# Patient Record
Sex: FEMALE | Race: BLACK OR AFRICAN AMERICAN | Employment: OTHER | URBAN - NONMETROPOLITAN AREA
[De-identification: names, ages, dates, MRNs, and addresses within clinical notes are randomized per-mention and may not be internally consistent; named-entity substitution may affect disease eponyms.]

---

## 2017-02-20 ENCOUNTER — HOSPITAL ENCOUNTER (OUTPATIENT)
Age: 72
Setting detail: SPECIMEN
Discharge: HOME | End: 2017-02-20
Payer: MEDICARE

## 2017-05-08 ENCOUNTER — HOSPITAL ENCOUNTER (OUTPATIENT)
Age: 72
Setting detail: SPECIMEN
Discharge: HOME | End: 2017-05-08
Payer: COMMERCIAL

## 2017-05-31 ENCOUNTER — APPOINTMENT (OUTPATIENT)
Dept: GENERAL RADIOLOGY | Age: 72
End: 2017-05-31
Payer: MEDICARE

## 2017-05-31 ENCOUNTER — HOSPITAL ENCOUNTER (EMERGENCY)
Age: 72
Discharge: HOME | End: 2017-05-31
Attending: EMERGENCY MEDICINE
Payer: MEDICARE

## 2017-05-31 VITALS
HEIGHT: 62 IN | TEMPERATURE: 98.4 F | RESPIRATION RATE: 18 BRPM | DIASTOLIC BLOOD PRESSURE: 76 MMHG | BODY MASS INDEX: 20.98 KG/M2 | HEART RATE: 82 BPM | SYSTOLIC BLOOD PRESSURE: 118 MMHG | WEIGHT: 114 LBS | OXYGEN SATURATION: 99 %

## 2017-05-31 DIAGNOSIS — E87.6 HYPOKALEMIA: ICD-10-CM

## 2017-05-31 DIAGNOSIS — D64.9 ANEMIA, UNSPECIFIED TYPE: ICD-10-CM

## 2017-05-31 DIAGNOSIS — N17.9 ACUTE RENAL FAILURE ON DIALYSIS (HCC): Primary | ICD-10-CM

## 2017-05-31 DIAGNOSIS — Z99.2 ACUTE RENAL FAILURE ON DIALYSIS (HCC): Primary | ICD-10-CM

## 2017-05-31 LAB
ABO/RH: NORMAL
ALBUMIN SERPL-MCNC: 2.4 G/DL (ref 3.5–5.2)
ALP BLD-CCNC: 88 U/L (ref 35–104)
ALT SERPL-CCNC: <5 U/L (ref 5–33)
ANION GAP SERPL CALCULATED.3IONS-SCNC: 18 MMOL/L (ref 7–19)
ANTIBODY SCREEN: NORMAL
AST SERPL-CCNC: 9 U/L (ref 5–32)
BANDED NEUTROPHILS RELATIVE PERCENT: 1 % (ref 0–5)
BASOPHILS ABSOLUTE: 0 K/UL (ref 0–0.2)
BASOPHILS MANUAL: 1 %
BASOPHILS RELATIVE PERCENT: 1 % (ref 0–1)
BILIRUB SERPL-MCNC: 1.2 MG/DL (ref 0.2–1.2)
BLOOD BANK DISPENSE STATUS: NORMAL
BLOOD BANK PRODUCT CODE: NORMAL
BPU ID: NORMAL
BUN BLDV-MCNC: 28 MG/DL (ref 8–23)
BURR CELLS: ABNORMAL
C-REACTIVE PROTEIN: 4.8 MG/L (ref 0–5)
CALCIUM SERPL-MCNC: 7.3 MG/DL (ref 8.8–10.2)
CHLORIDE BLD-SCNC: 96 MMOL/L (ref 98–111)
CO2: 24 MMOL/L (ref 22–29)
CREAT SERPL-MCNC: 9.8 MG/DL (ref 0.5–0.9)
DESCRIPTION BLOOD BANK: NORMAL
EOSINOPHILS ABSOLUTE: 0.06 K/UL (ref 0–0.6)
EOSINOPHILS MANUAL: 2 %
EOSINOPHILS RELATIVE PERCENT: 2 % (ref 0–5)
GFR NON-AFRICAN AMERICAN: 4
GLUCOSE BLD-MCNC: 67 MG/DL (ref 74–109)
HCT VFR BLD CALC: 22 % (ref 37–47)
HEMOGLOBIN: 7.1 G/DL (ref 12–16)
HYPOCHROMIA: ABNORMAL
LYMPHOCYTES ABSOLUTE: 0.9 K/UL (ref 1.1–4.5)
LYMPHOCYTES MANUAL: 32 % (ref 20–40)
LYMPHOCYTES RELATIVE PERCENT: 32 % (ref 20–40)
MAGNESIUM: 2.1 MG/DL (ref 1.6–2.4)
MCH RBC QN AUTO: 29.5 PG (ref 27–31)
MCHC RBC AUTO-ENTMCNC: 32.3 G/DL (ref 33–37)
MCV RBC AUTO: 91.3 FL (ref 81–99)
MONOCYTES ABSOLUTE: 0.1 K/UL (ref 0–0.9)
MONOCYTES MANUAL: 4 % (ref 0–10)
MONOCYTES RELATIVE PERCENT: 4 % (ref 0–10)
NEUTROPHILS ABSOLUTE: 1.8 K/UL (ref 1.5–7.5)
NEUTROPHILS MANUAL: 60 %
NEUTROPHILS RELATIVE PERCENT: 60 % (ref 50–65)
PDW BLD-RTO: 23 % (ref 11.5–14.5)
PLATELET # BLD: 111 K/UL (ref 130–400)
PMV BLD AUTO: 9.5 FL (ref 7.4–10.4)
POIKILOCYTES: ABNORMAL
POTASSIUM SERPL-SCNC: 2 MMOL/L (ref 3.5–5)
RBC # BLD: 2.41 M/UL (ref 4.2–5.4)
SEDIMENTATION RATE, ERYTHROCYTE: 13 MM/HR (ref 0–25)
SLIDE REVIEW: ABNORMAL
SODIUM BLD-SCNC: 138 MMOL/L (ref 136–145)
TOTAL PROTEIN: 5.5 G/DL (ref 6.6–8.7)
WBC # BLD: 2.9 K/UL (ref 4.8–10.8)

## 2017-05-31 PROCEDURE — 85652 RBC SED RATE AUTOMATED: CPT

## 2017-05-31 PROCEDURE — 36415 COLL VENOUS BLD VENIPUNCTURE: CPT

## 2017-05-31 PROCEDURE — 86901 BLOOD TYPING SEROLOGIC RH(D): CPT

## 2017-05-31 PROCEDURE — 99284 EMERGENCY DEPT VISIT MOD MDM: CPT

## 2017-05-31 PROCEDURE — 85025 COMPLETE CBC W/AUTO DIFF WBC: CPT

## 2017-05-31 PROCEDURE — 2580000003 HC RX 258: Performed by: NURSE PRACTITIONER

## 2017-05-31 PROCEDURE — 99284 EMERGENCY DEPT VISIT MOD MDM: CPT | Performed by: EMERGENCY MEDICINE

## 2017-05-31 PROCEDURE — 83735 ASSAY OF MAGNESIUM: CPT

## 2017-05-31 PROCEDURE — 73610 X-RAY EXAM OF ANKLE: CPT

## 2017-05-31 PROCEDURE — 86900 BLOOD TYPING SEROLOGIC ABO: CPT

## 2017-05-31 PROCEDURE — 86140 C-REACTIVE PROTEIN: CPT

## 2017-05-31 PROCEDURE — 36430 TRANSFUSION BLD/BLD COMPNT: CPT

## 2017-05-31 PROCEDURE — 93005 ELECTROCARDIOGRAM TRACING: CPT

## 2017-05-31 PROCEDURE — 6370000000 HC RX 637 (ALT 250 FOR IP): Performed by: NURSE PRACTITIONER

## 2017-05-31 PROCEDURE — 80053 COMPREHEN METABOLIC PANEL: CPT

## 2017-05-31 PROCEDURE — P9016 RBC LEUKOCYTES REDUCED: HCPCS

## 2017-05-31 PROCEDURE — 86850 RBC ANTIBODY SCREEN: CPT

## 2017-05-31 RX ORDER — POTASSIUM CHLORIDE 20 MEQ/1
20 TABLET, EXTENDED RELEASE ORAL ONCE
Status: COMPLETED | OUTPATIENT
Start: 2017-05-31 | End: 2017-05-31

## 2017-05-31 RX ORDER — POTASSIUM CHLORIDE 7.45 MG/ML
10 INJECTION INTRAVENOUS ONCE
Status: DISCONTINUED | OUTPATIENT
Start: 2017-05-31 | End: 2017-05-31

## 2017-05-31 RX ORDER — 0.9 % SODIUM CHLORIDE 0.9 %
250 INTRAVENOUS SOLUTION INTRAVENOUS ONCE
Status: COMPLETED | OUTPATIENT
Start: 2017-05-31 | End: 2017-05-31

## 2017-05-31 RX ADMIN — SODIUM CHLORIDE 250 ML: 9 INJECTION, SOLUTION INTRAVENOUS at 11:39

## 2017-05-31 RX ADMIN — POTASSIUM CHLORIDE 20 MEQ: 20 TABLET, EXTENDED RELEASE ORAL at 11:24

## 2017-05-31 ASSESSMENT — PAIN DESCRIPTION - LOCATION: LOCATION: ANKLE

## 2017-05-31 ASSESSMENT — PAIN SCALES - GENERAL: PAINLEVEL_OUTOF10: 10

## 2017-05-31 ASSESSMENT — PAIN DESCRIPTION - ORIENTATION: ORIENTATION: LEFT

## 2017-05-31 ASSESSMENT — PAIN DESCRIPTION - FREQUENCY: FREQUENCY: CONTINUOUS

## 2017-06-01 LAB
EKG P AXIS: 76 DEGREES
EKG P-R INTERVAL: 151 MS
EKG Q-T INTERVAL: 540 MS
EKG QRS DURATION: 125 MS
EKG QTC CALCULATION (BAZETT): 612 MS
EKG T AXIS: -59 DEGREES

## 2017-06-11 ENCOUNTER — HOSPITAL ENCOUNTER (INPATIENT)
Facility: HOSPITAL | Age: 72
LOS: 15 days | Discharge: HOME HEALTH | End: 2017-06-26
Attending: EMERGENCY MEDICINE | Admitting: FAMILY MEDICINE

## 2017-06-11 ENCOUNTER — APPOINTMENT (OUTPATIENT)
Dept: GENERAL RADIOLOGY | Facility: HOSPITAL | Age: 72
End: 2017-06-11

## 2017-06-11 DIAGNOSIS — I95.9 HYPOTENSION, UNSPECIFIED HYPOTENSION TYPE: ICD-10-CM

## 2017-06-11 DIAGNOSIS — N18.6 END STAGE RENAL DISEASE (HCC): ICD-10-CM

## 2017-06-11 DIAGNOSIS — Z74.09 IMPAIRED MOBILITY: ICD-10-CM

## 2017-06-11 DIAGNOSIS — I50.22 CHRONIC SYSTOLIC (CONGESTIVE) HEART FAILURE (HCC): ICD-10-CM

## 2017-06-11 DIAGNOSIS — Z78.9 DECREASED ACTIVITIES OF DAILY LIVING (ADL): ICD-10-CM

## 2017-06-11 DIAGNOSIS — R07.9 CHEST PAIN IN ADULT: Primary | ICD-10-CM

## 2017-06-11 LAB
ALBUMIN SERPL-MCNC: 2.1 G/DL (ref 3.5–5)
ALBUMIN/GLOB SERPL: 0.6 G/DL (ref 1.1–2.5)
ALP SERPL-CCNC: 81 U/L (ref 24–120)
ALT SERPL W P-5'-P-CCNC: 17 U/L (ref 0–54)
ANION GAP SERPL CALCULATED.3IONS-SCNC: 20 MMOL/L (ref 4–13)
AST SERPL-CCNC: 18 U/L (ref 7–45)
BILIRUB SERPL-MCNC: 2.4 MG/DL (ref 0.1–1)
BUN BLD-MCNC: 66 MG/DL (ref 5–21)
BUN/CREAT SERPL: 4 (ref 7–25)
CALCIUM SPEC-SCNC: 8.1 MG/DL (ref 8.4–10.4)
CHLORIDE SERPL-SCNC: 100 MMOL/L (ref 98–110)
CO2 SERPL-SCNC: 19 MMOL/L (ref 24–31)
CREAT BLD-MCNC: 16.31 MG/DL (ref 0.5–1.4)
GFR SERPL CREATININE-BSD FRML MDRD: 3 ML/MIN/1.73
GLOBULIN UR ELPH-MCNC: 3.3 GM/DL
GLUCOSE BLD-MCNC: 72 MG/DL (ref 70–100)
MAGNESIUM SERPL-MCNC: 2.2 MG/DL (ref 1.4–2.2)
PHOSPHATE SERPL-MCNC: 9.4 MG/DL (ref 2.5–4.5)
POTASSIUM BLD-SCNC: 2 MMOL/L (ref 3.5–5.3)
PROT SERPL-MCNC: 5.4 G/DL (ref 6.3–8.7)
SODIUM BLD-SCNC: 139 MMOL/L (ref 135–145)
TROPONIN I SERPL-MCNC: 2.57 NG/ML (ref 0–0.03)
TROPONIN I SERPL-MCNC: 2.87 NG/ML (ref 0–0.03)

## 2017-06-11 PROCEDURE — 83735 ASSAY OF MAGNESIUM: CPT | Performed by: INTERNAL MEDICINE

## 2017-06-11 PROCEDURE — 93010 ELECTROCARDIOGRAM REPORT: CPT | Performed by: INTERNAL MEDICINE

## 2017-06-11 PROCEDURE — 93005 ELECTROCARDIOGRAM TRACING: CPT

## 2017-06-11 PROCEDURE — 84484 ASSAY OF TROPONIN QUANT: CPT | Performed by: INTERNAL MEDICINE

## 2017-06-11 PROCEDURE — 84100 ASSAY OF PHOSPHORUS: CPT | Performed by: NURSE PRACTITIONER

## 2017-06-11 PROCEDURE — 85007 BL SMEAR W/DIFF WBC COUNT: CPT | Performed by: NURSE PRACTITIONER

## 2017-06-11 PROCEDURE — 84484 ASSAY OF TROPONIN QUANT: CPT | Performed by: EMERGENCY MEDICINE

## 2017-06-11 PROCEDURE — 71010 HC CHEST PA OR AP: CPT

## 2017-06-11 PROCEDURE — 99285 EMERGENCY DEPT VISIT HI MDM: CPT

## 2017-06-11 PROCEDURE — 80053 COMPREHEN METABOLIC PANEL: CPT | Performed by: NURSE PRACTITIONER

## 2017-06-11 PROCEDURE — 99222 1ST HOSP IP/OBS MODERATE 55: CPT | Performed by: INTERNAL MEDICINE

## 2017-06-11 PROCEDURE — 85025 COMPLETE CBC W/AUTO DIFF WBC: CPT | Performed by: NURSE PRACTITIONER

## 2017-06-11 PROCEDURE — 83735 ASSAY OF MAGNESIUM: CPT | Performed by: NURSE PRACTITIONER

## 2017-06-11 PROCEDURE — 93005 ELECTROCARDIOGRAM TRACING: CPT | Performed by: EMERGENCY MEDICINE

## 2017-06-11 RX ORDER — LOSARTAN POTASSIUM 25 MG/1
25 TABLET ORAL DAILY
COMMUNITY
End: 2017-06-26 | Stop reason: HOSPADM

## 2017-06-11 RX ORDER — PREDNISONE 1 MG/1
1 TABLET ORAL DAILY
COMMUNITY
End: 2017-06-26 | Stop reason: HOSPADM

## 2017-06-11 RX ORDER — TRAZODONE HYDROCHLORIDE 50 MG/1
50 TABLET ORAL NIGHTLY
COMMUNITY

## 2017-06-11 RX ORDER — IPRATROPIUM BROMIDE AND ALBUTEROL SULFATE 2.5; .5 MG/3ML; MG/3ML
3 SOLUTION RESPIRATORY (INHALATION) EVERY 4 HOURS PRN
Status: DISCONTINUED | OUTPATIENT
Start: 2017-06-11 | End: 2017-06-15

## 2017-06-11 RX ORDER — PANTOPRAZOLE SODIUM 40 MG/1
40 TABLET, DELAYED RELEASE ORAL DAILY
COMMUNITY

## 2017-06-11 RX ORDER — ONDANSETRON 2 MG/ML
4 INJECTION INTRAMUSCULAR; INTRAVENOUS EVERY 6 HOURS PRN
Status: DISCONTINUED | OUTPATIENT
Start: 2017-06-11 | End: 2017-06-26 | Stop reason: HOSPADM

## 2017-06-11 RX ORDER — POTASSIUM CHLORIDE 750 MG/1
20 CAPSULE, EXTENDED RELEASE ORAL 2 TIMES DAILY WITH MEALS
Status: DISCONTINUED | OUTPATIENT
Start: 2017-06-11 | End: 2017-06-12

## 2017-06-11 RX ORDER — SODIUM CHLORIDE 0.9 % (FLUSH) 0.9 %
1-10 SYRINGE (ML) INJECTION AS NEEDED
Status: DISCONTINUED | OUTPATIENT
Start: 2017-06-11 | End: 2017-06-22

## 2017-06-11 RX ORDER — POTASSIUM CHLORIDE 1.5 G/1.77G
20 POWDER, FOR SOLUTION ORAL DAILY
COMMUNITY
End: 2017-06-26 | Stop reason: HOSPADM

## 2017-06-11 RX ORDER — CARVEDILOL 12.5 MG/1
12.5 TABLET ORAL DAILY
COMMUNITY
End: 2017-06-26 | Stop reason: HOSPADM

## 2017-06-11 RX ORDER — PANTOPRAZOLE SODIUM 40 MG/1
40 TABLET, DELAYED RELEASE ORAL
Status: DISCONTINUED | OUTPATIENT
Start: 2017-06-12 | End: 2017-06-26 | Stop reason: HOSPADM

## 2017-06-11 RX ORDER — ASPIRIN 81 MG/1
81 TABLET ORAL DAILY
Status: DISCONTINUED | OUTPATIENT
Start: 2017-06-11 | End: 2017-06-18

## 2017-06-11 RX ADMIN — SODIUM CHLORIDE 250 ML: 9 INJECTION, SOLUTION INTRAVENOUS at 17:55

## 2017-06-11 RX ADMIN — SODIUM CHLORIDE 250 ML: 9 INJECTION, SOLUTION INTRAVENOUS at 18:30

## 2017-06-11 NOTE — ED NOTES
PATIENT C/O CHEST PAIN THAT BEGAN TWO DAYS AGO. PATIENT STATES PAIN RADIATED TO HER RIGHT ARM. PATIENT STATES PAIN IS RESOLVED AT THIS TIME. PATIENT DENIES SHORTNESS OF BREATH. PATIENT PRESENTED TO [facility] ER TODAY WITH C/O CHEST PAIN, NAUSEA, SOA, AND ALL OVER PAIN. PATIENT'S TROPONIN WAS 3.09 AT [facility]. PATIENT ALSO REPORTS MISSING HER DIALYSIS APPOINTMENT ON Friday DUE TO FATIGUE AND NOT FELLING WELL. PATIENT WAS GIVEN 500 NS BOLUS, 324 MG ASPIRIN, 2 MG MORPHINE, AND 4MG ZOFRAN PER TRANSFERRING FACILITY.      Dahlia Avila RN  06/11/17 2859

## 2017-06-11 NOTE — ED PROVIDER NOTES
"Subjective   HPI Comments: Patient is a 72-year-old female with history of chronic systolic congestive heart failure, history of severe ischemic cardiomyopathy, end-stage renal disease, atrial fibrillation and hypertension.  Patient reports that she was not feeling well on Friday (06/09/17) and therefore she did not go for her dialysis treatment.  Patient reports that this morning at 3 AM she developed \"sharp\" center chest pain radiating to her left arm with associated shortness of breath, nausea, vomiting, diaphoresis and generalized pain and therefore she was seen at [facility] emergency department and was noted to have a potassium at 2.7, BUNs 67, creatinine 17.9 and troponin 3.09 and therefore the patient was sent to this emergency department.  Patient reports her chest pain was worsened by nothing and relieved partially at [facility] emergency department when given morphine 2 mg intravenously.  She reports her chest pain currently is a 10 and at its worse is a 10.  Patient reports that her cardiologist is in [place], [place] and she last saw him about 3 months ago.  She reports her last cardiac stress test was about 4 years ago.      History provided by:  Patient ([facility] records dated 06/11/17.)      Review of Systems   Constitutional: Positive for diaphoresis.   HENT: Negative.    Eyes: Negative.    Respiratory: Positive for shortness of breath.    Cardiovascular: Positive for chest pain.   Gastrointestinal: Positive for nausea and vomiting.   Endocrine: Negative.    Genitourinary: Negative.         Chronic kidney disease   Musculoskeletal: Negative.    Skin: Negative.    Allergic/Immunologic: Negative.    Neurological: Negative.    Hematological: Negative.    Psychiatric/Behavioral: Negative.    All other systems reviewed and are negative.      Past Medical History:   Diagnosis Date   • A-fib    • Arthritis    • CHF (congestive heart failure)    • Hypertension    • " Renal failure        Allergies   Allergen Reactions   • Heparin    • Iron    • Latex    • Levaquin [Levofloxacin]    • Shrimp (Diagnostic)    • Vancomycin        Past Surgical History:   Procedure Laterality Date   • ARTERIOVENOUS FISTULA LEG Left        History reviewed. No pertinent family history.    Social History     Social History   • Marital status: Single     Spouse name: N/A   • Number of children: N/A   • Years of education: N/A     Social History Main Topics   • Smoking status: Former Smoker   • Smokeless tobacco: None   • Alcohol use No   • Drug use: No   • Sexual activity: Defer     Other Topics Concern   • None     Social History Narrative   • None           Objective   Physical Exam   Constitutional: She is oriented to person, place, and time.   Ill-appearing female.   HENT:   Head: Normocephalic and atraumatic.   Right Ear: External ear normal.   Left Ear: External ear normal.   Nose: Nose normal.   Mouth/Throat: Oropharynx is clear and moist.   Eyes: Conjunctivae and EOM are normal. Pupils are equal, round, and reactive to light. Right eye exhibits no discharge. Left eye exhibits no discharge.   Neck: Normal range of motion. Neck supple. No tracheal deviation present. No thyromegaly present.   Cardiovascular: Normal rate, regular rhythm, normal heart sounds and intact distal pulses.    No murmur heard.  Pulmonary/Chest: Effort normal and breath sounds normal. No respiratory distress. She exhibits no tenderness.   Abdominal: Soft. She exhibits no distension. There is tenderness (Mild generalized abdominal pain to palpation.).   Musculoskeletal: She exhibits no edema, tenderness or deformity.   Moderate generalized weakness.   Neurological: She is alert and oriented to person, place, and time. No cranial nerve deficit.   Skin: Skin is warm and dry. No erythema. No pallor.   Psychiatric: She has a normal mood and affect. Judgment normal.   Nursing note and vitals reviewed.      Procedures         ED  Course  ED Course   Comment By Time   Patient's case has been discussed with Dr. Nelson and he recommends that the patient be admitted by the hospitalist and the patient receive dialysis and that he will consult on the patient. Dereck Calvo MD 06/11 1841   Patient's case has just been discussed with Dr. Tang (hospitalist) and he agrees to admit the patient to ICU. Dereck Calvo MD 06/11 1858                  MDM  Number of Diagnoses or Management Options  Chest pain in adult: new and requires workup  Chronic systolic (congestive) heart failure:   End stage renal disease: established and worsening  Hypotension, unspecified hypotension type: new and requires workup     Amount and/or Complexity of Data Reviewed  Clinical lab tests: reviewed and ordered  Tests in the radiology section of CPT®: ordered and reviewed  Discuss the patient with other providers: yes    Risk of Complications, Morbidity, and/or Mortality  Presenting problems: moderate  Diagnostic procedures: moderate  Management options: low    Patient Progress  Patient progress: stable      Final diagnoses:   Chest pain in adult   End stage renal disease   Hypotension, unspecified hypotension type   Chronic systolic (congestive) heart failure            Dereck Calvo MD  06/11/17 9370

## 2017-06-11 NOTE — ED NOTES
Transfer from [facility]. Pt went to transferring ER with c/o CP since this AM, nausea, SOA, and generalized pain. Dialysis patient (M-W-F) and missed her Friday dialysis due to not feeling well. Transferred to this ER for abnormal labs: troponin 3.09, bilirubin 2.3, K 2.7, BUN 67, creat 17.9. While at [facility] pt received NS bolus of 500ml, ASA 324mg PO, Morphine 2mg IV, and Zofran 4mg IV. Chest pain decreased from 10/10 to 6/10 with Morphine. Pt also has reported new onset afib, controlled rate in 80s.     Yashira Macedo, RN, BSN  06/11/17 5595

## 2017-06-12 ENCOUNTER — APPOINTMENT (OUTPATIENT)
Dept: CARDIOLOGY | Facility: HOSPITAL | Age: 72
End: 2017-06-12
Attending: INTERNAL MEDICINE

## 2017-06-12 LAB
ANION GAP SERPL CALCULATED.3IONS-SCNC: 17 MMOL/L (ref 4–13)
ANION GAP SERPL CALCULATED.3IONS-SCNC: 20 MMOL/L (ref 4–13)
ANISOCYTOSIS BLD QL: NORMAL
BASOPHILS # BLD AUTO: 0.01 10*3/MM3 (ref 0–0.2)
BASOPHILS NFR BLD AUTO: 0.1 % (ref 0–2)
BH CV ECHO MEAS - AO MAX PG (FULL): 13.6 MMHG
BH CV ECHO MEAS - AO MAX PG: 17.5 MMHG
BH CV ECHO MEAS - AO MEAN PG (FULL): 5 MMHG
BH CV ECHO MEAS - AO MEAN PG: 7 MMHG
BH CV ECHO MEAS - AO ROOT AREA (BSA CORRECTED): 1.9
BH CV ECHO MEAS - AO ROOT AREA: 6.2 CM^2
BH CV ECHO MEAS - AO ROOT DIAM: 2.8 CM
BH CV ECHO MEAS - AO V2 MAX: 209 CM/SEC
BH CV ECHO MEAS - AO V2 MEAN: 119 CM/SEC
BH CV ECHO MEAS - AO V2 VTI: 35.8 CM
BH CV ECHO MEAS - AVA(I,A): 1.3 CM^2
BH CV ECHO MEAS - AVA(I,D): 1.3 CM^2
BH CV ECHO MEAS - AVA(V,A): 1.3 CM^2
BH CV ECHO MEAS - AVA(V,D): 1.3 CM^2
BH CV ECHO MEAS - BSA(HAYCOCK): 1.5 M^2
BH CV ECHO MEAS - BSA: 1.5 M^2
BH CV ECHO MEAS - BZI_BMI: 20.7 KILOGRAMS/M^2
BH CV ECHO MEAS - BZI_METRIC_HEIGHT: 157.5 CM
BH CV ECHO MEAS - BZI_METRIC_WEIGHT: 51.3 KG
BH CV ECHO MEAS - CONTRAST EF 4CH: 65.1 ML/M^2
BH CV ECHO MEAS - EDV(CUBED): 54.4 ML
BH CV ECHO MEAS - EDV(MOD-SP4): 70.8 ML
BH CV ECHO MEAS - EDV(TEICH): 61.6 ML
BH CV ECHO MEAS - EF(CUBED): 69.9 %
BH CV ECHO MEAS - EF(MOD-SP4): 65.1 %
BH CV ECHO MEAS - EF(TEICH): 62.3 %
BH CV ECHO MEAS - ESV(CUBED): 16.4 ML
BH CV ECHO MEAS - ESV(MOD-SP4): 24.7 ML
BH CV ECHO MEAS - ESV(TEICH): 23.2 ML
BH CV ECHO MEAS - FS: 33 %
BH CV ECHO MEAS - IVS/LVPW: 0.92
BH CV ECHO MEAS - IVSD: 0.91 CM
BH CV ECHO MEAS - LA DIMENSION: 3.5 CM
BH CV ECHO MEAS - LA/AO: 1.3
BH CV ECHO MEAS - LAT PEAK E' VEL: 4.7 CM/SEC
BH CV ECHO MEAS - LV DIASTOLIC VOL/BSA (35-75): 47.2 ML/M^2
BH CV ECHO MEAS - LV MASS(C)D: 109.3 GRAMS
BH CV ECHO MEAS - LV MASS(C)DI: 72.9 GRAMS/M^2
BH CV ECHO MEAS - LV MAX PG: 3.8 MMHG
BH CV ECHO MEAS - LV MEAN PG: 2 MMHG
BH CV ECHO MEAS - LV SYSTOLIC VOL/BSA (12-30): 16.5 ML/M^2
BH CV ECHO MEAS - LV V1 MAX: 97.9 CM/SEC
BH CV ECHO MEAS - LV V1 MEAN: 59.3 CM/SEC
BH CV ECHO MEAS - LV V1 VTI: 16.7 CM
BH CV ECHO MEAS - LVIDD: 3.8 CM
BH CV ECHO MEAS - LVIDS: 2.5 CM
BH CV ECHO MEAS - LVLD AP4: 7.4 CM
BH CV ECHO MEAS - LVLS AP4: 5.8 CM
BH CV ECHO MEAS - LVOT AREA (M): 2.8 CM^2
BH CV ECHO MEAS - LVOT AREA: 2.8 CM^2
BH CV ECHO MEAS - LVOT DIAM: 1.9 CM
BH CV ECHO MEAS - LVPWD: 1 CM
BH CV ECHO MEAS - MED PEAK E' VEL: 3.37 CM/SEC
BH CV ECHO MEAS - MV A MAX VEL: 81.2 CM/SEC
BH CV ECHO MEAS - MV DEC TIME: 0.24 SEC
BH CV ECHO MEAS - MV E MAX VEL: 81.9 CM/SEC
BH CV ECHO MEAS - MV E/A: 1
BH CV ECHO MEAS - PI END-D VEL: 162 CM/SEC
BH CV ECHO MEAS - RAP SYSTOLE: 10 MMHG
BH CV ECHO MEAS - RVSP: 88.5 MMHG
BH CV ECHO MEAS - SI(AO): 147 ML/M^2
BH CV ECHO MEAS - SI(CUBED): 25.4 ML/M^2
BH CV ECHO MEAS - SI(LVOT): 31.6 ML/M^2
BH CV ECHO MEAS - SI(MOD-SP4): 30.7 ML/M^2
BH CV ECHO MEAS - SI(TEICH): 25.6 ML/M^2
BH CV ECHO MEAS - SV(AO): 220.4 ML
BH CV ECHO MEAS - SV(CUBED): 38.1 ML
BH CV ECHO MEAS - SV(LVOT): 47.3 ML
BH CV ECHO MEAS - SV(MOD-SP4): 46.1 ML
BH CV ECHO MEAS - SV(TEICH): 38.3 ML
BH CV ECHO MEAS - TR MAX VEL: 443 CM/SEC
BUN BLD-MCNC: 45 MG/DL (ref 5–21)
BUN BLD-MCNC: 66 MG/DL (ref 5–21)
BUN/CREAT SERPL: 3.9 (ref 7–25)
BUN/CREAT SERPL: 4 (ref 7–25)
BURR CELLS BLD QL SMEAR: NORMAL
CALCIUM SPEC-SCNC: 8.1 MG/DL (ref 8.4–10.4)
CALCIUM SPEC-SCNC: 8.2 MG/DL (ref 8.4–10.4)
CHLORIDE SERPL-SCNC: 100 MMOL/L (ref 98–110)
CHLORIDE SERPL-SCNC: 97 MMOL/L (ref 98–110)
CO2 SERPL-SCNC: 19 MMOL/L (ref 24–31)
CO2 SERPL-SCNC: 24 MMOL/L (ref 24–31)
CREAT BLD-MCNC: 11.53 MG/DL (ref 0.5–1.4)
CREAT BLD-MCNC: 16.52 MG/DL (ref 0.5–1.4)
DEPRECATED RDW RBC AUTO: 68.6 FL (ref 40–54)
DEPRECATED RDW RBC AUTO: 69.4 FL (ref 40–54)
EOSINOPHIL # BLD AUTO: 0.05 10*3/MM3 (ref 0–0.7)
EOSINOPHIL NFR BLD AUTO: 0.6 % (ref 0–4)
ERYTHROCYTE [DISTWIDTH] IN BLOOD BY AUTOMATED COUNT: 22.6 % (ref 12–15)
ERYTHROCYTE [DISTWIDTH] IN BLOOD BY AUTOMATED COUNT: 22.8 % (ref 12–15)
GFR SERPL CREATININE-BSD FRML MDRD: 3 ML/MIN/1.73
GFR SERPL CREATININE-BSD FRML MDRD: 4 ML/MIN/1.73
GLUCOSE BLD-MCNC: 48 MG/DL (ref 70–100)
GLUCOSE BLD-MCNC: 71 MG/DL (ref 70–100)
GLUCOSE BLDC GLUCOMTR-MCNC: 29 MG/DL (ref 70–130)
GLUCOSE BLDC GLUCOMTR-MCNC: 34 MG/DL (ref 70–130)
HCT VFR BLD AUTO: 23.8 % (ref 37–47)
HCT VFR BLD AUTO: 24.1 % (ref 37–47)
HGB BLD-MCNC: 8 G/DL (ref 12–16)
HGB BLD-MCNC: 8.1 G/DL (ref 12–16)
IMM GRANULOCYTES # BLD: 0.03 10*3/MM3 (ref 0–0.03)
IMM GRANULOCYTES NFR BLD: 0.3 % (ref 0–5)
LEFT ATRIUM VOLUME INDEX: 38.9 ML/M2
LEFT ATRIUM VOLUME: 58.3 CM3
LYMPHOCYTES # BLD AUTO: 0.61 10*3/MM3 (ref 0.72–4.86)
LYMPHOCYTES NFR BLD AUTO: 6.9 % (ref 15–45)
MAGNESIUM SERPL-MCNC: 2.2 MG/DL (ref 1.4–2.2)
MCH RBC QN AUTO: 28 PG (ref 28–32)
MCH RBC QN AUTO: 28.5 PG (ref 28–32)
MCHC RBC AUTO-ENTMCNC: 33.2 G/DL (ref 33–36)
MCHC RBC AUTO-ENTMCNC: 34 G/DL (ref 33–36)
MCV RBC AUTO: 83.8 FL (ref 82–98)
MCV RBC AUTO: 84.3 FL (ref 82–98)
MONOCYTES # BLD AUTO: 0.17 10*3/MM3 (ref 0.19–1.3)
MONOCYTES NFR BLD AUTO: 1.9 % (ref 4–12)
NEUTROPHILS # BLD AUTO: 8 10*3/MM3 (ref 1.87–8.4)
NEUTROPHILS NFR BLD AUTO: 90.2 % (ref 39–78)
OVALOCYTES BLD QL SMEAR: NORMAL
PLATELET # BLD AUTO: 123 10*3/MM3 (ref 130–400)
PLATELET # BLD AUTO: 127 10*3/MM3 (ref 130–400)
PMV BLD AUTO: 10.7 FL (ref 6–12)
POIKILOCYTOSIS BLD QL SMEAR: NORMAL
POTASSIUM BLD-SCNC: 1.9 MMOL/L (ref 3.5–5.3)
POTASSIUM BLD-SCNC: 2.2 MMOL/L (ref 3.5–5.3)
POTASSIUM BLD-SCNC: 2.4 MMOL/L (ref 3.5–5.3)
RBC # BLD AUTO: 2.84 10*6/MM3 (ref 4.2–5.4)
RBC # BLD AUTO: 2.86 10*6/MM3 (ref 4.2–5.4)
SMALL PLATELETS BLD QL SMEAR: NORMAL
SODIUM BLD-SCNC: 138 MMOL/L (ref 135–145)
SODIUM BLD-SCNC: 139 MMOL/L (ref 135–145)
T3FREE SERPL-MCNC: 2.53 PG/ML (ref 2.77–5.27)
T4 FREE SERPL-MCNC: 1.87 NG/DL (ref 0.78–2.19)
TROPONIN I SERPL-MCNC: 1.66 NG/ML (ref 0–0.03)
TROPONIN I SERPL-MCNC: 2.03 NG/ML (ref 0–0.03)
TROPONIN I SERPL-MCNC: 2.44 NG/ML (ref 0–0.03)
TSH SERPL DL<=0.05 MIU/L-ACNC: 12.2 MIU/ML (ref 0.47–4.68)
WBC MORPH BLD: NORMAL
WBC NRBC COR # BLD: 8.87 10*3/MM3 (ref 4.8–10.8)
WBC NRBC COR # BLD: 8.9 10*3/MM3 (ref 4.8–10.8)

## 2017-06-12 PROCEDURE — 85027 COMPLETE CBC AUTOMATED: CPT | Performed by: INTERNAL MEDICINE

## 2017-06-12 PROCEDURE — 84484 ASSAY OF TROPONIN QUANT: CPT | Performed by: INTERNAL MEDICINE

## 2017-06-12 PROCEDURE — 99232 SBSQ HOSP IP/OBS MODERATE 35: CPT | Performed by: INTERNAL MEDICINE

## 2017-06-12 PROCEDURE — 84439 ASSAY OF FREE THYROXINE: CPT | Performed by: FAMILY MEDICINE

## 2017-06-12 PROCEDURE — 80048 BASIC METABOLIC PNL TOTAL CA: CPT | Performed by: INTERNAL MEDICINE

## 2017-06-12 PROCEDURE — 93306 TTE W/DOPPLER COMPLETE: CPT

## 2017-06-12 PROCEDURE — 25010000003 POTASSIUM CHLORIDE 10 MEQ/100ML SOLUTION: Performed by: NURSE PRACTITIONER

## 2017-06-12 PROCEDURE — 02HV33Z INSERTION OF INFUSION DEVICE INTO SUPERIOR VENA CAVA, PERCUTANEOUS APPROACH: ICD-10-PCS | Performed by: FAMILY MEDICINE

## 2017-06-12 PROCEDURE — 84481 FREE ASSAY (FT-3): CPT | Performed by: FAMILY MEDICINE

## 2017-06-12 PROCEDURE — 94799 UNLISTED PULMONARY SVC/PX: CPT

## 2017-06-12 PROCEDURE — 84443 ASSAY THYROID STIM HORMONE: CPT | Performed by: INTERNAL MEDICINE

## 2017-06-12 PROCEDURE — 25010000002 ONDANSETRON PER 1 MG: Performed by: INTERNAL MEDICINE

## 2017-06-12 PROCEDURE — 84132 ASSAY OF SERUM POTASSIUM: CPT | Performed by: FAMILY MEDICINE

## 2017-06-12 PROCEDURE — 82962 GLUCOSE BLOOD TEST: CPT

## 2017-06-12 PROCEDURE — 93306 TTE W/DOPPLER COMPLETE: CPT | Performed by: INTERNAL MEDICINE

## 2017-06-12 RX ORDER — LIDOCAINE AND PRILOCAINE 25; 25 MG/G; MG/G
CREAM TOPICAL ONCE
Status: COMPLETED | OUTPATIENT
Start: 2017-06-12 | End: 2017-06-12

## 2017-06-12 RX ORDER — DEXTROSE MONOHYDRATE 25 G/50ML
50 INJECTION, SOLUTION INTRAVENOUS
Status: DISCONTINUED | OUTPATIENT
Start: 2017-06-12 | End: 2017-06-22

## 2017-06-12 RX ORDER — DEXTROSE MONOHYDRATE 25 G/50ML
INJECTION, SOLUTION INTRAVENOUS
Status: COMPLETED
Start: 2017-06-12 | End: 2017-06-12

## 2017-06-12 RX ORDER — POTASSIUM CHLORIDE 20MEQ/15ML
20 LIQUID (ML) ORAL DAILY
Status: DISCONTINUED | OUTPATIENT
Start: 2017-06-12 | End: 2017-06-12

## 2017-06-12 RX ORDER — POTASSIUM CHLORIDE 20MEQ/15ML
20 LIQUID (ML) ORAL ONCE
Status: COMPLETED | OUTPATIENT
Start: 2017-06-12 | End: 2017-06-12

## 2017-06-12 RX ORDER — POTASSIUM CHLORIDE 7.45 MG/ML
10 INJECTION INTRAVENOUS
Status: COMPLETED | OUTPATIENT
Start: 2017-06-12 | End: 2017-06-12

## 2017-06-12 RX ORDER — POTASSIUM CHLORIDE 750 MG/1
40 CAPSULE, EXTENDED RELEASE ORAL 2 TIMES DAILY WITH MEALS
Status: DISCONTINUED | OUTPATIENT
Start: 2017-06-12 | End: 2017-06-13

## 2017-06-12 RX ORDER — DEXTROSE MONOHYDRATE 25 G/50ML
50 INJECTION, SOLUTION INTRAVENOUS
Status: DISCONTINUED | OUTPATIENT
Start: 2017-06-12 | End: 2017-06-12

## 2017-06-12 RX ORDER — POTASSIUM CHLORIDE, DEXTROSE MONOHYDRATE 150; 5 MG/100ML; G/100ML
50 INJECTION, SOLUTION INTRAVENOUS CONTINUOUS
Status: DISCONTINUED | OUTPATIENT
Start: 2017-06-12 | End: 2017-06-12

## 2017-06-12 RX ADMIN — POTASSIUM CHLORIDE 40 MEQ: 10 CAPSULE, EXTENDED RELEASE ORAL at 17:28

## 2017-06-12 RX ADMIN — SODIUM CHLORIDE 250 ML: 9 INJECTION, SOLUTION INTRAVENOUS at 20:08

## 2017-06-12 RX ADMIN — LIDOCAINE AND PRILOCAINE: 25; 25 CREAM TOPICAL at 15:14

## 2017-06-12 RX ADMIN — POTASSIUM CHLORIDE 10 MEQ: 7.46 INJECTION, SOLUTION INTRAVENOUS at 06:09

## 2017-06-12 RX ADMIN — POTASSIUM CHLORIDE 10 MEQ: 7.46 INJECTION, SOLUTION INTRAVENOUS at 03:47

## 2017-06-12 RX ADMIN — DEXTROSE MONOHYDRATE 50 ML: 25 INJECTION, SOLUTION INTRAVENOUS at 23:30

## 2017-06-12 RX ADMIN — POTASSIUM CHLORIDE 10 MEQ: 7.46 INJECTION, SOLUTION INTRAVENOUS at 04:49

## 2017-06-12 RX ADMIN — POTASSIUM CHLORIDE 10 MEQ: 7.46 INJECTION, SOLUTION INTRAVENOUS at 02:42

## 2017-06-12 RX ADMIN — POTASSIUM CHLORIDE 20 MEQ: 20 SOLUTION ORAL at 20:08

## 2017-06-12 RX ADMIN — PANTOPRAZOLE SODIUM 40 MG: 40 TABLET, DELAYED RELEASE ORAL at 06:10

## 2017-06-12 RX ADMIN — ONDANSETRON 4 MG: 2 SOLUTION INTRAMUSCULAR; INTRAVENOUS at 09:12

## 2017-06-12 RX ADMIN — ONDANSETRON 4 MG: 2 SOLUTION INTRAMUSCULAR; INTRAVENOUS at 17:15

## 2017-06-12 RX ADMIN — ASPIRIN 81 MG: 81 TABLET ORAL at 09:54

## 2017-06-12 NOTE — PROGRESS NOTES
Discharge Planning Assessment  [facility]     Patient Name: Jennifer Marks  MRN: [id number]  Today's Date: 6/12/2017    Admit Date: 6/11/2017          Discharge Needs Assessment       06/12/17 1351    Living Environment    Lives With alone    Living Arrangements house    Transportation Available car;family or friend will provide;public transportation    Living Environment    Provides Primary Care For no one    Discharge Needs Assessment    Concerns To Be Addressed care coordination/care conferences;discharge planning concerns    Readmission Within The Last 30 Days no previous admission in last 30 days    Equipment Currently Used at Home walker, standard;walker, rolling;wheelchair    Discharge Facility/Level Of Care Needs nursing facility, skilled    Current Discharge Risk chronically ill    Discharge Planning Comments Spoke with patient regarding discharge plan/needs.  Patient states she resides alone.  Patient does dialysis at the [facility].  Patient states she transports to and from dialysis via [place] [facility].  Discussed discharge options and patient stated she was possibly interested in short term SNF placement.  She was referring to a specific facility in [place] but states it is not [facility].  SW went back to meet with patient with resource manual to discuss facilities and lab was drawing blood.  Second attempt patient was receiving dialysis and sleeping.  Will follow up with patient tomorrow regarding facility options.            Discharge Plan     None        Discharge Placement     No information found                Demographic Summary     None            Functional Status     None            Psychosocial     None            Abuse/Neglect     None            Legal     None            Substance Abuse     None            Patient Forms     None          ANUPAMA Zaragoza

## 2017-06-12 NOTE — CONSULTS
Consult for midline discussed with FANNIE Campoverde for nephrology. Due to patients extremely low GFR, he stated no midline at this time.

## 2017-06-12 NOTE — PLAN OF CARE
Problem: Fall Risk (Adult)  Goal: Identify Related Risk Factors and Signs and Symptoms  Outcome: Ongoing (interventions implemented as appropriate)  Pt. Received dialysis today, no fluid removed.    06/12/17 1749   Fall Risk   Fall Risk: Related Risk Factors age-related changes       Goal: Absence of Falls  Outcome: Ongoing (interventions implemented as appropriate)    Problem: Fluid Volume Excess (Adult,Obstetrics,Pediatric)  Goal: Identify Related Risk Factors and Signs and Symptoms  Outcome: Ongoing (interventions implemented as appropriate)  Goal: Stable Weight  Outcome: Outcome(s) achieved Date Met:  06/12/17  Goal: Balanced Intake/Output  Outcome: Ongoing (interventions implemented as appropriate)

## 2017-06-12 NOTE — PROGRESS NOTES
[facility] -  Progress Note     LOS: 1 day   Patient Care Team:  No Known Provider as PCP - General  No Known Provider as PCP - Family Medicine    Chief Complaint: chest pain    Subjective     Interval History: Patient not complaining of chest pain this morning.  Sleeping when I entered the room but was easily arousable.  No complaints of palpitations, chest pain, dyspnea.    Review of Systems:  Review of Systems   Respiratory: Negative for shortness of breath and wheezing.    Cardiovascular: Negative for chest pain, palpitations and leg swelling.       Vital Sign Min/Max for last 24 hours  Temp  Min: 96.2 °F (35.7 °C)  Max: 97.8 °F (36.6 °C)   BP  Min: 64/41  Max: 94/53   Pulse  Min: 76  Max: 88   Resp  Min: 12  Max: 20   SpO2  Min: 85 %  Max: 100 %   Flow (L/min)  Min: 2  Max: 2.5   Weight  Min: 113 lb 0.1 oz (51.3 kg)  Max: 113 lb 0.1 oz (51.3 kg)     Last Weight    06/11/17 1727   Weight: 113 lb 0.1 oz (51.3 kg)       Physical Exam:   Physical Exam   Constitutional: She appears acutely ill.   Neck: No JVD present.   Cardiovascular: Normal rate, regular rhythm, S1 normal, S2 normal, normal heart sounds, intact distal pulses and normal pulses.    Pulmonary/Chest: Effort normal and breath sounds normal.   Abdominal: There is tenderness. A hernia is present.   Neurological: She is alert and oriented to person, place, and time.   Skin: Skin is warm and dry.       Results Review:   Lab Results (last 24 hours)     Procedure Component Value Units Date/Time    Troponin [[id number]]  (Abnormal) Collected:  06/11/17 1730    Specimen:  Blood from Arm, Right Updated:  06/11/17 1807     Troponin I 2.870 (C) ng/mL     Troponin [[id number]]  (Abnormal) Collected:  06/11/17 2047    Specimen:  Blood Updated:  06/11/17 2116     Troponin I 2.570 (C) ng/mL     Phosphorus [[id number]]  (Abnormal) Collected:  06/11/17 2047    Specimen:  Blood Updated:  06/11/17 2238     Phosphorus 9.4 (H) mg/dL     Magnesium  [[id number]]  (Normal) Collected:  06/11/17 2047    Specimen:  Blood Updated:  06/11/17 2238     Magnesium 2.2 mg/dL     Comprehensive Metabolic Panel [[id number]]  (Abnormal) Collected:  06/11/17 2047    Specimen:  Blood Updated:  06/11/17 2249     Glucose 72 mg/dL      BUN 66 (H) mg/dL      Creatinine 16.31 (H) mg/dL      Sodium 139 mmol/L      Potassium 2.0 (C) mmol/L      Chloride 100 mmol/L      CO2 19.0 (L) mmol/L      Calcium 8.1 (L) mg/dL      Total Protein 5.4 (L) g/dL      Albumin 2.10 (L) g/dL      ALT (SGPT) 17 U/L      AST (SGOT) 18 U/L      Alkaline Phosphatase 81 U/L      Total Bilirubin 2.4 (H) mg/dL      eGFR  African Amer 3 (L) mL/min/1.73      Globulin 3.3 gm/dL      A/G Ratio 0.6 (L) g/dL      BUN/Creatinine Ratio 4.0 (L)     Anion Gap 20.0 (H) mmol/L     Narrative:       The MDRD GFR formula is only valid for adults with stable renal function between ages 18 and 70.    Magnesium [[id number]]  (Normal) Collected:  06/11/17 2328    Specimen:  Blood Updated:  06/12/17 0002     Magnesium 2.2 mg/dL     Troponin [[id number]]  (Abnormal) Collected:  06/11/17 2328    Specimen:  Blood Updated:  06/12/17 0018     Troponin I 2.440 (C) ng/mL     CBC & Differential [[id number]] Collected:  06/11/17 2328    Specimen:  Blood Updated:  06/12/17 0020    Narrative:       The following orders were created for panel order CBC & Differential.  Procedure                               Abnormality         Status                     ---------                               -----------         ------                     Scan Slide[[id number]]                                       Final result               CBC Auto Differential[[id number]]        Abnormal            Final result                 Please view results for these tests on the individual orders.    CBC Auto Differential [[id number]]  (Abnormal) Collected:  06/11/17 2328    Specimen:  Blood Updated:  06/12/17 0020     WBC 8.87 10*3/mm3      RBC 2.84 (L) 10*6/mm3       Hemoglobin 8.1 (L) g/dL      Hematocrit 23.8 (L) %      MCV 83.8 fL      MCH 28.5 pg      MCHC 34.0 g/dL      RDW 22.6 (H) %      RDW-SD 68.6 (H) fl      MPV 10.7 fL      Platelets 127 (L) 10*3/mm3      Neutrophil % 90.2 (H) %      Lymphocyte % 6.9 (L) %      Monocyte % 1.9 (L) %      Eosinophil % 0.6 %      Basophil % 0.1 %      Immature Grans % 0.3 %      Neutrophils, Absolute 8.00 10*3/mm3      Lymphocytes, Absolute 0.61 (L) 10*3/mm3      Monocytes, Absolute 0.17 (L) 10*3/mm3      Eosinophils, Absolute 0.05 10*3/mm3      Basophils, Absolute 0.01 10*3/mm3      Immature Grans, Absolute 0.03 10*3/mm3     Scan Slide [[id number]] Collected:  06/11/17 2328    Specimen:  Blood Updated:  06/12/17 0020     Anisocytosis Slight/1+     [place] Cells Slight/1+     Ovalocytes Slight/1+     Poikilocytes Mod/2+     WBC Morphology Normal     Platelet Estimate Decreased    Basic Metabolic Panel [[id number]]  (Abnormal) Collected:  06/12/17 0253    Specimen:  Blood Updated:  06/12/17 0322     Glucose 71 mg/dL      BUN 66 (H) mg/dL      Creatinine 16.52 (H) mg/dL      Sodium 139 mmol/L      Potassium 1.9 (C) mmol/L      Chloride 100 mmol/L      CO2 19.0 (L) mmol/L      Calcium 8.2 (L) mg/dL      eGFR  African Amer 3 (L) mL/min/1.73      BUN/Creatinine Ratio 4.0 (L)     Anion Gap 20.0 (H) mmol/L     Narrative:       The MDRD GFR formula is only valid for adults with stable renal function between ages 18 and 70.    TSH [[id number]]  (Abnormal) Collected:  06/12/17 0253    Specimen:  Blood Updated:  06/12/17 0341     TSH 12.200 (H) mIU/mL     CBC (No Diff) [[id number]]  (Abnormal) Collected:  06/12/17 0253    Specimen:  Blood Updated:  06/12/17 0613     WBC 8.90 10*3/mm3      RBC 2.86 (L) 10*6/mm3      Hemoglobin 8.0 (L) g/dL      Hematocrit 24.1 (L) %      MCV 84.3 fL      MCH 28.0 pg      MCHC 33.2 g/dL      RDW 22.8 (H) %      RDW-SD 69.4 (H) fl      Platelets 123 (L) 10*3/mm3     Troponin [[id number]]  (Abnormal) Collected:  06/12/17 0545     Specimen:  Blood Updated:  06/12/17 0631     Troponin I 2.030 (C) ng/mL     Potassium [[id number]] Updated:  06/12/17 0835    Specimen:  Blood     T4, Free [[id number]]  (Normal) Collected:  06/12/17 0253    Specimen:  Blood Updated:  06/12/17 0849     Free T4 1.87 ng/dL     T3, Free [[id number]]  (Abnormal) Collected:  06/12/17 0253    Specimen:  Blood Updated:  06/12/17 0849     T3, Free 2.53 (L) pg/mL           Medication Review: yes  Current Facility-Administered Medications   Medication Dose Route Frequency Provider Last Rate Last Dose   • aspirin EC tablet 81 mg  81 mg Oral Daily Pino Tang MD   81 mg at 06/12/17 0954   • ipratropium-albuterol (DUO-NEB) nebulizer solution 3 mL  3 mL Nebulization Q4H PRN Pino Tang MD       • ondansetron (ZOFRAN) injection 4 mg  4 mg Intravenous Q6H PRN Pino Tang MD   4 mg at 06/12/17 0912   • pantoprazole (PROTONIX) EC tablet 40 mg  40 mg Oral Q AM Pino Tang MD   40 mg at 06/12/17 0610   • sodium chloride 0.9 % flush 1-10 mL  1-10 mL Intravenous PRN Pino Tang MD         Telemetry reviewed: No arrhythmias.    Assessment/Plan       1. Atypical chest discomfort: Resolved.  Troponin downtrending.  EKG showed no ischemic changes. The troponin elevation (in setting of renal failure and having missed dialysis last week) in conjunction with ongoing discomfort of a week's duration is not consistent with angina or ACS. Patient still has epigastric tenderness on exam - recommend laboratory and/or imaging investigation for GI pathology.   2.  Troponin elevation without evidence of ACS: see above.  3. Non-ischemic cardiomyopathy (by cath 12/20/14) (established, more w/u warranted) : no signs of decompensated HF on exam or by history. Check echocardiogram to reassess LVEF since I do not see one here since 2015.  4. ESRD (established, no w/u but treatment needed)- on MWF HD; missed last Friday's session.  Nephrology has seen the patient and she will be dialyzed this  morning.  5. Hypokalemia: being replaced. Defer to nephrology.     I discussed the patients findings and my recommendations with patient.  If echocardiogram does not show any significant changes from previous studies, cardiology will sign off.  Please call back with any questions.    Jayson Nelson MD  06/12/17  10:45 AM

## 2017-06-12 NOTE — PROGRESS NOTES
"Patient:  Jennifer Marks  YOB: 1945  Date of Service: 6/12/2017  MRN: [id number]   Acct: [id number]   Primary Care Physician: No Known Provider  Advance Directive: Full Code  Admit Date: 6/11/2017       Hospital Day: 1  Referring Provider: Pino Tang MD      Pt was seen on RRT  Modality: Hemodialysis  Access: AV graft  Location: left lower  QB: 300  QD: 500  UF: 0      Review of Systems:  History obtained from chart review and the patient  General ROS: No fever or chills  Respiratory ROS: No cough, shortness of breath, wheezing  Cardiovascular ROS: no chest pain or dyspnea on exertion  Gastrointestinal ROS: No abdominal pain or melena  Genito-Urinary ROS: No dysuria or hematuria  Musculoskeletal: negative  Skin: negative    Objective:  BP (!) 80/57  Pulse 83  Temp 96.9 °F (36.1 °C) (Temporal Artery )   Resp 16  Ht 62\" (157.5 cm)  Wt 113 lb 0.1 oz (51.3 kg)  SpO2 (!) 77%  BMI 20.67 kg/m2    Intake/Output Summary (Last 24 hours) at 06/12/17 1700  Last data filed at 06/12/17 0609   Gross per 24 hour   Intake              400 ml   Output                0 ml   Net              400 ml       Physical examination:  General: awake/alert   Chest:  clear to auscultation bilaterally without respiratory distress  CVS: regular rate and rhythm  Abdominal: soft, nontender, normal bowel sounds  Extremities: no cyanosis or edema  Skin: warm and dry without rash  Neuro: No focal motor deficits    Labs:  Lab Results (last 24 hours)     Procedure Component Value Units Date/Time    Troponin [[id number]]  (Abnormal) Collected:  06/11/17 1730    Specimen:  Blood from Arm, Right Updated:  06/11/17 1807     Troponin I 2.870 (C) ng/mL     Troponin [[id number]]  (Abnormal) Collected:  06/11/17 2047    Specimen:  Blood Updated:  06/11/17 2116     Troponin I 2.570 (C) ng/mL     Phosphorus [[id number]]  (Abnormal) Collected:  06/11/17 2047    Specimen:  Blood Updated:  06/11/17 2238     Phosphorus 9.4 (H) mg/dL     " Magnesium [[id number]]  (Normal) Collected:  06/11/17 2047    Specimen:  Blood Updated:  06/11/17 2238     Magnesium 2.2 mg/dL     Comprehensive Metabolic Panel [[id number]]  (Abnormal) Collected:  06/11/17 2047    Specimen:  Blood Updated:  06/11/17 2249     Glucose 72 mg/dL      BUN 66 (H) mg/dL      Creatinine 16.31 (H) mg/dL      Sodium 139 mmol/L      Potassium 2.0 (C) mmol/L      Chloride 100 mmol/L      CO2 19.0 (L) mmol/L      Calcium 8.1 (L) mg/dL      Total Protein 5.4 (L) g/dL      Albumin 2.10 (L) g/dL      ALT (SGPT) 17 U/L      AST (SGOT) 18 U/L      Alkaline Phosphatase 81 U/L      Total Bilirubin 2.4 (H) mg/dL      eGFR  African Amer 3 (L) mL/min/1.73      Globulin 3.3 gm/dL      A/G Ratio 0.6 (L) g/dL      BUN/Creatinine Ratio 4.0 (L)     Anion Gap 20.0 (H) mmol/L     Narrative:       The MDRD GFR formula is only valid for adults with stable renal function between ages 18 and 70.    Magnesium [[id number]]  (Normal) Collected:  06/11/17 2328    Specimen:  Blood Updated:  06/12/17 0002     Magnesium 2.2 mg/dL     Troponin [[id number]]  (Abnormal) Collected:  06/11/17 2328    Specimen:  Blood Updated:  06/12/17 0018     Troponin I 2.440 (C) ng/mL     CBC & Differential [[id number]] Collected:  06/11/17 2328    Specimen:  Blood Updated:  06/12/17 0020    Narrative:       The following orders were created for panel order CBC & Differential.  Procedure                               Abnormality         Status                     ---------                               -----------         ------                     Scan Slide[[id number]]                                       Final result               CBC Auto Differential[[id number]]        Abnormal            Final result                 Please view results for these tests on the individual orders.    CBC Auto Differential [[id number]]  (Abnormal) Collected:  06/11/17 2328    Specimen:  Blood Updated:  06/12/17 0020     WBC 8.87 10*3/mm3      RBC 2.84 (L)  10*6/mm3      Hemoglobin 8.1 (L) g/dL      Hematocrit 23.8 (L) %      MCV 83.8 fL      MCH 28.5 pg      MCHC 34.0 g/dL      RDW 22.6 (H) %      RDW-SD 68.6 (H) fl      MPV 10.7 fL      Platelets 127 (L) 10*3/mm3      Neutrophil % 90.2 (H) %      Lymphocyte % 6.9 (L) %      Monocyte % 1.9 (L) %      Eosinophil % 0.6 %      Basophil % 0.1 %      Immature Grans % 0.3 %      Neutrophils, Absolute 8.00 10*3/mm3      Lymphocytes, Absolute 0.61 (L) 10*3/mm3      Monocytes, Absolute 0.17 (L) 10*3/mm3      Eosinophils, Absolute 0.05 10*3/mm3      Basophils, Absolute 0.01 10*3/mm3      Immature Grans, Absolute 0.03 10*3/mm3     Scan Slide [[id number]] Collected:  06/11/17 2328    Specimen:  Blood Updated:  06/12/17 0020     Anisocytosis Slight/1+     [place] Cells Slight/1+     Ovalocytes Slight/1+     Poikilocytes Mod/2+     WBC Morphology Normal     Platelet Estimate Decreased    Basic Metabolic Panel [[id number]]  (Abnormal) Collected:  06/12/17 0253    Specimen:  Blood Updated:  06/12/17 0322     Glucose 71 mg/dL      BUN 66 (H) mg/dL      Creatinine 16.52 (H) mg/dL      Sodium 139 mmol/L      Potassium 1.9 (C) mmol/L      Chloride 100 mmol/L      CO2 19.0 (L) mmol/L      Calcium 8.2 (L) mg/dL      eGFR  African Amer 3 (L) mL/min/1.73      BUN/Creatinine Ratio 4.0 (L)     Anion Gap 20.0 (H) mmol/L     Narrative:       The MDRD GFR formula is only valid for adults with stable renal function between ages 18 and 70.    TSH [[id number]]  (Abnormal) Collected:  06/12/17 0253    Specimen:  Blood Updated:  06/12/17 0341     TSH 12.200 (H) mIU/mL     CBC (No Diff) [[id number]]  (Abnormal) Collected:  06/12/17 0253    Specimen:  Blood Updated:  06/12/17 0613     WBC 8.90 10*3/mm3      RBC 2.86 (L) 10*6/mm3      Hemoglobin 8.0 (L) g/dL      Hematocrit 24.1 (L) %      MCV 84.3 fL      MCH 28.0 pg      MCHC 33.2 g/dL      RDW 22.8 (H) %      RDW-SD 69.4 (H) fl      Platelets 123 (L) 10*3/mm3     Troponin [[id number]]  (Abnormal) Collected:   06/12/17 0545    Specimen:  Blood Updated:  06/12/17 0631     Troponin I 2.030 (C) ng/mL     T4, Free [[id number]]  (Normal) Collected:  06/12/17 0253    Specimen:  Blood Updated:  06/12/17 0849     Free T4 1.87 ng/dL     T3, Free [[id number]]  (Abnormal) Collected:  06/12/17 0253    Specimen:  Blood Updated:  06/12/17 0849     T3, Free 2.53 (L) pg/mL     Potassium [[id number]]  (Abnormal) Collected:  06/12/17 1304    Specimen:  Blood Updated:  06/12/17 1330     Potassium 2.2 (C) mmol/L     Troponin [[id number]]  (Abnormal) Collected:  06/12/17 1304    Specimen:  Blood Updated:  06/12/17 1336     Troponin I 1.660 (C) ng/mL           Radiology:   Imaging Results (last 24 hours)     Procedure Component Value Units Date/Time    XR Chest 1 View [[id number]] Collected:  06/11/17 1803     Updated:  06/11/17 2054    Narrative:       EXAMINATION:   XR CHEST 1 VW-  6/11/2017 6:01 PM CDT     HISTORY: Chest pain     Prior exams 05/28/2016.     Single view of the chest is obtained. The pulmonary vascular margins are  slightly indistinct. This has the appearance of mild pulmonary vascular  congestion. Left diaphragm is indistinct. This may be infiltrate or  atelectasis left lower lobe.     Cardiac silhouette is enlarged and unchanged from May 28.       Impression:       1. Silhouetting of the left diaphragm consistent with infiltrate or  atelectasis left lower lobe.  2. Indistinct pulmonary vascular margins consistent with mild pulmonary  vascular congestion.  This report was finalized on 06/11/2017 20:51 by Dr. Herson Magaña MD.              Assessment     End stage renal disease  Type II diabetes mellitus with nephropathy  Benign hypertension  Diarrhea and hypotension    Plan:  Will continue dialysis cautiously. If she has further drop in her BPs. Will then stp dialysis and patient is then to go on pressors.    Dg Ball MD  6/12/2017  5:00 PM

## 2017-06-12 NOTE — CONSULTS
"  [facility] HEART GROUP CONSULT NOTE    Patient Care Team:  No Known Provider as PCP - General  No Known Provider as PCP - Family Medicine  Pino Tang MD  REASON FOR REFERRAL: Chest pain, troponin elevation  Chief complaint: Chest pain    Subjective     Patient is a 72 y.o. female with end-stage renal disease who presents with chest pain.  Onset 1-1/2 weeks ago.  Has been constant 10 out of 10 right-sided chest discomfort ever since.  He describes today's pain is \"like an getting electrocuted.\"  No identifiable aggravating or alleviating factors.  Radiates into right arm.  Associated with constant nausea.  Upon my evaluation, the patient was sleeping comfortably.  When awakened, she complained of 10 out of 10 chest pain.    Patient does have history of congestive heart failure, which by cardiac catheterization here in December 2014, has been deemed nonischemic in origin.  She wore a LifeVest for arrhythmia prevention shortly after diagnosis, but never had an ICD implanted for unclear reasons.  The aforementioned catheterization in 2014 was performed for circumstances quite similar to this presentation, according to comparison of records.      Review of Systems   Review of Systems   Constitutional: Negative for fatigue and unexpected weight change.   HENT: Negative for nosebleeds and trouble swallowing.    Respiratory: Negative for cough, shortness of breath and wheezing.    Cardiovascular: Positive for chest pain. Negative for palpitations and leg swelling.   Gastrointestinal: Positive for nausea. Negative for abdominal distention, abdominal pain, blood in stool and vomiting.   Endocrine: Negative for cold intolerance, heat intolerance, polydipsia, polyphagia and polyuria.   Genitourinary: Negative for hematuria and vaginal bleeding.   Musculoskeletal: Negative for arthralgias and joint swelling.   Skin: Negative for color change and pallor.   Neurological: Negative for seizures, syncope and " weakness.   Hematological: Does not bruise/bleed easily.   Psychiatric/Behavioral: Negative for confusion. The patient is not nervous/anxious.        History  Past Medical History:   Diagnosis Date   • A-fib    • Arthritis    • CHF (congestive heart failure)    • Hypertension    • Renal failure      Past Surgical History:   Procedure Laterality Date   • ARTERIOVENOUS FISTULA LEG Left      History reviewed. No pertinent family history.  Social History   Substance Use Topics   • Smoking status: Former Smoker   • Smokeless tobacco: None   • Alcohol use No       (Not in a hospital admission)  Allergies:  Heparin; Iron; Latex; Levaquin [levofloxacin]; Shrimp (diagnostic); and Vancomycin    Objective     Vital Signs  Temp:  [97.6 °F (36.4 °C)] 97.6 °F (36.4 °C)  Heart Rate:  [83-87] 83  Resp:  [17-20] 20  BP: (71-83)/(34-50) 82/43    Physical Exam:  Physical Exam   Constitutional: No distress.   HENT:   Mouth/Throat: Oropharynx is clear. Pharynx is normal.   Neck: Normal range of motion and thyroid normal. Neck supple. No JVD present. No thyromegaly present.   Cardiovascular: Normal rate, regular rhythm, S1 normal, S2 normal, normal heart sounds, intact distal pulses and normal pulses.   No extrasystoles are present. PMI is not displaced.    Pulmonary/Chest: Effort normal and breath sounds normal.   Abdominal: Bowel sounds are normal. She exhibits no distension. There is no splenomegaly or hepatomegaly. There is tenderness (epigastric tenderness/guarding).   Musculoskeletal: She exhibits no edema or tenderness.   Neurological: She is alert and oriented to person, place, and time.   Skin: Skin is warm and dry.     Results Review:   Lab Results (last 72 hours)     Procedure Component Value Units Date/Time    Troponin [[id number]]  (Abnormal) Collected:  06/11/17 1730    Specimen:  Blood from Arm, Right Updated:  06/11/17 1807     Troponin I 2.870 (C) ng/mL         Chest x-ray report reviewed: Possible mild vascular  congestion.  Possible infiltrate versus atelectasis of the left lower lobe.    ECG reviewed by me (my interpretation), performed 6/11/17 at 1720: Normal sinus rhythm w/PACs, IVCD, nonspecific ST changes in lateral leads.    Echo report reviewed by me from 7/14/15: LVEF 35-40% with global hypokinesis.  Mild to moderately dilated left atrium.  Mild aortic stenosis, mild mitral regurgitation, moderate tricuspid regurgitation with estimated RVSP greater than 60 consistent with severe pulmonary hypertension    Cath report from 12/20/14 reviewed: Normal coronaries.    Assessment/Plan     1. Atypical chest discomfort (new, w/u needed).  Despite troponin elevation (in setting of renal failure and having missed dialysis last week), ongoing discomfort of a week's duration is not consistent with angina or ACS. ECG has no ischemic changes.  Patient has epigastric tenderness on exam - recommend admission to hospitalist with further labs, imaging to look for GI pathology.  Continue to trend ECGs, troponins 6hr, and check ECG if change in chest pain occurs.   2.  Non-ischemic cardiomyopathy (by cath 12/20/14) (established, more w/u warranted) : no signs of decompensated HF on exam or by history.  Check echocardiogram to reassess LVEF since I do not see one here since 2015.  3.  ESRD (established, no w/u but treatment needed)- on MWF HD; missed last Friday's session. Recommend consultation with nephrology.    I discussed the patients findings and my recommendations with patient.     Jayson Nelson MD  06/11/17  7:25 PM

## 2017-06-12 NOTE — PROGRESS NOTES
[facility] Medicine Services  INPATIENT PROGRESS NOTE    Length of Stay: 1  Date of Admission: 6/11/2017  Primary Care Physician: No Known Provider    Subjective   Chief Complaint: Weakness  HPI   Patient seemed very sleepy.  Denies any chest pain or shortness of breath.  Discussed patient with the lab values.  Nephrology consult pending.  Unable to give prophylaxis heparin due to allergy.     Review of Systems   Constitutional: Positive for activity change, appetite change and fatigue. Negative for chills and fever.   HENT: Negative for hearing loss, nosebleeds, tinnitus and trouble swallowing.    Eyes: Negative for visual disturbance.   Respiratory: Negative for cough, chest tightness, shortness of breath and wheezing.    Cardiovascular: Negative for chest pain, palpitations and leg swelling.   Gastrointestinal: Negative for abdominal distention, abdominal pain, blood in stool, constipation, diarrhea, nausea and vomiting.   Endocrine: Negative for cold intolerance, heat intolerance, polydipsia, polyphagia and polyuria.   Genitourinary: Negative for decreased urine volume, difficulty urinating, dysuria, flank pain, frequency and hematuria.   Musculoskeletal: Positive for arthralgias, gait problem and myalgias. Negative for joint swelling.   Skin: Negative for rash.   Allergic/Immunologic: Negative for immunocompromised state.   Neurological: Positive for weakness. Negative for dizziness, syncope, light-headedness and headaches.   Hematological: Negative for adenopathy. Does not bruise/bleed easily.   Psychiatric/Behavioral: Negative for confusion and sleep disturbance. The patient is not nervous/anxious.           All pertinent negatives and positives are as above. All other systems have been reviewed and are negative unless otherwise stated.     Objective    Temp:  [96.2 °F (35.7 °C)-97.8 °F (36.6 °C)] 96.2 °F (35.7 °C)  Heart Rate:  [76-88] 76  Resp:  [14-20] 14  BP:  (64-94)/(34-55) 69/55    Intake/Output Summary (Last 24 hours) at 06/12/17 0753  Last data filed at 06/12/17 0609   Gross per 24 hour   Intake              400 ml   Output                0 ml   Net              400 ml     Physical Exam   Constitutional: She appears well-developed.   HENT:   Head: Normocephalic and atraumatic.   Eyes: Conjunctivae and EOM are normal. Pupils are equal, round, and reactive to light.   Neck: Neck supple. No JVD present. No thyromegaly present.   Cardiovascular: Normal rate, regular rhythm, normal heart sounds and intact distal pulses.  Exam reveals no gallop and no friction rub.    No murmur heard.  Pulmonary/Chest: Effort normal and breath sounds normal. No respiratory distress. She has no wheezes. She has no rales. She exhibits no tenderness.   Abdominal: Soft. Bowel sounds are normal. She exhibits no distension. There is no tenderness. There is no rebound and no guarding.   Musculoskeletal: Normal range of motion. She exhibits no edema, tenderness or deformity.   Lymphadenopathy:     She has no cervical adenopathy.   Neurological: She is alert. She displays normal reflexes. No cranial nerve deficit. She exhibits abnormal muscle tone. Coordination abnormal.   Skin: Skin is warm and dry. No rash noted.   Psychiatric: She has a normal mood and affect. Her behavior is normal.   Nursing note and vitals reviewed.      Results Review:  Lab Results (last 24 hours)     Procedure Component Value Units Date/Time    Troponin [[id number]]  (Abnormal) Collected:  06/11/17 1730    Specimen:  Blood from Arm, Right Updated:  06/11/17 1807     Troponin I 2.870 (C) ng/mL     Troponin [[id number]]  (Abnormal) Collected:  06/11/17 2047    Specimen:  Blood Updated:  06/11/17 2116     Troponin I 2.570 (C) ng/mL     Phosphorus [[id number]]  (Abnormal) Collected:  06/11/17 2047    Specimen:  Blood Updated:  06/11/17 2238     Phosphorus 9.4 (H) mg/dL     Magnesium [[id number]]  (Normal) Collected:  06/11/17 2047     Specimen:  Blood Updated:  06/11/17 2238     Magnesium 2.2 mg/dL     Comprehensive Metabolic Panel [[id number]]  (Abnormal) Collected:  06/11/17 2047    Specimen:  Blood Updated:  06/11/17 2249     Glucose 72 mg/dL      BUN 66 (H) mg/dL      Creatinine 16.31 (H) mg/dL      Sodium 139 mmol/L      Potassium 2.0 (C) mmol/L      Chloride 100 mmol/L      CO2 19.0 (L) mmol/L      Calcium 8.1 (L) mg/dL      Total Protein 5.4 (L) g/dL      Albumin 2.10 (L) g/dL      ALT (SGPT) 17 U/L      AST (SGOT) 18 U/L      Alkaline Phosphatase 81 U/L      Total Bilirubin 2.4 (H) mg/dL      eGFR  [place] Amer 3 (L) mL/min/1.73      Globulin 3.3 gm/dL      A/G Ratio 0.6 (L) g/dL      BUN/Creatinine Ratio 4.0 (L)     Anion Gap 20.0 (H) mmol/L     Narrative:       The MDRD GFR formula is only valid for adults with stable renal function between ages 18 and 70.    Magnesium [[id number]]  (Normal) Collected:  06/11/17 2328    Specimen:  Blood Updated:  06/12/17 0002     Magnesium 2.2 mg/dL     Troponin [[id number]]  (Abnormal) Collected:  06/11/17 2328    Specimen:  Blood Updated:  06/12/17 0018     Troponin I 2.440 (C) ng/mL     CBC & Differential [[id number]] Collected:  06/11/17 2328    Specimen:  Blood Updated:  06/12/17 0020    Narrative:       The following orders were created for panel order CBC & Differential.  Procedure                               Abnormality         Status                     ---------                               -----------         ------                     Scan Slide[[id number]]                                       Final result               CBC Auto Differential[[id number]]        Abnormal            Final result                 Please view results for these tests on the individual orders.    CBC Auto Differential [[id number]]  (Abnormal) Collected:  06/11/17 2328    Specimen:  Blood Updated:  06/12/17 0020     WBC 8.87 10*3/mm3      RBC 2.84 (L) 10*6/mm3      Hemoglobin 8.1 (L) g/dL      Hematocrit 23.8 (L) %       MCV 83.8 fL      MCH 28.5 pg      MCHC 34.0 g/dL      RDW 22.6 (H) %      RDW-SD 68.6 (H) fl      MPV 10.7 fL      Platelets 127 (L) 10*3/mm3      Neutrophil % 90.2 (H) %      Lymphocyte % 6.9 (L) %      Monocyte % 1.9 (L) %      Eosinophil % 0.6 %      Basophil % 0.1 %      Immature Grans % 0.3 %      Neutrophils, Absolute 8.00 10*3/mm3      Lymphocytes, Absolute 0.61 (L) 10*3/mm3      Monocytes, Absolute 0.17 (L) 10*3/mm3      Eosinophils, Absolute 0.05 10*3/mm3      Basophils, Absolute 0.01 10*3/mm3      Immature Grans, Absolute 0.03 10*3/mm3     Scan Slide [[id number]] Collected:  06/11/17 2328    Specimen:  Blood Updated:  06/12/17 0020     Anisocytosis Slight/1+     [place] Cells Slight/1+     Ovalocytes Slight/1+     Poikilocytes Mod/2+     WBC Morphology Normal     Platelet Estimate Decreased    Basic Metabolic Panel [[id number]]  (Abnormal) Collected:  06/12/17 0253    Specimen:  Blood Updated:  06/12/17 0322     Glucose 71 mg/dL      BUN 66 (H) mg/dL      Creatinine 16.52 (H) mg/dL      Sodium 139 mmol/L      Potassium 1.9 (C) mmol/L      Chloride 100 mmol/L      CO2 19.0 (L) mmol/L      Calcium 8.2 (L) mg/dL      eGFR  [place] Amer 3 (L) mL/min/1.73      BUN/Creatinine Ratio 4.0 (L)     Anion Gap 20.0 (H) mmol/L     Narrative:       The MDRD GFR formula is only valid for adults with stable renal function between ages 18 and 70.    TSH [[id number]]  (Abnormal) Collected:  06/12/17 0253    Specimen:  Blood Updated:  06/12/17 0341     TSH 12.200 (H) mIU/mL     CBC (No Diff) [[id number]]  (Abnormal) Collected:  06/12/17 0253    Specimen:  Blood Updated:  06/12/17 0613     WBC 8.90 10*3/mm3      RBC 2.86 (L) 10*6/mm3      Hemoglobin 8.0 (L) g/dL      Hematocrit 24.1 (L) %      MCV 84.3 fL      MCH 28.0 pg      MCHC 33.2 g/dL      RDW 22.8 (H) %      RDW-SD 69.4 (H) fl      Platelets 123 (L) 10*3/mm3     Troponin [[id number]]  (Abnormal) Collected:  06/12/17 0545    Specimen:  Blood Updated:  06/12/17 0631      Troponin I 2.030 (C) ng/mL            Cultures:       Radiology Data:    Imaging Results (last 24 hours)     Procedure Component Value Units Date/Time    XR Chest 1 View [[id number]] Collected:  06/11/17 1803     Updated:  06/11/17 2054    Narrative:       EXAMINATION:   XR CHEST 1 VW-  6/11/2017 6:01 PM CDT     HISTORY: Chest pain     Prior exams 05/28/2016.     Single view of the chest is obtained. The pulmonary vascular margins are  slightly indistinct. This has the appearance of mild pulmonary vascular  congestion. Left diaphragm is indistinct. This may be infiltrate or  atelectasis left lower lobe.     Cardiac silhouette is enlarged and unchanged from May 28.       Impression:       1. Silhouetting of the left diaphragm consistent with infiltrate or  atelectasis left lower lobe.  2. Indistinct pulmonary vascular margins consistent with mild pulmonary  vascular congestion.  This report was finalized on 06/11/2017 20:51 by Dr. Herson Magaña MD.          Allergies   Allergen Reactions   • Heparin    • Iron    • Latex    • Levaquin [Levofloxacin]    • Shrimp (Diagnostic)    • Vancomycin        Scheduled meds:     aspirin 81 mg Oral Daily   pantoprazole 40 mg Oral Q AM       PRN meds:  ipratropium-albuterol  •  ondansetron  •  sodium chloride    Assessment/Plan     Active Problems:    Chest pain in adult      Plan:  Non-STEMI- echocardiogram pending. Per cardiology.     Anemia- stable 8.    Chronic renal failure- consult nephrology pending    Hyperlipidemia- lipid panel in am.     Elevate TSH- free t4 and free t3.     Hypotension- stable    Hypokalemia- 4 potassium run. K after K's runs. Magnesium level.     Midline and std.     Carter Llamas MD   06/12/17   7:53 AM

## 2017-06-12 NOTE — CONSULTS
Nephrology ([facility]) Consult Note      Patient:  Jennifer Marks  YOB: 1945  Date of Service: 6/12/2017  MRN: [id number]   Acct: [id number]   Primary Care Physician: No Known Provider  Advance Directive: Full Code  Admit Date: 6/11/2017       Hospital Day: 1  Referring Provider: Pino Tang MD      Patient personally seen and examined.  Complete chart including Consults, Notes, Operative Reports, Labs, Cardiology, and Radiology studies reviewed as able.        Subjective:  Jennifer Marks is a 72 y.o. female  whom we were consulted for end stage renal disease management, hypokalemia.  She is on hemodialysis Monday Wednesday Friday; apparently missed her Friday treatment due to not feeling well.  Admitted with chest pain.  This morning is sleepy, mild confusion.  Denies chest pain or dyspnea.      Allergies:  Heparin; Iron; Latex; Levaquin [levofloxacin]; Shrimp (diagnostic); and Vancomycin    Home Meds:  Prescriptions Prior to Admission   Medication Sig Dispense Refill Last Dose   • carvedilol (COREG) 12.5 MG tablet Take 12.5 mg by mouth Daily.      • losartan (COZAAR) 25 MG tablet Take 25 mg by mouth Daily.      • pantoprazole (PROTONIX) 40 MG EC tablet Take 40 mg by mouth Daily.      • potassium chloride (KLOR-CON) 20 MEQ packet Take 20 mEq by mouth Daily.      • predniSONE (DELTASONE) 1 MG tablet Take 1 mg by mouth Daily.      • PREDNISONE PO Take  by mouth.      • traZODone (DESYREL) 50 MG tablet Take 50 mg by mouth Every Night.          Medicines:  Current Facility-Administered Medications   Medication Dose Route Frequency Provider Last Rate Last Dose   • aspirin EC tablet 81 mg  81 mg Oral Daily Pino Tang MD       • ipratropium-albuterol (DUO-NEB) nebulizer solution 3 mL  3 mL Nebulization Q4H PRN Pino Tang MD       • ondansetron (ZOFRAN) injection 4 mg  4 mg Intravenous Q6H PRN Pino Tang MD       • pantoprazole (PROTONIX) EC tablet 40 mg  40 mg Oral  "Q AM Pino Tang MD   40 mg at 06/12/17 0610   • sodium chloride 0.9 % flush 1-10 mL  1-10 mL Intravenous PRN Pino Tang MD           Past Medical History:  Past Medical History:   Diagnosis Date   • A-fib    • Arthritis    • CHF (congestive heart failure)    • Hypertension    • Renal failure        Past Surgical History:  Past Surgical History:   Procedure Laterality Date   • ARTERIOVENOUS FISTULA LEG Left        Family History  History reviewed. No pertinent family history.    Social History  Social History     Social History   • Marital status: Single     Spouse name: N/A   • Number of children: N/A   • Years of education: N/A     Occupational History   • Not on file.     Social History Main Topics   • Smoking status: Former Smoker   • Smokeless tobacco: Not on file   • Alcohol use No   • Drug use: No   • Sexual activity: Defer     Other Topics Concern   • Not on file     Social History Narrative   • No narrative on file         Review of Systems:  History obtained from chart review and the patient  General ROS: Patient complains of malaise and No fever or chills  Respiratory ROS: No cough, shortness of breath, wheezing  Cardiovascular ROS: no chest pain or dyspnea on exertion  Gastrointestinal ROS: No abdominal pain or melena  Genito-Urinary ROS: No dysuria or hematuria  Neurological ROS: negative  14 point ROS reviewed with the patient and negative except as noted above and in the HPI unless unable to obtain.    Objective:  BP (!) 85/54  Pulse 80  Temp 96.2 °F (35.7 °C) (Temporal Artery )   Resp 16  Ht 62\" (157.5 cm)  Wt 113 lb 0.1 oz (51.3 kg)  SpO2 100%  BMI 20.67 kg/m2    Intake/Output Summary (Last 24 hours) at 06/12/17 0821  Last data filed at 06/12/17 0609   Gross per 24 hour   Intake              400 ml   Output                0 ml   Net              400 ml     General: awake, oriented X 2   Neck: supple, no JVD  Chest:  clear to auscultation bilaterally without respiratory distress  CVS: " regular rate and rhythm  Abdominal: soft, nontender, normal bowel sounds  Extremities: no cyanosis or edema  Skin: warm and dry without rash   Neuro: no focal motor deficits      Labs:    Results from last 7 days  Lab Units 06/12/17  0253 06/11/17  2328   WBC 10*3/mm3 8.90 8.87   HEMOGLOBIN g/dL 8.0* 8.1*   HEMATOCRIT % 24.1* 23.8*   PLATELETS 10*3/mm3 123* 127*           Results from last 7 days  Lab Units 06/12/17  0253 06/11/17  2047   SODIUM mmol/L 139 139   POTASSIUM mmol/L 1.9* 2.0*   CHLORIDE mmol/L 100 100   TOTAL CO2 mmol/L 19.0* 19.0*   BUN mg/dL 66* 66*   CREATININE mg/dL 16.52* 16.31*   CALCIUM mg/dL 8.2* 8.1*   BILIRUBIN mg/dL  --  2.4*   ALK PHOS U/L  --  81   ALT (SGPT) U/L  --  17   AST (SGOT) U/L  --  18   GLUCOSE mg/dL 71 72       Radiology:   Imaging Results (last 72 hours)     Procedure Component Value Units Date/Time    XR Chest 1 View [[id number]] Collected:  06/11/17 1803     Updated:  06/11/17 2054    Narrative:       EXAMINATION:   XR CHEST 1 VW-  6/11/2017 6:01 PM CDT     HISTORY: Chest pain     Prior exams 05/28/2016.     Single view of the chest is obtained. The pulmonary vascular margins are  slightly indistinct. This has the appearance of mild pulmonary vascular  congestion. Left diaphragm is indistinct. This may be infiltrate or  atelectasis left lower lobe.     Cardiac silhouette is enlarged and unchanged from May 28.       Impression:       1. Silhouetting of the left diaphragm consistent with infiltrate or  atelectasis left lower lobe.  2. Indistinct pulmonary vascular margins consistent with mild pulmonary  vascular congestion.  This report was finalized on 06/11/2017 20:51 by Dr. Herson Magaña MD.          Culture:         Assessment   1.  End stage renal disease on hemodialysis Monday Wednesday Friday   2.  Hypokalemia  3.  Chest pain  4.  Hypotension  5.  Anemia of chronic kidney disease  5.  Diabetes type 2    Plan:  1.  Dialysis this AM  2.  Replace potassium  3.  Further  assessment and plan pending Dr Ball's evaluation of patient      Thank you for the consult, we appreciate the opportunity to provide care to your patients.  Feel free to contact me if I can be of any further assistance.      Jaden Tovar, APRN  6/12/2017  8:21 AM

## 2017-06-12 NOTE — H&P
cc:      TIME: 10:49 p.m.     PRIMARY CARE PHYSICIAN: Unknown.    HISTORY OF PRESENT ILLNESS: Ms. Marks is a 72-year-old  female who presents to [facility] due to uncertain reasons. Ms. Marks is not able to provide much meaningful history. There are no family members present. I spoke with her niece who relates that Ms. Marks has been having significant difficulties with fatigue, malaise, lethargy, generalized weakness, diffuse arthralgias, myalgias, poor appetite, as well as recurring chest and abdominal pain. Her symptoms worsened acutely approximately 24 hours ago. Ms. Marks has a history of end-stage renal disease. She undergoes dialysis on Monday, Wednesday and Friday. There is speculation that she did not complete dialysis on Friday due to feeling ill. Presently Ms. Marks is resting comfortably and is in no distress. It is very difficult to obtain any information due to her status.     In the emergency department, laboratory studies demonstrated a creatinine of 16.3. I have consulted the Nephrology service for dialysis.     REVIEW OF SYSTEMS: Cannot be performed due to the patient's status.     PAST MEDICAL HISTORY:  1. Hypertension.   2. Dyslipidemia.   3. Diabetes mellitus type 2.   3. Hypothyroidism.   4. Chronic congestive heart failure due to systolic and diastolic dysfunction.   5. Aortic stenosis.   6. Pulmonary hypertension.   7. Nonischemic cardiomyopathy with estimated ejection fraction of 30% to 35%.   8. Peripheral arterial disease.   9. Wegener's granulomatosis.   10. Rheumatoid arthritis.   11. End-stage renal disease necessitating hemodialysis.   12. Chronic anemia.   13. Deep vein thrombosis.   14. Syncope.   15. Hearing impairment.     PAST SURGICAL HISTORY:  1. Status post appendectomy.   2. Status post cholecystectomy.   3. Status post bilateral tubal ligation.   4. Status post hernia repair.   5. Status post cataract resection.   6. Status post bilateral  upper extremity AV fistula placement.   7. Status post left lower extremity AV fistula placement.   8. Status post cardiac catheterization in 2014, which demonstrated no coronary artery disease.     ALLERGIES (PER MEDICAL RECORDS):  1. HEPARIN.  2. IRON.  3. LATEX.  4. LEVAQUIN.  5. SHRIMP.  6. VANCOMYCIN.     HOME MEDICATIONS:  1. Coreg 12.5 mg p.o. b.i.d.   2. Losartan 25 mg p.o. daily.   3. Protonix 40 mg p.o. daily.   4. Potassium chloride 20 mEq p.o. daily.   5. Prednisone 1 mg p.o. daily.   6. Trazodone 50 mg p.o. at bedtime.     Note, the above medications doses and schedules cannot be confirmed.     SOCIAL HISTORY: Significant for being a resident of [place], [place]. She lives with a male friend. She is . She hads3 sons and 2 daughters although 1 son is now  due to uncertain reasons. The patient is retired. She has a high school education. She has no history of tobacco, alcohol or drug use. She is Latter day. She has no recent history of travel outside this region.     She is a FULL CODE.     She designates her daughter, Rosanna Marks, to serve as a surrogate for healthcare matters should such become necessary. Her daughter’s telephone number is [phone].    FAMILY HISTORY: Cannot be obtained.     PHYSICAL EXAMINATION:  VITAL SIGNS: Temperature is 97.8, pulse is 88, respirations 17, and unlabored, blood pressure is 81/45, O2 saturation is 100%, breathing supplemental oxygen, weight 113 pounds.     GENERAL: This is a 72-year-old  female appearing her documented age. She is resting comfortably in bed. She is in no apparent distress. She is not able to provide any sort of meaningful history. She seems to be hearing impaired.     HEAD AND NECK: Essentially unremarkable except as noted. I see no signs of acute trauma. Eyes, nose, and throat appear grossly unremarkable. Neck is supple. She has no cervical or clavicular adenopathy. She has no definite carotid bruits. There  are no masses of the head or neck. Neck veins do not appear pathologically distended.     CARDIAC: Reveals S1 and S2 with a regular rate and rhythm. She has had no definite murmurs, rubs, or gallops.     LUNGS: Reveals bilateral breath sounds are clear to auscultation throughout. She has no rales, wheezes, or rhonchi.     ABDOMEN: Reveals bowel sounds to be present. Her abdomen is nondistended and soft. She exhibits diffuse tenderness. She relates this is a chronic problem.     EXTREMITIES: No significant lower extremity edema, erythema or calf tenderness.     NEUROLOGIC: Reveals the patient to be awake and alert. Cranial nerves II-XII are intact. She exhibits no definite focal, motor or sensory deficits. She seems able to move all extremities without difficulty and at will. Her gait was not tested. She appears weak.     PSYCHIATRIC: Deferred due to patient's status.     DIAGNOSTIC DATA: Laboratory studies from [facility] demonstrated a sodium of 136, potassium 2.7, chloride 98, CO2 of 17, BUN 67, creatinine 17.9, glucose 71, total calcium 8.1.     CBC demonstrates a white blood cell count of 8.6, hemoglobin 10.0, hematocrit 29.6, platelet count 157,000.     Magnesium is 2.4.     Troponin level is 3.09.     EKG at [facility] demonstrates sinus rhythm of 68 beats per minute. ST segment elevations are noted in the inferior leads. T wave inversions are noted throughout the anterior precordial leads.     IMPRESSION:  1. Acute MI?.  2. Hypertension.   3. Dyslipidemia.   4. Diabetes mellitus type 2.   5. Hypothyroidism.   6. End-stage renal disease necessitating hemodialysis.   7. Chronic congestive heart failure due to systolic and diastolic dysfunction.   8. Nonischemic cardiomyopathy.   9. Ejection fraction of 30% to 35%.   10. Pulmonary hypertension.   11. Anemia.   12. Wegener's granulomatosis.     PLAN: At this time, Ms. Marks will be admitted to [facility] for further  evaluation and treatment. Her admitting diagnoses are as noted. Her condition at this time is judged to be guarded. She will be housed in the CCU.     I have asked the nursing staff to obtain vital signs per protocol. She will be confined to bedrest. Her allergies are as noted above. I have asked the nursing staff to monitor input and output. Daily weights will be obtained. She will be maintained on a cardiac diet. IV fluids will be saline locked. Oxygen will be used as needed to maintain her O2 saturation greater than 92%. She is a FULL CODE. Fall precautions are to be instituted. I will consult the Cardiology service. The patient has already been seen by Dr. Nelson.    INITIAL ADMITTING MEDICATIONS:  1. Aspirin 81 mg p.o. daily.   2. Protonix 40 mg p.o. daily.   3. Potassium chloride 20 mEq p.o. b.i.d.     Note, the patient will not be given beta blockers at this time due to her low blood pressure. Also note that she cannot be given heparin or heparin products due to reported allergy.     Additional p.r.n. medications will include DuoNeb and Zofran.     I will obtain follow up laboratory studies.     I will schedule a cardiac echocardiogram for in the morning.     I will continue to follow Ms. Marks closely through the night pending return of the hospitalist team in the morning. The nursing staff may call should they have any questions or concerns. Please refer to the medical record for additional information, orders and/or comments.          Pino Tang M.D.  SANTINO/[id number]  D:  06/12/2017 00:04:01(Eastern Time)  T:  06/12/2017 00:46:25(Eastern Time)  Voice ID:  [id number]/Document ID:  [id number]

## 2017-06-13 ENCOUNTER — ANESTHESIA (OUTPATIENT)
Dept: CARDIOVASCULAR ICU | Facility: HOSPITAL | Age: 72
End: 2017-06-13

## 2017-06-13 ENCOUNTER — ANESTHESIA EVENT (OUTPATIENT)
Dept: CARDIOVASCULAR ICU | Facility: HOSPITAL | Age: 72
End: 2017-06-13

## 2017-06-13 PROBLEM — I95.9 HYPOTENSION: Status: ACTIVE | Noted: 2017-06-13

## 2017-06-13 PROBLEM — D64.9 ANEMIA: Status: ACTIVE | Noted: 2017-06-13

## 2017-06-13 PROBLEM — N19 RENAL FAILURE: Status: ACTIVE | Noted: 2017-06-13

## 2017-06-13 PROBLEM — E87.6 HYPOKALEMIA: Status: ACTIVE | Noted: 2017-06-13

## 2017-06-13 LAB
ABO GROUP BLD: NORMAL
ALBUMIN SERPL-MCNC: 2.1 G/DL (ref 3.5–5)
ALBUMIN/GLOB SERPL: 0.7 G/DL (ref 1.1–2.5)
ALP SERPL-CCNC: 94 U/L (ref 24–120)
ALT SERPL W P-5'-P-CCNC: 20 U/L (ref 0–54)
ANION GAP SERPL CALCULATED.3IONS-SCNC: 16 MMOL/L (ref 4–13)
ANISOCYTOSIS BLD QL: NORMAL
ARTICHOKE IGE QN: 39 MG/DL (ref 0–99)
AST SERPL-CCNC: 30 U/L (ref 7–45)
BASOPHILS # BLD AUTO: 0.01 10*3/MM3 (ref 0–0.2)
BASOPHILS NFR BLD AUTO: 0.2 % (ref 0–2)
BILIRUB SERPL-MCNC: 1.8 MG/DL (ref 0.1–1)
BLD GP AB SCN SERPL QL: NEGATIVE
BUN BLD-MCNC: 49 MG/DL (ref 5–21)
BUN/CREAT SERPL: 4.1 (ref 7–25)
C3 FRG RBC-MCNC: NORMAL
CALCIUM SPEC-SCNC: 7.7 MG/DL (ref 8.4–10.4)
CHLORIDE SERPL-SCNC: 100 MMOL/L (ref 98–110)
CHOLEST SERPL-MCNC: 65 MG/DL (ref 130–200)
CO2 SERPL-SCNC: 22 MMOL/L (ref 24–31)
CREAT BLD-MCNC: 11.87 MG/DL (ref 0.5–1.4)
DEPRECATED RDW RBC AUTO: 68.6 FL (ref 40–54)
EOSINOPHIL # BLD AUTO: 0.12 10*3/MM3 (ref 0–0.7)
EOSINOPHIL NFR BLD AUTO: 2.4 % (ref 0–4)
ERYTHROCYTE [DISTWIDTH] IN BLOOD BY AUTOMATED COUNT: 22.7 % (ref 12–15)
GFR SERPL CREATININE-BSD FRML MDRD: 4 ML/MIN/1.73
GLOBULIN UR ELPH-MCNC: 3.1 GM/DL
GLUCOSE BLD-MCNC: 75 MG/DL (ref 70–100)
GLUCOSE BLD-MCNC: 89 MG/DL (ref 70–100)
GLUCOSE BLDC GLUCOMTR-MCNC: 178 MG/DL (ref 70–130)
GLUCOSE BLDC GLUCOMTR-MCNC: 37 MG/DL (ref 70–130)
GLUCOSE BLDC GLUCOMTR-MCNC: 81 MG/DL (ref 70–130)
GLUCOSE BLDC GLUCOMTR-MCNC: 96 MG/DL (ref 70–130)
GLUCOSE BLDC GLUCOMTR-MCNC: 98 MG/DL (ref 70–130)
GLUCOSE BLDC GLUCOMTR-MCNC: 99 MG/DL (ref 70–130)
HCT VFR BLD AUTO: 21.5 % (ref 37–47)
HDLC SERPL-MCNC: 10 MG/DL
HGB BLD-MCNC: 7.2 G/DL (ref 12–16)
HYPOCHROMIA BLD QL: NORMAL
IMM GRANULOCYTES # BLD: 0.03 10*3/MM3 (ref 0–0.03)
IMM GRANULOCYTES NFR BLD: 0.6 % (ref 0–5)
LDLC/HDLC SERPL: 3.18 {RATIO}
LYMPHOCYTES # BLD AUTO: 0.49 10*3/MM3 (ref 0.72–4.86)
LYMPHOCYTES NFR BLD AUTO: 9.6 % (ref 15–45)
MAGNESIUM SERPL-MCNC: 2 MG/DL (ref 1.4–2.2)
MCH RBC QN AUTO: 27.9 PG (ref 28–32)
MCHC RBC AUTO-ENTMCNC: 33.5 G/DL (ref 33–36)
MCV RBC AUTO: 83.3 FL (ref 82–98)
MONOCYTES # BLD AUTO: 0.16 10*3/MM3 (ref 0.19–1.3)
MONOCYTES NFR BLD AUTO: 3.1 % (ref 4–12)
NEUTROPHILS # BLD AUTO: 4.29 10*3/MM3 (ref 1.87–8.4)
NEUTROPHILS NFR BLD AUTO: 84.1 % (ref 39–78)
NRBC BLD MANUAL-RTO: 1.1 /100 WBC (ref 0–0)
PLATELET # BLD AUTO: 100 10*3/MM3 (ref 130–400)
POIKILOCYTOSIS BLD QL SMEAR: NORMAL
POTASSIUM BLD-SCNC: 2.4 MMOL/L (ref 3.5–5.3)
PROT SERPL-MCNC: 5.2 G/DL (ref 6.3–8.7)
RBC # BLD AUTO: 2.58 10*6/MM3 (ref 4.2–5.4)
RH BLD: NEGATIVE
SMALL PLATELETS BLD QL SMEAR: NORMAL
SODIUM BLD-SCNC: 138 MMOL/L (ref 135–145)
TARGETS BLD QL SMEAR: NORMAL
TRIGL SERPL-MCNC: 116 MG/DL (ref 0–149)
WBC MORPH BLD: NORMAL
WBC NRBC COR # BLD: 5.1 10*3/MM3 (ref 4.8–10.8)

## 2017-06-13 PROCEDURE — 5A1D60Z PERFORMANCE OF URINARY FILTRATION, MULTIPLE: ICD-10-PCS | Performed by: INTERNAL MEDICINE

## 2017-06-13 PROCEDURE — 80061 LIPID PANEL: CPT | Performed by: FAMILY MEDICINE

## 2017-06-13 PROCEDURE — 86900 BLOOD TYPING SEROLOGIC ABO: CPT

## 2017-06-13 PROCEDURE — 85025 COMPLETE CBC W/AUTO DIFF WBC: CPT | Performed by: FAMILY MEDICINE

## 2017-06-13 PROCEDURE — 25010000003 POTASSIUM CHLORIDE PER 2 MEQ: Performed by: NURSE PRACTITIONER

## 2017-06-13 PROCEDURE — P9016 RBC LEUKOCYTES REDUCED: HCPCS

## 2017-06-13 PROCEDURE — 83735 ASSAY OF MAGNESIUM: CPT | Performed by: FAMILY MEDICINE

## 2017-06-13 PROCEDURE — 94760 N-INVAS EAR/PLS OXIMETRY 1: CPT

## 2017-06-13 PROCEDURE — 86920 COMPATIBILITY TEST SPIN: CPT

## 2017-06-13 PROCEDURE — 80053 COMPREHEN METABOLIC PANEL: CPT | Performed by: FAMILY MEDICINE

## 2017-06-13 PROCEDURE — 30233N1 TRANSFUSION OF NONAUTOLOGOUS RED BLOOD CELLS INTO PERIPHERAL VEIN, PERCUTANEOUS APPROACH: ICD-10-PCS | Performed by: FAMILY MEDICINE

## 2017-06-13 PROCEDURE — P9040 RBC LEUKOREDUCED IRRADIATED: HCPCS

## 2017-06-13 PROCEDURE — 82947 ASSAY GLUCOSE BLOOD QUANT: CPT | Performed by: FAMILY MEDICINE

## 2017-06-13 PROCEDURE — 25010000002 HYDROMORPHONE PER 4 MG: Performed by: FAMILY MEDICINE

## 2017-06-13 PROCEDURE — 02HV33Z INSERTION OF INFUSION DEVICE INTO SUPERIOR VENA CAVA, PERCUTANEOUS APPROACH: ICD-10-PCS | Performed by: SPECIALIST

## 2017-06-13 PROCEDURE — 86900 BLOOD TYPING SEROLOGIC ABO: CPT | Performed by: FAMILY MEDICINE

## 2017-06-13 PROCEDURE — 82962 GLUCOSE BLOOD TEST: CPT

## 2017-06-13 PROCEDURE — 25010000002 ONDANSETRON PER 1 MG: Performed by: INTERNAL MEDICINE

## 2017-06-13 PROCEDURE — 86850 RBC ANTIBODY SCREEN: CPT | Performed by: FAMILY MEDICINE

## 2017-06-13 PROCEDURE — 94799 UNLISTED PULMONARY SVC/PX: CPT

## 2017-06-13 PROCEDURE — 85007 BL SMEAR W/DIFF WBC COUNT: CPT | Performed by: FAMILY MEDICINE

## 2017-06-13 PROCEDURE — 36430 TRANSFUSION BLD/BLD COMPNT: CPT

## 2017-06-13 PROCEDURE — 86901 BLOOD TYPING SEROLOGIC RH(D): CPT | Performed by: FAMILY MEDICINE

## 2017-06-13 RX ORDER — DEXTROSE MONOHYDRATE 25 G/50ML
INJECTION, SOLUTION INTRAVENOUS
Status: COMPLETED
Start: 2017-06-13 | End: 2017-06-13

## 2017-06-13 RX ORDER — POTASSIUM CHLORIDE 29.8 MG/ML
20 INJECTION INTRAVENOUS
Status: COMPLETED | OUTPATIENT
Start: 2017-06-13 | End: 2017-06-13

## 2017-06-13 RX ADMIN — DEXTROSE MONOHYDRATE 50 ML: 25 INJECTION, SOLUTION INTRAVENOUS at 14:25

## 2017-06-13 RX ADMIN — POTASSIUM CHLORIDE 20 MEQ: 400 INJECTION, SOLUTION INTRAVENOUS at 14:26

## 2017-06-13 RX ADMIN — HYDROMORPHONE HYDROCHLORIDE 0.5 MG: 1 INJECTION, SOLUTION INTRAMUSCULAR; INTRAVENOUS; SUBCUTANEOUS at 10:19

## 2017-06-13 RX ADMIN — SODIUM CHLORIDE 250 ML: 9 INJECTION, SOLUTION INTRAVENOUS at 08:57

## 2017-06-13 RX ADMIN — POTASSIUM CHLORIDE 20 MEQ: 400 INJECTION, SOLUTION INTRAVENOUS at 10:18

## 2017-06-13 RX ADMIN — DEXTROSE MONOHYDRATE 50 ML: 25 INJECTION, SOLUTION INTRAVENOUS at 14:24

## 2017-06-13 RX ADMIN — ASPIRIN 81 MG: 81 TABLET ORAL at 08:14

## 2017-06-13 RX ADMIN — NOREPINEPHRINE BITARTRATE 0.02 MCG/KG/MIN: 1 INJECTION INTRAVENOUS at 14:26

## 2017-06-13 RX ADMIN — ONDANSETRON 4 MG: 2 SOLUTION INTRAMUSCULAR; INTRAVENOUS at 10:19

## 2017-06-13 NOTE — OP NOTE
After informed consent and universal precautions.  The right groin was prepped and draped sterilely.  One percent lidocaine was used for local anesthesia.  The vein was accessed and the wire passed.  The track was dilated and the catheter was inserted.  Suture placed 2-0 silk.  Flushed.  Dressing placed.

## 2017-06-13 NOTE — NURSING NOTE
Dr. Chaney here to attempt  central line placement in intrajugular bilaterally, attempts not successful, unable to thread. Dr. Garcia notified per Dr. Chaney.

## 2017-06-13 NOTE — PLAN OF CARE
Problem: Patient Care Overview (Adult)  Goal: Plan of Care Review  Outcome: Outcome(s) achieved Date Met:  06/13/17 06/13/17 0452   Coping/Psychosocial Response Interventions   Plan Of Care Reviewed With patient;family   Patient Care Overview   Progress no change       Goal: Adult Individualization and Mutuality  Outcome: Ongoing (interventions implemented as appropriate)  Goal: Discharge Needs Assessment  Outcome: Ongoing (interventions implemented as appropriate)    Problem: Fall Risk (Adult)  Goal: Absence of Falls  Outcome: Ongoing (interventions implemented as appropriate)  Goal: Identify Related Risk Factors and Signs and Symptoms  Outcome: Outcome(s) achieved Date Met:  06/13/17  Goal: Absence of Falls  Outcome: Ongoing (interventions implemented as appropriate)    Problem: Fluid Volume Excess (Adult,Obstetrics,Pediatric)  Goal: Identify Related Risk Factors and Signs and Symptoms  Outcome: Outcome(s) achieved Date Met:  06/13/17  Goal: Balanced Intake/Output  Outcome: Ongoing (interventions implemented as appropriate)    Problem: Pressure Ulcer Risk (New Scale) (Adult,Obstetrics,Pediatric)  Goal: Identify Related Risk Factors and Signs and Symptoms  Outcome: Outcome(s) achieved Date Met:  06/13/17  Goal: Skin Integrity  Outcome: Ongoing (interventions implemented as appropriate)

## 2017-06-13 NOTE — PROGRESS NOTES
Continued Stay Note   Renea     Patient Name: Jennifer Marks  MRN: [id number]  Today's Date: 6/13/2017    Admit Date: 6/11/2017          Discharge Plan       06/13/17 1053    Case Management/Social Work Plan    Plan Referral to [facility]    Patient/Family In Agreement With Plan yes    Additional Comments Spoke with patient this am to follow up about facilities in [place].  Patient has consented to referral to [facility].  Will proceed with referral.              Discharge Codes     None            ANUPAMA Zaragoza

## 2017-06-13 NOTE — PROGRESS NOTES
Nephrology ([facility]) Progress Note      Patient:  Jennifer Marks  YOB: 1945  Date of Service: 6/13/2017  MRN: [id number]   Acct: [id number]   Primary Care Physician: No Known Provider  Advance Directive: Full Code  Admit Date: 6/11/2017       Hospital Day: 2  Referring Provider: Pino Tang MD      Patient personally seen and examined.  Complete chart including Consults, Notes, Operative Reports, Labs, Cardiology, and Radiology studies reviewed as able.        Subjective:  Jennifer Marks is a 72 y.o. female  whom we were consulted for end stage renal disease management, hypokalemia. She is on hemodialysis Monday Wednesday Friday; apparently missed her Friday treatment due to not feeling well. Admitted with chest pain. Has had decreased oral intake and diarrhea for several days.  Today she is awake, alert.  Complaint of general malaise.    Allergies:  Heparin; Iron; Latex; Levaquin [levofloxacin]; Shrimp (diagnostic); and Vancomycin    Home Meds:  Prescriptions Prior to Admission   Medication Sig Dispense Refill Last Dose   • carvedilol (COREG) 12.5 MG tablet Take 12.5 mg by mouth Daily.      • losartan (COZAAR) 25 MG tablet Take 25 mg by mouth Daily.      • pantoprazole (PROTONIX) 40 MG EC tablet Take 40 mg by mouth Daily.      • potassium chloride (KLOR-CON) 20 MEQ packet Take 20 mEq by mouth Daily.      • predniSONE (DELTASONE) 1 MG tablet Take 1 mg by mouth Daily.      • PREDNISONE PO Take  by mouth.      • traZODone (DESYREL) 50 MG tablet Take 50 mg by mouth Every Night.          Medicines:  Current Facility-Administered Medications   Medication Dose Route Frequency Provider Last Rate Last Dose   • aspirin EC tablet 81 mg  81 mg Oral Daily Pino Tang MD   81 mg at 06/12/17 0954   • dextrose (D50W) solution 50 mL  50 mL Intravenous Q1H PRN Bree Soares MD   50 mL at 06/12/17 3430   • ipratropium-albuterol (DUO-NEB) nebulizer solution 3 mL  3 mL Nebulization  Q4H PRN Pino Tang MD       • ondansetron (ZOFRAN) injection 4 mg  4 mg Intravenous Q6H PRN Pino Tang MD   4 mg at 06/12/17 1715   • pantoprazole (PROTONIX) EC tablet 40 mg  40 mg Oral Q AM Pino Tang MD   40 mg at 06/12/17 0610   • potassium chloride (MICRO-K) CR capsule 40 mEq  40 mEq Oral BID With Meals Carter Llamsa MD   40 mEq at 06/12/17 1728   • potassium chloride 20 mEq in 50 mL IVPB  20 mEq Intravenous Q1H FANNIE Blanco       • sodium chloride 0.9 % flush 1-10 mL  1-10 mL Intravenous PRN Pino Tang MD           Past Medical History:  Past Medical History:   Diagnosis Date   • A-fib    • Arthritis    • CHF (congestive heart failure)    • Hypertension    • Renal failure        Past Surgical History:  Past Surgical History:   Procedure Laterality Date   • ARTERIOVENOUS FISTULA LEG Left        Family History  History reviewed. No pertinent family history.    Social History  Social History     Social History   • Marital status: Single     Spouse name: N/A   • Number of children: N/A   • Years of education: N/A     Occupational History   • Not on file.     Social History Main Topics   • Smoking status: Former Smoker   • Smokeless tobacco: Not on file   • Alcohol use No   • Drug use: No   • Sexual activity: Defer     Other Topics Concern   • Not on file     Social History Narrative   • No narrative on file         Review of Systems:  History obtained from chart review and the patient  General ROS: Patient complains of malaise and No fever or chills  Respiratory ROS: No cough, shortness of breath, wheezing  Cardiovascular ROS: no chest pain or dyspnea on exertion  Gastrointestinal ROS: No abdominal pain or melena  Genito-Urinary ROS: No dysuria or hematuria  Neurological ROS: negative  14 point ROS reviewed with the patient and negative except as noted above and in the HPI unless unable to obtain.    Objective:  BP (!) 72/42  Pulse 84  Temp 97.4 °F (36.3 °C) (Temporal Artery )   Resp 21  " Ht 62\" (157.5 cm)  Wt 113 lb 0.1 oz (51.3 kg)  SpO2 92%  BMI 20.67 kg/m2    Intake/Output Summary (Last 24 hours) at 06/13/17 0813  Last data filed at 06/12/17 2330   Gross per 24 hour   Intake              300 ml   Output                0 ml   Net              300 ml     General: awake/alert    Neck: supple, no JVD  Chest:  clear to auscultation bilaterally without respiratory distress  CVS: regular rate and rhythm  Abdominal: soft, nontender, normal bowel sounds  Extremities: no cyanosis or edema  Skin: warm and dry without rash   Neuro: no focal motor deficits      Labs:    Results from last 7 days  Lab Units 06/13/17  0556 06/12/17  0253 06/11/17  2328   WBC 10*3/mm3 5.10 8.90 8.87   HEMOGLOBIN g/dL 7.2* 8.0* 8.1*   HEMATOCRIT % 21.5* 24.1* 23.8*   PLATELETS 10*3/mm3 100* 123* 127*           Results from last 7 days  Lab Units 06/13/17  0556 06/12/17  1830 06/12/17  1304 06/12/17  0253 06/11/17  2047   SODIUM mmol/L 138 138  --  139 139   POTASSIUM mmol/L 2.4* 2.4* 2.2* 1.9* 2.0*   CHLORIDE mmol/L 100 97*  --  100 100   TOTAL CO2 mmol/L 22.0* 24.0  --  19.0* 19.0*   BUN mg/dL 49* 45*  --  66* 66*   CREATININE mg/dL 11.87* 11.53*  --  16.52* 16.31*   CALCIUM mg/dL 7.7* 8.1*  --  8.2* 8.1*   BILIRUBIN mg/dL 1.8*  --   --   --  2.4*   ALK PHOS U/L 94  --   --   --  81   ALT (SGPT) U/L 20  --   --   --  17   AST (SGOT) U/L 30  --   --   --  18   GLUCOSE mg/dL 89 48*  --  71 72       Radiology:   Imaging Results (last 72 hours)     Procedure Component Value Units Date/Time    XR Chest 1 View [[id number]] Collected:  06/11/17 1803     Updated:  06/11/17 2054    Narrative:       EXAMINATION:   XR CHEST 1 VW-  6/11/2017 6:01 PM CDT     HISTORY: Chest pain     Prior exams 05/28/2016.     Single view of the chest is obtained. The pulmonary vascular margins are  slightly indistinct. This has the appearance of mild pulmonary vascular  congestion. Left diaphragm is indistinct. This may be infiltrate or  atelectasis left " lower lobe.     Cardiac silhouette is enlarged and unchanged from May 28.       Impression:       1. Silhouetting of the left diaphragm consistent with infiltrate or  atelectasis left lower lobe.  2. Indistinct pulmonary vascular margins consistent with mild pulmonary  vascular congestion.  This report was finalized on 06/11/2017 20:51 by Dr. Herson Magaña MD.          Culture:         Assessment   1. End stage renal disease on maintenance hemodialysis Monday Wednesday Friday   2. Hypokalemia  3. Hypotension  4. Anemia of chronic kidney disease  5. Diabetes mellitus type 2    Plan:  1.  Replace potassium  2.  Pt has no peripheral access; needs central line placed  3.  Normal saline 250ml bolus  4.  Levophed drip as needed to maintain blood pressure.      Jaden Tovar, APRN  6/13/2017  8:13 AM

## 2017-06-13 NOTE — PAYOR COMM NOTE
"ADMIT INPT 6-11-17 TO CCU  AETNA IS PRIMARY, PATIENT HAS ONLY MEDICARE PART B  UR PHONE   FAX  [phone]      Jennifer Zhao (72 y.o. Female)     Date of Birth Social Security Number Address Home Phone MRN    1945  [address] [place] [address] [phone] [id number]    Mosque Marital Status          Cheondoism Single       Admission Date Admission Type Admitting Provider Attending Provider Department, Room/Bed    6/11/17 Emergency Carter Llamas MD Truong, Khai C, MD [facility] CARDIAC CARE, C002/1    Discharge Date Discharge Disposition Discharge Destination                      Attending Provider: Carter Llamas MD     Allergies:  Heparin, Iron, Latex, Levaquin [Levofloxacin], Shrimp (Diagnostic), Vancomycin    Isolation:  None   Infection:  None   Code Status:  FULL    Ht:  62\" (157.5 cm)   Wt:  113 lb 0.1 oz (51.3 kg)    Admission Cmt:  None   Principal Problem:  None                Active Insurance as of 6/11/2017     Primary Coverage     Payor Plan Insurance Group Employer/Plan Group    MEDICARE MEDICARE B ONLY      Payor Plan Address Payor Plan Phone Number Effective From Effective To    [address] [address] [phone] 5/1/2008     [place] [address]       Subscriber Name Subscriber Birth Date Member ID       JENNIFER ZHAO 1945 [id number]           Secondary Coverage     Payor Plan Insurance Group Employer/Plan Group    [place]      Payor Plan Address Payor Plan Phone Number Effective From Effective To    [address] [address]  1/1/2014     [place] [address]       Subscriber Name Subscriber Birth Date Member ID       JENNIFER ZHAO 1945 [id number]                 Emergency Contacts      (Rel.) Home Phone Work Phone Mobile Phone    Norma Amado (Relative) [phone] -- --    Mirza Peguero (Relative) [phone] -- --               History & Physical      H&P signed by Pino Tang MD at " 6/12/2017 10:22 PM              cc:      TIME: 10:49 p.m.     PRIMARY CARE PHYSICIAN: Unknown.    HISTORY OF PRESENT ILLNESS: Ms. Marks is a 72-year-old  female who presents to [facility] due to uncertain reasons. Ms. Marks is not able to provide much meaningful history. There are no family members present. I spoke with her niece who relates that Ms. Marks has been having significant difficulties with fatigue, malaise, lethargy, generalized weakness, diffuse arthralgias, myalgias, poor appetite, as well as recurring chest and abdominal pain. Her symptoms worsened acutely approximately 24 hours ago. Ms. Marks has a history of end-stage renal disease. She undergoes dialysis on Monday, Wednesday and Friday. There is speculation that she did not complete dialysis on Friday due to feeling ill. Presently Ms. Marks is resting comfortably and is in no distress. It is very difficult to obtain any information due to her status.     In the emergency department, laboratory studies demonstrated a creatinine of 16.3. I have consulted the Nephrology service for dialysis.     REVIEW OF SYSTEMS: Cannot be performed due to the patient's status.     PAST MEDICAL HISTORY:  1. Hypertension.   2. Dyslipidemia.   3. Diabetes mellitus type 2.   3. Hypothyroidism.   4. Chronic congestive heart failure due to systolic and diastolic dysfunction.   5. Aortic stenosis.   6. Pulmonary hypertension.   7. Nonischemic cardiomyopathy with estimated ejection fraction of 30% to 35%.   8. Peripheral arterial disease.   9. Wegener's granulomatosis.   10. Rheumatoid arthritis.   11. End-stage renal disease necessitating hemodialysis.   12. Chronic anemia.   13. Deep vein thrombosis.   14. Syncope.   15. Hearing impairment.     PAST SURGICAL HISTORY:  1. Status post appendectomy.   2. Status post cholecystectomy.   3. Status post bilateral tubal ligation.   4. Status post hernia repair.   5. Status post cataract resection.    6. Status post bilateral upper extremity AV fistula placement.   7. Status post left lower extremity AV fistula placement.   8. Status post cardiac catheterization in 2014, which demonstrated no coronary artery disease.     ALLERGIES (PER MEDICAL RECORDS):  1. HEPARIN.  2. IRON.  3. LATEX.  4. LEVAQUIN.  5. SHRIMP.  6. VANCOMYCIN.     HOME MEDICATIONS:  1. Coreg 12.5 mg p.o. b.i.d.   2. Losartan 25 mg p.o. daily.   3. Protonix 40 mg p.o. daily.   4. Potassium chloride 20 mEq p.o. daily.   5. Prednisone 1 mg p.o. daily.   6. Trazodone 50 mg p.o. at bedtime.     Note, the above medications doses and schedules cannot be confirmed.     SOCIAL HISTORY: Significant for being a resident of [place], [place]. She lives with a male friend. She is . She hads3 sons and 2 daughters although 1 son is now  due to uncertain reasons. The patient is retired. She has a high school education. She has no history of tobacco, alcohol or drug use. She is Amish. She has no recent history of travel outside this region.     She is a FULL CODE.     She designates her daughter, Rosanna Marks, to serve as a surrogate for healthcare matters should such become necessary. Her daughter’s telephone number is [phone].    FAMILY HISTORY: Cannot be obtained.     PHYSICAL EXAMINATION:  VITAL SIGNS: Temperature is 97.8, pulse is 88, respirations 17, and unlabored, blood pressure is 81/45, O2 saturation is 100%, breathing supplemental oxygen, weight 113 pounds.     GENERAL: This is a 72-year-old  female appearing her documented age. She is resting comfortably in bed. She is in no apparent distress. She is not able to provide any sort of meaningful history. She seems to be hearing impaired.     HEAD AND NECK: Essentially unremarkable except as noted. I see no signs of acute trauma. Eyes, nose, and throat appear grossly unremarkable. Neck is supple. She has no cervical or clavicular adenopathy. She has no  definite carotid bruits. There are no masses of the head or neck. Neck veins do not appear pathologically distended.     CARDIAC: Reveals S1 and S2 with a regular rate and rhythm. She has had no definite murmurs, rubs, or gallops.     LUNGS: Reveals bilateral breath sounds are clear to auscultation throughout. She has no rales, wheezes, or rhonchi.     ABDOMEN: Reveals bowel sounds to be present. Her abdomen is nondistended and soft. She exhibits diffuse tenderness. She relates this is a chronic problem.     EXTREMITIES: No significant lower extremity edema, erythema or calf tenderness.     NEUROLOGIC: Reveals the patient to be awake and alert. Cranial nerves II-XII are intact. She exhibits no definite focal, motor or sensory deficits. She seems able to move all extremities without difficulty and at will. Her gait was not tested. She appears weak.     PSYCHIATRIC: Deferred due to patient's status.     DIAGNOSTIC DATA: Laboratory studies from [facility] demonstrated a sodium of 136, potassium 2.7, chloride 98, CO2 of 17, BUN 67, creatinine 17.9, glucose 71, total calcium 8.1.     CBC demonstrates a white blood cell count of 8.6, hemoglobin 10.0, hematocrit 29.6, platelet count 157,000.     Magnesium is 2.4.     Troponin level is 3.09.     EKG at [facility] demonstrates sinus rhythm of 68 beats per minute. ST segment elevations are noted in the inferior leads. T wave inversions are noted throughout the anterior precordial leads.     IMPRESSION:  1. Acute MI?.  2. Hypertension.   3. Dyslipidemia.   4. Diabetes mellitus type 2.   5. Hypothyroidism.   6. End-stage renal disease necessitating hemodialysis.   7. Chronic congestive heart failure due to systolic and diastolic dysfunction.   8. Nonischemic cardiomyopathy.   9. Ejection fraction of 30% to 35%.   10. Pulmonary hypertension.   11. Anemia.   12. Wegener's granulomatosis.     PLAN: At this time, Ms. Marks will be admitted to  [facility] for further evaluation and treatment. Her admitting diagnoses are as noted. Her condition at this time is judged to be guarded. She will be housed in the CCU.     I have asked the nursing staff to obtain vital signs per protocol. She will be confined to bedrest. Her allergies are as noted above. I have asked the nursing staff to monitor input and output. Daily weights will be obtained. She will be maintained on a cardiac diet. IV fluids will be saline locked. Oxygen will be used as needed to maintain her O2 saturation greater than 92%. She is a FULL CODE. Fall precautions are to be instituted. I will consult the Cardiology service. The patient has already been seen by Dr. Nelson.    INITIAL ADMITTING MEDICATIONS:  1. Aspirin 81 mg p.o. daily.   2. Protonix 40 mg p.o. daily.   3. Potassium chloride 20 mEq p.o. b.i.d.     Note, the patient will not be given beta blockers at this time due to her low blood pressure. Also note that she cannot be given heparin or heparin products due to reported allergy.     Additional p.r.n. medications will include DuoNeb and Zofran.     I will obtain follow up laboratory studies.     I will schedule a cardiac echocardiogram for in the morning.     I will continue to follow Ms. Marks closely through the night pending return of the hospitalist team in the morning. The nursing staff may call should they have any questions or concerns. Please refer to the medical record for additional information, orders and/or comments.          Pino Tang M.D.  SANTINO/[id number]  D:  06/12/2017 00:04:01(Eastern Time)  T:  06/12/2017 00:46:25(Eastern Time)  Voice ID:  [id number]/Document ID:  [id number]       Electronically signed by Pino Tang MD at 6/12/2017 10:22 PM           Emergency Department Notes      Yashira Macedo, RN, BSN at 6/11/2017  5:07 PM          Transfer from [facility]. Pt went to transferring ER with c/o CP since this AM, nausea, SOA, and generalized pain. Dialysis  "patient (M-W-F) and missed her Friday dialysis due to not feeling well. Transferred to this ER for abnormal labs: troponin 3.09, bilirubin 2.3, K 2.7, BUN 67, creat 17.9. [facility] received NS bolus of 500ml, ASA 324mg PO, Morphine 2mg IV, and Zofran 4mg IV. Chest pain decreased from 10/10 to 6/10 with Morphine. Pt also has reported new onset afib, controlled rate in 80s.     Yashira Macedo RN, BSN  06/11/17 6635       Electronically signed by Yashira Macedo RN, BSN at 6/11/2017  5:09 PM      Dahlia Avila RN at 6/11/2017  5:43 PM          PATIENT C/O CHEST PAIN THAT BEGAN TWO DAYS AGO. PATIENT STATES PAIN RADIATED TO HER RIGHT ARM. PATIENT STATES PAIN IS RESOLVED AT THIS TIME. PATIENT DENIES SHORTNESS OF BREATH. PATIENT PRESENTED TO [facility] ER TODAY WITH C/O CHEST PAIN, NAUSEA, SOA, AND ALL OVER PAIN. PATIENT'S TROPONIN WAS 3.09 AT [facility]. PATIENT ALSO REPORTS MISSING HER DIALYSIS APPOINTMENT ON Friday DUE TO FATIGUE AND NOT FELLING WELL. PATIENT WAS GIVEN 500 NS BOLUS, 324 MG ASPIRIN, 2 MG MORPHINE, AND 4MG ZOFRAN PER TRANSFERRING FACILITY.      Dahlia Avila RN  06/11/17 3917       Electronically signed by Dahlia Avila RN at 6/11/2017  5:45 PM      Dereck Calvo MD at 6/11/2017  6:37 PM          Subjective   HPI Comments: Patient is a 72-year-old female with history of chronic systolic congestive heart failure, history of severe ischemic cardiomyopathy, end-stage renal disease, atrial fibrillation and hypertension.  Patient reports that she was not feeling well on Friday (06/09/17) and therefore she did not go for her dialysis treatment.  Patient reports that this morning at 3 AM she developed \"sharp\" center chest pain radiating to her left arm with associated shortness of breath, nausea, vomiting, diaphoresis and generalized pain and therefore she was seen at [facility] emergency department and was noted to have a potassium at 2.7, BUNs " 67, creatinine 17.9 and troponin 3.09 and therefore the patient was sent to this emergency department.  Patient reports her chest pain was worsened by nothing and relieved partially at [facility] emergency department when given morphine 2 mg intravenously.  She reports her chest pain currently is a 10 and at its worse is a 10.  Patient reports that her cardiologist is in [place], [place] and she last saw him about 3 months ago.  She reports her last cardiac stress test was about 4 years ago.      History provided by:  Patient ([facility] records dated 06/11/17.)      Review of Systems   Constitutional: Positive for diaphoresis.   HENT: Negative.    Eyes: Negative.    Respiratory: Positive for shortness of breath.    Cardiovascular: Positive for chest pain.   Gastrointestinal: Positive for nausea and vomiting.   Endocrine: Negative.    Genitourinary: Negative.         Chronic kidney disease   Musculoskeletal: Negative.    Skin: Negative.    Allergic/Immunologic: Negative.    Neurological: Negative.    Hematological: Negative.    Psychiatric/Behavioral: Negative.    All other systems reviewed and are negative.    Objective   Physical Exam   Constitutional: She is oriented to person, place, and time.   Ill-appearing female.   HENT:   Head: Normocephalic and atraumatic.   Right Ear: External ear normal.   Left Ear: External ear normal.   Nose: Nose normal.   Mouth/Throat: Oropharynx is clear and moist.   Eyes: Conjunctivae and EOM are normal. Pupils are equal, round, and reactive to light. Right eye exhibits no discharge. Left eye exhibits no discharge.   Neck: Normal range of motion. Neck supple. No tracheal deviation present. No thyromegaly present.   Cardiovascular: Normal rate, regular rhythm, normal heart sounds and intact distal pulses.    No murmur heard.  Pulmonary/Chest: Effort normal and breath sounds normal. No respiratory distress. She exhibits no tenderness.   Abdominal: Soft. She  exhibits no distension. There is tenderness (Mild generalized abdominal pain to palpation.).   Musculoskeletal: She exhibits no edema, tenderness or deformity.   Moderate generalized weakness.   Neurological: She is alert and oriented to person, place, and time. No cranial nerve deficit.   Skin: Skin is warm and dry. No erythema. No pallor.   Psychiatric: She has a normal mood and affect. Judgment normal.   Nursing note and vitals reviewed.      Procedures        ED Course  ED Course   Comment By Time   Patient's case has been discussed with Dr. Nelson and he recommends that the patient be admitted by the hospitalist and the patient receive dialysis and that he will consult on the patient. Dereck Calvo MD 06/11 1841   Patient's case has just been discussed with Dr. Tang (hospitalist) and he agrees to admit the patient to ICU. Dereck Calvo MD 06/11 0997                  MDM  Number of Diagnoses or Management Options  Chest pain in adult: new and requires workup  Chronic systolic (congestive) heart failure:   End stage renal disease: established and worsening  Hypotension, unspecified hypotension type: new and requires workup     Amount and/or Complexity of Data Reviewed  Clinical lab tests: reviewed and ordered  Tests in the radiology section of CPT®:  ordered and reviewed  Discuss the patient with other providers: yes    Risk of Complications, Morbidity, and/or Mortality  Presenting problems: moderate  Diagnostic procedures: moderate  Management options: low    Patient Progress  Patient progress: stable      Final diagnoses:   Chest pain in adult   End stage renal disease   Hypotension, unspecified hypotension type   Chronic systolic (congestive) heart failure            Dereck Calvo MD  06/11/17 1900       Electronically signed by Dereck Calvo MD at 6/11/2017  7:00 PM        Vital Signs (last 7 days)       06/06 0700  -  06/07 0659 06/07 0700  -  06/08 0659 06/08 0700  -  06/09  0659 06/09 0700  -  06/10 0659 06/10 0700  -  06/11 0659 06/11 0700  -  06/12 0659 06/12 0700 - 06/13 0659 06/13 0700  -  06/13 1320   Most Recent    Temp (°F)           96.2 -  97.8    96.3 -  97.4       97.4 (36.3)    Heart Rate           76 -  88    75 -  90    92 -  93     93    Resp           14 -  20    12 -  17      21     21    BP           (!)64/41 -  94/53    (!)69/46 -  92/49    (!)67/57 -  (!)70/39     (!) 67/57    SpO2 (%)           (!)85 -  100    (!)77 -  100    (!)89 -  95     95          Intake & Output (last 7 days)       06/06 0701 - 06/07 0700 06/07 0701 - 06/08 0700 06/08 0701 - 06/09 0700 06/09 0701 - 06/10 0700 06/10 0701 - 06/11 0700 06/11 0701 - 06/12 0700 06/12 0701 - 06/13 0700 06/13 0701 - 06/14 0700    IV Piggyback      400 300     Total Intake(mL/kg)      400 (7.8) 300 (5.9)     Net           +400 +300                      Lines, Drains & Airways    Active LDAs     Name:   Placement date:   Placement time:   Site:   Days:    Percutaneous Central Line - Triple Lumen 06/13/17 1051  06/13/17    1051 created via procedure documentation    less than 1    Peripheral IV Line - Single Lumen 06/11/17 1732 metacarpal vein (top of hand), left 22 gauge  06/11/17    1732      1    Peripheral IV Line - Single Lumen 06/11/17 1733 median vein (underside of arm), right 18 gauge  06/11/17    1733      1    Hemodialysis AV Access Device                 Hemodialysis AV Access Device                 Hemodialysis AV Access Device                      Inactive LDAs     None                Hospital Medications (all)       Dose Frequency Start End    aspirin EC tablet 81 mg 81 mg Daily 6/11/2017     Sig - Route: Take 1 tablet by mouth Daily. - Oral    dextrose (D50W) solution 50 mL 50 mL Every 1 Hour PRN 6/12/2017     Sig - Route: Infuse 50 mL into a venous catheter Every 1 (One) Hour As Needed for Low Blood Sugar (Blood sugar < 70). - Intravenous    HYDROmorphone (DILAUDID) injection 0.5 mg 0.5 mg Every 4  Hours PRN 6/13/2017 6/23/2017    Sig - Route: Infuse 0.5 mg into a venous catheter Every 4 (Four) Hours As Needed for Severe Pain (7-10). - Intravenous    ipratropium-albuterol (DUO-NEB) nebulizer solution 3 mL 3 mL Every 4 Hours PRN 6/11/2017     Sig - Route: Take 3 mL by nebulization Every 4 (Four) Hours As Needed for Wheezing or Shortness of Air. - Nebulization    lidocaine-prilocaine (EMLA) 2.5-2.5 % cream  Once 6/12/2017 6/12/2017    Sig - Route: Apply  topically 1 (One) Time. - Topical    norepinephrine (LEVOPHED) 8,000 mcg in sodium chloride 0.9 % 250 mL (32 mcg/mL) infusion 0.02-0.3 mcg/kg/min × 51.3 kg Titrated 6/13/2017     Sig - Route: Infuse 1.026-15.39 mcg/min into a venous catheter Dose Adjusted By Provider As Needed. - Intravenous    ondansetron (ZOFRAN) injection 4 mg 4 mg Every 6 Hours PRN 6/11/2017     Sig - Route: Infuse 2 mL into a venous catheter Every 6 (Six) Hours As Needed for Nausea or Vomiting. - Intravenous    pantoprazole (PROTONIX) EC tablet 40 mg 40 mg Every Early Morning 6/12/2017     Sig - Route: Take 1 tablet by mouth Every Morning. - Oral    potassium chloride (KAYCIEL) 20 MEQ/15ML (10%) solution 20 mEq 20 mEq Once 6/12/2017 6/12/2017    Sig - Route: Take 15 mL by mouth 1 (One) Time. - Oral    potassium chloride 10 mEq in 100 mL IVPB 10 mEq Every 1 Hour 6/12/2017 6/12/2017    Sig - Route: Infuse 100 mL into a venous catheter Every 1 (One) Hour. - Intravenous    potassium chloride 20 mEq in 50 mL IVPB 20 mEq Every 2 Hours 6/13/2017 6/13/2017    Sig - Route: Infuse 50 mL into a venous catheter Every 2 (Two) Hours. - Intravenous    sodium chloride 0.9 % bolus 250 mL 250 mL Once 6/11/2017 6/11/2017    Sig - Route: Infuse 250 mL into a venous catheter 1 (One) Time. - Intravenous    sodium chloride 0.9 % bolus 250 mL 250 mL Once 6/11/2017 6/11/2017    Sig - Route: Infuse 250 mL into a venous catheter 1 (One) Time. - Intravenous    sodium chloride 0.9 % bolus 250 mL 250 mL Once 6/12/2017  6/12/2017    Sig - Route: Infuse 250 mL into a venous catheter 1 (One) Time. - Intravenous    sodium chloride 0.9 % bolus 250 mL 250 mL Once 6/13/2017 6/13/2017    Sig - Route: Infuse 250 mL into a venous catheter 1 (One) Time. - Intravenous    sodium chloride 0.9 % flush 1-10 mL 1-10 mL As Needed 6/11/2017     Sig - Route: Infuse 1-10 mL into a venous catheter As Needed for Line Care. - Intravenous    dextrose (D50W) solution 50 mL (Discontinued) 50 mL Every 1 Hour PRN 6/12/2017 6/12/2017    Sig - Route: Infuse 50 mL into a venous catheter Every 1 (One) Hour As Needed for Low Blood Sugar. - Intravenous    dextrose 5 % with KCl 20 mEq/L infusion (Discontinued) 50 mL/hr Continuous 6/12/2017 6/12/2017    Sig - Route: Infuse 50 mL/hr into a venous catheter Continuous. - Intravenous    potassium chloride (KAYCIEL) 20 MEQ/15ML (10%) solution 20 mEq (Discontinued) 20 mEq Daily 6/12/2017 6/12/2017    Sig - Route: Take 15 mL by mouth Daily. - Oral    potassium chloride (MICRO-K) CR capsule 20 mEq (Discontinued) 20 mEq 2 Times Daily With Meals 6/11/2017 6/12/2017    Sig - Route: Take 2 capsules by mouth 2 (Two) Times a Day With Meals. - Oral    potassium chloride (MICRO-K) CR capsule 40 mEq (Discontinued) 40 mEq 2 Times Daily With Meals 6/12/2017 6/13/2017    Sig - Route: Take 4 capsules by mouth 2 (Two) Times a Day With Meals. - Oral          Blood Administration Record     None        Lab Results (all)     Procedure Component Value Units Date/Time    Troponin [[id number]]  (Abnormal) Collected:  06/11/17 1730    Specimen:  Blood from Arm, Right Updated:  06/11/17 1807     Troponin I 2.870 (C) ng/mL     Troponin [[id number]]  (Abnormal) Collected:  06/11/17 2047    Specimen:  Blood Updated:  06/11/17 2116     Troponin I 2.570 (C) ng/mL     Phosphorus [[id number]]  (Abnormal) Collected:  06/11/17 2047    Specimen:  Blood Updated:  06/11/17 2238     Phosphorus 9.4 (H) mg/dL     Magnesium [[id number]]  (Normal) Collected:  06/11/17  2047    Specimen:  Blood Updated:  06/11/17 2238     Magnesium 2.2 mg/dL     Comprehensive Metabolic Panel [[id number]]  (Abnormal) Collected:  06/11/17 2047    Specimen:  Blood Updated:  06/11/17 2249     Glucose 72 mg/dL      BUN 66 (H) mg/dL      Creatinine 16.31 (H) mg/dL      Sodium 139 mmol/L      Potassium 2.0 (C) mmol/L      Chloride 100 mmol/L      CO2 19.0 (L) mmol/L      Calcium 8.1 (L) mg/dL      Total Protein 5.4 (L) g/dL      Albumin 2.10 (L) g/dL      ALT (SGPT) 17 U/L      AST (SGOT) 18 U/L      Alkaline Phosphatase 81 U/L      Total Bilirubin 2.4 (H) mg/dL      eGFR  African Amer 3 (L) mL/min/1.73      Globulin 3.3 gm/dL      A/G Ratio 0.6 (L) g/dL      BUN/Creatinine Ratio 4.0 (L)     Anion Gap 20.0 (H) mmol/L     Narrative:       The MDRD GFR formula is only valid for adults with stable renal function between ages 18 and 70.    Magnesium [[id number]]  (Normal) Collected:  06/11/17 2328    Specimen:  Blood Updated:  06/12/17 0002     Magnesium 2.2 mg/dL     Troponin [[id number]]  (Abnormal) Collected:  06/11/17 2328    Specimen:  Blood Updated:  06/12/17 0018     Troponin I 2.440 (C) ng/mL     CBC & Differential [[id number]] Collected:  06/11/17 2328    Specimen:  Blood Updated:  06/12/17 0020    Narrative:       The following orders were created for panel order CBC & Differential.  Procedure                               Abnormality         Status                     ---------                               -----------         ------                     Scan Slide[[id number]]                                       Final result               CBC Auto Differential[[id number]]        Abnormal            Final result                 Please view results for these tests on the individual orders.    CBC Auto Differential [[id number]]  (Abnormal) Collected:  06/11/17 2328    Specimen:  Blood Updated:  06/12/17 0020     WBC 8.87 10*3/mm3      RBC 2.84 (L) 10*6/mm3      Hemoglobin 8.1 (L) g/dL      Hematocrit 23.8  (L) %      MCV 83.8 fL      MCH 28.5 pg      MCHC 34.0 g/dL      RDW 22.6 (H) %      RDW-SD 68.6 (H) fl      MPV 10.7 fL      Platelets 127 (L) 10*3/mm3      Neutrophil % 90.2 (H) %      Lymphocyte % 6.9 (L) %      Monocyte % 1.9 (L) %      Eosinophil % 0.6 %      Basophil % 0.1 %      Immature Grans % 0.3 %      Neutrophils, Absolute 8.00 10*3/mm3      Lymphocytes, Absolute 0.61 (L) 10*3/mm3      Monocytes, Absolute 0.17 (L) 10*3/mm3      Eosinophils, Absolute 0.05 10*3/mm3      Basophils, Absolute 0.01 10*3/mm3      Immature Grans, Absolute 0.03 10*3/mm3     Scan Slide [[id number]] Collected:  06/11/17 2328    Specimen:  Blood Updated:  06/12/17 0020     Anisocytosis Slight/1+     Forgan Cells Slight/1+     Ovalocytes Slight/1+     Poikilocytes Mod/2+     WBC Morphology Normal     Platelet Estimate Decreased    Basic Metabolic Panel [[id number]]  (Abnormal) Collected:  06/12/17 0253    Specimen:  Blood Updated:  06/12/17 0322     Glucose 71 mg/dL      BUN 66 (H) mg/dL      Creatinine 16.52 (H) mg/dL      Sodium 139 mmol/L      Potassium 1.9 (C) mmol/L      Chloride 100 mmol/L      CO2 19.0 (L) mmol/L      Calcium 8.2 (L) mg/dL      eGFR  African Amer 3 (L) mL/min/1.73      BUN/Creatinine Ratio 4.0 (L)     Anion Gap 20.0 (H) mmol/L     Narrative:       The MDRD GFR formula is only valid for adults with stable renal function between ages 18 and 70.    TSH [[id number]]  (Abnormal) Collected:  06/12/17 0253    Specimen:  Blood Updated:  06/12/17 0341     TSH 12.200 (H) mIU/mL     CBC (No Diff) [[id number]]  (Abnormal) Collected:  06/12/17 0253    Specimen:  Blood Updated:  06/12/17 0613     WBC 8.90 10*3/mm3      RBC 2.86 (L) 10*6/mm3      Hemoglobin 8.0 (L) g/dL      Hematocrit 24.1 (L) %      MCV 84.3 fL      MCH 28.0 pg      MCHC 33.2 g/dL      RDW 22.8 (H) %      RDW-SD 69.4 (H) fl      Platelets 123 (L) 10*3/mm3     Troponin [[id number]]  (Abnormal) Collected:  06/12/17 0545    Specimen:  Blood Updated:  06/12/17 0631      Troponin I 2.030 (C) ng/mL     T4, Free [[id number]]  (Normal) Collected:  06/12/17 0253    Specimen:  Blood Updated:  06/12/17 0849     Free T4 1.87 ng/dL     T3, Free [[id number]]  (Abnormal) Collected:  06/12/17 0253    Specimen:  Blood Updated:  06/12/17 0849     T3, Free 2.53 (L) pg/mL     Potassium [[id number]]  (Abnormal) Collected:  06/12/17 1304    Specimen:  Blood Updated:  06/12/17 1330     Potassium 2.2 (C) mmol/L     Troponin [[id number]]  (Abnormal) Collected:  06/12/17 1304    Specimen:  Blood Updated:  06/12/17 1336     Troponin I 1.660 (C) ng/mL     Basic Metabolic Panel [[id number]]  (Abnormal) Collected:  06/12/17 1830    Specimen:  Blood Updated:  06/12/17 1903     Glucose 48 (C) mg/dL      BUN 45 (H) mg/dL      Creatinine 11.53 (H) mg/dL      Sodium 138 mmol/L      Potassium 2.4 (C) mmol/L      Chloride 97 (L) mmol/L      CO2 24.0 mmol/L      Calcium 8.1 (L) mg/dL      eGFR  African Amer 4 (L) mL/min/1.73      BUN/Creatinine Ratio 3.9 (L)     Anion Gap 17.0 (H) mmol/L     Narrative:       The MDRD GFR formula is only valid for adults with stable renal function between ages 18 and 70.    POC Glucose Fingerstick [[id number]]  (Abnormal) Collected:  06/12/17 2317    Specimen:  Blood Updated:  06/12/17 2331     Glucose 34 (C) mg/dL       : [id number] [facility] ID: [id number]       POC Glucose Fingerstick [[id number]]  (Abnormal) Collected:  06/12/17 2322    Specimen:  Blood Updated:  06/12/17 2333     Glucose 29 (C) mg/dL       : [id number] [facility] ID: [id number]       POC Glucose Fingerstick [[id number]]  (Normal) Collected:  06/13/17 0020    Specimen:  Blood Updated:  06/13/17 0030     Glucose 98 mg/dL       : [id number] Vitaliy Contreras ID: [id number]       POC Glucose Fingerstick [[id number]]  (Normal) Collected:  06/13/17 0531    Specimen:  Blood Updated:  06/13/17 0544     Glucose 81 mg/dL       : [id number] [facility] ID: [id number]        Magnesium [[id number]]  (Normal) Collected:  06/13/17 0556    Specimen:  Blood Updated:  06/13/17 0640     Magnesium 2.0 mg/dL     Comprehensive Metabolic Panel [[id number]]  (Abnormal) Collected:  06/13/17 0556    Specimen:  Blood Updated:  06/13/17 0643     Glucose 89 mg/dL      BUN 49 (H) mg/dL      Creatinine 11.87 (H) mg/dL      Sodium 138 mmol/L      Potassium 2.4 (C) mmol/L      Chloride 100 mmol/L      CO2 22.0 (L) mmol/L      Calcium 7.7 (L) mg/dL      Total Protein 5.2 (L) g/dL      Albumin 2.10 (L) g/dL      ALT (SGPT) 20 U/L      AST (SGOT) 30 U/L      Alkaline Phosphatase 94 U/L      Total Bilirubin 1.8 (H) mg/dL      eGFR  African Amer 4 (L) mL/min/1.73      Globulin 3.1 gm/dL      A/G Ratio 0.7 (L) g/dL      BUN/Creatinine Ratio 4.1 (L)     Anion Gap 16.0 (H) mmol/L     Narrative:       The MDRD GFR formula is only valid for adults with stable renal function between ages 18 and 70.    Lipid Panel [[id number]]  (Abnormal) Collected:  06/13/17 0556    Specimen:  Blood Updated:  06/13/17 0651     Total Cholesterol 65 (L) mg/dL      Triglycerides 116 mg/dL      HDL Cholesterol 10 (L) mg/dL      LDL Cholesterol  39 mg/dL      LDL/HDL Ratio 3.18    CBC & Differential [[id number]] Collected:  06/13/17 0556    Specimen:  Blood Updated:  06/13/17 0655    Narrative:       The following orders were created for panel order CBC & Differential.  Procedure                               Abnormality         Status                     ---------                               -----------         ------                     Scan Slide[[id number]]                                       Final result               CBC Auto Differential[[id number]]        Abnormal            Final result                 Please view results for these tests on the individual orders.    CBC Auto Differential [[id number]]  (Abnormal) Collected:  06/13/17 0556    Specimen:  Blood Updated:  06/13/17 0655     WBC 5.10 10*3/mm3      RBC 2.58 (L) 10*6/mm3       Hemoglobin 7.2 (L) g/dL      Hematocrit 21.5 (L) %      MCV 83.3 fL      MCH 27.9 (L) pg      MCHC 33.5 g/dL      RDW 22.7 (H) %      RDW-SD 68.6 (H) fl      Platelets 100 (L) 10*3/mm3      Neutrophil % 84.1 (H) %      Lymphocyte % 9.6 (L) %      Monocyte % 3.1 (L) %      Eosinophil % 2.4 %      Basophil % 0.2 %      Immature Grans % 0.6 %      Neutrophils, Absolute 4.29 10*3/mm3      Lymphocytes, Absolute 0.49 (L) 10*3/mm3      Monocytes, Absolute 0.16 (L) 10*3/mm3      Eosinophils, Absolute 0.12 10*3/mm3      Basophils, Absolute 0.01 10*3/mm3      Immature Grans, Absolute 0.03 10*3/mm3      nRBC 1.1 (H) /100 WBC     Scan Slide [[id number]] Collected:  06/13/17 0556    Specimen:  Blood Updated:  06/13/17 0655     Anisocytosis Slight/1+     Hypochromia Slight/1+     Poikilocytes Slight/1+     RBC Fragments Slight/1+     Target Cells Slight/1+     WBC Morphology Normal     Platelet Estimate Decreased          Imaging Results (all)     Procedure Component Value Units Date/Time    XR Chest 1 View [[id number]] Collected:  06/11/17 1803     Updated:  06/11/17 2054    Narrative:       EXAMINATION:   XR CHEST 1 VW-  6/11/2017 6:01 PM CDT     HISTORY: Chest pain     Prior exams 05/28/2016.     Single view of the chest is obtained. The pulmonary vascular margins are  slightly indistinct. This has the appearance of mild pulmonary vascular  congestion. Left diaphragm is indistinct. This may be infiltrate or  atelectasis left lower lobe.     Cardiac silhouette is enlarged and unchanged from May 28.       Impression:       1. Silhouetting of the left diaphragm consistent with infiltrate or  atelectasis left lower lobe.  2. Indistinct pulmonary vascular margins consistent with mild pulmonary  vascular congestion.  This report was finalized on 06/11/2017 20:51 by Dr. Herson Magaña MD.          ECG/EMG Results (all)     Procedure Component Value Units Date/Time    SCANNED EKG [[id number]] Resulted:  06/11/17      Updated:  06/12/17  0429    ECG 12 Lead [[id number]] Collected:  06/11/17 1720     Updated:  06/12/17 0835    Narrative:       Test Reason : chest pain  Blood Pressure : **/** mmHG  Vent. Rate : 086 BPM     Atrial Rate : 085 BPM     P-R Int : 276 ms          QRS Dur : 124 ms      QT Int : 352 ms       P-R-T Axes : -38 112 254 degrees     QTc Int : 421 ms    ** Poor data quality, interpretation may be adversely affected  Normal sinus rhythm with Premature supraventricular complexes  Right bundle branch block  Low voltage QRS, consider pulmonary disease, pericardial effusion, or  normal variant  Nonspecific ST and T wave abnormality  Abnormal ECG  When compared with ECG of 28-MAY-2016 12:57,  Premature atrial complexes is now present  Right bundle branch block is now present  Nonspecific ST and T wave abnormality is now present    Referred By:  ALEA           Confirmed By:Luis Eduardo Ray MD    Adult Transthoracic Echo Complete [[id number]] Collected:  06/12/17 0859     Updated:  06/12/17 1705    Narrative:       · Left ventricular systolic function is normal. Estimated ejection   fraction is 61-65%.  · Left ventricular diastolic dysfunction (grade II) consistent with   pseudonormalization.  · Right ventricular cavity is moderate-to-severely dilated.  · Moderately reduced right ventricular systolic function noted.  · There is biatrial enlargement.  · Mild aortic valve stenosis is present.  · Mild pulmonic valve regurgitation is present.  · Moderate to severe tricuspid valve regurgitation is present.  · Estimated right ventricular systolic pressure from tricuspid   regurgitation is markedly elevated (>55 mmHg).             Orders (all)     Start     Ordered    06/14/17 0600  Magnesium  Morning Draw      06/13/17 0811    06/14/17 0600  CBC & Differential  Morning Draw      06/13/17 0955    06/13/17 1009  Type & Screen  Once      06/13/17 1008    06/13/17 1007  Verify Informed Consent for Blood Product Administration  Once      06/13/17 1008     06/13/17 1007  Prepare RBC, 2 Units  Blood - Once     Comments:  Transfused during dialysis please.    06/13/17 1008    06/13/17 0948  HYDROmorphone (DILAUDID) injection 0.5 mg  Every 4 Hours PRN      06/13/17 0948    06/13/17 0900  sodium chloride 0.9 % bolus 250 mL  Once      06/13/17 0821    06/13/17 0900  norepinephrine (LEVOPHED) 8,000 mcg in sodium chloride 0.9 % 250 mL (32 mcg/mL) infusion  Titrated      06/13/17 0824    06/13/17 0830  potassium chloride 20 mEq in 50 mL IVPB  Every 2 Hours      06/13/17 0810    06/13/17 0804  Nursing Communication Consult surgery for central line placement.  Continuous     Comments:  Consult surgery for central line placement.    06/13/17 0804    06/13/17 0631  Scan Slide  Once      06/13/17 0630    06/13/17 0600  Lipid Panel  Morning Draw      06/12/17 0817    06/13/17 0600  CBC & Differential  Morning Draw      06/12/17 0817    06/13/17 0600  Comprehensive Metabolic Panel  Morning Draw      06/12/17 0817    06/13/17 0600  Magnesium  Morning Draw      06/12/17 0817    06/13/17 0600  Basic Metabolic Panel  Daily      06/12/17 0834    06/13/17 0600  Basic Metabolic Panel  Morning Draw,   Status:  Canceled      06/12/17 1457    06/13/17 0600  CBC Auto Differential  PROCEDURE ONCE      06/13/17 0001    06/13/17 0545  POC Glucose Fingerstick  Once      06/13/17 0544    06/13/17 0031  POC Glucose Fingerstick  Once      06/13/17 0030    06/13/17 0000  Hemodialysis inpatient  Once      06/12/17 1457    06/13/17 0000  POC Glucose Fingerstick  Every 6 Hours      06/12/17 1948    06/12/17 2346  dextrose (D50W) solution 50 mL  Every 1 Hour PRN      06/12/17 2346    06/12/17 2334  POC Glucose Fingerstick  Once      06/12/17 2333    06/12/17 2332  POC Glucose Fingerstick  Once      06/12/17 2331    06/12/17 2318  dextrose (D50W) solution 50 mL  Every 1 Hour PRN,   Status:  Discontinued      06/12/17 2319    06/12/17 2030  potassium chloride (KAYCIEL) 20 MEQ/15ML (10%) solution 20 mEq   Once      06/12/17 1945 06/12/17 2015  sodium chloride 0.9 % bolus 250 mL  Once      06/12/17 1940 06/12/17 2015  potassium chloride (KAYCIEL) 20 MEQ/15ML (10%) solution 20 mEq  Daily,   Status:  Discontinued      06/12/17 1944 06/12/17 1800  potassium chloride (MICRO-K) CR capsule 40 mEq  2 Times Daily With Meals,   Status:  Discontinued      06/12/17 1430    06/12/17 1800  Basic Metabolic Panel  Morning Draw      06/12/17 1457    06/12/17 1530  lidocaine-prilocaine (EMLA) 2.5-2.5 % cream  Once      06/12/17 1500    06/12/17 1500  Hemodialysis inpatient  Once      06/12/17 1459    06/12/17 1256  Potassium  Once      06/12/17 0730    06/12/17 0817  T4, Free  Once      06/12/17 0817    06/12/17 0817  T3, Free  Once      06/12/17 0817    06/12/17 0810  Inpatient Consult For PICC  Once     Comments:  After Line Placement, Flushes and Line Care Should Be Ordered Using the Line Care (Flushes & Dressing Changes) - All Line Types Order Set   Provider:  (Not yet assigned)    06/12/17 0809    06/12/17 0803  Place Sequential Compression Device  Once      06/12/17 0802    06/12/17 0600  pantoprazole (PROTONIX) EC tablet 40 mg  Every Early Morning      06/11/17 2302    06/12/17 0600  TSH  Morning Draw      06/11/17 2305    06/12/17 0600  Basic Metabolic Panel  Morning Draw      06/11/17 2305    06/12/17 0600  CBC (No Diff)  Morning Draw      06/11/17 2305    06/12/17 0511  Troponin  Once      06/12/17 0511    06/12/17 0115  potassium chloride 10 mEq in 100 mL IVPB  Every 1 Hour      06/12/17 0039    06/12/17 0104  Inpatient Consult to Case Management   Once     Provider:  (Not yet assigned)    06/12/17 0103    06/12/17 0100  dextrose 5 % with KCl 20 mEq/L infusion  Continuous,   Status:  Discontinued      06/12/17 0018    06/11/17 2353  Scan Slide  Once      06/11/17 2352    06/11/17 2345  potassium chloride (MICRO-K) CR capsule 20 mEq  2 Times Daily With Meals,   Status:  Discontinued      06/11/17 8544     06/11/17 2306  Magnesium  Once      06/11/17 2305    06/11/17 2306  Troponin  Now Then Every 6 Hours      06/11/17 2305    06/11/17 2306  Adult Transthoracic Echo Complete  Once      06/11/17 2305    06/11/17 2221  Phosphorus  STAT      06/11/17 2226    06/11/17 2221  Magnesium  STAT      06/11/17 2226    06/11/17 2220  CBC & Differential  STAT      06/11/17 2226    06/11/17 2220  Comprehensive Metabolic Panel  STAT      06/11/17 2226    06/11/17 2220  CBC Auto Differential  PROCEDURE ONCE      06/11/17 2226    06/11/17 2200  Vital Signs Every Hour and Per Hospital Policy Based on Patient Condition  Every Hour      06/11/17 2155 06/11/17 2200  Intake and Output  Every Hour      06/11/17 2155 06/11/17 2156  Cardiac Monitoring  Continuous      06/11/17 2155 06/11/17 2156  Continuous Pulse Oximetry  Continuous      06/11/17 2155 06/11/17 2156  Strict Bed Rest  Until Discontinued      06/11/17 2155 06/11/17 2156  Use Mobility Guidelines for Advancement of Activity  Continuous      06/11/17 2155 06/11/17 2156  Height & Weight  Once      06/11/17 2155 06/11/17 2156  Daily Weights  Daily      06/11/17 2155 06/11/17 2156  Insert Peripheral IV  Once      06/11/17 2155 06/11/17 2156  Saline Lock & Maintain IV Access  Continuous      06/11/17 2155 06/11/17 2156  Full Code  Continuous      06/11/17 2155 06/11/17 2156  VTE Risk Assessment - Moderate Risk  Once      06/11/17 2155 06/11/17 2156  Mechanical VTE Prophylaxis Not Indicated: Moderate VTE Risk With Pharmacologic Prophylaxis  Once      06/11/17 2155 06/11/17 2156  NPO Diet  Diet Effective Now      06/11/17 2155 06/11/17 2155  sodium chloride 0.9 % flush 1-10 mL  As Needed      06/11/17 2155    06/11/17 2046  Troponin  STAT      06/11/17 2045    06/11/17 1920  IP Consult to Cardiology  Once     Specialty:  Cardiology  Provider:  Jayson Nelson MD    06/11/17 1919    06/11/17 1900  aspirin EC tablet 81 mg  Daily      06/11/17  1859 06/11/17 1900  Inpatient Consult to Nephrology  Once     Specialty:  Nephrology  Provider:  Maximo Nieves MD    06/11/17 1859    06/11/17 1859  Inpatient Admission  Once      06/11/17 1858    06/11/17 1858  ondansetron (ZOFRAN) injection 4 mg  Every 6 Hours PRN      06/11/17 1859    06/11/17 1858  ipratropium-albuterol (DUO-NEB) nebulizer solution 3 mL  Every 4 Hours PRN      06/11/17 1859    06/11/17 1858  Inpatient Admission  Once      06/11/17 1858    06/11/17 1830  sodium chloride 0.9 % bolus 250 mL  Once      06/11/17 1828    06/11/17 1757  sodium chloride 0.9 % bolus 250 mL  Once      06/11/17 1755    06/11/17 1732  Troponin  Once      06/11/17 1731    06/11/17 1732  ECG 12 Lead  Once      06/11/17 1731    06/11/17 1732  XR Chest 1 View  1 Time Imaging      06/11/17 1731    06/11/17 1717  ECG 12 Lead  Once      06/11/17 1716    Unscheduled  Transfuse RBC, 2 Units Infuse Each Unit Over: 4H  Transfusion      06/13/17 1008    --  carvedilol (COREG) 12.5 MG tablet  Daily      06/11/17 1750    --  potassium chloride (KLOR-CON) 20 MEQ packet  Daily      06/11/17 1750    --  traZODone (DESYREL) 50 MG tablet  Nightly      06/11/17 1750    --  predniSONE (DELTASONE) 1 MG tablet  Daily      06/11/17 1750    --  PREDNISONE PO      06/11/17 1750    --  losartan (COZAAR) 25 MG tablet  Daily      06/11/17 1750    --  pantoprazole (PROTONIX) 40 MG EC tablet  Daily      06/11/17 1750    --  SCANNED EKG      06/11/17 0000    Pending  Nursing Communication Resp may draw labs arterially as pt is a difficult stick  Continuous     Comments:  Resp may draw labs arterially as pt is a difficult stick    Pending    --  SCANNED - TELEMETRY        06/11/17 0000    Pending  Diet Regular; Cardiac  Diet Effective Now      Pending          Ventilator/Non-Invasive Ventilation Settings     None           Operative/Procedure Notes (last 72 hours) (Notes from 6/10/2017  1:20 PM through 6/13/2017  1:20 PM)      Ana Luisa WHITING  MD Sharla at 6/13/2017  1:16 PM  Version 1 of 1         After informed consent and universal precautions.  The right groin was prepped and draped sterilely.  One percent lidocaine was used for local anesthesia.  The vein was accessed and the wire passed.  The track was dilated and the catheter was inserted.  Suture placed 2-0 silk.  Flushed.  Dressing placed.     Electronically signed by Ana Luisa Magdaleno MD at 6/13/2017  1:17 PM           Physician Progress Notes (all)      Carter Llamas MD at 6/12/2017  7:53 AM  Version 1 of 1             [facility] Medicine Services  INPATIENT PROGRESS NOTE    Length of Stay: 1  Date of Admission: 6/11/2017  Primary Care Physician: No Known Provider    Subjective   Chief Complaint: Weakness  HPI   Patient seemed very sleepy.  Denies any chest pain or shortness of breath.  Discussed patient with the lab values.  Nephrology consult pending.  Unable to give prophylaxis heparin due to allergy.     Review of Systems   Constitutional: Positive for activity change, appetite change and fatigue. Negative for chills and fever.   HENT: Negative for hearing loss, nosebleeds, tinnitus and trouble swallowing.    Eyes: Negative for visual disturbance.   Respiratory: Negative for cough, chest tightness, shortness of breath and wheezing.    Cardiovascular: Negative for chest pain, palpitations and leg swelling.   Gastrointestinal: Negative for abdominal distention, abdominal pain, blood in stool, constipation, diarrhea, nausea and vomiting.   Endocrine: Negative for cold intolerance, heat intolerance, polydipsia, polyphagia and polyuria.   Genitourinary: Negative for decreased urine volume, difficulty urinating, dysuria, flank pain, frequency and hematuria.   Musculoskeletal: Positive for arthralgias, gait problem and myalgias. Negative for joint swelling.   Skin: Negative for rash.   Allergic/Immunologic: Negative for immunocompromised state.   Neurological:  Positive for weakness. Negative for dizziness, syncope, light-headedness and headaches.   Hematological: Negative for adenopathy. Does not bruise/bleed easily.   Psychiatric/Behavioral: Negative for confusion and sleep disturbance. The patient is not nervous/anxious.           All pertinent negatives and positives are as above. All other systems have been reviewed and are negative unless otherwise stated.     Objective    Temp:  [96.2 °F (35.7 °C)-97.8 °F (36.6 °C)] 96.2 °F (35.7 °C)  Heart Rate:  [76-88] 76  Resp:  [14-20] 14  BP: (64-94)/(34-55) 69/55    Intake/Output Summary (Last 24 hours) at 06/12/17 0753  Last data filed at 06/12/17 0609   Gross per 24 hour   Intake              400 ml   Output                0 ml   Net              400 ml     Physical Exam   Constitutional: She appears well-developed.   HENT:   Head: Normocephalic and atraumatic.   Eyes: Conjunctivae and EOM are normal. Pupils are equal, round, and reactive to light.   Neck: Neck supple. No JVD present. No thyromegaly present.   Cardiovascular: Normal rate, regular rhythm, normal heart sounds and intact distal pulses.  Exam reveals no gallop and no friction rub.    No murmur heard.  Pulmonary/Chest: Effort normal and breath sounds normal. No respiratory distress. She has no wheezes. She has no rales. She exhibits no tenderness.   Abdominal: Soft. Bowel sounds are normal. She exhibits no distension. There is no tenderness. There is no rebound and no guarding.   Musculoskeletal: Normal range of motion. She exhibits no edema, tenderness or deformity.   Lymphadenopathy:     She has no cervical adenopathy.   Neurological: She is alert. She displays normal reflexes. No cranial nerve deficit. She exhibits abnormal muscle tone. Coordination abnormal.   Skin: Skin is warm and dry. No rash noted.   Psychiatric: She has a normal mood and affect. Her behavior is normal.   Nursing note and vitals reviewed.      Results Review:  Lab Results (last 24 hours)      Procedure Component Value Units Date/Time    Troponin [[id number]]  (Abnormal) Collected:  06/11/17 1730    Specimen:  Blood from Arm, Right Updated:  06/11/17 1807     Troponin I 2.870 (C) ng/mL     Troponin [[id number]]  (Abnormal) Collected:  06/11/17 2047    Specimen:  Blood Updated:  06/11/17 2116     Troponin I 2.570 (C) ng/mL    Radiology Data:    Imaging Results (last 24 hours)     Procedure Component Value Units Date/Time    XR Chest 1 View [[id number]] Collected:  06/11/17 1803     Updated:  06/11/17 2054    Narrative:       EXAMINATION:   XR CHEST 1 VW-  6/11/2017 6:01 PM CDT     HISTORY: Chest pain     Prior exams 05/28/2016.     Single view of the chest is obtained. The pulmonary vascular margins are  slightly indistinct. This has the appearance of mild pulmonary vascular  congestion. Left diaphragm is indistinct. This may be infiltrate or  atelectasis left lower lobe.     Cardiac silhouette is enlarged and unchanged from May 28.       Impression:       1. Silhouetting of the left diaphragm consistent with infiltrate or  atelectasis left lower lobe.  2. Indistinct pulmonary vascular margins consistent with mild pulmonary  vascular congestion.  This report was finalized on 06/11/2017 20:51 by Dr. Herson Magaña MD.          Allergies   Allergen Reactions   • Heparin    • Iron    • Latex    • Levaquin [Levofloxacin]    • Shrimp (Diagnostic)    • Vancomycin        Scheduled meds:     aspirin 81 mg Oral Daily   pantoprazole 40 mg Oral Q AM       PRN meds:  ipratropium-albuterol  •  ondansetron  •  sodium chloride    Assessment/Plan     Active Problems:    Chest pain in adult      Plan:  Non-STEMI- echocardiogram pending. Per cardiology.     Anemia- stable 8.    Chronic renal failure- consult nephrology pending    Hyperlipidemia- lipid panel in am.     Elevate TSH- free t4 and free t3.     Hypotension- stable    Hypokalemia- 4 potassium run. K after K's runs. Magnesium level.     Midline and std.     Carter C.  MD Farhad   06/12/17   7:53 AM            [facility] -  Progress Note     LOS: 1 day   Patient Care Team:  No Known Provider as PCP - General  No Known Provider as PCP - Family Medicine    Chief Complaint: chest pain    Subjective     Interval History: Patient not complaining of chest pain this morning.  Sleeping when I entered the room but was easily arousable.  No complaints of palpitations, chest pain, dyspnea.    Review of Systems:  Review of Systems   Respiratory: Negative for shortness of breath and wheezing.    Cardiovascular: Negative for chest pain, palpitations and leg swelling.       Vital Sign Min/Max for last 24 hours  Temp  Min: 96.2 °F (35.7 °C)  Max: 97.8 °F (36.6 °C)   BP  Min: 64/41  Max: 94/53   Pulse  Min: 76  Max: 88   Resp  Min: 12  Max: 20   SpO2  Min: 85 %  Max: 100 %   Flow (L/min)  Min: 2  Max: 2.5   Weight  Min: 113 lb 0.1 oz (51.3 kg)  Max: 113 lb 0.1 oz (51.3 kg)     Last Weight    06/11/17  1727   Weight: 113 lb 0.1 oz (51.3 kg)       Physical Exam:   Physical Exam   Constitutional: She appears acutely ill.   Neck: No JVD present.   Cardiovascular: Normal rate, regular rhythm, S1 normal, S2 normal, normal heart sounds, intact distal pulses and normal pulses.    Pulmonary/Chest: Effort normal and breath sounds normal.   Abdominal: There is tenderness. A hernia is present.   Neurological: She is alert and oriented to person, place, and time.   Skin: Skin is warm and dry.     Medication Review: yes  Current Facility-Administered Medications   Medication Dose Route Frequency Provider Last Rate Last Dose   • aspirin EC tablet 81 mg  81 mg Oral Daily Pino Tang MD   81 mg at 06/12/17 0954   • ipratropium-albuterol (DUO-NEB) nebulizer solution 3 mL  3 mL Nebulization Q4H PRN Pino Tang MD       • ondansetron (ZOFRAN) injection 4 mg  4 mg Intravenous Q6H PRN Pino Tang MD   4 mg at 06/12/17 0912   • pantoprazole (PROTONIX) EC tablet 40 mg  40 mg Oral Q AM Pino  JOHANNE Tang MD   40 mg at 06/12/17 0610   • sodium chloride 0.9 % flush 1-10 mL  1-10 mL Intravenous PRN Pino Tang MD         Telemetry reviewed: No arrhythmias.    Assessment/Plan       1. Atypical chest discomfort: Resolved.  Troponin downtrending.  EKG showed no ischemic changes. The troponin elevation (in setting of renal failure and having missed dialysis last week) in conjunction with ongoing discomfort of a week's duration is not consistent with angina or ACS. Patient still has epigastric tenderness on exam - recommend laboratory and/or imaging investigation for GI pathology.   2.  Troponin elevation without evidence of ACS: see above.  3. Non-ischemic cardiomyopathy (by cath 12/20/14) (established, more w/u warranted) : no signs of decompensated HF on exam or by history. Check echocardiogram to reassess LVEF since I do not see one here since 2015.  4. ESRD (established, no w/u but treatment needed)- on MWF HD; missed last Friday's session.  Nephrology has seen the patient and she will be dialyzed this morning.  5. Hypokalemia: being replaced. Defer to nephrology.     I discussed the patients findings and my recommendations with patient.  If echocardiogram does not show any significant changes from previous studies, cardiology will sign off.  Please call back with any questions.    Jayson Nelson MD  06/12/17  10:45 AM          [facility] -  Progress Note     LOS: 1 day   Patient Care Team:  No Known Provider as PCP - General  No Known Provider as PCP - Family Medicine    Chief Complaint: chest pain    Subjective     Interval History: Patient not complaining of chest pain this morning.  Sleeping when I entered the room but was easily arousable.  No complaints of palpitations, chest pain, dyspnea.    Review of Systems:  Review of Systems   Respiratory: Negative for shortness of breath and wheezing.    Cardiovascular: Negative for chest pain, palpitations and leg swelling.       Vital  Sign Min/Max for last 24 hours  Temp  Min: 96.2 °F (35.7 °C)  Max: 97.8 °F (36.6 °C)   BP  Min: 64/41  Max: 94/53   Pulse  Min: 76  Max: 88   Resp  Min: 12  Max: 20   SpO2  Min: 85 %  Max: 100 %   Flow (L/min)  Min: 2  Max: 2.5   Weight  Min: 113 lb 0.1 oz (51.3 kg)  Max: 113 lb 0.1 oz (51.3 kg)     Last Weight    06/11/17  1727   Weight: 113 lb 0.1 oz (51.3 kg)       Physical Exam:   Physical Exam   Constitutional: She appears acutely ill.   Neck: No JVD present.   Cardiovascular: Normal rate, regular rhythm, S1 normal, S2 normal, normal heart sounds, intact distal pulses and normal pulses.    Pulmonary/Chest: Effort normal and breath sounds normal.   Abdominal: There is tenderness. A hernia is present.   Neurological: She is alert and oriented to person, place, and time.   Skin: Skin is warm and dry.       Assessment/Plan       1. Atypical chest discomfort: Resolved.  Troponin downtrending.  EKG showed no ischemic changes. The troponin elevation (in setting of renal failure and having missed dialysis last week) in conjunction with ongoing discomfort of a week's duration is not consistent with angina or ACS. Patient still has epigastric tenderness on exam - recommend laboratory and/or imaging investigation for GI pathology.   2.  Troponin elevation without evidence of ACS: see above.  3. Non-ischemic cardiomyopathy (by cath 12/20/14) (established, more w/u warranted) : no signs of decompensated HF on exam or by history. Check echocardiogram to reassess LVEF since I do not see one here since 2015.  4. ESRD (established, no w/u but treatment needed)- on MWF HD; missed last Friday's session.  Nephrology has seen the patient and she will be dialyzed this morning.     I discussed the patients findings and my recommendations with patient.  If echocardiogram does not show any significant changes from previous studies, cardiology will sign off.  Please call back with any questions.    Jayson Nelson MD  06/12/17  10:45  "AM             Electronically signed by Jayson Nelson MD at 6/12/2017 10:53 AM      Dg Ball MD at 6/12/2017  5:00 PM  Version 1 of 1         Patient:  Jennifer Marks  YOB: 1945  Date of Service: 6/12/2017  MRN: [id number]   Acct: [id number]   Primary Care Physician: No Known Provider  Advance Directive: Full Code  Admit Date: 6/11/2017       Hospital Day: 1  Referring Provider: Pino Tang MD      Pt was seen on RRT  Modality: Hemodialysis  Access: AV graft  Location: left lower  QB: 300  QD: 500  UF: 0      Review of Systems:  History obtained from chart review and the patient  General ROS: No fever or chills  Respiratory ROS: No cough, shortness of breath, wheezing  Cardiovascular ROS: no chest pain or dyspnea on exertion  Gastrointestinal ROS: No abdominal pain or melena  Genito-Urinary ROS: No dysuria or hematuria  Musculoskeletal: negative  Skin: negative    Objective:  BP (!) 80/57  Pulse 83  Temp 96.9 °F (36.1 °C) (Temporal Artery )   Resp 16  Ht 62\" (157.5 cm)  Wt 113 lb 0.1 oz (51.3 kg)  SpO2 (!) 77%  BMI 20.67 kg/m2    Intake/Output Summary (Last 24 hours) at 06/12/17 1700  Last data filed at 06/12/17 0609   Gross per 24 hour   Intake              400 ml   Output                0 ml   Net              400 ml       Physical examination:  General: awake/alert   Chest:  clear to auscultation bilaterally without respiratory distress  CVS: regular rate and rhythm  Abdominal: soft, nontender, normal bowel sounds  Extremities: no cyanosis or edema  Skin: warm and dry without rash  Neuro: No focal motor deficits    Assessment     End stage renal disease  Type II diabetes mellitus with nephropathy  Benign hypertension  Diarrhea and hypotension    Plan:  Will continue dialysis cautiously. If she has further drop in her BPs. Will then stp dialysis and patient is then to go on pressors.    Dg Ball MD  6/12/2017  5:00 PM     Electronically signed by Dg " Cholo Ball MD at 6/12/2017  5:02 PM      FANNIE Blanco at 6/13/2017  8:13 AM  Version 1 of 1         Nephrology ([facility]) Progress Note      Patient:  Jennifer Marks  YOB: 1945  Date of Service: 6/13/2017  MRN: [id number]   Acct: [id number]   Primary Care Physician: No Known Provider  Advance Directive: Full Code  Admit Date: 6/11/2017       Hospital Day: 2  Referring Provider: Pino Tang MD      Patient personally seen and examined.  Complete chart including Consults, Notes, Operative Reports, Labs, Cardiology, and Radiology studies reviewed as able.        Subjective:  Jennifer Marks is a 72 y.o. female  whom we were consulted for end stage renal disease management, hypokalemia. She is on hemodialysis Monday Wednesday Friday; apparently missed her Friday treatment due to not feeling well. Admitted with chest pain. Has had decreased oral intake and diarrhea for several days.  Today she is awake, alert.  Complaint of general malaise.    Allergies:  Heparin; Iron; Latex; Levaquin [levofloxacin]; Shrimp (diagnostic); and Vancomycin    Home Meds:  Prescriptions Prior to Admission   Medication Sig Dispense Refill Last Dose   • carvedilol (COREG) 12.5 MG tablet Take 12.5 mg by mouth Daily.      • losartan (COZAAR) 25 MG tablet Take 25 mg by mouth Daily.      • pantoprazole (PROTONIX) 40 MG EC tablet Take 40 mg by mouth Daily.      • potassium chloride (KLOR-CON) 20 MEQ packet Take 20 mEq by mouth Daily.      • predniSONE (DELTASONE) 1 MG tablet Take 1 mg by mouth Daily.      • PREDNISONE PO Take  by mouth.      • traZODone (DESYREL) 50 MG tablet Take 50 mg by mouth Every Night.        Review of Systems:  History obtained from chart review and the patient  General ROS: Patient complains of malaise and No fever or chills  Respiratory ROS: No cough, shortness of breath, wheezing  Cardiovascular ROS: no chest pain or dyspnea on exertion  Gastrointestinal ROS:  "No abdominal pain or melena  Genito-Urinary ROS: No dysuria or hematuria  Neurological ROS: negative  14 point ROS reviewed with the patient and negative except as noted above and in the HPI unless unable to obtain.    Objective:  BP (!) 72/42  Pulse 84  Temp 97.4 °F (36.3 °C) (Temporal Artery )   Resp 21  Ht 62\" (157.5 cm)  Wt 113 lb 0.1 oz (51.3 kg)  SpO2 92%  BMI 20.67 kg/m2    Intake/Output Summary (Last 24 hours) at 06/13/17 0813  Last data filed at 06/12/17 2330   Gross per 24 hour   Intake              300 ml   Output                0 ml   Net              300 ml     General: awake/alert    Neck: supple, no JVD  Chest:  clear to auscultation bilaterally without respiratory distress  CVS: regular rate and rhythm  Abdominal: soft, nontender, normal bowel sounds  Extremities: no cyanosis or edema  Skin: warm and dry without rash   Neuro: no focal motor deficits      Labs:    Results from last 7 days  Lab Units 06/13/17  0556 06/12/17  0253 06/11/17  2328   WBC 10*3/mm3 5.10 8.90 8.87   HEMOGLOBIN g/dL 7.2* 8.0* 8.1*   HEMATOCRIT % 21.5* 24.1* 23.8*   PLATELETS 10*3/mm3 100* 123* 127*           Results from last 7 days  Lab Units 06/13/17  0556 06/12/17  1830 06/12/17  1304 06/12/17  0253 06/11/17  2047   SODIUM mmol/L 138 138  --  139 139   POTASSIUM mmol/L 2.4* 2.4* 2.2* 1.9* 2.0*   CHLORIDE mmol/L 100 97*  --  100 100   TOTAL CO2 mmol/L 22.0* 24.0  --  19.0* 19.0*   BUN mg/dL 49* 45*  --  66* 66*   CREATININE mg/dL 11.87* 11.53*  --  16.52* 16.31*   CALCIUM mg/dL 7.7* 8.1*  --  8.2* 8.1*   BILIRUBIN mg/dL 1.8*  --   --   --  2.4*   ALK PHOS U/L 94  --   --   --  81   ALT (SGPT) U/L 20  --   --   --  17   AST (SGOT) U/L 30  --   --   --  18   GLUCOSE mg/dL 89 48*  --  71 72         Assessment   1. End stage renal disease on maintenance hemodialysis Monday Wednesday Friday   2. Hypokalemia  3. Hypotension  4. Anemia of chronic kidney disease  5. Diabetes mellitus type 2    Plan:  1.  Replace potassium  2. "  Pt has no peripheral access; needs central line placed  3.  Normal saline 250ml bolus  4.  Levophed drip as needed to maintain blood pressure.      Jaden Tovar, APRN  6/13/2017  8:13 AM      [facility] ER Medicine Services  INPATIENT PROGRESS NOTE    Length of Stay: 2  Date of Admission: 6/11/2017  Primary Care Physician: No Known Provider    Subjective   Chief Complaint: Weakness.     HPI    Patient stated she feeling a little better.  Denies any shortness of breath.  Complaint chest pain.  EKG showed no acute changes.  Discussed patient is possibly due to low potassium.  Patient for Dialysis today.  Plan for transfusion during dialysis.      Review of Systems   Constitutional: Positive for activity change, appetite change and fatigue. Negative for chills and fever.   HENT: Negative for hearing loss, nosebleeds, tinnitus and trouble swallowing.   Eyes: Negative for visual disturbance.   Respiratory: Negative for cough, chest tightness, shortness of breath and wheezing.   Cardiovascular: Negative for chest pain, palpitations and leg swelling.   Gastrointestinal: Negative for abdominal distention, abdominal pain, blood in stool, constipation, diarrhea, nausea and vomiting.   Endocrine: Negative for cold intolerance, heat intolerance, polydipsia, polyphagia and polyuria.   Genitourinary: Negative for decreased urine volume, difficulty urinating, dysuria, flank pain, frequency and hematuria.   Musculoskeletal: Positive for arthralgias, gait problem and myalgias. Negative for joint swelling.   Skin: Negative for rash.   Allergic/Immunologic: Negative for immunocompromised state.   Neurological: Positive for weakness. Negative for dizziness, syncope, light-headedness and headaches.   Hematological: Negative for adenopathy. Does not bruise/bleed easily.   Psychiatric/Behavioral: Negative for confusion and sleep disturbance. The patient is not nervous/anxious.     All pertinent  negatives and positives are as above. All other systems have been reviewed and are negative unless otherwise stated.     Objective    Temp:  [96.3 °F (35.7 °C)-97.4 °F (36.3 °C)] 97.4 °F (36.3 °C)  Heart Rate:  [75-92] 92  Resp:  [12-21] 21  BP: (69-92)/(39-59) 70/39    Intake/Output Summary (Last 24 hours) at 06/13/17 0954  Last data filed at 06/12/17 2330   Gross per 24 hour   Intake              300 ml   Output                0 ml   Net              300 ml     Physical Exam  Constitutional: She appears well-developed.   HENT:   Head: Normocephalic and atraumatic.   Eyes: Conjunctivae and EOM are normal. Pupils are equal, round, and reactive to light.   Neck: Neck supple. No JVD present. No thyromegaly present.   Cardiovascular: Normal rate, regular rhythm, normal heart sounds and intact distal pulses. Exam reveals no gallop and no friction rub.   No murmur heard.  Pulmonary/Chest: Effort normal and breath sounds normal. No respiratory distress. She has no wheezes. She has no rales. She exhibits no tenderness.   Abdominal: Soft. Bowel sounds are normal. She exhibits no distension. There is no tenderness. There is no rebound and no guarding.   Musculoskeletal: Normal range of motion. She exhibits no edema, tenderness or deformity.   Lymphadenopathy:   She has no cervical adenopathy.   Neurological: She is alert. She displays normal reflexes. No cranial nerve deficit. She exhibits abnormal muscle tone. Coordination abnormal.   Skin: Skin is warm and dry. No rash noted.   Psychiatric: She has a normal mood and affect. Her behavior is normal.   Nursing note and vitals reviewed.    Assessment/Plan     Active Problems:    Chest pain in adult    Renal failure    Hypotension    Anemia    Hypokalemia      Plan:  Non-STEMI- echocardiogram -Ejection fraction 61%, diastolic dysfunction, right ventricular dilatation, tricuspid valve regurgitation, right ventricle systolic pressure greater than 55. Per cardiology.      Anemia-  "Hemoglobin is dropping.  Type and crossmatch 2 units of blood transfused during dialysis.     Chronic renal failure- nephrology is on board.  Patient dialysis today.     Hyperlipidemia- lipid panel in am.      Elevate TSH- free T4 is normal.     Hypotension- stable     Hypokalemia- potassium run pending.    Calcium 7.7, corrected calcium 9.5.     Consults Gen. surgery Central line placement.    Discharge Planning: unknown    Carter Llamas MD   06/13/17   9:54 AM                     Electronically signed by Carter Llamas MD at 6/13/2017 10:09 AM           Consult Notes (all)      Jayson Nelson MD at 6/11/2017  7:24 PM  Version 1 of 1     Consult Orders:    1. IP Consult to Cardiology [[id number]] ordered by Dereck Calvo MD at 06/11/17 2839                  [facility] CONSULT NOTE    Patient Care Team:  No Known Provider as PCP - General  No Known Provider as PCP - Family Medicine  Pino Tang MD  REASON FOR REFERRAL: Chest pain, troponin elevation  Chief complaint: Chest pain    Subjective     Patient is a 72 y.o. female with end-stage renal disease who presents with chest pain.  Onset 1-1/2 weeks ago.  Has been constant 10 out of 10 right-sided chest discomfort ever since.  He describes today's pain is \"like an getting electrocuted.\"  No identifiable aggravating or alleviating factors.  Radiates into right arm.  Associated with constant nausea.  Upon my evaluation, the patient was sleeping comfortably.  When awakened, she complained of 10 out of 10 chest pain.    Patient does have history of congestive heart failure, which by cardiac catheterization here in December 2014, has been deemed nonischemic in origin.  She wore a LifeVest for arrhythmia prevention shortly after diagnosis, but never had an ICD implanted for unclear reasons.  The aforementioned catheterization in 2014 was performed for circumstances quite similar to this presentation, according to comparison of records.  "     Review of Systems   Review of Systems   Constitutional: Negative for fatigue and unexpected weight change.   HENT: Negative for nosebleeds and trouble swallowing.    Respiratory: Negative for cough, shortness of breath and wheezing.    Cardiovascular: Positive for chest pain. Negative for palpitations and leg swelling.   Gastrointestinal: Positive for nausea. Negative for abdominal distention, abdominal pain, blood in stool and vomiting.   Endocrine: Negative for cold intolerance, heat intolerance, polydipsia, polyphagia and polyuria.   Genitourinary: Negative for hematuria and vaginal bleeding.   Musculoskeletal: Negative for arthralgias and joint swelling.   Skin: Negative for color change and pallor.   Neurological: Negative for seizures, syncope and weakness.   Hematological: Does not bruise/bleed easily.   Psychiatric/Behavioral: Negative for confusion. The patient is not nervous/anxious.        History  Past Medical History:   Diagnosis Date   • A-fib    • Arthritis    • CHF (congestive heart failure)    • Hypertension    • Renal failure      Past Surgical History:   Procedure Laterality Date   • ARTERIOVENOUS FISTULA LEG Left      History reviewed. No pertinent family history.  Social History   Substance Use Topics   • Smoking status: Former Smoker   • Smokeless tobacco: None   • Alcohol use No       (Not in a hospital admission)  Allergies:  Heparin; Iron; Latex; Levaquin [levofloxacin]; Shrimp (diagnostic); and Vancomycin    Objective     Vital Signs  Temp:  [97.6 °F (36.4 °C)] 97.6 °F (36.4 °C)  Heart Rate:  [83-87] 83  Resp:  [17-20] 20  BP: (71-83)/(34-50) 82/43    Physical Exam:  Physical Exam   Constitutional: No distress.   HENT:   Mouth/Throat: Oropharynx is clear. Pharynx is normal.   Neck: Normal range of motion and thyroid normal. Neck supple. No JVD present. No thyromegaly present.   Cardiovascular: Normal rate, regular rhythm, S1 normal, S2 normal, normal heart sounds, intact distal pulses  and normal pulses.   No extrasystoles are present. PMI is not displaced.    Pulmonary/Chest: Effort normal and breath sounds normal.   Abdominal: Bowel sounds are normal. She exhibits no distension. There is no splenomegaly or hepatomegaly. There is tenderness (epigastric tenderness/guarding).   Musculoskeletal: She exhibits no edema or tenderness.   Neurological: She is alert and oriented to person, place, and time.   Skin: Skin is warm and dry.     Results Review:   Lab Results (last 72 hours)     Procedure Component Value Units Date/Time    Troponin [[id number]]  (Abnormal) Collected:  06/11/17 1730    Specimen:  Blood from Arm, Right Updated:  06/11/17 1807     Troponin I 2.870 (C) ng/mL         Chest x-ray report reviewed: Possible mild vascular congestion.  Possible infiltrate versus atelectasis of the left lower lobe.    ECG reviewed by me (my interpretation), performed 6/11/17 at 1720: Normal sinus rhythm w/PACs, IVCD, nonspecific ST changes in lateral leads.    Echo report reviewed by me from 7/14/15: LVEF 35-40% with global hypokinesis.  Mild to moderately dilated left atrium.  Mild aortic stenosis, mild mitral regurgitation, moderate tricuspid regurgitation with estimated RVSP greater than 60 consistent with severe pulmonary hypertension    Cath report from 12/20/14 reviewed: Normal coronaries.    Assessment/Plan     1. Atypical chest discomfort (new, w/u needed).  Despite troponin elevation (in setting of renal failure and having missed dialysis last week), ongoing discomfort of a week's duration is not consistent with angina or ACS. ECG has no ischemic changes.  Patient has epigastric tenderness on exam - recommend admission to hospitalist with further labs, imaging to look for GI pathology.  Continue to trend ECGs, troponins 6hr, and check ECG if change in chest pain occurs.   2.  Non-ischemic cardiomyopathy (by cath 12/20/14) (established, more w/u warranted) : no signs of decompensated HF on exam or by  history.  Check echocardiogram to reassess LVEF since I do not see one here since 2015.  3.  ESRD (established, no w/u but treatment needed)- on MWF HD; missed last Friday's session. Recommend consultation with nephrology.    I discussed the patients findings and my recommendations with patient.     Jayson Nelson MD  06/11/17  7:25 PM           Electronically signed by Jayson Nelson MD at 6/11/2017  7:37 PM      FANNIE Blanco at 6/12/2017  8:20 AM  Version 1 of 1     Consult Orders:    1. Inpatient Consult to Nephrology [[id number]] ordered by Pino Tang MD at 06/11/17 4579           Attestation signed by Dg Ball MD at 6/12/2017  5:02 PM (Updated)        I saw and  examined patient independently. I agree with nurse practitioner assessment and examination.  Patient is dehydrated from intractable diarrhea and decreased oral intake.  She has been missing dialysis and she appeared to be on dialyzed.  This showed a critically low serum potassium.  Patient is in CCU and has no peripheral intravenous line.  She was in need for dialysis.  She was awake and offered no major complaints or concerns.  The signs were reviewed.  She was hypotensive with systolic blood pressure in the 70s but she was hemodynamically stable without any major sinus distress.  On examination, she was awake, responsive.  Heart sounds were regular and her lungs were clear to auscultation.  Her abdomen was soft nontender and her legs without edema. Labs were reviewed.  Assessment and plan:  End stage renal disease  Type II diabetes mellitus   Benign hypertension  Diarrhea and hypotension  At this time we will proceed his dialysis cautiously.  Will not remove fluids.  We will follow-up her electrolytes after dialysis.                                  Nephrology ([facility]) Consult Note      Patient:  Jennifer Marks  YOB: 1945  Date of Service: 6/12/2017  MRN: [id number]   Acct:  [id number]   Primary Care Physician: No Known Provider  Advance Directive: Full Code  Admit Date: 6/11/2017       Hospital Day: 1  Referring Provider: Pino Tang MD      Patient personally seen and examined.  Complete chart including Consults, Notes, Operative Reports, Labs, Cardiology, and Radiology studies reviewed as able.        Subjective:  Jennifer Marks is a 72 y.o. female  whom we were consulted for end stage renal disease management, hypokalemia.  She is on hemodialysis Monday Wednesday Friday; apparently missed her Friday treatment due to not feeling well.  Admitted with chest pain.  This morning is sleepy, mild confusion.  Denies chest pain or dyspnea.      Allergies:  Heparin; Iron; Latex; Levaquin [levofloxacin]; Shrimp (diagnostic); and Vancomycin    Home Meds:  Prescriptions Prior to Admission   Medication Sig Dispense Refill Last Dose   • carvedilol (COREG) 12.5 MG tablet Take 12.5 mg by mouth Daily.      • losartan (COZAAR) 25 MG tablet Take 25 mg by mouth Daily.      • pantoprazole (PROTONIX) 40 MG EC tablet Take 40 mg by mouth Daily.      • potassium chloride (KLOR-CON) 20 MEQ packet Take 20 mEq by mouth Daily.      • predniSONE (DELTASONE) 1 MG tablet Take 1 mg by mouth Daily.      • PREDNISONE PO Take  by mouth.      • traZODone (DESYREL) 50 MG tablet Take 50 mg by mouth Every Night.          Medicines:  Current Facility-Administered Medications   Medication Dose Route Frequency Provider Last Rate Last Dose   • aspirin EC tablet 81 mg  81 mg Oral Daily Pino Tang MD       • ipratropium-albuterol (DUO-NEB) nebulizer solution 3 mL  3 mL Nebulization Q4H PRN Pino Tang MD       • ondansetron (ZOFRAN) injection 4 mg  4 mg Intravenous Q6H PRN Pino Tang MD       • pantoprazole (PROTONIX) EC tablet 40 mg  40 mg Oral Q AM Pino Tang MD   40 mg at 06/12/17 0610   • sodium chloride 0.9 % flush 1-10 mL  1-10 mL Intravenous PRN Pino Tang MD           Past Medical History:  Past  "Medical History:   Diagnosis Date   • A-fib    • Arthritis    • CHF (congestive heart failure)    • Hypertension    • Renal failure        Past Surgical History:  Past Surgical History:   Procedure Laterality Date   • ARTERIOVENOUS FISTULA LEG Left        Family History  History reviewed. No pertinent family history.    Social History  Social History     Social History   • Marital status: Single     Spouse name: N/A   • Number of children: N/A   • Years of education: N/A     Occupational History   • Not on file.     Social History Main Topics   • Smoking status: Former Smoker   • Smokeless tobacco: Not on file   • Alcohol use No   • Drug use: No   • Sexual activity: Defer     Other Topics Concern   • Not on file     Social History Narrative   • No narrative on file         Review of Systems:  History obtained from chart review and the patient  General ROS: Patient complains of malaise and No fever or chills  Respiratory ROS: No cough, shortness of breath, wheezing  Cardiovascular ROS: no chest pain or dyspnea on exertion  Gastrointestinal ROS: No abdominal pain or melena  Genito-Urinary ROS: No dysuria or hematuria  Neurological ROS: negative  14 point ROS reviewed with the patient and negative except as noted above and in the HPI unless unable to obtain.    Objective:  BP (!) 85/54  Pulse 80  Temp 96.2 °F (35.7 °C) (Temporal Artery )   Resp 16  Ht 62\" (157.5 cm)  Wt 113 lb 0.1 oz (51.3 kg)  SpO2 100%  BMI 20.67 kg/m2    Intake/Output Summary (Last 24 hours) at 06/12/17 0821  Last data filed at 06/12/17 0609   Gross per 24 hour   Intake              400 ml   Output                0 ml   Net              400 ml     General: awake, oriented X 2   Neck: supple, no JVD  Chest:  clear to auscultation bilaterally without respiratory distress  CVS: regular rate and rhythm  Abdominal: soft, nontender, normal bowel sounds  Extremities: no cyanosis or edema  Skin: warm and dry without rash   Neuro: no focal motor " deficits      Labs:    Results from last 7 days  Lab Units 06/12/17  0253 06/11/17  2328   WBC 10*3/mm3 8.90 8.87   HEMOGLOBIN g/dL 8.0* 8.1*   HEMATOCRIT % 24.1* 23.8*   PLATELETS 10*3/mm3 123* 127*           Results from last 7 days  Lab Units 06/12/17  0253 06/11/17  2047   SODIUM mmol/L 139 139   POTASSIUM mmol/L 1.9* 2.0*   CHLORIDE mmol/L 100 100   TOTAL CO2 mmol/L 19.0* 19.0*   BUN mg/dL 66* 66*   CREATININE mg/dL 16.52* 16.31*   CALCIUM mg/dL 8.2* 8.1*   BILIRUBIN mg/dL  --  2.4*   ALK PHOS U/L  --  81   ALT (SGPT) U/L  --  17   AST (SGOT) U/L  --  18   GLUCOSE mg/dL 71 72       Radiology:   Imaging Results (last 72 hours)     Procedure Component Value Units Date/Time    XR Chest 1 View [[id number]] Collected:  06/11/17 1803     Updated:  06/11/17 2054    Narrative:       EXAMINATION:   XR CHEST 1 VW-  6/11/2017 6:01 PM CDT     HISTORY: Chest pain     Prior exams 05/28/2016.     Single view of the chest is obtained. The pulmonary vascular margins are  slightly indistinct. This has the appearance of mild pulmonary vascular  congestion. Left diaphragm is indistinct. This may be infiltrate or  atelectasis left lower lobe.     Cardiac silhouette is enlarged and unchanged from May 28.       Impression:       1. Silhouetting of the left diaphragm consistent with infiltrate or  atelectasis left lower lobe.  2. Indistinct pulmonary vascular margins consistent with mild pulmonary  vascular congestion.  This report was finalized on 06/11/2017 20:51 by Dr. Herson Magaña MD.          Culture:         Assessment   1.  End stage renal disease on hemodialysis Monday Wednesday Friday   2.  Hypokalemia  3.  Chest pain  4.  Hypotension  5.  Anemia of chronic kidney disease  5.  Diabetes type 2    Plan:  1.  Dialysis this AM  2.  Replace potassium  3.  Further assessment and plan pending Dr Ball's evaluation of patient      Thank you for the consult, we appreciate the opportunity to provide care to your patients.  Feel free  to contact me if I can be of any further assistance.      Jaden RITCHIE Tovar, APRN  6/12/2017  8:21 AM     Electronically signed by Dg Ball MD at 6/12/2017  5:02 PM      Ana Luisa Magdalneo MD at 6/13/2017  1:11 PM  Version 1 of 1         Eat a central line.  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  Currently she has a dialysis fistula in her left leg area she has extensive scarring from this.  She is dialyzed 3 days a week.  Anesthesiologist attempted bilateral internal jugular vein catheterization for central lines this morning for unsuccessful meeting resistance at approximate 14 cm.  I have been asked to place a femoral line.  I have spoken with Dr. Valle regarding attempt at subclavian but I feel that this would be most likely fruitless with the findings during IJ attempts.  The extensive risks of bleeding infection injury to structures or the possibility that she has scarring from prior vascular interventions in the right groin which is our only option currently were discussed with the patient and family.     Electronically signed by Ana Luisa Magdaleno MD at 6/13/2017  1:16 PM

## 2017-06-13 NOTE — ANESTHESIA PROCEDURE NOTES
Central Line    Patient location during procedure: ICU  Start time: 6/13/2017 10:51 AM  Stop Time:6/13/2017 11:23 AM  Indications: central pressure monitoring, MD/Surgeon request and vascular access  Staff  Anesthesiologist: ALTHEA BARTHOLOMEW  Preanesthetic Checklist  Completed: patient identified, site marked, surgical consent, pre-op evaluation, timeout performed, IV checked, risks and benefits discussed and monitors and equipment checked  Central Line Prep  Sterile Tech:cap, gloves, gown, mask and sterile barriers  Prep: chloraprep  Patient monitoring: blood pressure monitoring, continuous pulse oximetry and EKG  Central Line Procedure  Laterality:right (left and right)  Location:internal jugular  Catheter Type:triple lumen  Catheter Size:7 Fr  Guidance:ultrasound guided  Assessment  Complications:no  Patient Tolerance:patient tolerated the procedure well with no apparent complications  Additional Notes  Attempted IJ CVC access with dynamic ultrasound bilaterally.  Was able to easily access and cannulate IJ on both sides with catheter over needle, however I was unable to pass wire further then 14cm on either side.  Procedure was aborted and d/w with primary physician.

## 2017-06-13 NOTE — PROGRESS NOTES
[facility] Medicine Services  INPATIENT PROGRESS NOTE    Length of Stay: 2  Date of Admission: 6/11/2017  Primary Care Physician: No Known Provider    Subjective   Chief Complaint: Weakness.     HPI    Patient stated she feeling a little better.  Denies any shortness of breath.  Complaint chest pain.  EKG showed no acute changes.  Discussed patient is possibly due to low potassium.  Patient for Dialysis today.  Plan for transfusion during dialysis.      Review of Systems   Constitutional: Positive for activity change, appetite change and fatigue. Negative for chills and fever.   HENT: Negative for hearing loss, nosebleeds, tinnitus and trouble swallowing.   Eyes: Negative for visual disturbance.   Respiratory: Negative for cough, chest tightness, shortness of breath and wheezing.   Cardiovascular: Negative for chest pain, palpitations and leg swelling.   Gastrointestinal: Negative for abdominal distention, abdominal pain, blood in stool, constipation, diarrhea, nausea and vomiting.   Endocrine: Negative for cold intolerance, heat intolerance, polydipsia, polyphagia and polyuria.   Genitourinary: Negative for decreased urine volume, difficulty urinating, dysuria, flank pain, frequency and hematuria.   Musculoskeletal: Positive for arthralgias, gait problem and myalgias. Negative for joint swelling.   Skin: Negative for rash.   Allergic/Immunologic: Negative for immunocompromised state.   Neurological: Positive for weakness. Negative for dizziness, syncope, light-headedness and headaches.   Hematological: Negative for adenopathy. Does not bruise/bleed easily.   Psychiatric/Behavioral: Negative for confusion and sleep disturbance. The patient is not nervous/anxious.     All pertinent negatives and positives are as above. All other systems have been reviewed and are negative unless otherwise stated.     Objective    Temp:  [96.3 °F (35.7 °C)-97.4 °F (36.3 °C)] 97.4 °F (36.3  °C)  Heart Rate:  [75-92] 92  Resp:  [12-21] 21  BP: (69-92)/(39-59) 70/39    Intake/Output Summary (Last 24 hours) at 06/13/17 0954  Last data filed at 06/12/17 2330   Gross per 24 hour   Intake              300 ml   Output                0 ml   Net              300 ml     Physical Exam  Constitutional: She appears well-developed.   HENT:   Head: Normocephalic and atraumatic.   Eyes: Conjunctivae and EOM are normal. Pupils are equal, round, and reactive to light.   Neck: Neck supple. No JVD present. No thyromegaly present.   Cardiovascular: Normal rate, regular rhythm, normal heart sounds and intact distal pulses. Exam reveals no gallop and no friction rub.   No murmur heard.  Pulmonary/Chest: Effort normal and breath sounds normal. No respiratory distress. She has no wheezes. She has no rales. She exhibits no tenderness.   Abdominal: Soft. Bowel sounds are normal. She exhibits no distension. There is no tenderness. There is no rebound and no guarding.   Musculoskeletal: Normal range of motion. She exhibits no edema, tenderness or deformity.   Lymphadenopathy:   She has no cervical adenopathy.   Neurological: She is alert. She displays normal reflexes. No cranial nerve deficit. She exhibits abnormal muscle tone. Coordination abnormal.   Skin: Skin is warm and dry. No rash noted.   Psychiatric: She has a normal mood and affect. Her behavior is normal.   Nursing note and vitals reviewed.  Results Review:  Lab Results (last 24 hours)     Procedure Component Value Units Date/Time    Potassium [[id number]]  (Abnormal) Collected:  06/12/17 1304    Specimen:  Blood Updated:  06/12/17 1330     Potassium 2.2 (C) mmol/L     Troponin [[id number]]  (Abnormal) Collected:  06/12/17 1304    Specimen:  Blood Updated:  06/12/17 1336     Troponin I 1.660 (C) ng/mL     Basic Metabolic Panel [[id number]]  (Abnormal) Collected:  06/12/17 1830    Specimen:  Blood Updated:  06/12/17 1903     Glucose 48 (C) mg/dL      BUN 45 (H) mg/dL       Creatinine 11.53 (H) mg/dL      Sodium 138 mmol/L      Potassium 2.4 (C) mmol/L      Chloride 97 (L) mmol/L      CO2 24.0 mmol/L      Calcium 8.1 (L) mg/dL      eGFR  African Amer 4 (L) mL/min/1.73      BUN/Creatinine Ratio 3.9 (L)     Anion Gap 17.0 (H) mmol/L     Narrative:       The MDRD GFR formula is only valid for adults with stable renal function between ages 18 and 70.    POC Glucose Fingerstick [[id number]]  (Abnormal) Collected:  06/12/17 2317    Specimen:  Blood Updated:  06/12/17 2331     Glucose 34 (C) mg/dL       : [id number] [employer] ID: [id number]       POC Glucose Fingerstick [[id number]]  (Abnormal) Collected:  06/12/17 2322    Specimen:  Blood Updated:  06/12/17 2333     Glucose 29 (C) mg/dL       : [id number] [employer] ID: [id number]       POC Glucose Fingerstick [[id number]]  (Normal) Collected:  06/13/17 0020    Specimen:  Blood Updated:  06/13/17 0030     Glucose 98 mg/dL       : [id number] [facility] ID: [id number]       POC Glucose Fingerstick [[id number]]  (Normal) Collected:  06/13/17 0531    Specimen:  Blood Updated:  06/13/17 0544     Glucose 81 mg/dL       : [id number] [employer] ID: [id number]       Magnesium [[id number]]  (Normal) Collected:  06/13/17 0556    Specimen:  Blood Updated:  06/13/17 0640     Magnesium 2.0 mg/dL     Comprehensive Metabolic Panel [[id number]]  (Abnormal) Collected:  06/13/17 0556    Specimen:  Blood Updated:  06/13/17 0643     Glucose 89 mg/dL      BUN 49 (H) mg/dL      Creatinine 11.87 (H) mg/dL      Sodium 138 mmol/L      Potassium 2.4 (C) mmol/L      Chloride 100 mmol/L      CO2 22.0 (L) mmol/L      Calcium 7.7 (L) mg/dL      Total Protein 5.2 (L) g/dL      Albumin 2.10 (L) g/dL      ALT (SGPT) 20 U/L      AST (SGOT) 30 U/L      Alkaline Phosphatase 94 U/L      Total Bilirubin 1.8 (H) mg/dL      eGFR  African Amer 4 (L) mL/min/1.73      Globulin 3.1 gm/dL      A/G Ratio 0.7 (L) g/dL      BUN/Creatinine  Ratio 4.1 (L)     Anion Gap 16.0 (H) mmol/L     Narrative:       The MDRD GFR formula is only valid for adults with stable renal function between ages 18 and 70.    Lipid Panel [[id number]]  (Abnormal) Collected:  06/13/17 0556    Specimen:  Blood Updated:  06/13/17 0651     Total Cholesterol 65 (L) mg/dL      Triglycerides 116 mg/dL      HDL Cholesterol 10 (L) mg/dL      LDL Cholesterol  39 mg/dL      LDL/HDL Ratio 3.18    CBC & Differential [[id number]] Collected:  06/13/17 0556    Specimen:  Blood Updated:  06/13/17 0655    Narrative:       The following orders were created for panel order CBC & Differential.  Procedure                               Abnormality         Status                     ---------                               -----------         ------                     Scan Slide[[id number]]                                       Final result               CBC Auto Differential[[id number]]        Abnormal            Final result                 Please view results for these tests on the individual orders.    CBC Auto Differential [[id number]]  (Abnormal) Collected:  06/13/17 0556    Specimen:  Blood Updated:  06/13/17 0655     WBC 5.10 10*3/mm3      RBC 2.58 (L) 10*6/mm3      Hemoglobin 7.2 (L) g/dL      Hematocrit 21.5 (L) %      MCV 83.3 fL      MCH 27.9 (L) pg      MCHC 33.5 g/dL      RDW 22.7 (H) %      RDW-SD 68.6 (H) fl      Platelets 100 (L) 10*3/mm3      Neutrophil % 84.1 (H) %      Lymphocyte % 9.6 (L) %      Monocyte % 3.1 (L) %      Eosinophil % 2.4 %      Basophil % 0.2 %      Immature Grans % 0.6 %      Neutrophils, Absolute 4.29 10*3/mm3      Lymphocytes, Absolute 0.49 (L) 10*3/mm3      Monocytes, Absolute 0.16 (L) 10*3/mm3      Eosinophils, Absolute 0.12 10*3/mm3      Basophils, Absolute 0.01 10*3/mm3      Immature Grans, Absolute 0.03 10*3/mm3      nRBC 1.1 (H) /100 WBC     Scan Slide [[id number]] Collected:  06/13/17 0556    Specimen:  Blood Updated:  06/13/17 0655     Anisocytosis Slight/1+      Hypochromia Slight/1+     Poikilocytes Slight/1+     RBC Fragments Slight/1+     Target Cells Slight/1+     WBC Morphology Normal     Platelet Estimate Decreased           Cultures:       Radiology Data:      Interpretation Summary Echocardiogram      · Left ventricular systolic function is normal. Estimated ejection fraction is 61-65%.  · Left ventricular diastolic dysfunction (grade II) consistent with pseudonormalization.  · Right ventricular cavity is moderate-to-severely dilated.  · Moderately reduced right ventricular systolic function noted.  · There is biatrial enlargement.  · Mild aortic valve stenosis is present.  · Mild pulmonic valve regurgitation is present.  · Moderate to severe tricuspid valve regurgitation is present.  · Estimated right ventricular systolic pressure from tricuspid regurgitation is markedly elevated (>55 mmHg).               Allergies   Allergen Reactions   • Heparin    • Iron    • Latex    • Levaquin [Levofloxacin]    • Shrimp (Diagnostic)    • Vancomycin        Scheduled meds:     aspirin 81 mg Oral Daily   pantoprazole 40 mg Oral Q AM   potassium chloride 20 mEq Intravenous Q2H       PRN meds:  dextrose  •  HYDROmorphone  •  ipratropium-albuterol  •  ondansetron  •  sodium chloride    Assessment/Plan     Active Problems:    Chest pain in adult    Renal failure    Hypotension    Anemia    Hypokalemia      Plan:  Non-STEMI- echocardiogram -Ejection fraction 61%, diastolic dysfunction, right ventricular dilatation, tricuspid valve regurgitation, right ventricle systolic pressure greater than 55. Per cardiology.      Anemia- Hemoglobin is dropping.  Type and crossmatch 2 units of blood transfused during dialysis.     Chronic renal failure- nephrology is on board.  Patient dialysis today.     Hyperlipidemia- lipid panel in am.      Elevate TSH- free T4 is normal.     Hypotension- stable     Hypokalemia- potassium run pending.    Calcium 7.7, corrected calcium 9.5.     Consults Gen.  surgery Central line placement.    Discharge Planning: unknown    Carter Llamas MD   06/13/17   9:54 AM

## 2017-06-13 NOTE — PLAN OF CARE
Problem: Patient Care Overview (Adult)  Goal: Adult Individualization and Mutuality  Levophed started, and central line placed per Dr. Garcia in right groin.    Problem: Fall Risk (Adult)  Goal: Absence of Falls  Outcome: Ongoing (interventions implemented as appropriate)  Goal: Absence of Falls  Outcome: Ongoing (interventions implemented as appropriate)    Problem: Fluid Volume Excess (Adult,Obstetrics,Pediatric)  Goal: Balanced Intake/Output  Outcome: Ongoing (interventions implemented as appropriate)    Problem: Pressure Ulcer Risk (New Scale) (Adult,Obstetrics,Pediatric)  Goal: Skin Integrity  Outcome: Ongoing (interventions implemented as appropriate)

## 2017-06-13 NOTE — CONSULTS
Eat a central line.  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  Currently she has a dialysis fistula in her left leg area she has extensive scarring from this.  She is dialyzed 3 days a week.  Anesthesiologist attempted bilateral internal jugular vein catheterization for central lines this morning for unsuccessful meeting resistance at approximate 14 cm.  I have been asked to place a femoral line.  I have spoken with Dr. Valle regarding attempt at subclavian but I feel that this would be most likely fruitless with the findings during IJ attempts.  The extensive risks of bleeding infection injury to structures or the possibility that she has scarring from prior vascular interventions in the right groin which is our only option currently were discussed with the patient and family.

## 2017-06-14 LAB
ANION GAP SERPL CALCULATED.3IONS-SCNC: 16 MMOL/L (ref 4–13)
ANISOCYTOSIS BLD QL: NORMAL
BASOPHILS # BLD AUTO: 0 10*3/MM3 (ref 0–0.2)
BASOPHILS NFR BLD AUTO: 0 % (ref 0–2)
BUN BLD-MCNC: 27 MG/DL (ref 5–21)
BUN/CREAT SERPL: 3.4 (ref 7–25)
BURR CELLS BLD QL SMEAR: NORMAL
CALCIUM SPEC-SCNC: 8.3 MG/DL (ref 8.4–10.4)
CHLORIDE SERPL-SCNC: 103 MMOL/L (ref 98–110)
CO2 SERPL-SCNC: 22 MMOL/L (ref 24–31)
CREAT BLD-MCNC: 7.86 MG/DL (ref 0.5–1.4)
DEPRECATED RDW RBC AUTO: 59 FL (ref 40–54)
EOSINOPHIL # BLD AUTO: 0.05 10*3/MM3 (ref 0–0.7)
EOSINOPHIL NFR BLD AUTO: 0.7 % (ref 0–4)
ERYTHROCYTE [DISTWIDTH] IN BLOOD BY AUTOMATED COUNT: 20.3 % (ref 12–15)
GFR SERPL CREATININE-BSD FRML MDRD: 6 ML/MIN/1.73
GLUCOSE BLD-MCNC: 102 MG/DL (ref 70–100)
GLUCOSE BLD-MCNC: 286 MG/DL (ref 70–100)
GLUCOSE BLD-MCNC: 69 MG/DL (ref 70–100)
GLUCOSE BLDC GLUCOMTR-MCNC: 165 MG/DL (ref 70–130)
GLUCOSE BLDC GLUCOMTR-MCNC: 25 MG/DL (ref 70–130)
GLUCOSE BLDC GLUCOMTR-MCNC: 283 MG/DL (ref 70–130)
GLUCOSE BLDC GLUCOMTR-MCNC: 31 MG/DL (ref 70–130)
GLUCOSE BLDC GLUCOMTR-MCNC: 313 MG/DL (ref 70–130)
GLUCOSE BLDC GLUCOMTR-MCNC: 48 MG/DL (ref 70–130)
GLUCOSE BLDC GLUCOMTR-MCNC: 64 MG/DL (ref 70–130)
GLUCOSE BLDC GLUCOMTR-MCNC: 82 MG/DL (ref 70–130)
HCT VFR BLD AUTO: 32.3 % (ref 37–47)
HGB BLD-MCNC: 10.9 G/DL (ref 12–16)
HYPOCHROMIA BLD QL: NORMAL
IMM GRANULOCYTES # BLD: 0.03 10*3/MM3 (ref 0–0.03)
IMM GRANULOCYTES NFR BLD: 0.4 % (ref 0–5)
LYMPHOCYTES # BLD AUTO: 0.33 10*3/MM3 (ref 0.72–4.86)
LYMPHOCYTES NFR BLD AUTO: 4.7 % (ref 15–45)
MAGNESIUM SERPL-MCNC: 2 MG/DL (ref 1.4–2.2)
MCH RBC QN AUTO: 28.1 PG (ref 28–32)
MCHC RBC AUTO-ENTMCNC: 33.7 G/DL (ref 33–36)
MCV RBC AUTO: 83.2 FL (ref 82–98)
MONOCYTES # BLD AUTO: 0.23 10*3/MM3 (ref 0.19–1.3)
MONOCYTES NFR BLD AUTO: 3.3 % (ref 4–12)
NEUTROPHILS # BLD AUTO: 6.35 10*3/MM3 (ref 1.87–8.4)
NEUTROPHILS NFR BLD AUTO: 90.9 % (ref 39–78)
NRBC BLD MANUAL-RTO: 3.8 /100 WBC (ref 0–0)
PAPPENHEIMER BOD BLD QL SMEAR: PRESENT
PLATELET # BLD AUTO: 101 10*3/MM3 (ref 130–400)
POIKILOCYTOSIS BLD QL SMEAR: NORMAL
POTASSIUM BLD-SCNC: 3.3 MMOL/L (ref 3.5–5.3)
RBC # BLD AUTO: 3.88 10*6/MM3 (ref 4.2–5.4)
SMALL PLATELETS BLD QL SMEAR: NORMAL
SODIUM BLD-SCNC: 141 MMOL/L (ref 135–145)
TARGETS BLD QL SMEAR: NORMAL
WBC MORPH BLD: NORMAL
WBC NRBC COR # BLD: 6.99 10*3/MM3 (ref 4.8–10.8)

## 2017-06-14 PROCEDURE — 85025 COMPLETE CBC W/AUTO DIFF WBC: CPT | Performed by: FAMILY MEDICINE

## 2017-06-14 PROCEDURE — 85007 BL SMEAR W/DIFF WBC COUNT: CPT | Performed by: FAMILY MEDICINE

## 2017-06-14 PROCEDURE — 82947 ASSAY GLUCOSE BLOOD QUANT: CPT | Performed by: INTERNAL MEDICINE

## 2017-06-14 PROCEDURE — 25010000003 POTASSIUM CHLORIDE PER 2 MEQ: Performed by: NURSE PRACTITIONER

## 2017-06-14 PROCEDURE — 80048 BASIC METABOLIC PNL TOTAL CA: CPT | Performed by: NURSE PRACTITIONER

## 2017-06-14 PROCEDURE — 82962 GLUCOSE BLOOD TEST: CPT

## 2017-06-14 PROCEDURE — 25010000002 HYDROMORPHONE PER 4 MG: Performed by: FAMILY MEDICINE

## 2017-06-14 PROCEDURE — 83735 ASSAY OF MAGNESIUM: CPT | Performed by: NURSE PRACTITIONER

## 2017-06-14 PROCEDURE — 82947 ASSAY GLUCOSE BLOOD QUANT: CPT | Performed by: FAMILY MEDICINE

## 2017-06-14 RX ORDER — DEXTROSE MONOHYDRATE 25 G/50ML
200 INJECTION, SOLUTION INTRAVENOUS ONCE
Status: COMPLETED | OUTPATIENT
Start: 2017-06-14 | End: 2017-06-14

## 2017-06-14 RX ORDER — LEVOTHYROXINE SODIUM 0.05 MG/1
50 TABLET ORAL
Status: DISCONTINUED | OUTPATIENT
Start: 2017-06-14 | End: 2017-06-26 | Stop reason: HOSPADM

## 2017-06-14 RX ORDER — POTASSIUM CHLORIDE 29.8 MG/ML
20 INJECTION INTRAVENOUS ONCE
Status: COMPLETED | OUTPATIENT
Start: 2017-06-14 | End: 2017-06-14

## 2017-06-14 RX ADMIN — POTASSIUM CHLORIDE 20 MEQ: 400 INJECTION, SOLUTION INTRAVENOUS at 11:33

## 2017-06-14 RX ADMIN — HYDROMORPHONE HYDROCHLORIDE 0.5 MG: 1 INJECTION, SOLUTION INTRAMUSCULAR; INTRAVENOUS; SUBCUTANEOUS at 11:36

## 2017-06-14 RX ADMIN — DEXTROSE MONOHYDRATE 50 ML: 25 INJECTION, SOLUTION INTRAVENOUS at 21:49

## 2017-06-14 RX ADMIN — NOREPINEPHRINE BITARTRATE 0.16 MCG/KG/MIN: 1 INJECTION INTRAVENOUS at 12:38

## 2017-06-14 RX ADMIN — DEXTROSE MONOHYDRATE 50 ML: 25 INJECTION, SOLUTION INTRAVENOUS at 21:04

## 2017-06-14 RX ADMIN — DEXTROSE MONOHYDRATE 200 ML: 25 INJECTION, SOLUTION INTRAVENOUS at 22:30

## 2017-06-14 RX ADMIN — HYDROMORPHONE HYDROCHLORIDE 0.5 MG: 1 INJECTION, SOLUTION INTRAMUSCULAR; INTRAVENOUS; SUBCUTANEOUS at 17:53

## 2017-06-14 NOTE — PROGRESS NOTES
[facility] Medicine Services  INPATIENT PROGRESS NOTE    Length of Stay: 3  Date of Admission: 6/11/2017  Primary Care Physician: No Known Provider    Subjective   Chief Complaint: Hypotension, weakness    HPI   Patient doing a little bit better today.  Patient still complaining chest pain, no acute distress.  Denies any shortness of breath.    Review of Systems   Constitutional: Positive for activity change, appetite change and fatigue. Negative for chills and fever.   HENT: Negative for hearing loss, nosebleeds, tinnitus and trouble swallowing.   Eyes: Negative for visual disturbance.   Respiratory: Negative for cough, chest tightness, shortness of breath and wheezing.   Cardiovascular: Negative for chest pain, palpitations and leg swelling.   Gastrointestinal: Negative for abdominal distention, abdominal pain, blood in stool, constipation, diarrhea, nausea and vomiting.   Endocrine: Negative for cold intolerance, heat intolerance, polydipsia, polyphagia and polyuria.   Genitourinary: Negative for decreased urine volume, difficulty urinating, dysuria, flank pain, frequency and hematuria.   Musculoskeletal: Positive for arthralgias, gait problem and myalgias. Negative for joint swelling.   Skin: Negative for rash.   Allergic/Immunologic: Negative for immunocompromised state.   Neurological: Positive for weakness. Negative for dizziness, syncope, light-headedness and headaches.   Hematological: Negative for adenopathy. Does not bruise/bleed easily.   Psychiatric/Behavioral: Negative for confusion and sleep disturbance. The patient is not nervous/anxious.     All pertinent negatives and positives are as above. All other systems have been reviewed and are negative unless otherwise stated.     Objective    Temp:  [96.9 °F (36.1 °C)-98.2 °F (36.8 °C)] 97.4 °F (36.3 °C)  Heart Rate:  [] 91  Resp:  [14-24] 14  BP: ()/() 100/56    Intake/Output Summary (Last 24 hours)  at 06/14/17 0751  Last data filed at 06/13/17 2059   Gross per 24 hour   Intake              600 ml   Output                0 ml   Net              600 ml     Physical Exam  Constitutional: She appears well-developed.   HENT:   Head: Normocephalic and atraumatic.   Eyes: Conjunctivae and EOM are normal. Pupils are equal, round, and reactive to light.   Neck: Neck supple. No JVD present. No thyromegaly present.   Cardiovascular: Normal rate, regular rhythm, normal heart sounds and intact distal pulses. Exam reveals no gallop and no friction rub.   No murmur heard.  Pulmonary/Chest: Effort normal and breath sounds normal. No respiratory distress. She has no wheezes. She has no rales. She exhibits no tenderness.   Abdominal: Soft. Bowel sounds are normal. She exhibits no distension. There is no tenderness. There is no rebound and no guarding.   Musculoskeletal: Normal range of motion. She exhibits no edema, tenderness or deformity.   Lymphadenopathy:   She has no cervical adenopathy.   Neurological: She is alert. She displays normal reflexes. No cranial nerve deficit. She exhibits abnormal muscle tone. Coordination abnormal.   Skin: Skin is warm and dry. No rash noted.   Psychiatric: She has a normal mood and affect. Her behavior is normal.   Nursing note and vitals reviewed.  Results Review:  Lab Results (last 24 hours)     Procedure Component Value Units Date/Time    POC Glucose Fingerstick [[id number]]  (Normal) Collected:  06/13/17 1311    Specimen:  Blood Updated:  06/13/17 1330     Glucose 96 mg/dL       : [id number] Mirza AmberMeter ID: [id number]       POC Glucose Fingerstick [[id number]]  (Abnormal) Collected:  06/13/17 1412    Specimen:  Blood Updated:  06/13/17 1424     Glucose 37 (C) mg/dL       : [id number] Anais KellyMeter ID: [id number]       Glucose, Random [[id number]]  (Normal) Collected:  06/13/17 1419    Specimen:  Blood Updated:  06/13/17 1504     Glucose 75 mg/dL     POC Glucose  Fingerstick [[id number]]  (Abnormal) Collected:  06/13/17 1616    Specimen:  Blood Updated:  06/13/17 1629     Glucose 178 (H) mg/dL       : [id number] Mirza AmberMeter ID: [id number]       POC Glucose Fingerstick [[id number]]  (Normal) Collected:  06/13/17 2106    Specimen:  Blood Updated:  06/13/17 2117     Glucose 99 mg/dL       : [id number] Igor Pike ID: [id number]       Magnesium [[id number]]  (Normal) Collected:  06/14/17 0511    Specimen:  Blood Updated:  06/14/17 0542     Magnesium 2.0 mg/dL     Basic Metabolic Panel [[id number]]  (Abnormal) Collected:  06/14/17 0511    Specimen:  Blood Updated:  06/14/17 0550     Glucose 102 (H) mg/dL      BUN 27 (H) mg/dL      Creatinine 7.86 (H) mg/dL      Sodium 141 mmol/L      Potassium 3.3 (L) mmol/L      Chloride 103 mmol/L      CO2 22.0 (L) mmol/L      Calcium 8.3 (L) mg/dL      eGFR   Amer 6 (L) mL/min/1.73      BUN/Creatinine Ratio 3.4 (L)     Anion Gap 16.0 (H) mmol/L     Narrative:       The MDRD GFR formula is only valid for adults with stable renal function between ages 18 and 70.    CBC & Differential [[id number]] Collected:  06/14/17 0511    Specimen:  Blood Updated:  06/14/17 0635    Narrative:       The following orders were created for panel order CBC & Differential.  Procedure                               Abnormality         Status                     ---------                               -----------         ------                     Scan Slide[[id number]]                                       Final result               CBC Auto Differential[[id number]]        Abnormal            Final result                 Please view results for these tests on the individual orders.    CBC Auto Differential [[id number]]  (Abnormal) Collected:  06/14/17 0511    Specimen:  Blood Updated:  06/14/17 0635     WBC 6.99 10*3/mm3      RBC 3.88 (L) 10*6/mm3      Hemoglobin 10.9 (L) g/dL      Hematocrit 32.3 (L) %      MCV 83.2 fL      MCH 28.1 pg       MCHC 33.7 g/dL      RDW 20.3 (H) %      RDW-SD 59.0 (H) fl      Platelets 101 (L) 10*3/mm3      Neutrophil % 90.9 (H) %      Lymphocyte % 4.7 (L) %      Monocyte % 3.3 (L) %      Eosinophil % 0.7 %      Basophil % 0.0 %      Immature Grans % 0.4 %      Neutrophils, Absolute 6.35 10*3/mm3      Lymphocytes, Absolute 0.33 (L) 10*3/mm3      Monocytes, Absolute 0.23 10*3/mm3      Eosinophils, Absolute 0.05 10*3/mm3      Basophils, Absolute 0.00 10*3/mm3      Immature Grans, Absolute 0.03 10*3/mm3      nRBC 3.8 (H) /100 WBC     Scan Slide [[id number]] Collected:  06/14/17 0511    Specimen:  Blood Updated:  06/14/17 0635     Anisocytosis Slight/1+     Crenated RBC's Slight/1+     Hypochromia Slight/1+     Pappenheimer Bodies Present     Poikilocytes Mod/2+     Target Cells Slight/1+     WBC Morphology Normal     Platelet Estimate Decreased           Cultures:       Radiology Data:    Imaging Results (last 24 hours)     ** No results found for the last 24 hours. **          Allergies   Allergen Reactions   • Heparin    • Iron    • Latex    • Levaquin [Levofloxacin]    • Shrimp (Diagnostic)    • Vancomycin        Scheduled meds:     aspirin 81 mg Oral Daily   pantoprazole 40 mg Oral Q AM       PRN meds:  dextrose  •  HYDROmorphone  •  ipratropium-albuterol  •  ondansetron  •  sodium chloride    Assessment/Plan     Active Problems:    Chest pain in adult    Renal failure    Hypotension    Anemia    Hypokalemia      Plan:  Chest pain, atypical, echocardiogram -Ejection fraction 61%, diastolic dysfunction, right ventricular dilatation, tricuspid valve regurgitation, right ventricle systolic pressure greater than 55. Per cardiology.       Anemia- Status post 2 units of blood transfusion.      Chronic renal failure- nephrology is on board. Patient dialysis today.      Hyperlipidemia- lipid panel in am.       Elevate TSH- free T4 is normal. Start synthroid.      Hypotension- On Levophed drip      Hypokalemia- improving     Calcium  7.7, corrected calcium 9.5 - 6/13/2017.    Deconditioning- start PT and OT     Discharge Planning: unknown    Carter Llamas MD   06/14/17   7:51 AM

## 2017-06-14 NOTE — PROGRESS NOTES
Nephrology ([facility]) Progress Note      Patient:  Jenniefr Marks  YOB: 1945  Date of Service: 6/14/2017  MRN: [id number]   Acct: [id number]   Primary Care Physician: No Known Provider  Advance Directive: Full Code  Admit Date: 6/11/2017       Hospital Day: 3  Referring Provider: Pino Tang MD      Patient personally seen and examined.  Complete chart including Consults, Notes, Operative Reports, Labs, Cardiology, and Radiology studies reviewed as able.        Subjective:  Jennifer Marks is a 72 y.o. female  whom we were consulted for end stage renal disease management, hypokalemia. She is on hemodialysis Monday Wednesday Friday; Missed her Friday (6/09) treatment due to not feeling well. Admitted with chest pain. Has had decreased oral intake and diarrhea for several days.  Yesterday had femoral line placed.  Today she is awake, alert.  Complaint of general malaise and mild diffuse abdominal pain.    Allergies:  Heparin; Iron; Latex; Levaquin [levofloxacin]; Shrimp (diagnostic); and Vancomycin    Home Meds:  Prescriptions Prior to Admission   Medication Sig Dispense Refill Last Dose   • carvedilol (COREG) 12.5 MG tablet Take 12.5 mg by mouth Daily.      • losartan (COZAAR) 25 MG tablet Take 25 mg by mouth Daily.      • pantoprazole (PROTONIX) 40 MG EC tablet Take 40 mg by mouth Daily.      • potassium chloride (KLOR-CON) 20 MEQ packet Take 20 mEq by mouth Daily.      • predniSONE (DELTASONE) 1 MG tablet Take 1 mg by mouth Daily.      • PREDNISONE PO Take  by mouth.      • traZODone (DESYREL) 50 MG tablet Take 50 mg by mouth Every Night.          Medicines:  Current Facility-Administered Medications   Medication Dose Route Frequency Provider Last Rate Last Dose   • aspirin EC tablet 81 mg  81 mg Oral Daily Pino Tang MD   81 mg at 06/13/17 0814   • dextrose (D50W) solution 50 mL  50 mL Intravenous Q1H PRN Bree Soares MD   50 mL at 06/12/17 5620   • HYDROmorphone  (DILAUDID) injection 0.5 mg  0.5 mg Intravenous Q4H PRN Carter Llamas MD   0.5 mg at 06/13/17 1019   • ipratropium-albuterol (DUO-NEB) nebulizer solution 3 mL  3 mL Nebulization Q4H PRN Pino Tang MD       • levothyroxine (SYNTHROID, LEVOTHROID) tablet 50 mcg  50 mcg Oral Q AM Carter Llamas MD       • norepinephrine (LEVOPHED) 8,000 mcg in sodium chloride 0.9 % 250 mL (32 mcg/mL) infusion  0.02-0.3 mcg/kg/min Intravenous Titrated FANNIE Blanco 13.47 mL/hr at 06/14/17 0315 0.14 mcg/kg/min at 06/14/17 0315   • ondansetron (ZOFRAN) injection 4 mg  4 mg Intravenous Q6H PRN Pino Tang MD   4 mg at 06/13/17 1019   • pantoprazole (PROTONIX) EC tablet 40 mg  40 mg Oral Q AM Pino Tang MD   40 mg at 06/12/17 0610   • potassium chloride 20 mEq in 50 mL IVPB  20 mEq Intravenous Once FANNIE Blanco       • sodium chloride 0.9 % flush 1-10 mL  1-10 mL Intravenous PRN Pino Tang MD           Past Medical History:  Past Medical History:   Diagnosis Date   • A-fib    • Arthritis    • CHF (congestive heart failure)    • Hypertension    • Renal failure        Past Surgical History:  Past Surgical History:   Procedure Laterality Date   • ARTERIOVENOUS FISTULA LEG Left        Family History  History reviewed. No pertinent family history.    Social History  Social History     Social History   • Marital status: Single     Spouse name: N/A   • Number of children: N/A   • Years of education: N/A     Occupational History   • Not on file.     Social History Main Topics   • Smoking status: Former Smoker   • Smokeless tobacco: Not on file   • Alcohol use No   • Drug use: No   • Sexual activity: Defer     Other Topics Concern   • Not on file     Social History Narrative   • No narrative on file         Review of Systems:  History obtained from chart review and the patient  General ROS: Patient complains of malaise and No fever or chills  Respiratory ROS: No cough, shortness of breath, wheezing  Cardiovascular  "ROS: no chest pain or dyspnea on exertion  Gastrointestinal ROS: abdominal pain, no nausea  Genito-Urinary ROS: No dysuria or hematuria  Neurological ROS: negative  14 point ROS reviewed with the patient and negative except as noted above and in the HPI unless unable to obtain.    Objective:  /56  Pulse 91  Temp 97.4 °F (36.3 °C) (Temporal Artery )   Resp 14  Ht 62\" (157.5 cm)  Wt 113 lb 0.1 oz (51.3 kg)  SpO2 100%  BMI 20.67 kg/m2    Intake/Output Summary (Last 24 hours) at 06/14/17 0804  Last data filed at 06/13/17 2059   Gross per 24 hour   Intake              600 ml   Output                0 ml   Net              600 ml     General: awake/alert    Neck: supple, no JVD  Chest:  clear to auscultation bilaterally without respiratory distress  CVS: regular rate and rhythm  Abdominal: soft, diffusely tender, +bowel sounds  Extremities: no cyanosis or edema  Skin: warm and dry without rash   Neuro: no focal motor deficits      Labs:    Results from last 7 days  Lab Units 06/14/17  0511 06/13/17  0556 06/12/17  0253   WBC 10*3/mm3 6.99 5.10 8.90   HEMOGLOBIN g/dL 10.9* 7.2* 8.0*   HEMATOCRIT % 32.3* 21.5* 24.1*   PLATELETS 10*3/mm3 101* 100* 123*           Results from last 7 days  Lab Units 06/14/17  0511 06/13/17  1419 06/13/17  0556 06/12/17  1830  06/11/17  2047   SODIUM mmol/L 141  --  138 138  < > 139   POTASSIUM mmol/L 3.3*  --  2.4* 2.4*  < > 2.0*   CHLORIDE mmol/L 103  --  100 97*  < > 100   TOTAL CO2 mmol/L 22.0*  --  22.0* 24.0  < > 19.0*   BUN mg/dL 27*  --  49* 45*  < > 66*   CREATININE mg/dL 7.86*  --  11.87* 11.53*  < > 16.31*   CALCIUM mg/dL 8.3*  --  7.7* 8.1*  < > 8.1*   BILIRUBIN mg/dL  --   --  1.8*  --   --  2.4*   ALK PHOS U/L  --   --  94  --   --  81   ALT (SGPT) U/L  --   --  20  --   --  17   AST (SGOT) U/L  --   --  30  --   --  18   GLUCOSE mg/dL 102* 75 89 48*  < > 72   < > = values in this interval not displayed.    Radiology:   Imaging Results (last 72 hours)     Procedure " Component Value Units Date/Time    XR Chest 1 View [[id number]] Collected:  06/11/17 1803     Updated:  06/11/17 2054    Narrative:       EXAMINATION:   XR CHEST 1 VW-  6/11/2017 6:01 PM CDT     HISTORY: Chest pain     Prior exams 05/28/2016.     Single view of the chest is obtained. The pulmonary vascular margins are  slightly indistinct. This has the appearance of mild pulmonary vascular  congestion. Left diaphragm is indistinct. This may be infiltrate or  atelectasis left lower lobe.     Cardiac silhouette is enlarged and unchanged from May 28.       Impression:       1. Silhouetting of the left diaphragm consistent with infiltrate or  atelectasis left lower lobe.  2. Indistinct pulmonary vascular margins consistent with mild pulmonary  vascular congestion.  This report was finalized on 06/11/2017 20:51 by Dr. Herson Magaña MD.          Culture:         Assessment   1. End stage renal disease on maintenance hemodialysis Monday Wednesday Friday   2. Hypokalemia  3. Hypotension--on Levophed  4. Anemia of chronic kidney disease  5. Diabetes mellitus type 2    Plan:  1.  Replace potassium  2.  Wean Levophed drip as tolerated.      Jaden Tovar, APRN  6/14/2017  8:04 AM

## 2017-06-14 NOTE — PLAN OF CARE
Problem: Fall Risk (Adult)  Goal: Absence of Falls  Outcome: Ongoing (interventions implemented as appropriate)  This patient is able to respond appropriately, but is very weak. She continues on levophed, dialysis done today with no fluid removed.    06/14/17 9509   Fall Risk (Adult)   Absence of Falls making progress toward outcome       Goal: Absence of Falls  Outcome: Ongoing (interventions implemented as appropriate)    Problem: Fluid Volume Excess (Adult,Obstetrics,Pediatric)  Goal: Stable Weight  Outcome: Ongoing (interventions implemented as appropriate)  Goal: Balanced Intake/Output  Outcome: Ongoing (interventions implemented as appropriate)    Problem: Pressure Ulcer Risk (New Scale) (Adult,Obstetrics,Pediatric)  Goal: Skin Integrity  Outcome: Ongoing (interventions implemented as appropriate)    Problem: Nutrition, Imbalanced: Inadequate Oral Intake (Adult)  Goal: Identify Related Risk Factors and Signs and Symptoms  Outcome: Ongoing (interventions implemented as appropriate)  Goal: Improved Oral Intake  Outcome: Ongoing (interventions implemented as appropriate)  Goal: Prevent Further Weight Loss  Outcome: Ongoing (interventions implemented as appropriate)

## 2017-06-14 NOTE — PLAN OF CARE
Problem: Patient Care Overview (Adult)  Goal: Plan of Care Review  Outcome: Ongoing (interventions implemented as appropriate)    06/14/17 4031   Coping/Psychosocial Response Interventions   Plan Of Care Reviewed With other (see comments)  (nurse)   Patient Care Overview   Progress no change   Outcome Evaluation   Outcome Summary/Follow up Plan Pt on Regular, Cardiac,Consistnent Carbohydrate diet, oral intake n/a at this time, Adding glucerna bid. Cont to follow.         Problem: Nutrition, Imbalanced: Inadequate Oral Intake (Adult)  Goal: Identify Related Risk Factors and Signs and Symptoms  Outcome: Ongoing (interventions implemented as appropriate)

## 2017-06-14 NOTE — PLAN OF CARE
Problem: Patient Care Overview (Adult)  Goal: Plan of Care Review  Outcome: Ongoing (interventions implemented as appropriate)    06/14/17 0526   Coping/Psychosocial Response Interventions   Plan Of Care Reviewed With patient   Patient Care Overview   Progress no change       Goal: Adult Individualization and Mutuality  Outcome: Ongoing (interventions implemented as appropriate)  Goal: Discharge Needs Assessment  Outcome: Ongoing (interventions implemented as appropriate)    Problem: Fall Risk (Adult)  Goal: Absence of Falls  Outcome: Ongoing (interventions implemented as appropriate)  Goal: Absence of Falls  Outcome: Ongoing (interventions implemented as appropriate)    Problem: Fluid Volume Excess (Adult,Obstetrics,Pediatric)  Goal: Stable Weight  Outcome: Ongoing (interventions implemented as appropriate)  Goal: Balanced Intake/Output  Outcome: Ongoing (interventions implemented as appropriate)    Problem: Pressure Ulcer Risk (New Scale) (Adult,Obstetrics,Pediatric)  Goal: Skin Integrity  Outcome: Ongoing (interventions implemented as appropriate)

## 2017-06-14 NOTE — PLAN OF CARE
Problem: Patient Care Overview (Adult)  Goal: Plan of Care Review  Outcome: Ongoing (interventions implemented as appropriate)    06/14/17 1541   Outcome Evaluation   Outcome Summary/Follow up Plan Pt remains on levophed drip. HD today. Will continue to monitor         Problem: Fall Risk (Adult)  Goal: Absence of Falls  Outcome: Ongoing (interventions implemented as appropriate)  Goal: Absence of Falls  Outcome: Ongoing (interventions implemented as appropriate)    Problem: Fluid Volume Excess (Adult,Obstetrics,Pediatric)  Goal: Stable Weight  Outcome: Ongoing (interventions implemented as appropriate)  Goal: Balanced Intake/Output  Outcome: Ongoing (interventions implemented as appropriate)    Problem: Pressure Ulcer Risk (New Scale) (Adult,Obstetrics,Pediatric)  Goal: Skin Integrity  Outcome: Ongoing (interventions implemented as appropriate)

## 2017-06-15 ENCOUNTER — APPOINTMENT (OUTPATIENT)
Dept: GENERAL RADIOLOGY | Facility: HOSPITAL | Age: 72
End: 2017-06-15

## 2017-06-15 LAB
ABO + RH BLD: NORMAL
ABO + RH BLD: NORMAL
ANION GAP SERPL CALCULATED.3IONS-SCNC: 19 MMOL/L (ref 4–13)
ANISOCYTOSIS BLD QL: NORMAL
BASOPHILS # BLD AUTO: 0.01 10*3/MM3 (ref 0–0.2)
BASOPHILS NFR BLD AUTO: 0.1 % (ref 0–2)
BH BB BLOOD EXPIRATION DATE: NORMAL
BH BB BLOOD EXPIRATION DATE: NORMAL
BH BB BLOOD TYPE BARCODE: 9500
BH BB BLOOD TYPE BARCODE: 9500
BH BB DISPENSE STATUS: NORMAL
BH BB DISPENSE STATUS: NORMAL
BH BB PRODUCT CODE: NORMAL
BH BB PRODUCT CODE: NORMAL
BH BB UNIT NUMBER: NORMAL
BH BB UNIT NUMBER: NORMAL
BUN BLD-MCNC: 16 MG/DL (ref 5–21)
BUN/CREAT SERPL: 3.2 (ref 7–25)
BURR CELLS BLD QL SMEAR: NORMAL
C3 FRG RBC-MCNC: NORMAL
CALCIUM SPEC-SCNC: 8.5 MG/DL (ref 8.4–10.4)
CHLORIDE SERPL-SCNC: 101 MMOL/L (ref 98–110)
CO2 SERPL-SCNC: 19 MMOL/L (ref 24–31)
CREAT BLD-MCNC: 4.95 MG/DL (ref 0.5–1.4)
CROSSMATCH INTERPRETATION: NORMAL
CROSSMATCH INTERPRETATION: NORMAL
DEPRECATED RDW RBC AUTO: 62.8 FL (ref 40–54)
EOSINOPHIL # BLD AUTO: 0.04 10*3/MM3 (ref 0–0.7)
EOSINOPHIL NFR BLD AUTO: 0.5 % (ref 0–4)
ERYTHROCYTE [DISTWIDTH] IN BLOOD BY AUTOMATED COUNT: 20.8 % (ref 12–15)
GFR SERPL CREATININE-BSD FRML MDRD: 10 ML/MIN/1.73
GLUCOSE BLD-MCNC: 195 MG/DL (ref 70–100)
GLUCOSE BLDC GLUCOMTR-MCNC: 104 MG/DL (ref 70–130)
GLUCOSE BLDC GLUCOMTR-MCNC: 167 MG/DL (ref 70–130)
GLUCOSE BLDC GLUCOMTR-MCNC: 381 MG/DL (ref 70–130)
GLUCOSE BLDC GLUCOMTR-MCNC: 51 MG/DL (ref 70–130)
GLUCOSE BLDC GLUCOMTR-MCNC: 63 MG/DL (ref 70–130)
GLUCOSE BLDC GLUCOMTR-MCNC: 72 MG/DL (ref 70–130)
GLUCOSE BLDC GLUCOMTR-MCNC: 75 MG/DL (ref 70–130)
GLUCOSE BLDC GLUCOMTR-MCNC: 75 MG/DL (ref 70–130)
GLUCOSE BLDC GLUCOMTR-MCNC: 96 MG/DL (ref 70–130)
HCT VFR BLD AUTO: 31 % (ref 37–47)
HGB BLD-MCNC: 10.3 G/DL (ref 12–16)
IMM GRANULOCYTES # BLD: 0.04 10*3/MM3 (ref 0–0.03)
IMM GRANULOCYTES NFR BLD: 0.5 % (ref 0–5)
LYMPHOCYTES # BLD AUTO: 0.25 10*3/MM3 (ref 0.72–4.86)
LYMPHOCYTES NFR BLD AUTO: 3.3 % (ref 15–45)
MCH RBC QN AUTO: 28.5 PG (ref 28–32)
MCHC RBC AUTO-ENTMCNC: 33.2 G/DL (ref 33–36)
MCV RBC AUTO: 85.6 FL (ref 82–98)
MONOCYTES # BLD AUTO: 0.3 10*3/MM3 (ref 0.19–1.3)
MONOCYTES NFR BLD AUTO: 3.9 % (ref 4–12)
NEUTROPHILS # BLD AUTO: 6.96 10*3/MM3 (ref 1.87–8.4)
NEUTROPHILS NFR BLD AUTO: 91.7 % (ref 39–78)
NRBC BLD MANUAL-RTO: 1.6 /100 WBC (ref 0–0)
PLATELET # BLD AUTO: 72 10*3/MM3 (ref 130–400)
PMV BLD AUTO: 0 FL (ref 6–12)
POIKILOCYTOSIS BLD QL SMEAR: NORMAL
POTASSIUM BLD-SCNC: 3.2 MMOL/L (ref 3.5–5.3)
RBC # BLD AUTO: 3.62 10*6/MM3 (ref 4.2–5.4)
SCHISTOCYTES BLD QL SMEAR: NORMAL
SMALL PLATELETS BLD QL SMEAR: NORMAL
SODIUM BLD-SCNC: 139 MMOL/L (ref 135–145)
UNIT  ABO: NORMAL
UNIT  ABO: NORMAL
UNIT  RH: NORMAL
UNIT  RH: NORMAL
WBC MORPH BLD: NORMAL
WBC NRBC COR # BLD: 7.6 10*3/MM3 (ref 4.8–10.8)

## 2017-06-15 PROCEDURE — G8987 SELF CARE CURRENT STATUS: HCPCS

## 2017-06-15 PROCEDURE — G8988 SELF CARE GOAL STATUS: HCPCS

## 2017-06-15 PROCEDURE — G8982 BODY POS GOAL STATUS: HCPCS

## 2017-06-15 PROCEDURE — 71010 HC CHEST PA OR AP: CPT

## 2017-06-15 PROCEDURE — 25010000003 POTASSIUM CHLORIDE PER 2 MEQ: Performed by: NURSE PRACTITIONER

## 2017-06-15 PROCEDURE — 25010000002 ONDANSETRON PER 1 MG: Performed by: INTERNAL MEDICINE

## 2017-06-15 PROCEDURE — 85007 BL SMEAR W/DIFF WBC COUNT: CPT | Performed by: FAMILY MEDICINE

## 2017-06-15 PROCEDURE — 97166 OT EVAL MOD COMPLEX 45 MIN: CPT

## 2017-06-15 PROCEDURE — G8981 BODY POS CURRENT STATUS: HCPCS

## 2017-06-15 PROCEDURE — 85025 COMPLETE CBC W/AUTO DIFF WBC: CPT | Performed by: FAMILY MEDICINE

## 2017-06-15 PROCEDURE — 82962 GLUCOSE BLOOD TEST: CPT

## 2017-06-15 PROCEDURE — 80048 BASIC METABOLIC PNL TOTAL CA: CPT | Performed by: NURSE PRACTITIONER

## 2017-06-15 PROCEDURE — 97162 PT EVAL MOD COMPLEX 30 MIN: CPT

## 2017-06-15 RX ORDER — MIDODRINE HYDROCHLORIDE 2.5 MG/1
5 TABLET ORAL
Status: DISCONTINUED | OUTPATIENT
Start: 2017-06-15 | End: 2017-06-26 | Stop reason: HOSPADM

## 2017-06-15 RX ORDER — MEGESTROL ACETATE 40 MG/ML
400 SUSPENSION ORAL DAILY
Status: DISCONTINUED | OUTPATIENT
Start: 2017-06-15 | End: 2017-06-26 | Stop reason: HOSPADM

## 2017-06-15 RX ORDER — POTASSIUM CHLORIDE 29.8 MG/ML
20 INJECTION INTRAVENOUS ONCE
Status: COMPLETED | OUTPATIENT
Start: 2017-06-15 | End: 2017-06-15

## 2017-06-15 RX ADMIN — DEXTROSE MONOHYDRATE 50 ML: 25 INJECTION, SOLUTION INTRAVENOUS at 20:55

## 2017-06-15 RX ADMIN — NOREPINEPHRINE BITARTRATE 0.08 MCG/KG/MIN: 1 INJECTION INTRAVENOUS at 19:14

## 2017-06-15 RX ADMIN — MIDODRINE HYDROCHLORIDE 5 MG: 2.5 TABLET ORAL at 09:32

## 2017-06-15 RX ADMIN — POTASSIUM CHLORIDE 20 MEQ: 400 INJECTION, SOLUTION INTRAVENOUS at 08:33

## 2017-06-15 RX ADMIN — MEGESTROL ACETATE 400 MG: 40 SUSPENSION ORAL at 09:33

## 2017-06-15 RX ADMIN — MIDODRINE HYDROCHLORIDE 5 MG: 2.5 TABLET ORAL at 11:25

## 2017-06-15 RX ADMIN — DEXTROSE MONOHYDRATE 50 ML: 25 INJECTION, SOLUTION INTRAVENOUS at 08:34

## 2017-06-15 RX ADMIN — MIDODRINE HYDROCHLORIDE 5 MG: 2.5 TABLET ORAL at 17:38

## 2017-06-15 RX ADMIN — ASPIRIN 81 MG: 81 TABLET ORAL at 08:33

## 2017-06-15 RX ADMIN — ONDANSETRON 4 MG: 2 SOLUTION INTRAMUSCULAR; INTRAVENOUS at 05:55

## 2017-06-15 NOTE — PLAN OF CARE
Problem: Patient Care Overview (Adult)  Goal: Plan of Care Review  Outcome: Ongoing (interventions implemented as appropriate)    06/15/17 0456   Coping/Psychosocial Response Interventions   Plan Of Care Reviewed With patient;family   Patient Care Overview   Progress no change   Outcome Evaluation   Outcome Summary/Follow up Plan Levophed gtt off for 2 hours, blood sugar low- received total of 6 amps of d50, 2 small wound on coccyx/ gluteal fold         Problem: Fall Risk (Adult)  Goal: Absence of Falls  Outcome: Ongoing (interventions implemented as appropriate)    Problem: Fluid Volume Excess (Adult,Obstetrics,Pediatric)  Goal: Stable Weight  Outcome: Ongoing (interventions implemented as appropriate)  Goal: Balanced Intake/Output  Outcome: Ongoing (interventions implemented as appropriate)    Problem: Pressure Ulcer Risk (New Scale) (Adult,Obstetrics,Pediatric)  Goal: Skin Integrity  Outcome: Ongoing (interventions implemented as appropriate)    Problem: Nutrition, Imbalanced: Inadequate Oral Intake (Adult)  Goal: Identify Related Risk Factors and Signs and Symptoms  Outcome: Ongoing (interventions implemented as appropriate)  Goal: Improved Oral Intake  Outcome: Ongoing (interventions implemented as appropriate)  Goal: Prevent Further Weight Loss  Outcome: Ongoing (interventions implemented as appropriate)

## 2017-06-15 NOTE — PLAN OF CARE
Problem: Patient Care Overview (Adult)  Goal: Plan of Care Review  Outcome: Ongoing (interventions implemented as appropriate)    06/15/17 1015   Coping/Psychosocial Response Interventions   Plan Of Care Reviewed With patient   Patient Care Overview   Progress progress toward functional goals as expected   Outcome Evaluation   Outcome Summary/Follow up Plan OT eval completed. Pt. demonstrated decreased cognition limiting full ability to obtain information for thorough eval. Pt. lies in fetal position d/t flexion synergy BUE and BLE. Pt. depx 2 to complete bed mob. Pt. dep for LB dressing. Pt. followed 25% one step commands with mod/max verbal and tactile cues. Pt. cont to benefit from skilled OT d/t increased tone, decreased ROM, decreased cognition, decreased strength, decreased independence in ADLs. At this time OT will pick pt. up on caseload and monitor closely. If pt. is not making progression OT can d/c. Anticipated dc SNF. 6/15/17         Problem: Inpatient Occupational Therapy  Goal: Bed Mobility Goal LTG- OT  Outcome: Ongoing (interventions implemented as appropriate)    06/15/17 1015   Bed Mobility OT LTG   Bed Mobility OT LTG, Date Established 06/15/17   Bed Mobility OT LTG, Time to Achieve by discharge   Bed Mobility OT LTG, Activity Type all bed mobility   Bed Mobility OT LTG, [place] Level moderate assist (50% patient effort)   Bed Mobility OT LTG, Assist Device bed rails       Goal: Strength Goal LTG- OT  Outcome: Ongoing (interventions implemented as appropriate)    06/15/17 1015   Strength OT LTG   Strength Goal OT LTG, Date Established 06/15/17   Strength Goal OT LTG, Time to Achieve by discharge   Strength Goal OT LTG, Measure to Achieve Pt. will complete BUE strengthening exercsies to increase BUE strength and decreased tone for completing ADLs.        Goal: Grooming Goal LTG- OT  Outcome: Ongoing (interventions implemented as appropriate)    06/15/17 1015   Grooming OT LTG   Grooming Goal  OT LTG, Date Established 06/15/17   Grooming Goal OT LTG, Time to Achieve by discharge   Grooming Goal OT LTG, [place] Level moderate assist (50% patient effort)

## 2017-06-15 NOTE — PROGRESS NOTES
[facility] Medicine Services  INPATIENT PROGRESS NOTE    Length of Stay: 4  Date of Admission: 6/11/2017  Primary Care Physician: No Known Provider    Subjective   Chief Complaint: Hypotension, weakness       HPI   She continued to be the same.  Patient denies any chest pain or shortness of breath.  Patient still remained very weak.  Still on the Levophed drip.  Plan to wean off the Levophed drip and start Midodrin by nephrology.  Patient is not eating well, start Megace and continue regular diet.  Patient had an episode of hemoptysis this morning, chest x-ray.    Review of Systems   Constitutional: Positive for activity change, appetite change and fatigue. Negative for chills and fever.   HENT: Negative for hearing loss, nosebleeds, tinnitus and trouble swallowing.   Eyes: Negative for visual disturbance.   Respiratory: Negative for cough, chest tightness, shortness of breath and wheezing.   Cardiovascular: Negative for chest pain, palpitations and leg swelling.   Gastrointestinal: Negative for abdominal distention, abdominal pain, blood in stool, constipation, diarrhea, nausea and vomiting.   Endocrine: Negative for cold intolerance, heat intolerance, polydipsia, polyphagia and polyuria.   Genitourinary: Negative for decreased urine volume, difficulty urinating, dysuria, flank pain, frequency and hematuria.   Musculoskeletal: Positive for arthralgias, gait problem and myalgias. Negative for joint swelling.   Skin: Negative for rash.   Allergic/Immunologic: Negative for immunocompromised state.   Neurological: Positive for weakness. Negative for dizziness, syncope, light-headedness and headaches.   Hematological: Negative for adenopathy. Does not bruise/bleed easily.   Psychiatric/Behavioral: Negative for confusion and sleep disturbance. The patient is not nervous/anxious.        All pertinent negatives and positives are as above. All other systems have been reviewed and are  negative unless otherwise stated.     Objective    Temp:  [97 °F (36.1 °C)-98.4 °F (36.9 °C)] 98.4 °F (36.9 °C)  Heart Rate:  [] 105  Resp:  [14-25] 22  BP: ()/() 100/83    Intake/Output Summary (Last 24 hours) at 06/15/17 0802  Last data filed at 06/15/17 0400   Gross per 24 hour   Intake              807 ml   Output                0 ml   Net              807 ml     Physical Exam  Constitutional: She appears well-developed.   HENT:   Head: Normocephalic and atraumatic.   Eyes: Conjunctivae and EOM are normal. Pupils are equal, round, and reactive to light.   Neck: Neck supple. No JVD present. No thyromegaly present.   Cardiovascular: Normal rate, regular rhythm, normal heart sounds and intact distal pulses. Exam reveals no gallop and no friction rub.   No murmur heard.  Pulmonary/Chest: Effort normal and breath sounds normal. No respiratory distress. She has no wheezes. She has no rales. She exhibits no tenderness.   Abdominal: Soft. Bowel sounds are normal. She exhibits no distension. There is no tenderness. There is no rebound and no guarding.   Musculoskeletal: Normal range of motion. She exhibits no edema, tenderness or deformity.   Lymphadenopathy:   She has no cervical adenopathy.   Neurological: She is alert. She displays normal reflexes. No cranial nerve deficit. She exhibits abnormal muscle tone. Coordination abnormal.   Skin: Skin is warm and dry. No rash noted.   Psychiatric: She has a normal mood and affect. Her behavior is normal.   Nursing note and vitals reviewed.  Results Review:  Lab Results (last 24 hours)     Procedure Component Value Units Date/Time    POC Glucose Fingerstick [[id number]]  (Normal) Collected:  06/14/17 0757    Specimen:  Blood Updated:  06/14/17 0809     Glucose 82 mg/dL       : [id number] Bryson AllysonMeter ID: [id number]       POC Glucose Fingerstick [[id number]]  (Abnormal) Collected:  06/14/17 2050    Specimen:  Blood Updated:  06/14/17 2117     Glucose 31  (C) mg/dL       : [id number] Yoon SandraMeter ID: [id number]       Glucose, Random [[id number]]  (Abnormal) Collected:  06/14/17 2103    Specimen:  Blood Updated:  06/14/17 2120     Glucose 69 (L) mg/dL     POC Glucose Fingerstick [[id number]]  (Abnormal) Collected:  06/14/17 2130    Specimen:  Blood Updated:  06/14/17 2148     Glucose 64 (L) mg/dL       : [id number] Yoon SandraMeter ID: [id number]       POC Glucose Fingerstick [[id number]]  (Abnormal) Collected:  06/14/17 2144    Specimen:  Blood Updated:  06/14/17 2201     Glucose 48 (C) mg/dL       : [id number] Yoon SandraMeter ID: [id number]       POC Glucose Fingerstick [[id number]]  (Abnormal) Collected:  06/14/17 2145    Specimen:  Blood Updated:  06/14/17 2201     Glucose 25 (C) mg/dL       : [id number] Yoon SandraMeter ID: [id number]       Glucose, Random [[id number]]  (Abnormal) Collected:  06/14/17 2212    Specimen:  Blood Updated:  06/14/17 2228     Glucose 286 (H) mg/dL     POC Glucose Fingerstick [[id number]]  (Abnormal) Collected:  06/14/17 2218    Specimen:  Blood Updated:  06/14/17 2246     Glucose 283 (H) mg/dL       : [id number] Yoon SandraMeter ID: [id number]       POC Glucose Fingerstick [[id number]]  (Abnormal) Collected:  06/14/17 2245    Specimen:  Blood Updated:  06/14/17 2259     Glucose 165 (H) mg/dL       : [id number] Yoon SandraMeter ID: [id number]       POC Glucose Fingerstick [[id number]]  (Abnormal) Collected:  06/14/17 2319    Specimen:  Blood Updated:  06/14/17 2332     Glucose 313 (H) mg/dL       : [id number] Yoon SandraMeter ID: [id number]       POC Glucose Fingerstick [[id number]]  (Abnormal) Collected:  06/15/17 0021    Specimen:  Blood Updated:  06/15/17 0047     Glucose 381 (H) mg/dL       : [id number] Car MartinezWest [facility] Hospitalalisha ID: [id number]       POC Glucose Fingerstick [[id number]]  (Abnormal) Collected:  06/15/17 0405    Specimen:  Blood Updated:  06/15/17 0417     Glucose 167  (H) mg/dL       : [id number] Car Barrow ID: [id number]       Basic Metabolic Panel [[id number]]  (Abnormal) Collected:  06/15/17 0405    Specimen:  Blood Updated:  06/15/17 0434     Glucose 195 (H) mg/dL      BUN 16 mg/dL      Creatinine 4.95 (H) mg/dL      Sodium 139 mmol/L      Potassium 3.2 (L) mmol/L      Chloride 101 mmol/L      CO2 19.0 (L) mmol/L      Calcium 8.5 mg/dL      eGFR  African Amer 10 (L) mL/min/1.73      BUN/Creatinine Ratio 3.2 (L)     Anion Gap 19.0 (H) mmol/L     Narrative:       The MDRD GFR formula is only valid for adults with stable renal function between ages 18 and 70.    CBC & Differential [[id number]] Collected:  06/15/17 0405    Specimen:  Blood Updated:  06/15/17 0518    Narrative:       The following orders were created for panel order CBC & Differential.  Procedure                               Abnormality         Status                     ---------                               -----------         ------                     Scan Slide[[id number]]                                       Final result               CBC Auto Differential[[id number]]        Abnormal            Final result                 Please view results for these tests on the individual orders.    CBC Auto Differential [[id number]]  (Abnormal) Collected:  06/15/17 0405    Specimen:  Blood Updated:  06/15/17 0518     WBC 7.60 10*3/mm3      RBC 3.62 (L) 10*6/mm3      Hemoglobin 10.3 (L) g/dL      Hematocrit 31.0 (L) %      MCV 85.6 fL      MCH 28.5 pg      MCHC 33.2 g/dL      RDW 20.8 (H) %      RDW-SD 62.8 (H) fl      MPV 0.0 (L) fL      Platelets 72 (L) 10*3/mm3      Neutrophil % 91.7 (H) %      Lymphocyte % 3.3 (L) %      Monocyte % 3.9 (L) %      Eosinophil % 0.5 %      Basophil % 0.1 %      Immature Grans % 0.5 %      Neutrophils, Absolute 6.96 10*3/mm3      Lymphocytes, Absolute 0.25 (L) 10*3/mm3      Monocytes, Absolute 0.30 10*3/mm3      Eosinophils, Absolute 0.04 10*3/mm3      Basophils, Absolute  0.01 10*3/mm3      Immature Grans, Absolute 0.04 (H) 10*3/mm3      nRBC 1.6 (H) /100 WBC     Scan Slide [[id number]] Collected:  06/15/17 0405    Specimen:  Blood Updated:  06/15/17 0518     Anisocytosis Slight/1+     Crenated RBC's Mod/2+     Poikilocytes Mod/2+     RBC Fragments Slight/1+     Schistocytes Slight/1+     WBC Morphology Normal     Platelet Estimate Decreased           Cultures:       Radiology Data:    Imaging Results (last 24 hours)     ** No results found for the last 24 hours. **          Allergies   Allergen Reactions   • Heparin    • Iron    • Latex    • Levaquin [Levofloxacin]    • Shrimp (Diagnostic)    • Vancomycin        Scheduled meds:     aspirin 81 mg Oral Daily   levothyroxine 50 mcg Oral Q AM   pantoprazole 40 mg Oral Q AM   potassium chloride 20 mEq Intravenous Once       PRN meds:  dextrose  •  HYDROmorphone  •  ipratropium-albuterol  •  ondansetron  •  sodium chloride    Assessment/Plan     Active Problems:    Chest pain in adult    Renal failure    Hypotension    Anemia    Hypokalemia      Plan:  Chest pain, atypical, echocardiogram -Ejection fraction 61%, diastolic dysfunction, right ventricular dilatation, tricuspid valve regurgitation, right ventricle systolic pressure greater than 55. Per cardiology.     Hemoptysis ×1.  Chest x-ray.      Anemia- Status post 2 units of blood transfusion.      Chronic renal failure- nephrology is on board. Patient dialysis today.      Hyperlipidemia- lipid panel in am.       Elevate TSH- free T4 is normal. Start synthroid.      Hypotension- Plan wean pt off levophed and midodrine by nephrology discussed with Jaden.      Hypokalemia- improving      Calcium 7.7, corrected calcium 9.5 - 6/13/2017.    Nutrition- start megace, cont regular diet.      Deconditioning- start PT and OT      Discharge Planning: unknown    Carter Llamas MD   06/15/17   8:02 AM

## 2017-06-15 NOTE — PROGRESS NOTES
Nephrology ([facility]) Progress Note      Patient:  Jennifer Marks  YOB: 1945  Date of Service: 6/15/2017  MRN: [id number]   Acct: [id number]   Primary Care Physician: No Known Provider  Advance Directive: Full Code  Admit Date: 6/11/2017       Hospital Day: 4  Referring Provider: Pino Tang MD      Patient personally seen and examined.  Complete chart including Consults, Notes, Operative Reports, Labs, Cardiology, and Radiology studies reviewed as able.        Subjective:  Jennifer Marks is a 72 y.o. female  whom we were consulted for end stage renal disease management, hypokalemia. She is on hemodialysis Monday Wednesday Friday; Missed her Friday (6/09) treatment due to not feeling well. Admitted with chest pain. Has had decreased oral intake and diarrhea for several days.  Yesterday had femoral line placed.  Today she is lethargic but answers questions appropriately.    Allergies:  Heparin; Iron; Latex; Levaquin [levofloxacin]; Shrimp (diagnostic); and Vancomycin    Home Meds:  Prescriptions Prior to Admission   Medication Sig Dispense Refill Last Dose   • carvedilol (COREG) 12.5 MG tablet Take 12.5 mg by mouth Daily.      • losartan (COZAAR) 25 MG tablet Take 25 mg by mouth Daily.      • pantoprazole (PROTONIX) 40 MG EC tablet Take 40 mg by mouth Daily.      • potassium chloride (KLOR-CON) 20 MEQ packet Take 20 mEq by mouth Daily.      • predniSONE (DELTASONE) 1 MG tablet Take 1 mg by mouth Daily.      • PREDNISONE PO Take  by mouth.      • traZODone (DESYREL) 50 MG tablet Take 50 mg by mouth Every Night.          Medicines:  Current Facility-Administered Medications   Medication Dose Route Frequency Provider Last Rate Last Dose   • aspirin EC tablet 81 mg  81 mg Oral Daily Pino Tang MD   81 mg at 06/13/17 0814   • dextrose (D50W) solution 50 mL  50 mL Intravenous Q1H PRN Bree Soares MD   50 mL at 06/14/17 7548   • HYDROmorphone (DILAUDID) injection 0.5 mg   0.5 mg Intravenous Q4H PRN Carter Llamas MD   0.5 mg at 06/14/17 1753   • ipratropium-albuterol (DUO-NEB) nebulizer solution 3 mL  3 mL Nebulization Q4H PRN Pino Tang MD       • levothyroxine (SYNTHROID, LEVOTHROID) tablet 50 mcg  50 mcg Oral Q AM Carter Llamas MD       • norepinephrine (LEVOPHED) 8,000 mcg in sodium chloride 0.9 % 250 mL (32 mcg/mL) infusion  0.02-0.3 mcg/kg/min Intravenous Titrated FANNIE Blanco 5.77 mL/hr at 06/15/17 0717 0.06 mcg/kg/min at 06/15/17 0717   • ondansetron (ZOFRAN) injection 4 mg  4 mg Intravenous Q6H PRN Pino Tang MD   4 mg at 06/15/17 0555   • pantoprazole (PROTONIX) EC tablet 40 mg  40 mg Oral Q AM Pino Tang MD   40 mg at 06/12/17 0610   • potassium chloride 20 mEq in 50 mL IVPB  20 mEq Intravenous Once FANNIE Blanco       • sodium chloride 0.9 % flush 1-10 mL  1-10 mL Intravenous PRN Pino Tang MD           Past Medical History:  Past Medical History:   Diagnosis Date   • A-fib    • Arthritis    • CHF (congestive heart failure)    • Hypertension    • Renal failure        Past Surgical History:  Past Surgical History:   Procedure Laterality Date   • ARTERIOVENOUS FISTULA LEG Left        Family History  History reviewed. No pertinent family history.    Social History  Social History     Social History   • Marital status: Single     Spouse name: N/A   • Number of children: N/A   • Years of education: N/A     Occupational History   • Not on file.     Social History Main Topics   • Smoking status: Former Smoker   • Smokeless tobacco: Not on file   • Alcohol use No   • Drug use: No   • Sexual activity: Defer     Other Topics Concern   • Not on file     Social History Narrative         Review of Systems:  History obtained from chart review and the patient  General ROS: Patient complains of malaise and No fever or chills  Respiratory ROS: No cough, shortness of breath, wheezing  Cardiovascular ROS: no chest pain or dyspnea on  "exertion  Gastrointestinal ROS: No abdominal pain, no nausea  Genito-Urinary ROS: No dysuria or hematuria  Neurological ROS: negative  14 point ROS reviewed with the patient and negative except as noted above and in the HPI unless unable to obtain.    Objective:  /83  Pulse 105  Temp 98.4 °F (36.9 °C) (Temporal Artery )   Resp 22  Ht 62\" (157.5 cm)  Wt 112 lb 6.4 oz (51 kg)  SpO2 97%  BMI 20.56 kg/m2    Intake/Output Summary (Last 24 hours) at 06/15/17 0758  Last data filed at 06/15/17 0400   Gross per 24 hour   Intake              807 ml   Output                0 ml   Net              807 ml     General: awake/alert    Neck: supple, no JVD  Chest:  clear to auscultation bilaterally without respiratory distress  CVS: regular rate and rhythm  Abdominal: soft, nontender, normal bowel sounds  Extremities: no cyanosis or edema  Skin: warm and dry without rash   Neuro: no focal motor deficits      Labs:    Results from last 7 days  Lab Units 06/15/17  0405 06/14/17  0511 06/13/17  0556   WBC 10*3/mm3 7.60 6.99 5.10   HEMOGLOBIN g/dL 10.3* 10.9* 7.2*   HEMATOCRIT % 31.0* 32.3* 21.5*   PLATELETS 10*3/mm3 72* 101* 100*           Results from last 7 days  Lab Units 06/15/17  0405 06/14/17  2212 06/14/17  2103 06/14/17  0511  06/13/17  0556  06/11/17  2047   SODIUM mmol/L 139  --   --  141  --  138  < > 139   POTASSIUM mmol/L 3.2*  --   --  3.3*  --  2.4*  < > 2.0*   CHLORIDE mmol/L 101  --   --  103  --  100  < > 100   TOTAL CO2 mmol/L 19.0*  --   --  22.0*  --  22.0*  < > 19.0*   BUN mg/dL 16  --   --  27*  --  49*  < > 66*   CREATININE mg/dL 4.95*  --   --  7.86*  --  11.87*  < > 16.31*   CALCIUM mg/dL 8.5  --   --  8.3*  --  7.7*  < > 8.1*   BILIRUBIN mg/dL  --   --   --   --   --  1.8*  --  2.4*   ALK PHOS U/L  --   --   --   --   --  94  --  81   ALT (SGPT) U/L  --   --   --   --   --  20  --  17   AST (SGOT) U/L  --   --   --   --   --  30  --  18   GLUCOSE mg/dL 195* 286* 69* 102*  < > 89  < > 72   < > = " values in this interval not displayed.    Radiology:   Imaging Results (last 72 hours)     Procedure Component Value Units Date/Time    XR Chest 1 View [[id number]] Collected:  06/11/17 1803     Updated:  06/11/17 2054    Narrative:       EXAMINATION:   XR CHEST 1 VW-  6/11/2017 6:01 PM CDT     HISTORY: Chest pain     Prior exams 05/28/2016.     Single view of the chest is obtained. The pulmonary vascular margins are  slightly indistinct. This has the appearance of mild pulmonary vascular  congestion. Left diaphragm is indistinct. This may be infiltrate or  atelectasis left lower lobe.     Cardiac silhouette is enlarged and unchanged from May 28.       Impression:       1. Silhouetting of the left diaphragm consistent with infiltrate or  atelectasis left lower lobe.  2. Indistinct pulmonary vascular margins consistent with mild pulmonary  vascular congestion.  This report was finalized on 06/11/2017 20:51 by Dr. Herson Magaña MD.          Culture:         Assessment   1. End stage renal disease on maintenance hemodialysis Monday Wednesday Friday   2. Hypokalemia  3. Hypotension--still on low-dose Levophed  4. Anemia of chronic kidney disease  5. Diabetes mellitus type 2    Plan:  1.  Replace potassium  2.  Add Midodrine for persistently low BP  3.  Wean Levophed drip as tolerated.      Jaden Tovar, FANNIE  6/15/2017  7:58 AM

## 2017-06-15 NOTE — PLAN OF CARE
Problem: Patient Care Overview (Adult)  Goal: Plan of Care Review  Outcome: Ongoing (interventions implemented as appropriate)    06/15/17 0925   Coping/Psychosocial Response Interventions   Plan Of Care Reviewed With patient   Outcome Evaluation   Outcome Summary/Follow up Plan PT eval completed. Pt. dependent x 2 to get EOB and max assist to sit EOB. Pt. followed ~ 25% of simple commands. Pt. with weakness and tone vs resisting movement in B UE/LE. In bed B UE/LE flexed in the fetal position. PT will work to progress pt's strength, mobility, tone, and ROM. PT will place pt. on caseload, if unable to make progress PT will sign off. Anticipate d/c to SNF.          Problem: Inpatient Physical Therapy  Goal: Bed Mobility Goal LTG- PT  Outcome: Ongoing (interventions implemented as appropriate)    06/15/17 0925   Bed Mobility PT LTG   Bed Mobility PT LTG, Date Established 06/15/17   Bed Mobility PT LTG, Time to Achieve by discharge   Bed Mobility PT LTG, Activity Type roll left/roll right;supine to sit/sit to supine   Bed Mobility PT LTG, Kinney Level minimum assist (75% patient effort)   Bed Mobility PT Goal LTG, Assist Device bed rails       Goal: Strength Goal LTG- PT  Outcome: Ongoing (interventions implemented as appropriate)    06/15/17 0925   Strength Goal PT LTG   Strength Goal PT LTG, Date Established 06/15/17   Strength Goal PT LTG, Time to Achieve by discharge   Strength Goal PT LTG, Measure to Achieve AAROM, B UE/LE, 10-20 reps, min assist       Goal: Dynamic Sitting Balance Goal LTG- PT  Outcome: Ongoing (interventions implemented as appropriate)    06/15/17 0925   Dynamic Sitting Balance PT LTG   Dynamic Sitting Balance PT LTG, Date Established 06/15/17   Dynamic Sitting Balance PT LTG, Time to Achieve by discharge   Dynamic Sitting Balance PT LTG, [place] Level minimum assist (75% patient effort)   Dynamic Sitting Balance PT LTG, Assist Device UE Support;bed rails

## 2017-06-15 NOTE — THERAPY EVALUATION
Acute Care - Physical Therapy Initial Evaluation  [facility]     Patient Name: Jennifer Marks  : 1945  MRN: [id number]  Today's Date: 6/15/2017   Onset of Illness/Injury or Date of Surgery Date: 17  Date of Referral to PT: 17  Referring Physician: Dr. Llamas      Admit Date: 2017     Visit Dx:    ICD-10-CM ICD-9-CM   1. Chest pain in adult R07.9 786.50   2. End stage renal disease N18.6 585.6   3. Hypotension, unspecified hypotension type I95.9 458.9   4. Chronic systolic (congestive) heart failure I50.22 428.22     428.0   5. Decreased activities of daily living (ADL) Z78.9 V49.89   6. Impaired mobility Z74.09 799.89     Patient Active Problem List   Diagnosis   • Chest pain in adult   • Renal failure   • Hypotension   • Anemia   • Hypokalemia     Past Medical History:   Diagnosis Date   • A-fib    • Arthritis    • CHF (congestive heart failure)    • Hypertension    • Renal failure      Past Surgical History:   Procedure Laterality Date   • ARTERIOVENOUS FISTULA LEG Left           PT ASSESSMENT (last 72 hours)      PT Evaluation       06/15/17 0927 06/15/17 0925    Rehab Evaluation    Document Type evaluation   See MAR  -ND evaluation   See MAR  -YONIS    Subjective Information agree to therapy;complains of;weakness;fatigue;pain;nausea/vomiting  -ND agree to therapy;complains of;weakness;fatigue;dyspnea;pain  -YONIS    General Information    Patient Profile Review yes  -ND yes  -YONIS    Onset of Illness/Injury or Date of Surgery Date 17  -ND 17  -YONIS    Referring Physician Dr. Llamas  -RITA Llamas  -YONIS    General Observations Pt. lying fowlers in fetal postion, R fem line, telemetry, confused  -ND Fowlers in bed, B knees flexed. confused  -YONIS    Pertinent History Of Current Problem Pt. admitted from [facility] [place] with sx of nausea, SOB, generalized pain, generalized weakness, diffuse arthralgias, myalgias, decreased appetite, re-occuring chest pain/abdomen pain. Dx: Atypical chest discomft,  increased troponin, ESRD, non-ischemic cardiomyopathy, hypokalemia. H&H 10.9/ 32.3 XR chest- silhoueting L diaphragm with infiltrate or atelectasis.   -ND Pt. [place] [facility] [place] sx of nausea, SOB, generalized pain, generalized weakness, diffuse arthralgias, myalgias, decreased appetite, re-occuring chest pain/abdomen pain. Dx: Atypical chest discomft, increased troponin, ESRD, non-ischemic cardiomyopathy, hypokalemia. H&H 10.9/ 32.3 XR chest- silhoueting L diaphragm with infiltrate or atelectasis.   -YONIS    Precautions/Limitations fall precautions   dialysis MWF  -ND fall precautions;oxygen therapy device and L/min   dialysis MWF, Femoral line  -YONIS    Prior Level of Function --   unable to obtain at this time d/t decreased cognition  -ND --   unable to obtain at this time d/t decreased cognition  -YONIS    Equipment Currently Used at Home --   unable to obtain  -ND --   unable to obtain at this time d/t decreased cognition  -YONIS    Plans/Goals Discussed With patient;agreed upon  -ND patient;agreed upon  -YONIS    Risks Reviewed patient:;LOB;nausea/vomiting;dizziness;change in vital signs;increased drainage;lines disloged;increased discomfort  -ND patient:;LOB;nausea/vomiting;dizziness;increased discomfort;change in vital signs;lines disloged  -YONIS    Benefits Reviewed patient:;improve function;increase independence;increase strength;increase balance;decrease pain;improve skin integrity;decrease risk of DVT;increase knowledge  -ND patient:;improve function;increase independence;increase strength;increase balance;decrease pain;increase knowledge;improve skin integrity  -YONIS    Barriers to Rehab medically complex;cognitive status;previous functional deficit;physical barrier;contractures  -ND medically complex;previous functional deficit;cognitive status;physical barrier;contractures  -YONIS    Living Environment    Lives With --  -ND     Living Arrangements --  -ND     Living Environment Comment unable to obtain at this time   -ND unable to obtain at this time d/t decreased cognition  -YONIS    Clinical Impression    Date of Referral to PT  06/14/17  -YONIS    PT Diagnosis  impaired mobility, weakness  -YONIS    Functional Level At Time Of Evaluation  dependent x 2 bed mobility  -YONIS    Patient/Family Goals Statement  PT goal to increase strength/mobility  -YONIS    Criteria for Skilled Therapeutic Interventions Met  yes;treatment indicated  -YONIS    Impairments Found (describe specific impairments)  arousal, attention, and cognition;gait, locomotion, and balance;joint integrity and mobility;muscle performance;posture;ROM  -YONIS    Functional Limitations in Following Categories (Describe Specific Limitations)  self-care;home management  -YONIS    Disability: Inability to Perform Actions/Activities of Required Roles (describe specific disability)  community/leisure  -YONIS    Rehab Potential  fair, will monitor progress closely  -YONIS    Predicted Duration of Therapy Intervention (days/wks)  until d/c  -YONIS    Vital Signs    Pre Systolic BP Rehab  106  -YONIS    Pre Treatment Diastolic BP  90  -YONIS    Post Systolic BP Rehab  82  -YONIS    Post Treatment Diastolic BP  53  -YONIS    Pretreatment Heart Rate (beats/min) 108  -  -YONIS    Posttreatment Heart Rate (beats/min)  103  -YONIS    Pre SpO2 (%) 97  -ND 97  -YONIS    O2 Delivery Pre Treatment  supplemental O2   2L/NC  -YONIS    Post SpO2 (%)  97  -YONIS    O2 Delivery Post Treatment  supplemental O2   2L/NC  -YONIS    Pre Patient Position  Supine  -YONIS    Intra Patient Position  Sitting  -YONIS    Post Patient Position  Supine  -YONIS    Pain Assessment    Pain Assessment Morrison-Cameron FACES  -ND Morrison-Cameron FACES  -YONIS    Morrison-Cameron FACES Pain Rating 6  -ND 6  -YONIS    Pain Location Generalized  -ND Generalized  -YONIS    Pain Descriptors  --   unable to state  -YONIS    Pain Intervention(s)  Repositioned  -YONIS    Response to Interventions  tolerated  -YONIS    Vision Assessment/Intervention    Visual Impairment unable/difficult to assess  -ND  unable/difficult to assess  -YONIS    Cognitive Assessment/Intervention    Current Cognitive/Communication Assessment impaired   minimal speech  -ND impaired   minimal speech  -YONIS    Orientation Status oriented to;person   cues needed for name and age  -ND oriented to;person   cues needed for name and age  -YONIS    Follows Commands/Answers Questions 25% of the time;able to follow single-step instructions;needs cueing;needs increased time;needs repetition;speech unintelligible  -ND able to follow single-step instructions;25% of the time;needs cueing;needs increased time;needs repetition;speech unintelligible  -YONIS    Personal Safety severe impairment;decreased awareness, need for assist;decreased awareness, need for safety;decreased insight to deficits  -ND decreased awareness, need for assist;decreased awareness, need for safety;severe impairment  -YONIS    Personal Safety Interventions fall prevention program maintained;supervised activity  -ND fall prevention program maintained;gait belt;muscle strengthening facilitated;nonskid shoes/slippers when out of bed  -YONIS    ROM (Range of Motion)    General ROM upper extremity range of motion deficits identified  -ND     General ROM Detail BUE 75+% impaired d/t pt. resisting and tone  -ND R LE AROM impaired ~ 25-50%, L LE AROM impaired ~ 75-90%. (tone vs pt. resisting?) See OT IE for B UE  -YONIS    MMT (Manual Muscle Testing)    General MMT Assessment Detail BUE 2-/5  -ND B LE ~ 2-/5, see OT IE for B UE  -YONIS    Muscle Tone Assessment    Muscle Tone Assessment LUE;RUE  -ND LLE;RLE  -YONIS    LUE Muscle Tone Assessment moderately increased tone  -ND     RUE Muscle Tone Assessment moderately increased tone  -ND     LLE Muscle Tone Assessment  severely increased tone  -YONIS    RLE Muscle Tone Assessment  moderately increased tone  -YONIS    Bed Mobility, Assessment/Treatment    Bed Mobility, Assistive Device head of bed elevated;draw sheet  -ND head of bed elevated;draw sheet  -YONIS    Bed  Mobility, Scoot/Bridge, Schleicher dependent (less than 25% patient effort);verbal cues required;nonverbal cues required (demo/gesture)  -ND dependent (less than 25% patient effort);2 person assist required   draw sheet  -YONIS    Bed Mob, Supine to Sit, Schleicher verbal cues required;nonverbal cues required (demo/gesture);dependent (less than 25% patient effort);2 person assist required  -ND dependent (less than 25% patient effort);2 person assist required  -YONIS    Bed Mob, Sit to Supine, Schleicher verbal cues required;nonverbal cues required (demo/gesture);dependent (less than 25% patient effort);2 person assist required  -ND dependent (less than 25% patient effort);2 person assist required  -YONIS    Bed Mobility, Safety Issues decreased use of arms for pushing/pulling;decreased use of legs for bridging/pushing;impaired trunk control for bed mobility;cognitive deficits limit understanding  -ND cognitive deficits limit understanding;decreased use of arms for pushing/pulling;decreased use of legs for bridging/pushing;impaired trunk control for bed mobility  -YONIS    Bed Mobility, Impairments ROM decreased;decreased flexibility;muscle tone abnormal;strength decreased;impaired balance;postural control impaired;motor control impaired  -ND ROM decreased;decreased flexibility;muscle tone abnormal;strength decreased;impaired balance;postural control impaired  -YONIS    Transfer Assessment/Treatment    Transfer, Comment deferred at this time d/t pt. having femoral line and decreased cognition/weakness/tone  -ND     Motor Skills/Interventions    Additional Documentation Balance Skills Training (Group)  -ND Balance Skills Training (Group)  -YONIS    Balance Skills Training    Sitting-Level of Assistance Moderate assistance;Maximum assistance  -ND Maximum assistance  -YONIS    Sitting-Balance Support Right upper extremity supported;Left upper extremity supported;Feet unsupported  -ND Right upper extremity supported;Left upper  extremity supported;Feet unsupported  -YONIS    Sensory Assessment/Intervention    Sensory Impairment --   withdrew from  painful stimulus to BUE  -ND --   unable to accurately assess  -YONIS    Positioning and Restraints    Pre-Treatment Position in bed  -ND in bed  -YONIS    Post Treatment Position bed  -ND bed  -YONIS    In Bed side lying right;fowlers;call light within reach;encouraged to call for assist;side rails up x2;patient within staff view  -ND fowlers;side lying right;call light within reach;encouraged to call for assist;patient within staff view;notified nsg  -YONIS      06/14/17 1310 06/14/17 1305    Rehab Evaluation    Document Type --  -ND --  -YONIS    Evaluation Not Performed other (see comments)  -ND     Evaluation Not Performed, Comment Pt. receiving dialysis. Will check back at later time.   -ND On dialysis, PT to check back tomorrow.  -YONIS    General Information    Patient Profile Review yes  -ND yes  -YONIS    Onset of Illness/Injury or Date of Surgery Date 06/11/17  -ND 06/11/17  -YONIS    Referring Physician Dr. Llamas  -RITA Llamas  -YONIS    Pertinent History Of Current Problem Pt. admitted from [facility] [place] with sx of nausea, SOB, generalized pain, generalized weakness, diffuse arthralgias, myalgias, decreased appetite, re-occuring chest pain/abdomen pain. Dx: Atypical chest discomft, increased troponin, ESRD, non-ischemic cardiomyopathy, hypokalemia. H&H 10.9/ 32.3 XR chest- silhoueting L diaphragm with infiltrate or atelectasis.   -ND Pt. admitted from [facility] [place] with sx of nausea, SOB, generalized pain, generalized weakness, diffuse arthralgias, myalgias, decreased appetite, re-occuring chest pain/abdomen pain. Dx: Atypical chest discomft, increased troponin, ESRD, non-ischemic cardiomyopathy, hypokalemia. H&H 10.9/ 32.3 XR chest- silhoueting L diaphragm with infiltrate or atelectasis.  -YONIS    Precautions/Limitations fall precautions   Dialysis MWF  -ND     Living Environment    Lives With alone  -ND     Living Arrangements  house  -ND     Clinical Impression    Date of Referral to PT  06/14/17  -YONIS      06/12/17 1351       General Information    Equipment Currently Used at Home walker, standard;walker, rolling;wheelchair  -CE     Living Environment    Lives With alone  -CE     Living Arrangements house  -CE     Transportation Available car;family or friend will provide;public transportation  -CE       User Key  (r) = Recorded By, (t) = Taken By, (c) = Cosigned By    Initials Name Provider Type    YONIS Yen, PT DPT Physical Therapist    ANUPAMA Johnson     RITA Gaston, OTR/L Occupational Therapist          Physical Therapy Education     Title: PT OT SLP Therapies (Active)     Topic: Physical Therapy (Active)     Point: Mobility training (Active)    Learning Progress Summary    Learner Readiness Method Response Comment Documented by Status   Patient Acceptance E NR Educated pt. on progression of PT POC and benefits of activity YONIS 06/15/17 0987 Active                      User Key     Initials Effective Dates Name Provider Type Discipline    YONIS 08/02/16 -  Sal Yen, PT DPT Physical Therapist PT                PT Recommendation and Plan  Anticipated Discharge Disposition: skilled nursing facility  Planned Therapy Interventions: bed mobility training, balance training, home exercise program, patient/family education, postural re-education, ROM (Range of Motion), strengthening  PT Frequency: daily, 2 times/day  Plan of Care Review  Plan Of Care Reviewed With: patient  Outcome Summary/Follow up Plan: PT eval completed. Pt. dependent x 2 to get EOB and max assist to sit EOB. Pt. followed ~ 25% of simple commands. Pt. with weakness and tone vs resisting movement in B UE/LE. In bed B UE/LE flexed in the fetal position. PT will work to progress pt's strength, mobility, tone, and ROM. PT will place pt. on caseload, if unable to make progress PT will sign off. Anticipate d/c to SNF.           IP PT  Goals       06/15/17 0925          Bed Mobility PT LTG    Bed Mobility PT LTG, Date Established 06/15/17  -YONIS      Bed Mobility PT LTG, Time to Achieve by discharge  -YONIS      Bed Mobility PT LTG, Activity Type roll left/roll right;supine to sit/sit to supine  -YONIS      Bed Mobility PT LTG, [place] Level minimum assist (75% patient effort)  -YONIS      Bed Mobility PT Goal  LTG, Assist Device bed rails  -YONIS      Strength Goal PT LTG    Strength Goal PT LTG, Date Established 06/15/17  -YONIS      Strength Goal PT LTG, Time to Achieve by discharge  -YONIS      Strength Goal PT LTG, Measure to Achieve AAROM, B UE/LE, 10-20 reps, min assist  -YONIS      Dynamic Sitting Balance PT LTG    Dynamic Sitting Balance PT LTG, Date Established 06/15/17  -YONIS      Dynamic Sitting Balance PT LTG, Time to Achieve by discharge  -YONIS      Dynamic Sitting Balance PT LTG, [place] Level minimum assist (75% patient effort)  -YONIS      Dynamic Sitting Balance PT LTG, Assist Device UE Support;bed rails  -YONIS        User Key  (r) = Recorded By, (t) = Taken By, (c) = Cosigned By    Initials Name Provider Type    YONIS Yen, PT DPT Physical Therapist                Outcome Measures       06/15/17 1000 06/15/17 0925       How much help from another person do you currently need...    Turning from your back to your side while in flat bed without using bedrails?  1  -YONIS     Moving from lying on back to sitting on the side of a flat bed without bedrails?  1  -YONIS     Moving to and from a bed to a chair (including a wheelchair)?  1  -YONIS     Standing up from a chair using your arms (e.g., wheelchair, bedside chair)?  1  -YONIS     Climbing 3-5 steps with a railing?  1  -YONIS     To walk in hospital room?  1  -YONIS     AM-PAC 6 Clicks Score  6  -YONIS     How much help from another is currently needed...    Putting on and taking off regular lower body clothing? 1  -ND      Bathing (including washing, rinsing, and drying) 1  -ND      Toileting (which includes  using toilet bed pan or urinal) 1  -ND      Putting on and taking off regular upper body clothing 2  -ND      Taking care of personal grooming (such as brushing teeth) 2  -ND      Eating meals 2  -ND      Score 9  -ND      Functional Assessment    Outcome Measure Options AM-PAC 6 Clicks Daily Activity (OT)  -ND AM-PAC 6 Clicks Basic Mobility (PT)  -YONIS       User Key  (r) = Recorded By, (t) = Taken By, (c) = Cosigned By    Initials Name Provider Type    YONIS Yen, PT DPT Physical Therapist    ND Ailny Gaston, OTR/L Occupational Therapist           Time Calculation:         PT Charges       06/15/17 1050          Time Calculation    Start Time 0925  -YONIS      Stop Time 1010   Attempted eval and chart review yesterday, 15 minutes. PT with 60 total minutes.   -YONIS      Time Calculation (min) 45 min  -YONIS      PT Received On 06/15/17  -YONIS      PT Goal Re-Cert Due Date 06/25/17  -YONIS        User Key  (r) = Recorded By, (t) = Taken By, (c) = Cosigned By    Initials Name Provider Type    YONIS Yen, PT DPT Physical Therapist          Therapy Charges for Today     Code Description Service Date Service Provider Modifiers Qty    44131456715 HC PT CHNG MAIN POS CURRENT 6/15/2017 Sal Yen, PT DPT GP, CN 1    14288036801 HC PT CHNG MAIN POS PROJECTED 6/15/2017 Sal Yen, PT DPT GP, CL 1    98553065258 HC PT EVAL MOD COMPLEXITY 4 6/15/2017 Sal Yen, PT DPT GP, KX 1          PT G-Codes  Outcome Measure Options: AM-PAC 6 Clicks Daily Activity (OT)  Score: 6  Functional Limitation: Changing and maintaining body position  Changing and Maintaining Body Position Current Status (): 100 percent impaired, limited or restricted  Changing and Maintaining Body Position Goal Status (): At least 60 percent but less than 80 percent impaired, limited or restricted      Sal Yen, PT DPT  6/15/2017

## 2017-06-15 NOTE — THERAPY EVALUATION
Acute Care - Occupational Therapy Initial Evaluation  [facility]     Patient Name: Jennifer Marks  : 1945  MRN: [id number]  Today's Date: 6/15/2017  Onset of Illness/Injury or Date of Surgery Date: 17  Date of Referral to OT: 17  Referring Physician: Dr. Llamas    Admit Date: 2017       ICD-10-CM ICD-9-CM   1. Chest pain in adult R07.9 786.50   2. End stage renal disease N18.6 585.6   3. Hypotension, unspecified hypotension type I95.9 458.9   4. Chronic systolic (congestive) heart failure I50.22 428.22     428.0   5. Decreased activities of daily living (ADL) Z78.9 V49.89     Patient Active Problem List   Diagnosis   • Chest pain in adult   • Renal failure   • Hypotension   • Anemia   • Hypokalemia     Past Medical History:   Diagnosis Date   • A-fib    • Arthritis    • CHF (congestive heart failure)    • Hypertension    • Renal failure      Past Surgical History:   Procedure Laterality Date   • ARTERIOVENOUS FISTULA LEG Left           OT ASSESSMENT FLOWSHEET (last 72 hours)      OT Evaluation       06/15/17 0927 06/15/17 0925 17 1310 17 1305 17 1351    Rehab Evaluation    Document Type evaluation   See MAR  -ND evaluation   See MAR  -YONIS --  -ND --  -YONIS     Subjective Information agree to therapy;complains of;weakness;fatigue;pain;nausea/vomiting  -ND agree to therapy;complains of;weakness;fatigue;dyspnea;pain  -YONIS       Evaluation Not Performed   other (see comments)  -ND      Evaluation Not Performed, Comment   Pt. receiving dialysis. Will check back at later time.   -ND On dialysis, PT to check back tomorrow.  -YONIS     General Information    Patient Profile Review yes  -ND yes  -YONIS yes  -ND yes  -YONIS     Onset of Illness/Injury or Date of Surgery Date 17  -ND 17  -YONIS 17  -ND 17  -YONIS     Referring Physician Dr. Farhad POLANCO     General Observations Pt. lying fowlers in fetal postion, R fem line,  telemetry, confused  -ND Fowlers in bed, B knees flexed. confused  -YONIS       Pertinent History Of Current Problem Pt. admitted from [facility] with sx of nausea, SOB, generalized pain, generalized weakness, diffuse arthralgias, myalgias, decreased appetite, re-occuring chest pain/abdomen pain. Dx: Atypical chest discomft, increased troponin, ESRD, non-ischemic cardiomyopathy, hypokalemia. H&H 10.9/ 32.3 XR chest- silhoueting L diaphragm with infiltrate or atelectasis.   -ND Pt. admitted from [facility] with sx of nausea, SOB, generalized pain, generalized weakness, diffuse arthralgias, myalgias, decreased appetite, re-occuring chest pain/abdomen pain. Dx: Atypical chest discomft, increased troponin, ESRD, non-ischemic cardiomyopathy, hypokalemia. H&H 10.9/ 32.3 XR chest- silhoueting L diaphragm with infiltrate or atelectasis.   -YONIS Pt. admitted from [facility] with sx of nausea, SOB, generalized pain, generalized weakness, diffuse arthralgias, myalgias, decreased appetite, re-occuring chest pain/abdomen pain. Dx: Atypical chest discomft, increased troponin, ESRD, non-ischemic cardiomyopathy, hypokalemia. H&H 10.9/ 32.3 XR chest- silhoueting L diaphragm with infiltrate or atelectasis.   -ND Pt. admitted from [facility] with sx of nausea, SOB, generalized pain, generalized weakness, diffuse arthralgias, myalgias, decreased appetite, re-occuring chest pain/abdomen pain. Dx: Atypical chest discomft, increased troponin, ESRD, non-ischemic cardiomyopathy, hypokalemia. H&H 10.9/ 32.3 XR chest- silhoueting L diaphragm with infiltrate or atelectasis.  -YONIS     Precautions/Limitations fall precautions   dialysis MWF  -ND fall precautions;oxygen therapy device and L/min   dialysis MWF  -YONIS fall precautions   Dialysis MWF  -ND      Prior Level of Function --   unable to obtain at this time d/t decreased cognition  -ND --   unable to obtain at this time d/t decreased cognition  -YONIS       Equipment Currently Used at Home --   unable to obtain  -ND --    unable to obtain at this time d/t decreased cognition  -YONIS   walker, standard;walker, rolling;wheelchair  -CE    Plans/Goals Discussed With patient;agreed upon  -ND patient;agreed upon  -YONIS       Risks Reviewed patient:;LOB;nausea/vomiting;dizziness;change in vital signs;increased drainage;lines disloged;increased discomfort  -ND patient:;LOB;nausea/vomiting;dizziness;increased discomfort;change in vital signs;lines disloged  -YONIS       Benefits Reviewed patient:;improve function;increase independence;increase strength;increase balance;decrease pain;improve skin integrity;decrease risk of DVT;increase knowledge  -ND        Barriers to Rehab medically complex;cognitive status;previous functional deficit;physical barrier;contractures  -ND        Living Environment    Lives With --  -ND  alone  -ND  alone  -CE    Living Arrangements --  -ND  house  -ND  house  -CE    Transportation Available     car;family or friend will provide;public transportation  -CE    Living Environment Comment unable to obtain at this time  -ND        Clinical Impression    Date of Referral to OT 06/14/17  -ND  06/14/17  -ND      OT Diagnosis DECREASED ADL  -ND  DECREASED ADL  -ND      Prognosis fair  -ND        Impairments Found (describe specific impairments) aerobic capacity/endurance;arousal, attention, and cognition;ergonomics and body mechanics;gait, locomotion, and balance;integumentary integrity;joint integrity and mobility;motor function;muscle performance;posture;ROM  -ND        Patient/Family Goals Statement pt. did not state a goal  -ND        Criteria for Skilled Therapeutic Interventions Met yes;treatment indicated  -ND        Rehab Potential fair, will monitor progress closely  -ND        Therapy Frequency 3-5 times/wk  -ND        Predicted Duration of Therapy Intervention (days/wks) 10 days  -ND        Anticipated Discharge Disposition skilled nursing facility  -ND        Vital Signs    Pretreatment Heart Rate (beats/min) 108   -  -YONIS       Pre SpO2 (%) 97  -ND 97  -YONIS       Pain Assessment    Pain Assessment Morrison-Cameron FACES  -ND Morrison-Cameron FACES  -YONIS       Morrison-Cameron FACES Pain Rating 6  -ND        Pain Location Generalized  -ND Generalized  -YONIS       Vision Assessment/Intervention    Visual Impairment unable/difficult to assess  -ND        Cognitive Assessment/Intervention    Current Cognitive/Communication Assessment impaired   minimal speech  -ND impaired   minimaal speech  -YONIS       Orientation Status oriented to;person   cues needed for name and age  -ND oriented to;person   cues needed for name and age  -YONIS       Follows Commands/Answers Questions 25% of the time;able to follow single-step instructions;needs cueing;needs increased time;needs repetition;speech unintelligible  -ND        Personal Safety severe impairment;decreased awareness, need for assist;decreased awareness, need for safety;decreased insight to deficits  -ND        Personal Safety Interventions fall prevention program maintained;supervised activity  -ND        ROM (Range of Motion)    General ROM upper extremity range of motion deficits identified  -ND        General ROM Detail BUE 75+% impaired d/t pt. resisting and tone  -ND        MMT (Manual Muscle Testing)    General MMT Assessment Detail BUE 2-/5  -ND        Muscle Tone Assessment    Muscle Tone Assessment LUE;RUE  -ND        LUE Muscle Tone Assessment moderately increased tone  -ND        RUE Muscle Tone Assessment moderately increased tone  -ND        Bed Mobility, Assessment/Treatment    Bed Mobility, Assistive Device head of bed elevated;draw sheet  -ND        Bed Mobility, Scoot/Bridge, [place] dependent (less than 25% patient effort);verbal cues required;nonverbal cues required (demo/gesture)  -ND        Bed Mob, Supine to Sit, [place] verbal cues required;nonverbal cues required (demo/gesture);dependent (less than 25% patient effort);2 person assist required  -ND        Bed Mob, Sit to  Supine, Bertie verbal cues required;nonverbal cues required (demo/gesture);dependent (less than 25% patient effort);2 person assist required  -ND        Bed Mobility, Safety Issues decreased use of arms for pushing/pulling;decreased use of legs for bridging/pushing;impaired trunk control for bed mobility;cognitive deficits limit understanding  -ND        Bed Mobility, Impairments ROM decreased;decreased flexibility;muscle tone abnormal;strength decreased;impaired balance;postural control impaired;motor control impaired  -ND        Transfer Assessment/Treatment    Transfer, Comment deferred at this time d/t pt. having femoral line and decreased cognition/weakness/tone  -ND        Lower Body Dressing Assessment/Training    LB Dressing Assess/Train, Clothing Type doffing:;donning:;socks  -ND        LB Dressing Assess/Train, Position edge of bed  -ND        LB Dressing Assess/Train, Bertie dependent (less than 25% patient effort)  -ND        LB Dressing Assess/Train, Impairments decreased flexibility;ROM decreased;strength decreased;impaired balance;coordination impaired;motor control impaired;postural control impaired  -ND        Motor Skills/Interventions    Additional Documentation Balance Skills Training (Group)  -ND        Balance Skills Training    Sitting-Level of Assistance Moderate assistance;Maximum assistance  -ND        Sitting-Balance Support Right upper extremity supported;Left upper extremity supported;Feet unsupported  -ND        Sensory Assessment/Intervention    Sensory Impairment --   withdrew from  painful stimulus to BUE  -ND        General Therapy Interventions    Planned Therapy Interventions activity intolerance;ADL retraining;balance training;bed mobility training;home exercise program;energy conservation;stretching;strengthening  -ND        Positioning and Restraints    Pre-Treatment Position in bed  -ND        Post Treatment Position bed  -ND        In Bed side lying  right;fowlers;call light within reach;encouraged to call for assist;side rails up x2;patient within staff view  -ND          User Key  (r) = Recorded By, (t) = Taken By, (c) = Cosigned By    Initials Name Effective Dates    YONIS Yen, PT DPT 08/02/16 -     CE Jackie Conn, BSW 09/15/16 -     ND Ailyn Gaston OTR/CATHIE 10/21/16 -            Occupational Therapy Education     Title: PT OT SLP Therapies (Done)     Topic: Occupational Therapy (Done)     Point: ADL training (Done)    Description: Instruct learner(s) on proper safety adaptation and remediation techniques during self care or transfers.   Instruct in proper use of assistive devices.    Learning Progress Summary    Learner Readiness Method Response Comment Documented by Status   Patient Acceptance E VU,NR Pt. educated on role of OT, safe bed mob, benefit of activity, and progression with poc ND 06/15/17 1015 Done               Point: Home exercise program (Done)    Description: Instruct learner(s) on appropriate technique for monitoring, assisting and/or progressing therapeutic exercises/activities.    Learning Progress Summary    Learner Readiness Method Response Comment Documented by Status   Patient Acceptance E VU,NR Pt. educated on role of OT, safe bed mob, benefit of activity, and progression with poc ND 06/15/17 1015 Done               Point: Body mechanics (Done)    Description: Instruct learner(s) on proper positioning and spine alignment during self-care, functional mobility activities and/or exercises.    Learning Progress Summary    Learner Readiness Method Response Comment Documented by Status   Patient Acceptance E VU,NR Pt. educated on role of OT, safe bed mob, benefit of activity, and progression with poc ND 06/15/17 1015 Done                      User Key     Initials Effective Dates Name Provider Type Discipline    ND 10/21/16 -  Ailyn Gaston, OTR/L Occupational Therapist OT                  OT Recommendation and  Plan  Anticipated Discharge Disposition: skilled nursing facility  Planned Therapy Interventions: activity intolerance, ADL retraining, balance training, bed mobility training, home exercise program, energy conservation, stretching, strengthening  Therapy Frequency: 3-5 times/wk  Plan of Care Review  Plan Of Care Reviewed With: patient  Progress: progress toward functional goals as expected  Outcome Summary/Follow up Plan: OT eval completed. Pt. demonstrated decreased cognition limiting full ability to obtain information for thorough eval. Pt. lies in fetal position d/t flexion synergy BUE and BLE. Pt. depx 2 to complete bed mob. Pt. dep for LB dressing. Pt. followed 25% one step commands with mod/max verbal and tactile cues. Pt. cont to benefit from skilled OT d/t increased tone, decreased ROM, decreased cognition, decreased strength, decreased independence in ADLs. At this time OT will pick pt. up on caseload and monitor closely. If pt. is not making progression OT can d/c. Anticipated dc SNF. 6/15/17          OT Goals       06/15/17 1015          Bed Mobility OT LTG    Bed Mobility OT LTG, Date Established 06/15/17  -ND      Bed Mobility OT LTG, Time to Achieve by discharge  -ND      Bed Mobility OT LTG, Activity Type all bed mobility  -ND      Bed Mobility OT LTG, [place] Level moderate assist (50% patient effort)  -ND      Bed Mobility OT LTG, Assist Device bed rails  -ND      Strength OT LTG    Strength Goal OT LTG, Date Established 06/15/17  -ND      Strength Goal OT LTG, Time to Achieve by discharge  -ND      Strength Goal OT LTG, Measure to Achieve Pt. will complete BUE strengthening exercsies to increase BUE strength and decreased tone for completing ADLs.   -ND      Grooming OT LTG    Grooming Goal OT LTG, Date Established 06/15/17  -ND      Grooming Goal OT LTG, Time to Achieve by discharge  -ND      Grooming Goal OT LTG, [place] Level moderate assist (50% patient effort)  -ND        User Key   (r) = Recorded By, (t) = Taken By, (c) = Cosigned By    Initials Name Provider Type    RITA Gaston OTR/L Occupational Therapist                Outcome Measures       06/15/17 1000          How much help from another is currently needed...    Putting on and taking off regular lower body clothing? 1  -ND      Bathing (including washing, rinsing, and drying) 1  -ND      Toileting (which includes using toilet bed pan or urinal) 1  -ND      Putting on and taking off regular upper body clothing 2  -ND      Taking care of personal grooming (such as brushing teeth) 2  -ND      Eating meals 2  -ND      Score 9  -ND      Functional Assessment    Outcome Measure Options AM-PAC 6 Clicks Daily Activity (OT)  -ND        User Key  (r) = Recorded By, (t) = Taken By, (c) = Cosigned By    Initials Name Provider Type    RITA Gaston OTR/L Occupational Therapist          Time Calculation:   OT Start Time: 0927 (15 min spent with pt. chart review/previous day checking on pt not included)  OT Stop Time: 1015  OT Time Calculation (min): 48 min    Therapy Charges for Today     Code Description Service Date Service Provider Modifiers Qty    [id number]  OT SELFCARE CURRENT 6/15/2017 Ailyn Gaston OTR/L GO, CL 1    16099103302 HC OT SELFCARE PROJECTED 6/15/2017 Ailyn Gaston OTR/L GO, CK 1    [id number]  OT EVAL MOD COMPLEXITY 4 6/15/2017 Ailyn Gaston OTR/L GO, KX 1          OT G-codes  OT Functional Scales Options: AM-PAC 6 Clicks Daily Activity (OT)  Functional Limitation: Self care  Self Care Current Status (): At least 60 percent but less than 80 percent impaired, limited or restricted  Self Care Goal Status (): At least 40 percent but less than 60 percent impaired, limited or restricted    ZUHAIR Foley/CATHIE  6/15/2017

## 2017-06-15 NOTE — PLAN OF CARE
Problem: Patient Care Overview (Adult)  Goal: Plan of Care Review  Outcome: Ongoing (interventions implemented as appropriate)  Pt down to .04 mcg/kg/min on Levophed.  Accu check 75 at 1630.  Dr Llamas had discussion with family on feeding tube.        Goal: Adult Individualization and Mutuality  Outcome: Ongoing (interventions implemented as appropriate)  Goal: Discharge Needs Assessment  Outcome: Ongoing (interventions implemented as appropriate)    Problem: Fall Risk (Adult)  Goal: Identify Related Risk Factors and Signs and Symptoms  Outcome: Ongoing (interventions implemented as appropriate)

## 2017-06-16 LAB
ALBUMIN SERPL-MCNC: 2.1 G/DL (ref 3.5–5)
ALBUMIN/GLOB SERPL: 0.6 G/DL (ref 1.1–2.5)
ALP SERPL-CCNC: 92 U/L (ref 24–120)
ALT SERPL W P-5'-P-CCNC: 20 U/L (ref 0–54)
ANION GAP SERPL CALCULATED.3IONS-SCNC: 14 MMOL/L (ref 4–13)
AST SERPL-CCNC: 30 U/L (ref 7–45)
BASOPHILS # BLD AUTO: 0 10*3/MM3 (ref 0–0.2)
BASOPHILS NFR BLD AUTO: 0 % (ref 0–2)
BILIRUB SERPL-MCNC: 2.7 MG/DL (ref 0.1–1)
BUN BLD-MCNC: 24 MG/DL (ref 5–21)
BUN/CREAT SERPL: 4.5 (ref 7–25)
CALCIUM SPEC-SCNC: 8.6 MG/DL (ref 8.4–10.4)
CHLORIDE SERPL-SCNC: 104 MMOL/L (ref 98–110)
CO2 SERPL-SCNC: 22 MMOL/L (ref 24–31)
CREAT BLD-MCNC: 5.39 MG/DL (ref 0.5–1.4)
DEPRECATED RDW RBC AUTO: 60.5 FL (ref 40–54)
EOSINOPHIL # BLD AUTO: 0.05 10*3/MM3 (ref 0–0.7)
EOSINOPHIL NFR BLD AUTO: 0.4 % (ref 0–4)
ERYTHROCYTE [DISTWIDTH] IN BLOOD BY AUTOMATED COUNT: 20.4 % (ref 12–15)
GFR SERPL CREATININE-BSD FRML MDRD: 9 ML/MIN/1.73
GLOBULIN UR ELPH-MCNC: 3.3 GM/DL
GLUCOSE BLD-MCNC: 84 MG/DL (ref 70–100)
GLUCOSE BLDC GLUCOMTR-MCNC: 120 MG/DL (ref 70–130)
GLUCOSE BLDC GLUCOMTR-MCNC: 131 MG/DL (ref 70–130)
GLUCOSE BLDC GLUCOMTR-MCNC: 63 MG/DL (ref 70–130)
GLUCOSE BLDC GLUCOMTR-MCNC: 79 MG/DL (ref 70–130)
GLUCOSE BLDC GLUCOMTR-MCNC: 81 MG/DL (ref 70–130)
GLUCOSE BLDC GLUCOMTR-MCNC: 89 MG/DL (ref 70–130)
GLUCOSE BLDC GLUCOMTR-MCNC: 94 MG/DL (ref 70–130)
HCT VFR BLD AUTO: 28.7 % (ref 37–47)
HGB BLD-MCNC: 9.7 G/DL (ref 12–16)
IMM GRANULOCYTES # BLD: 0.08 10*3/MM3 (ref 0–0.03)
IMM GRANULOCYTES NFR BLD: 0.7 % (ref 0–5)
LYMPHOCYTES # BLD AUTO: 0.59 10*3/MM3 (ref 0.72–4.86)
LYMPHOCYTES NFR BLD AUTO: 5.3 % (ref 15–45)
MCH RBC QN AUTO: 28.2 PG (ref 28–32)
MCHC RBC AUTO-ENTMCNC: 33.8 G/DL (ref 33–36)
MCV RBC AUTO: 83.4 FL (ref 82–98)
MONOCYTES # BLD AUTO: 0.47 10*3/MM3 (ref 0.19–1.3)
MONOCYTES NFR BLD AUTO: 4.2 % (ref 4–12)
NEUTROPHILS # BLD AUTO: 9.94 10*3/MM3 (ref 1.87–8.4)
NEUTROPHILS NFR BLD AUTO: 89.4 % (ref 39–78)
PLATELET # BLD AUTO: 47 10*3/MM3 (ref 130–400)
POTASSIUM BLD-SCNC: 3.7 MMOL/L (ref 3.5–5.3)
PROT SERPL-MCNC: 5.4 G/DL (ref 6.3–8.7)
RBC # BLD AUTO: 3.44 10*6/MM3 (ref 4.2–5.4)
SODIUM BLD-SCNC: 140 MMOL/L (ref 135–145)
WBC NRBC COR # BLD: 11.13 10*3/MM3 (ref 4.8–10.8)

## 2017-06-16 PROCEDURE — 93005 ELECTROCARDIOGRAM TRACING: CPT | Performed by: FAMILY MEDICINE

## 2017-06-16 PROCEDURE — 25010000002 HYDROMORPHONE PER 4 MG: Performed by: FAMILY MEDICINE

## 2017-06-16 PROCEDURE — 94799 UNLISTED PULMONARY SVC/PX: CPT

## 2017-06-16 PROCEDURE — 87040 BLOOD CULTURE FOR BACTERIA: CPT | Performed by: FAMILY MEDICINE

## 2017-06-16 PROCEDURE — 93010 ELECTROCARDIOGRAM REPORT: CPT | Performed by: INTERNAL MEDICINE

## 2017-06-16 PROCEDURE — 99232 SBSQ HOSP IP/OBS MODERATE 35: CPT | Performed by: INTERNAL MEDICINE

## 2017-06-16 PROCEDURE — 25010000002 DIGOXIN PER 500 MCG

## 2017-06-16 PROCEDURE — 94640 AIRWAY INHALATION TREATMENT: CPT

## 2017-06-16 PROCEDURE — 85025 COMPLETE CBC W/AUTO DIFF WBC: CPT | Performed by: FAMILY MEDICINE

## 2017-06-16 PROCEDURE — 80053 COMPREHEN METABOLIC PANEL: CPT | Performed by: FAMILY MEDICINE

## 2017-06-16 PROCEDURE — 82962 GLUCOSE BLOOD TEST: CPT

## 2017-06-16 PROCEDURE — 25010000002 CEFTRIAXONE: Performed by: FAMILY MEDICINE

## 2017-06-16 PROCEDURE — 94760 N-INVAS EAR/PLS OXIMETRY 1: CPT

## 2017-06-16 RX ORDER — DILTIAZEM HCL/D5W 125 MG/125
5-15 PLASTIC BAG, INJECTION (ML) INTRAVENOUS
Status: DISCONTINUED | OUTPATIENT
Start: 2017-06-16 | End: 2017-06-22

## 2017-06-16 RX ORDER — DILTIAZEM HYDROCHLORIDE 5 MG/ML
5 INJECTION INTRAVENOUS ONCE
Status: COMPLETED | OUTPATIENT
Start: 2017-06-16 | End: 2017-06-16

## 2017-06-16 RX ORDER — DIGOXIN 0.25 MG/ML
250 INJECTION INTRAMUSCULAR; INTRAVENOUS ONCE
Status: COMPLETED | OUTPATIENT
Start: 2017-06-16 | End: 2017-06-16

## 2017-06-16 RX ORDER — IPRATROPIUM BROMIDE AND ALBUTEROL SULFATE 2.5; .5 MG/3ML; MG/3ML
3 SOLUTION RESPIRATORY (INHALATION)
Status: DISCONTINUED | OUTPATIENT
Start: 2017-06-16 | End: 2017-06-26 | Stop reason: HOSPADM

## 2017-06-16 RX ORDER — DIGOXIN 0.25 MG/ML
INJECTION INTRAMUSCULAR; INTRAVENOUS
Status: COMPLETED
Start: 2017-06-16 | End: 2017-06-16

## 2017-06-16 RX ADMIN — MIDODRINE HYDROCHLORIDE 5 MG: 2.5 TABLET ORAL at 11:52

## 2017-06-16 RX ADMIN — ASPIRIN 81 MG: 81 TABLET ORAL at 08:41

## 2017-06-16 RX ADMIN — IPRATROPIUM BROMIDE AND ALBUTEROL SULFATE 3 ML: 2.5; .5 SOLUTION RESPIRATORY (INHALATION) at 13:45

## 2017-06-16 RX ADMIN — CEFTRIAXONE 1 G: 1 INJECTION, POWDER, FOR SOLUTION INTRAMUSCULAR; INTRAVENOUS at 11:48

## 2017-06-16 RX ADMIN — DIGOXIN 250 MCG: 250 INJECTION, SOLUTION INTRAMUSCULAR; INTRAVENOUS; PARENTERAL at 12:22

## 2017-06-16 RX ADMIN — Medication 5 MG/HR: at 12:32

## 2017-06-16 RX ADMIN — MIDODRINE HYDROCHLORIDE 5 MG: 2.5 TABLET ORAL at 08:41

## 2017-06-16 RX ADMIN — DILTIAZEM HYDROCHLORIDE 5 MG: 5 INJECTION INTRAVENOUS at 12:30

## 2017-06-16 RX ADMIN — MIDODRINE HYDROCHLORIDE 5 MG: 2.5 TABLET ORAL at 18:15

## 2017-06-16 RX ADMIN — HYDROMORPHONE HYDROCHLORIDE 0.25 MG: 1 INJECTION, SOLUTION INTRAMUSCULAR; INTRAVENOUS; SUBCUTANEOUS at 04:36

## 2017-06-16 RX ADMIN — IPRATROPIUM BROMIDE AND ALBUTEROL SULFATE 3 ML: 2.5; .5 SOLUTION RESPIRATORY (INHALATION) at 19:14

## 2017-06-16 RX ADMIN — HYDROMORPHONE HYDROCHLORIDE 0.25 MG: 1 INJECTION, SOLUTION INTRAMUSCULAR; INTRAVENOUS; SUBCUTANEOUS at 12:02

## 2017-06-16 RX ADMIN — DIGOXIN 250 MCG: 0.25 INJECTION INTRAMUSCULAR; INTRAVENOUS at 12:22

## 2017-06-16 RX ADMIN — NOREPINEPHRINE BITARTRATE 0.18 MCG/KG/MIN: 1 INJECTION INTRAVENOUS at 15:00

## 2017-06-16 RX ADMIN — MEGESTROL ACETATE 400 MG: 40 SUSPENSION ORAL at 08:41

## 2017-06-16 RX ADMIN — DEXTROSE MONOHYDRATE 50 ML: 25 INJECTION, SOLUTION INTRAVENOUS at 05:02

## 2017-06-16 NOTE — PROGRESS NOTES
"Nephrology ([facility]) Progress Note      Patient:  Jennifer Marks  YOB: 1945  Date of Service: 6/16/2017  MRN: [id number]   Acct: [id number]   Primary Care Physician: No Known Provider  Advance Directive: Full Code  Admit Date: 6/11/2017       Hospital Day: 5  Referring Provider: Pino Tang MD      Patient personally seen and examined.  Complete chart including Consults, Notes, Operative Reports, Labs, Cardiology, and Radiology studies reviewed as able.        Subjective:  Jennifer Marks is a 72 y.o. female  whom we were consulted for end stage renal disease management, hypokalemia. She is on hemodialysis Monday Wednesday Friday; Missed her Friday (6/09) treatment due to not feeling well. Admitted with chest pain. Has had decreased oral intake and diarrhea for several days.  On 6/13 had femoral line placed.  Today she is sleepy but answers questions appropriately.  Nods \"yes\" when asked if she wants dialysis today.  Per nursing staff; patient told family last evening that she was going to continue with her dialysis treatments.    Allergies:  Heparin; Iron; Latex; Levaquin [levofloxacin]; Shrimp (diagnostic); and Vancomycin    Home Meds:  Prescriptions Prior to Admission   Medication Sig Dispense Refill Last Dose   • carvedilol (COREG) 12.5 MG tablet Take 12.5 mg by mouth Daily.      • losartan (COZAAR) 25 MG tablet Take 25 mg by mouth Daily.      • pantoprazole (PROTONIX) 40 MG EC tablet Take 40 mg by mouth Daily.      • potassium chloride (KLOR-CON) 20 MEQ packet Take 20 mEq by mouth Daily.      • predniSONE (DELTASONE) 1 MG tablet Take 1 mg by mouth Daily.      • PREDNISONE PO Take  by mouth.      • traZODone (DESYREL) 50 MG tablet Take 50 mg by mouth Every Night.          Medicines:  Current Facility-Administered Medications   Medication Dose Route Frequency Provider Last Rate Last Dose   • aspirin EC tablet 81 mg  81 mg Oral Daily Pino Tang MD   81 mg at " 06/16/17 0841   • cefTRIAXone (ROCEPHIN) 1 g/100 mL 0.9% NS (MBP)  1 g Intravenous Q24H Carter Llamas MD       • dextrose (D50W) solution 50 mL  50 mL Intravenous Q1H PRN Bree Soaers MD   50 mL at 06/16/17 0502   • HYDROmorphone (DILAUDID) injection 0.25 mg  0.25 mg Intravenous Q4H PRN Carter Llamas MD   0.25 mg at 06/16/17 0436   • ipratropium-albuterol (DUO-NEB) nebulizer solution 3 mL  3 mL Nebulization Q6H While Awake - RT Carter Llamas MD       • levothyroxine (SYNTHROID, LEVOTHROID) tablet 50 mcg  50 mcg Oral Q AM Carter Llamas MD       • megestrol (MEGACE) 40 MG/ML suspension 400 mg  400 mg Oral Daily Carter Llamas MD   400 mg at 06/16/17 0841   • midodrine (PROAMATINE) tablet 5 mg  5 mg Oral TID AC FANNIE Blanco   5 mg at 06/16/17 0841   • norepinephrine (LEVOPHED) 8,000 mcg in sodium chloride 0.9 % 250 mL (32 mcg/mL) infusion  0.02-0.3 mcg/kg/min Intravenous Titrated FANNIE Blanco 17.31 mL/hr at 06/16/17 0658 0.18 mcg/kg/min at 06/16/17 0658   • ondansetron (ZOFRAN) injection 4 mg  4 mg Intravenous Q6H PRN Pino Tang MD   4 mg at 06/15/17 0555   • pantoprazole (PROTONIX) EC tablet 40 mg  40 mg Oral Q AM Pino Tang MD   40 mg at 06/12/17 0610   • sodium chloride 0.9 % flush 1-10 mL  1-10 mL Intravenous PRN Pino Tang MD           Past Medical History:  Past Medical History:   Diagnosis Date   • A-fib    • Arthritis    • CHF (congestive heart failure)    • Hypertension    • Renal failure        Past Surgical History:  Past Surgical History:   Procedure Laterality Date   • ARTERIOVENOUS FISTULA LEG Left        Family History  History reviewed. No pertinent family history.    Social History  Social History     Social History   • Marital status: Single     Spouse name: N/A   • Number of children: N/A   • Years of education: N/A     Occupational History   • Not on file.     Social History Main Topics   • Smoking status: Former Smoker   • Smokeless tobacco: Not on file   •  "Alcohol use No   • Drug use: No   • Sexual activity: Defer     Other Topics Concern   • Not on file     Social History Narrative         Review of Systems:  History obtained from chart review and the patient  General ROS: Patient complains of malaise and No fever or chills  Respiratory ROS: No cough, shortness of breath, wheezing  Cardiovascular ROS: no chest pain or dyspnea on exertion  Gastrointestinal ROS: No abdominal pain, no nausea  Genito-Urinary ROS: No dysuria or hematuria  Neurological ROS: negative  14 point ROS reviewed with the patient and negative except as noted above and in the HPI unless unable to obtain.    Objective:  BP (!) 86/54  Pulse 101  Temp 97.7 °F (36.5 °C) (Temporal Artery )   Resp 20  Ht 62\" (157.5 cm)  Wt 111 lb (50.3 kg)  SpO2 92%  BMI 20.3 kg/m2    Intake/Output Summary (Last 24 hours) at 06/16/17 0943  Last data filed at 06/16/17 0502   Gross per 24 hour   Intake            423.5 ml   Output                0 ml   Net            423.5 ml     General: awake/alert    Neck: supple, no JVD  Chest:  clear to auscultation bilaterally without respiratory distress  CVS: regular rate and rhythm  Abdominal: soft, nontender, normal bowel sounds  Extremities: no cyanosis or edema  Skin: warm and dry without rash   Neuro: no focal motor deficits      Labs:    Results from last 7 days  Lab Units 06/16/17  0229 06/15/17  0405 06/14/17  0511   WBC 10*3/mm3 11.13* 7.60 6.99   HEMOGLOBIN g/dL 9.7* 10.3* 10.9*   HEMATOCRIT % 28.7* 31.0* 32.3*   PLATELETS 10*3/mm3 47* 72* 101*           Results from last 7 days  Lab Units 06/16/17  0229 06/15/17  0405 06/14/17  2212  06/14/17  0511  06/13/17  0556  06/11/17 2047   SODIUM mmol/L 140 139  --   --  141  --  138  < > 139   POTASSIUM mmol/L 3.7 3.2*  --   --  3.3*  --  2.4*  < > 2.0*   CHLORIDE mmol/L 104 101  --   --  103  --  100  < > 100   TOTAL CO2 mmol/L 22.0* 19.0*  --   --  22.0*  --  22.0*  < > 19.0*   BUN mg/dL 24* 16  --   --  27*  --  49*  " < > 66*   CREATININE mg/dL 5.39* 4.95*  --   --  7.86*  --  11.87*  < > 16.31*   CALCIUM mg/dL 8.6 8.5  --   --  8.3*  --  7.7*  < > 8.1*   BILIRUBIN mg/dL 2.7*  --   --   --   --   --  1.8*  --  2.4*   ALK PHOS U/L 92  --   --   --   --   --  94  --  81   ALT (SGPT) U/L 20  --   --   --   --   --  20  --  17   AST (SGOT) U/L 30  --   --   --   --   --  30  --  18   GLUCOSE mg/dL 84 195* 286*  < > 102*  < > 89  < > 72   < > = values in this interval not displayed.    Radiology:   Imaging Results (last 72 hours)     Procedure Component Value Units Date/Time    XR Chest 1 View [[id number]] Collected:  06/11/17 1803     Updated:  06/11/17 2054    Narrative:       EXAMINATION:   XR CHEST 1 VW-  6/11/2017 6:01 PM CDT     HISTORY: Chest pain     Prior exams 05/28/2016.     Single view of the chest is obtained. The pulmonary vascular margins are  slightly indistinct. This has the appearance of mild pulmonary vascular  congestion. Left diaphragm is indistinct. This may be infiltrate or  atelectasis left lower lobe.     Cardiac silhouette is enlarged and unchanged from May 28.       Impression:       1. Silhouetting of the left diaphragm consistent with infiltrate or  atelectasis left lower lobe.  2. Indistinct pulmonary vascular margins consistent with mild pulmonary  vascular congestion.  This report was finalized on 06/11/2017 20:51 by Dr. Herson Magaña MD.          Culture:         Assessment   1. End stage renal disease on maintenance hemodialysis Monday Wednesday Friday   2. Hypokalemia--improved  3. Hypotension--still on low-dose Levophed  4. Anemia of chronic kidney disease  5. Diabetes mellitus type 2    Plan:  1.  Wean Levophed drip as tolerated.  2.  Continue hemodialysis; pt has now indicated that she wants dialysis today.      Jaden Tovar, FANNIE  6/16/2017  9:43 AM

## 2017-06-16 NOTE — SIGNIFICANT NOTE
Screened to determine if pt would benefit from speech consult. Nursing reported pt has poor appetite, but no issues with speech, language, or swallowing.     06/16/17 1427   Time Calculation- SLP   SLP Start Time 1424   SLP Stop Time 1426   SLP Time Calculation (min) 2 min   SLP Non-Billable Time (min) 2 min   SLP Received On 06/16/17   Cate Stein CCC-SLP 6/16/2017 2:27 PM

## 2017-06-16 NOTE — PROGRESS NOTES
"Chief Complaint: Unobtainable from patient secondary to mental status    S: Cardiology was asked to evaluate the patient as she has developed atrial fibrillation with rapid ventricular response.  Review of records indicate that she was initially admitted with chest discomfort.  As evaluated by cardiology at that time.  Ultimately, it was thought that she had atypical chest pain that resolved.  She did have a mild troponin elevation but this was not thought to represent an acute coronary syndrome.  Overnight, the patient was noted to be in atrial fibrillation with rapid ventricular response.  She was given digoxin and placed on a Cardizem drip today and her heart rate has come down considerably.  In talking with the family, they feel that the patient's prognosis poor and are considering comfort measures at this time.  The patient is currently drowsy and does not answer questions at this time.    Medications: Reviewed  Telemetry: Currently Afib with HR 90's, overnight, considerably higher HR - up to 140's-150's periodically    O:  /60  Pulse 80  Temp 97.7 °F (36.5 °C) (Temporal Artery )   Resp 19  Ht 62\" (157.5 cm)  Wt 111 lb (50.3 kg)  SpO2 95%  BMI 20.3 kg/m2    /60  Pulse 80  Temp 97.7 °F (36.5 °C) (Temporal Artery )   Resp 19  Ht 62\" (157.5 cm)  Wt 111 lb (50.3 kg)  SpO2 95%  BMI 20.3 kg/m2    Gen: chronically ill appearing, lying flat, NAD  CV: irregularly, irregular, no mrg  Pulm: poor effort but appears CTAB  GI: thin, s, nt, ns, +bs  Ext: no c/c/e  Neuro: mental status - drowsy    Diagnostic Data:    Lab Results   Component Value Date    WBC 11.13 (H) 06/16/2017    HGB 9.7 (L) 06/16/2017    HCT 28.7 (L) 06/16/2017    MCV 83.4 06/16/2017    PLT 47 (L) 06/16/2017     Lab Results   Component Value Date    GLUCOSE 84 06/16/2017    CALCIUM 8.6 06/16/2017     06/16/2017    K 3.7 06/16/2017    CO2 22.0 (L) 06/16/2017     06/16/2017    BUN 24 (H) 06/16/2017    CREATININE 5.39 (H) " 06/16/2017    EGFRIFAFRI 9 (L) 06/16/2017    BCR 4.5 (L) 06/16/2017    ANIONGAP 14.0 (H) 06/16/2017     ASSESSMENT/PLAN:    1. Paroxysmal atrial fibrillation with rapid ventricular response: The patient has been given digoxin and placed on a Cardizem drip and her heart rates are better control at this time.  She remains hypotensive and on Levophed at this time.  Her hypotension seems to be unrelated to her atrial fibrillation with rapid ventricular response as it does predate her rapid ventricular response.  - Agree with current management with rate controlling medications.  - In the patient's prognosis long term which seems to be rather poor, would not be in favor of long-term anticoagulation for this patient.  - Can wean Cardizem drip as tolerated and convert to oral Cardizem when heart rates are fully controlled.    2.  Hypotension: Remains on Levophed at this time.  Wean as tolerated.  3.  End-stage renal disease on hemodialysis.  Nephrology is following.  4.  Anemia of chronic disease: Stable at this time  5.  Thrombocytopenia, no active bleeding at this time; however, platelets have shown steady decline    We will see again tomorrow.

## 2017-06-16 NOTE — PROGRESS NOTES
Continued Stay Note   Renea     Patient Name: Jennifer Marks  MRN: [id number]  Today's Date: 6/16/2017    Admit Date: 6/11/2017          Discharge Plan       06/16/17 1534    Case Management/Social Work Plan    Plan NHP    Patient/Family In Agreement With Plan yes    Additional Comments Patient has agreed to referral to [facility].  Referral pending.              Discharge Codes     None            ANUPAMA Zaragoza

## 2017-06-16 NOTE — PLAN OF CARE
Problem: Patient Care Overview (Adult)  Goal: Plan of Care Review  Outcome: Ongoing (interventions implemented as appropriate)    06/16/17 0640   Coping/Psychosocial Response Interventions   Plan Of Care Reviewed With patient   Patient Care Overview   Progress no change   Outcome Evaluation   Outcome Summary/Follow up Plan pt refuses to drink anything or take po meds, freq small loose bm's, right fem line leaking smal amount and dressing changed         Problem: Fall Risk (Adult)  Goal: Absence of Falls  Outcome: Ongoing (interventions implemented as appropriate)    Problem: Fluid Volume Excess (Adult,Obstetrics,Pediatric)  Goal: Stable Weight  Outcome: Ongoing (interventions implemented as appropriate)  Goal: Balanced Intake/Output  Outcome: Ongoing (interventions implemented as appropriate)    Problem: Pressure Ulcer Risk (New Scale) (Adult,Obstetrics,Pediatric)  Goal: Skin Integrity  Outcome: Ongoing (interventions implemented as appropriate)    Problem: Nutrition, Imbalanced: Inadequate Oral Intake (Adult)  Goal: Identify Related Risk Factors and Signs and Symptoms  Outcome: Ongoing (interventions implemented as appropriate)  Goal: Improved Oral Intake  Outcome: Ongoing (interventions implemented as appropriate)  Goal: Prevent Further Weight Loss  Outcome: Ongoing (interventions implemented as appropriate)    Problem: Skin Integrity Impairment, Risk/Actual (Adult)  Goal: Identify Related Risk Factors and Signs and Symptoms  Outcome: Ongoing (interventions implemented as appropriate)  Goal: Skin Integrity/Wound Healing  Outcome: Ongoing (interventions implemented as appropriate)

## 2017-06-16 NOTE — PROGRESS NOTES
[facility] Medicine Services  INPATIENT PROGRESS NOTE    Length of Stay: 5  Date of Admission: 6/11/2017  Primary Care Physician: No Known Provider    Subjective   Chief Complaint: Hypotension, weakness    HPI   Pt continue to be the same Pt is not eating well, discussed encouraged patient to eat more.  Patient denies any chest pain, shortness of breath.  Blood pressure remained low, unable to wean patient off Levophed drip.  Chest x-ray shows retrocardiac obesity, start respiratory and antibiotics treatment for now.    Review of Systems   Constitutional: Positive for activity change, appetite change and fatigue. Negative for chills and fever.   HENT: Negative for hearing loss, nosebleeds, tinnitus and trouble swallowing.   Eyes: Negative for visual disturbance.   Respiratory: Negative for cough, chest tightness, shortness of breath and wheezing.   Cardiovascular: Negative for chest pain, palpitations and leg swelling.   Gastrointestinal: Negative for abdominal distention, abdominal pain, blood in stool, constipation, diarrhea, nausea and vomiting.   Endocrine: Negative for cold intolerance, heat intolerance, polydipsia, polyphagia and polyuria.   Genitourinary: Negative for decreased urine volume, difficulty urinating, dysuria, flank pain, frequency and hematuria.   Musculoskeletal: Positive for arthralgias, gait problem and myalgias. Negative for joint swelling.   Skin: Negative for rash.   Allergic/Immunologic: Negative for immunocompromised state.   Neurological: Positive for weakness. Negative for dizziness, syncope, light-headedness and headaches.   Hematological: Negative for adenopathy. Does not bruise/bleed easily.   Psychiatric/Behavioral: Negative for confusion and sleep disturbance. The patient is not nervous/anxious.        All pertinent negatives and positives are as above. All other systems have been reviewed and are negative unless otherwise stated.     Objective     Temp:  [97.6 °F (36.4 °C)-98.3 °F (36.8 °C)] 98 °F (36.7 °C)  Heart Rate:  [100-115] 104  Resp:  [18-24] 22  BP: ()/(39-84) 78/44    Intake/Output Summary (Last 24 hours) at 06/16/17 0742  Last data filed at 06/16/17 0502   Gross per 24 hour   Intake            423.5 ml   Output                0 ml   Net            423.5 ml     Physical Exam   Constitutional: She appears well-developed and well-nourished.   HENT:   Head: Normocephalic and atraumatic.   Eyes: Conjunctivae and EOM are normal. Pupils are equal, round, and reactive to light.   Neck: Neck supple. No JVD present. No thyromegaly present.   Cardiovascular: Normal rate, regular rhythm, normal heart sounds and intact distal pulses.  Exam reveals no gallop and no friction rub.    No murmur heard.  Pulmonary/Chest: Effort normal. No respiratory distress. She has no wheezes. She has rales. She exhibits no tenderness.   Abdominal: Soft. Bowel sounds are normal. She exhibits no distension. There is no tenderness. There is no rebound and no guarding.   Musculoskeletal: Normal range of motion. She exhibits no edema, tenderness or deformity.   Lymphadenopathy:     She has no cervical adenopathy.   Neurological: She is alert. She displays normal reflexes. No cranial nerve deficit. She exhibits abnormal muscle tone. Coordination abnormal.   Skin: Skin is warm and dry. No rash noted.   Psychiatric: She has a normal mood and affect. Her behavior is normal.   Nursing note and vitals reviewed.      Results Review:  Lab Results (last 24 hours)     Procedure Component Value Units Date/Time    POC Glucose Fingerstick [[id number]]  (Abnormal) Collected:  06/15/17 0828    Specimen:  Blood Updated:  06/15/17 0839     Glucose 63 (L) mg/dL       : [id number] Dakota ErinMeter ID: [id number]       POC Glucose Fingerstick [[id number]]  (Normal) Collected:  06/15/17 0855    Specimen:  Blood Updated:  06/15/17 0906     Glucose 72 mg/dL       : [id number] Dakota  ErinMeter ID: [id number]       POC Glucose Fingerstick [[id number]]  (Normal) Collected:  06/15/17 0930    Specimen:  Blood Updated:  06/15/17 0944     Glucose 104 mg/dL       : [id number] [facility] ErinMeter ID: [id number]       POC Glucose Fingerstick [[id number]]  (Normal) Collected:  06/15/17 1227    Specimen:  Blood Updated:  06/15/17 1248     Glucose 75 mg/dL       : [id number] Iván Jose DKarenter ID: [id number]       POC Glucose Fingerstick [[id number]]  (Normal) Collected:  06/15/17 1735    Specimen:  Blood Updated:  06/15/17 1751     Glucose 75 mg/dL       : [id number] Iván Renatater ID: [id number]       POC Glucose Fingerstick [[id number]]  (Abnormal) Collected:  06/15/17 2050    Specimen:  Blood Updated:  06/15/17 2119     Glucose 51 (L) mg/dL       : [id number] Car SandraMeter ID: [id number]       POC Glucose Fingerstick [[id number]]  (Normal) Collected:  06/15/17 2116    Specimen:  Blood Updated:  06/15/17 2134     Glucose 96 mg/dL       : [id number] Car MichelleReubeneter ID: [id number]       POC Glucose Fingerstick [[id number]]  (Normal) Collected:  06/16/17 0014    Specimen:  Blood Updated:  06/16/17 0026     Glucose 79 mg/dL       : [id number] Car MartinezraMeter ID: [id number]       CBC & Differential [[id number]] Collected:  06/16/17 0229    Specimen:  Blood Updated:  06/16/17 0244    Narrative:       The following orders were created for panel order CBC & Differential.  Procedure                               Abnormality         Status                     ---------                               -----------         ------                     Scan Slide[[id number]]                                                                  CBC Auto Differential[[id number]]        Abnormal            Final result                 Please view results for these tests on the individual orders.    CBC Auto Differential [[id number]]  (Abnormal) Collected:  06/16/17 0229    Specimen:  Blood  Updated:  06/16/17 0244     WBC 11.13 (H) 10*3/mm3      RBC 3.44 (L) 10*6/mm3      Hemoglobin 9.7 (L) g/dL      Hematocrit 28.7 (L) %      MCV 83.4 fL      MCH 28.2 pg      MCHC 33.8 g/dL      RDW 20.4 (H) %      RDW-SD 60.5 (H) fl      Platelets 47 (L) 10*3/mm3      Neutrophil % 89.4 (H) %      Lymphocyte % 5.3 (L) %      Monocyte % 4.2 %      Eosinophil % 0.4 %      Basophil % 0.0 %      Immature Grans % 0.7 %      Neutrophils, Absolute 9.94 (H) 10*3/mm3      Lymphocytes, Absolute 0.59 (L) 10*3/mm3      Monocytes, Absolute 0.47 10*3/mm3      Eosinophils, Absolute 0.05 10*3/mm3      Basophils, Absolute 0.00 10*3/mm3      Immature Grans, Absolute 0.08 (H) 10*3/mm3     Comprehensive Metabolic Panel [[id number]]  (Abnormal) Collected:  06/16/17 0229    Specimen:  Blood Updated:  06/16/17 0252     Glucose 84 mg/dL      BUN 24 (H) mg/dL      Creatinine 5.39 (H) mg/dL      Sodium 140 mmol/L      Potassium 3.7 mmol/L      Chloride 104 mmol/L      CO2 22.0 (L) mmol/L      Calcium 8.6 mg/dL      Total Protein 5.4 (L) g/dL      Albumin 2.10 (L) g/dL      ALT (SGPT) 20 U/L      AST (SGOT) 30 U/L      Alkaline Phosphatase 92 U/L      Total Bilirubin 2.7 (H) mg/dL      eGFR  African Amer 9 (L) mL/min/1.73      Globulin 3.3 gm/dL      A/G Ratio 0.6 (L) g/dL      BUN/Creatinine Ratio 4.5 (L)     Anion Gap 14.0 (H) mmol/L     Narrative:       The MDRD GFR formula is only valid for adults with stable renal function between ages 18 and 70.    POC Glucose Fingerstick [[id number]]  (Normal) Collected:  06/16/17 0228    Specimen:  Blood Updated:  06/16/17 0252     Glucose 81 mg/dL       : [id number] Car Barrow ID: [id number]       POC Glucose Fingerstick [[id number]]  (Abnormal) Collected:  06/16/17 0500    Specimen:  Blood Updated:  06/16/17 0511     Glucose 63 (L) mg/dL       : [id number] Car Barrow ID: [id number]       POC Glucose Fingerstick [[id number]]  (Abnormal) Collected:  06/16/17 0529    Specimen:   Blood Updated:  06/16/17 0600     Glucose 131 (H) mg/dL       : [id number] Car SandraMeter ID: [id number]       POC Glucose Fingerstick [[id number]]  (Normal) Collected:  06/16/17 0721    Specimen:  Blood Updated:  06/16/17 0735     Glucose 120 mg/dL       : [id number] Adair RojasraMeter ID: [id number]              Cultures:       Radiology Data:    Imaging Results (last 24 hours)     Procedure Component Value Units Date/Time    XR Chest 1 View [[id number]] Collected:  06/15/17 1236     Updated:  06/15/17 1255    Narrative:       EXAMINATION: XR CHEST 1 VW- 6/15/2017 11:19 CST     HISTORY: Shortness of breath     COMPARISON: 06/11/2017     FINDINGS:  Heart is enlarged. Aorta is tortuous with atherosclerotic  calcifications. Evaluation of the left lung base is limited secondary to  overlying soft tissues. There appears to be bibasilar atelectasis.  Blunting of the right costophrenic angle suggests trace pleural fluid.  Chronic interstitial prominence is again noted. The pulmonary  vasculature is indistinct. No acute bony abnormality is identified.       Impression:       1. Stable pulmonary vascular congestion with questionable interstitial  edema.  2. Left retrocardiac opacity may represent atelectasis, infection, or a  combination of these.  3. Suspect trace right pleural fluid.  This report was finalized on 06/15/2017 12:52 by Dr. Mkuul Joaquin MD.          Allergies   Allergen Reactions   • Heparin    • Iron    • Latex    • Levaquin [Levofloxacin]    • Shrimp (Diagnostic)    • Vancomycin        Scheduled meds:     aspirin 81 mg Oral Daily   levothyroxine 50 mcg Oral Q AM   megestrol 400 mg Oral Daily   midodrine 5 mg Oral TID AC   pantoprazole 40 mg Oral Q AM       PRN meds:  dextrose  •  HYDROmorphone  •  ondansetron  •  sodium chloride    Assessment/Plan     Active Problems:    Chest pain in adult    Renal failure    Hypotension    Anemia    Hypokalemia      Plan:  Chest pain, atypical,  echocardiogram -Ejection fraction 61%, diastolic dysfunction, right ventricular dilatation, tricuspid valve regurgitation, right ventricle systolic pressure greater than 55.    Hemoptysis ×1. Hx feliciano syndrome. Slight leukocytosis.  Chest x-ray ? Retrocardiac opacity- start rocephin, respiratory culture and blood culture. Duo neb.      Anemia- Status post 2 units of blood transfusion.      Chronic renal failure- nephrology is on board. Cont dialysis per nephrology.       Hyperlipidemia      Elevate TSH- free T4 is normal. Start synthroid.      Hypotension-  Unable to wean pt off  levophed and continue midodrine.      Hypokalemia-- resolved      Calcium 7.7, corrected calcium 9.5 - 6/13/2017.     Nutrition- start megace, cont regular diet.       Deconditioning- start PT and OT      Discharge Planning: unknown    Carter Llamas MD   06/16/17   7:42 AM

## 2017-06-16 NOTE — PLAN OF CARE
Problem: Patient Care Overview (Adult)  Goal: Plan of Care Review    06/16/17 1551   Coping/Psychosocial Response Interventions   Plan Of Care Reviewed With patient   Patient Care Overview   Progress no change   Outcome Evaluation   Outcome Summary/Follow up Plan Pt. continues to eat, was able to drink nutritional supplement. Cardizem bolus and drip given for Afib. Received dialyis.         Problem: Fall Risk (Adult)  Goal: Absence of Falls  Outcome: Ongoing (interventions implemented as appropriate)    Problem: Fluid Volume Excess (Adult,Obstetrics,Pediatric)  Goal: Stable Weight  Outcome: Ongoing (interventions implemented as appropriate)  Goal: Balanced Intake/Output  Outcome: Ongoing (interventions implemented as appropriate)    Problem: Pressure Ulcer Risk (New Scale) (Adult,Obstetrics,Pediatric)  Goal: Skin Integrity  Outcome: Ongoing (interventions implemented as appropriate)    Problem: Nutrition, Imbalanced: Inadequate Oral Intake (Adult)  Goal: Identify Related Risk Factors and Signs and Symptoms  Outcome: Outcome(s) achieved Date Met:  06/16/17  Goal: Prevent Further Weight Loss  Outcome: Ongoing (interventions implemented as appropriate)

## 2017-06-16 NOTE — DISCHARGE PLACEMENT REQUEST
"To: [facility]    From: Jackie Conn [phone]        Jennifer Zhao (72 y.o. Female)     Date of Birth Social Security Number Address Home Phone MRN    1945  [address] [place] [address] [phone] [id number]    Yazidism Marital Status          Hindu Single       Admission Date Admission Type Admitting Provider Attending Provider Department, Room/Bed    6/11/17 Emergency Carter Llamas MD Truong, Khai C, MD [facility] CARDIAC CARE, C002/1    Discharge Date Discharge Disposition Discharge Destination                      Attending Provider: Carter Llamas MD     Allergies:  Heparin, Iron, Latex, Levaquin [Levofloxacin], Shrimp (Diagnostic), Vancomycin    Isolation:  None   Infection:  None   Code Status:  FULL    Ht:  62\" (157.5 cm)   Wt:  111 lb (50.3 kg)    Admission Cmt:  None   Principal Problem:  None                Active Insurance as of 6/11/2017     Primary Coverage     Payor Plan Insurance Group Employer/Plan Group    MEDICARE MEDICARE B ONLY      Payor Plan Address Payor Plan Phone Number Effective From Effective To    [address] [address] [phone] 5/1/2008     [place] [address]       Subscriber Name Subscriber Birth Date Member ID       JENNIFER ZHAO 1945 [id number]           Secondary Coverage     Payor Plan Insurance Group Employer/Plan Group    [place]      Payor Plan Address Payor Plan Phone Number Effective From Effective To    [address] [address]  1/1/2014     [place] [address]       Subscriber Name Subscriber Birth Date Member ID       JENNIFER ZHAO 1945 [id number]                 Emergency Contacts      (Rel.) Home Phone Work Phone Mobile Phone    Norma Amado (Relative) [phone] -- --    Mirza Peguero (Relative) [phone] -- --               History & Physical      H&P signed by Pino Tang MD at 6/12/2017 10:22 PM              cc:      TIME: 10:49 p.m.     PRIMARY CARE " PHYSICIAN: Unknown.    HISTORY OF PRESENT ILLNESS: Ms. Marks is a 72-year-old  female who presents to [facility] due to uncertain reasons. Ms. Marks is not able to provide much meaningful history. There are no family members present. I spoke with her niece who relates that Ms. Marks has been having significant difficulties with fatigue, malaise, lethargy, generalized weakness, diffuse arthralgias, myalgias, poor appetite, as well as recurring chest and abdominal pain. Her symptoms worsened acutely approximately 24 hours ago. Ms. Marks has a history of end-stage renal disease. She undergoes dialysis on Monday, Wednesday and Friday. There is speculation that she did not complete dialysis on Friday due to feeling ill. Presently Ms. Marks is resting comfortably and is in no distress. It is very difficult to obtain any information due to her status.     In the emergency department, laboratory studies demonstrated a creatinine of 16.3. I have consulted the Nephrology service for dialysis.     REVIEW OF SYSTEMS: Cannot be performed due to the patient's status.     PAST MEDICAL HISTORY:  1. Hypertension.   2. Dyslipidemia.   3. Diabetes mellitus type 2.   3. Hypothyroidism.   4. Chronic congestive heart failure due to systolic and diastolic dysfunction.   5. Aortic stenosis.   6. Pulmonary hypertension.   7. Nonischemic cardiomyopathy with estimated ejection fraction of 30% to 35%.   8. Peripheral arterial disease.   9. Wegener's granulomatosis.   10. Rheumatoid arthritis.   11. End-stage renal disease necessitating hemodialysis.   12. Chronic anemia.   13. Deep vein thrombosis.   14. Syncope.   15. Hearing impairment.     PAST SURGICAL HISTORY:  1. Status post appendectomy.   2. Status post cholecystectomy.   3. Status post bilateral tubal ligation.   4. Status post hernia repair.   5. Status post cataract resection.   6. Status post bilateral upper extremity AV fistula placement.   7.  Status post left lower extremity AV fistula placement.   8. Status post cardiac catheterization in 2014, which demonstrated no coronary artery disease.     ALLERGIES (PER MEDICAL RECORDS):  1. HEPARIN.  2. IRON.  3. LATEX.  4. LEVAQUIN.  5. SHRIMP.  6. VANCOMYCIN.     HOME MEDICATIONS:  1. Coreg 12.5 mg p.o. b.i.d.   2. Losartan 25 mg p.o. daily.   3. Protonix 40 mg p.o. daily.   4. Potassium chloride 20 mEq p.o. daily.   5. Prednisone 1 mg p.o. daily.   6. Trazodone 50 mg p.o. at bedtime.     Note, the above medications doses and schedules cannot be confirmed.     SOCIAL HISTORY: Significant for being a resident of [place], [place]. She lives with a male friend. She is . She hads3 sons and 2 daughters although 1 son is now  due to uncertain reasons. The patient is retired. She has a high school education. She has no history of tobacco, alcohol or drug use. She is Mormon. She has no recent history of travel outside this region.     She is a FULL CODE.     She designates her daughter, Rosanna Marks, to serve as a surrogate for healthcare matters should such become necessary. Her daughter’s telephone number is [phone].    FAMILY HISTORY: Cannot be obtained.     PHYSICAL EXAMINATION:  VITAL SIGNS: Temperature is 97.8, pulse is 88, respirations 17, and unlabored, blood pressure is 81/45, O2 saturation is 100%, breathing supplemental oxygen, weight 113 pounds.     GENERAL: This is a 72-year-old  female appearing her documented age. She is resting comfortably in bed. She is in no apparent distress. She is not able to provide any sort of meaningful history. She seems to be hearing impaired.     HEAD AND NECK: Essentially unremarkable except as noted. I see no signs of acute trauma. Eyes, nose, and throat appear grossly unremarkable. Neck is supple. She has no cervical or clavicular adenopathy. She has no definite carotid bruits. There are no masses of the head or neck. Neck  veins do not appear pathologically distended.     CARDIAC: Reveals S1 and S2 with a regular rate and rhythm. She has had no definite murmurs, rubs, or gallops.     LUNGS: Reveals bilateral breath sounds are clear to auscultation throughout. She has no rales, wheezes, or rhonchi.     ABDOMEN: Reveals bowel sounds to be present. Her abdomen is nondistended and soft. She exhibits diffuse tenderness. She relates this is a chronic problem.     EXTREMITIES: No significant lower extremity edema, erythema or calf tenderness.     NEUROLOGIC: Reveals the patient to be awake and alert. Cranial nerves II-XII are intact. She exhibits no definite focal, motor or sensory deficits. She seems able to move all extremities without difficulty and at will. Her gait was not tested. She appears weak.     PSYCHIATRIC: Deferred due to patient's status.     DIAGNOSTIC DATA: Laboratory studies from [facility] demonstrated a sodium of 136, potassium 2.7, chloride 98, CO2 of 17, BUN 67, creatinine 17.9, glucose 71, total calcium 8.1.     CBC demonstrates a white blood cell count of 8.6, hemoglobin 10.0, hematocrit 29.6, platelet count 157,000.     Magnesium is 2.4.     Troponin level is 3.09.     EKG at [facility] demonstrates sinus rhythm of 68 beats per minute. ST segment elevations are noted in the inferior leads. T wave inversions are noted throughout the anterior precordial leads.     IMPRESSION:  1. Acute MI?.  2. Hypertension.   3. Dyslipidemia.   4. Diabetes mellitus type 2.   5. Hypothyroidism.   6. End-stage renal disease necessitating hemodialysis.   7. Chronic congestive heart failure due to systolic and diastolic dysfunction.   8. Nonischemic cardiomyopathy.   9. Ejection fraction of 30% to 35%.   10. Pulmonary hypertension.   11. Anemia.   12. Wegener's granulomatosis.     PLAN: At this time, Ms. Marks will be admitted to [facility] for further evaluation and treatment. Her admitting  diagnoses are as noted. Her condition at this time is judged to be guarded. She will be housed in the CCU.     I have asked the nursing staff to obtain vital signs per protocol. She will be confined to bedrest. Her allergies are as noted above. I have asked the nursing staff to monitor input and output. Daily weights will be obtained. She will be maintained on a cardiac diet. IV fluids will be saline locked. Oxygen will be used as needed to maintain her O2 saturation greater than 92%. She is a FULL CODE. Fall precautions are to be instituted. I will consult the Cardiology service. The patient has already been seen by Dr. Nelson.    INITIAL ADMITTING MEDICATIONS:  1. Aspirin 81 mg p.o. daily.   2. Protonix 40 mg p.o. daily.   3. Potassium chloride 20 mEq p.o. b.i.d.     Note, the patient will not be given beta blockers at this time due to her low blood pressure. Also note that she cannot be given heparin or heparin products due to reported allergy.     Additional p.r.n. medications will include DuoNeb and Zofran.     I will obtain follow up laboratory studies.     I will schedule a cardiac echocardiogram for in the morning.     I will continue to follow Ms. Marks closely through the night pending return of the hospitalist team in the morning. The nursing staff may call should they have any questions or concerns. Please refer to the medical record for additional information, orders and/or comments.          Pino Tang M.D.  SANTINO/[id number]  D:  06/12/2017 00:04:01(Eastern Time)  T:  06/12/2017 00:46:25(Eastern Time)  Voice ID:  [id number]/Document ID:  [id number]       Electronically signed by Pino Tang MD at 6/12/2017 10:22 PM        Hospital Medications (active)       Dose Frequency Start End    aspirin EC tablet 81 mg 81 mg Daily 6/11/2017     Sig - Route: Take 1 tablet by mouth Daily. - Oral    cefTRIAXone (ROCEPHIN) 1 g/100 mL 0.9% NS (MBP) 1 g Every 24 Hours 6/16/2017     Sig - Route: Infuse 100 mL into a  venous catheter Daily. - Intravenous    dextrose (D50W) solution 50 mL 50 mL Every 1 Hour PRN 6/12/2017     Sig - Route: Infuse 50 mL into a venous catheter Every 1 (One) Hour As Needed for Low Blood Sugar (Blood sugar < 70). - Intravenous    digoxin (LANOXIN) injection 250 mcg 250 mcg Once 6/16/2017 6/16/2017    Sig - Route: Infuse 1 mL into a venous catheter 1 (One) Time. - Intravenous    diltiaZEM (CARDIZEM) 125mg/125 mL (1 mg/mL) infusion 5-15 mg/hr Titrated 6/16/2017     Sig - Route: Infuse 5-15 mg/hr into a venous catheter Dose Adjusted By Provider As Needed. - Intravenous    diltiaZEM (CARDIZEM) injection 5 mg 5 mg Once 6/16/2017 6/16/2017    Sig - Route: Infuse 1 mL into a venous catheter 1 (One) Time. - Intravenous    HYDROmorphone (DILAUDID) injection 0.25 mg 0.25 mg Every 4 Hours PRN 6/15/2017 6/23/2017    Sig - Route: Infuse 0.25 mg into a venous catheter Every 4 (Four) Hours As Needed for Severe Pain (7-10). - Intravenous    ipratropium-albuterol (DUO-NEB) nebulizer solution 3 mL 3 mL Every 6 Hours While Awake - RT 6/16/2017     Sig - Route: Take 3 mL by nebulization Every 6 (Six) Hours While Awake. - Nebulization    levothyroxine (SYNTHROID, LEVOTHROID) tablet 50 mcg 50 mcg Every Early Morning 6/14/2017     Sig - Route: Take 1 tablet by mouth Every Morning. - Oral    megestrol (MEGACE) 40 MG/ML suspension 400 mg 400 mg Daily 6/15/2017     Sig - Route: Take 10 mL by mouth Daily. - Oral    midodrine (PROAMATINE) tablet 5 mg 5 mg 3 Times Daily Before Meals 6/15/2017     Sig - Route: Take 2 tablets by mouth 3 (Three) Times a Day Before Meals. - Oral    norepinephrine (LEVOPHED) 8,000 mcg in sodium chloride 0.9 % 250 mL (32 mcg/mL) infusion 0.02-0.3 mcg/kg/min × 51.3 kg Titrated 6/13/2017     Sig - Route: Infuse 1.026-15.39 mcg/min into a venous catheter Dose Adjusted By Provider As Needed. - Intravenous    ondansetron (ZOFRAN) injection 4 mg 4 mg Every 6 Hours PRN 6/11/2017     Sig - Route: Infuse 2 mL  into a venous catheter Every 6 (Six) Hours As Needed for Nausea or Vomiting. - Intravenous    pantoprazole (PROTONIX) EC tablet 40 mg 40 mg Every Early Morning 6/12/2017     Sig - Route: Take 1 tablet by mouth Every Morning. - Oral    sodium chloride 0.9 % flush 1-10 mL 1-10 mL As Needed 6/11/2017     Sig - Route: Infuse 1-10 mL into a venous catheter As Needed for Line Care. - Intravenous             Operative/Procedure Notes (all)      Ana Luisa Magdaleno MD at 6/13/2017  1:16 PM  Version 1 of 1         After informed consent and universal precautions.  The right groin was prepped and draped sterilely.  One percent lidocaine was used for local anesthesia.  The vein was accessed and the wire passed.  The track was dilated and the catheter was inserted.  Suture placed 2-0 silk.  Flushed.  Dressing placed.     Electronically signed by Ana Luisa Magdaleno MD at 6/13/2017  1:17 PM           Physician Progress Notes (most recent note)      FANNIE Blanco at 6/16/2017  9:43 AM  Version 1 of 1         Nephrology ([facility]) Progress Note      Patient:  Jennifer Marks  YOB: 1945  Date of Service: 6/16/2017  MRN: [id number]   Acct: [id number]   Primary Care Physician: No Known Provider  Advance Directive: Full Code  Admit Date: 6/11/2017       Hospital Day: 5  Referring Provider: Pino Tang MD      Patient personally seen and examined.  Complete chart including Consults, Notes, Operative Reports, Labs, Cardiology, and Radiology studies reviewed as able.        Subjective:  Jennifer Marks is a 72 y.o. female  whom we were consulted for end stage renal disease management, hypokalemia. She is on hemodialysis Monday Wednesday Friday; Missed her Friday (6/09) treatment due to not feeling well. Admitted with chest pain. Has had decreased oral intake and diarrhea for several days.  On 6/13 had femoral line placed.  Today she is sleepy but answers questions appropriately.  Nods  "\"yes\" when asked if she wants dialysis today.  Per nursing staff; patient told family last evening that she was going to continue with her dialysis treatments.    Allergies:  Heparin; Iron; Latex; Levaquin [levofloxacin]; Shrimp (diagnostic); and Vancomycin    Home Meds:  Prescriptions Prior to Admission   Medication Sig Dispense Refill Last Dose   • carvedilol (COREG) 12.5 MG tablet Take 12.5 mg by mouth Daily.      • losartan (COZAAR) 25 MG tablet Take 25 mg by mouth Daily.      • pantoprazole (PROTONIX) 40 MG EC tablet Take 40 mg by mouth Daily.      • potassium chloride (KLOR-CON) 20 MEQ packet Take 20 mEq by mouth Daily.      • predniSONE (DELTASONE) 1 MG tablet Take 1 mg by mouth Daily.      • PREDNISONE PO Take  by mouth.      • traZODone (DESYREL) 50 MG tablet Take 50 mg by mouth Every Night.          Medicines:  Current Facility-Administered Medications   Medication Dose Route Frequency Provider Last Rate Last Dose   • aspirin EC tablet 81 mg  81 mg Oral Daily Pino Tang MD   81 mg at 06/16/17 0841   • cefTRIAXone (ROCEPHIN) 1 g/100 mL 0.9% NS (MBP)  1 g Intravenous Q24H Carter Llamas MD       • dextrose (D50W) solution 50 mL  50 mL Intravenous Q1H PRN Bree Soares MD   50 mL at 06/16/17 0502   • HYDROmorphone (DILAUDID) injection 0.25 mg  0.25 mg Intravenous Q4H PRN Carter Llamas MD   0.25 mg at 06/16/17 0436   • ipratropium-albuterol (DUO-NEB) nebulizer solution 3 mL  3 mL Nebulization Q6H While Awake - RT Carter Llamas MD       • levothyroxine (SYNTHROID, LEVOTHROID) tablet 50 mcg  50 mcg Oral Q AM Carter Llamas MD       • megestrol (MEGACE) 40 MG/ML suspension 400 mg  400 mg Oral Daily Carter Llamas MD   400 mg at 06/16/17 0841   • midodrine (PROAMATINE) tablet 5 mg  5 mg Oral TID AC FANNIE Blanco   5 mg at 06/16/17 0841   • norepinephrine (LEVOPHED) 8,000 mcg in sodium chloride 0.9 % 250 mL (32 mcg/mL) infusion  0.02-0.3 mcg/kg/min Intravenous Titrated Jaden Tovar, APRN 17.31 " "mL/hr at 06/16/17 0658 0.18 mcg/kg/min at 06/16/17 0658   • ondansetron (ZOFRAN) injection 4 mg  4 mg Intravenous Q6H PRN Pino Tang MD   4 mg at 06/15/17 0555   • pantoprazole (PROTONIX) EC tablet 40 mg  40 mg Oral Q AM Pino Tang MD   40 mg at 06/12/17 0610   • sodium chloride 0.9 % flush 1-10 mL  1-10 mL Intravenous PRN Pino Tang MD           Past Medical History:  Past Medical History:   Diagnosis Date   • A-fib    • Arthritis    • CHF (congestive heart failure)    • Hypertension    • Renal failure        Past Surgical History:  Past Surgical History:   Procedure Laterality Date   • ARTERIOVENOUS FISTULA LEG Left        Family History  History reviewed. No pertinent family history.    Social History  Social History     Social History   • Marital status: Single     Spouse name: N/A   • Number of children: N/A   • Years of education: N/A     Occupational History   • Not on file.     Social History Main Topics   • Smoking status: Former Smoker   • Smokeless tobacco: Not on file   • Alcohol use No   • Drug use: No   • Sexual activity: Defer     Other Topics Concern   • Not on file     Social History Narrative         Review of Systems:  History obtained from chart review and the patient  General ROS: Patient complains of malaise and No fever or chills  Respiratory ROS: No cough, shortness of breath, wheezing  Cardiovascular ROS: no chest pain or dyspnea on exertion  Gastrointestinal ROS: No abdominal pain, no nausea  Genito-Urinary ROS: No dysuria or hematuria  Neurological ROS: negative  14 point ROS reviewed with the patient and negative except as noted above and in the HPI unless unable to obtain.    Objective:  BP (!) 86/54  Pulse 101  Temp 97.7 °F (36.5 °C) (Temporal Artery )   Resp 20  Ht 62\" (157.5 cm)  Wt 111 lb (50.3 kg)  SpO2 92%  BMI 20.3 kg/m2    Intake/Output Summary (Last 24 hours) at 06/16/17 0943  Last data filed at 06/16/17 0502   Gross per 24 hour   Intake            423.5 ml "   Output                0 ml   Net            423.5 ml     General: awake/alert    Neck: supple, no JVD  Chest:  clear to auscultation bilaterally without respiratory distress  CVS: regular rate and rhythm  Abdominal: soft, nontender, normal bowel sounds  Extremities: no cyanosis or edema  Skin: warm and dry without rash   Neuro: no focal motor deficits      Labs:    Results from last 7 days  Lab Units 06/16/17  0229 06/15/17  0405 06/14/17  0511   WBC 10*3/mm3 11.13* 7.60 6.99   HEMOGLOBIN g/dL 9.7* 10.3* 10.9*   HEMATOCRIT % 28.7* 31.0* 32.3*   PLATELETS 10*3/mm3 47* 72* 101*           Results from last 7 days  Lab Units 06/16/17  0229 06/15/17  0405 06/14/17  2212  06/14/17  0511  06/13/17  0556  06/11/17  2047   SODIUM mmol/L 140 139  --   --  141  --  138  < > 139   POTASSIUM mmol/L 3.7 3.2*  --   --  3.3*  --  2.4*  < > 2.0*   CHLORIDE mmol/L 104 101  --   --  103  --  100  < > 100   TOTAL CO2 mmol/L 22.0* 19.0*  --   --  22.0*  --  22.0*  < > 19.0*   BUN mg/dL 24* 16  --   --  27*  --  49*  < > 66*   CREATININE mg/dL 5.39* 4.95*  --   --  7.86*  --  11.87*  < > 16.31*   CALCIUM mg/dL 8.6 8.5  --   --  8.3*  --  7.7*  < > 8.1*   BILIRUBIN mg/dL 2.7*  --   --   --   --   --  1.8*  --  2.4*   ALK PHOS U/L 92  --   --   --   --   --  94  --  81   ALT (SGPT) U/L 20  --   --   --   --   --  20  --  17   AST (SGOT) U/L 30  --   --   --   --   --  30  --  18   GLUCOSE mg/dL 84 195* 286*  < > 102*  < > 89  < > 72   < > = values in this interval not displayed.    Radiology:   Imaging Results (last 72 hours)     Procedure Component Value Units Date/Time    XR Chest 1 View [[id number]] Collected:  06/11/17 1803     Updated:  06/11/17 2054    Narrative:       EXAMINATION:   XR CHEST 1 VW-  6/11/2017 6:01 PM CDT     HISTORY: Chest pain     Prior exams 05/28/2016.     Single view of the chest is obtained. The pulmonary vascular margins are  slightly indistinct. This has the appearance of mild pulmonary vascular  congestion.  Left diaphragm is indistinct. This may be infiltrate or  atelectasis left lower lobe.     Cardiac silhouette is enlarged and unchanged from May 28.       Impression:       1. Silhouetting of the left diaphragm consistent with infiltrate or  atelectasis left lower lobe.  2. Indistinct pulmonary vascular margins consistent with mild pulmonary  vascular congestion.  This report was finalized on 06/11/2017 20:51 by Dr. Herson Magaña MD.          Culture:         Assessment   1. End stage renal disease on maintenance hemodialysis Monday Wednesday Friday   2. Hypokalemia--improved  3. Hypotension--still on low-dose Levophed  4. Anemia of chronic kidney disease  5. Diabetes mellitus type 2    Plan:  1.  Wean Levophed drip as tolerated.  2.  Continue hemodialysis; pt has now indicated that she wants dialysis today.      FANNIE Johnson  6/16/2017  9:43 AM       Electronically signed by FANNIE Blanco at 6/16/2017  9:49 AM           Consult Notes (most recent note)      Ana Luisa Magdaleno MD at 6/13/2017  1:11 PM  Version 1 of 1         Eat a central line.  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  Currently she has a dialysis fistula in her left leg area she has extensive scarring from this.  She is dialyzed 3 days a week.  Anesthesiologist attempted bilateral internal jugular vein catheterization for central lines this morning for unsuccessful meeting resistance at approximate 14 cm.  I have been asked to place a femoral line.  I have spoken with Dr. Valle regarding attempt at subclavian but I feel that this would be most likely fruitless with the findings during IJ attempts.  The extensive risks of bleeding infection injury to structures or the possibility that she has scarring from prior vascular interventions in the right groin which is our only option currently were discussed with the patient and family.     Electronically signed by Ana Luisa Magdaleno MD at 6/13/2017   1:16 PM           Nutrition Notes (most recent note)      Alma Patel at 2017  3:00 PM  Version 1 of 1         Problem: Patient Care Overview (Adult)  Goal: Plan of Care Review  Outcome: Ongoing (interventions implemented as appropriate)    17 1459   Coping/Psychosocial Response Interventions   Plan Of Care Reviewed With other (see comments)  (nurse)   Patient Care Overview   Progress no change   Outcome Evaluation   Outcome Summary/Follow up Plan Pt on Regular, Cardiac,Consistnent Carbohydrate diet, oral intake n/a at this time, Adding glucerna bid. Cont to follow.         Problem: Nutrition, Imbalanced: Inadequate Oral Intake (Adult)  Goal: Identify Related Risk Factors and Signs and Symptoms  Outcome: Ongoing (interventions implemented as appropriate)         Electronically signed by Alma Patel at 2017  3:00 PM           Physical Therapy Notes (most recent note)      Sal Yen, PT DPT at 6/15/2017 10:52 AM  Version 1 of 1         Acute Care - Physical Therapy Initial Evaluation  [facility]     Patient Name: Jennifer Marks  : 1945  MRN: [id number]  Today's Date: 6/15/2017   Onset of Illness/Injury or Date of Surgery Date: 17  Date of Referral to PT: 17  Referring Physician: Dr. Llamas      Admit Date: 2017     Visit Dx:    ICD-10-CM ICD-9-CM   1. Chest pain in adult R07.9 786.50   2. End stage renal disease N18.6 585.6   3. Hypotension, unspecified hypotension type I95.9 458.9   4. Chronic systolic (congestive) heart failure I50.22 428.22     428.0   5. Decreased activities of daily living (ADL) Z78.9 V49.89   6. Impaired mobility Z74.09 799.89     Patient Active Problem List   Diagnosis   • Chest pain in adult   • Renal failure   • Hypotension   • Anemia   • Hypokalemia     Past Medical History:   Diagnosis Date   • A-fib    • Arthritis    • CHF (congestive heart failure)    • Hypertension    • Renal failure      Past Surgical History:   Procedure  Laterality Date   • ARTERIOVENOUS FISTULA LEG Left           PT ASSESSMENT (last 72 hours)      PT Evaluation       06/15/17 0927 06/15/17 0925    Rehab Evaluation    Document Type evaluation   See MAR  -ND evaluation   See MAR  -YONIS    Subjective Information agree to therapy;complains of;weakness;fatigue;pain;nausea/vomiting  -ND agree to therapy;complains of;weakness;fatigue;dyspnea;pain  -YONIS    General Information    Patient Profile Review yes  -ND yes  -YONIS    Onset of Illness/Injury or Date of Surgery Date 06/11/17  -ND 06/11/17  -YONIS    Referring Physician Dr. Llamas  -ND Dr. Llamas  -YONIS    General Observations Pt. lying fowlers in fetal postion, R fem line, telemetry, confused  -ND Fowlers in bed, B knees flexed. confused  -YONIS    Pertinent History Of Current Problem Pt. admitted [place] [facility] [place], SOB, generalized pain, generalized weakness, diffuse arthralgias, myalgias, decreased appetite, re-occuring chest pain/abdomen pain. Dx: Atypical chest discomft, increased troponin, ESRD, non-ischemic cardiomyopathy, hypokalemia. H&H 10.9/ 32.3 XR chest- silhoueting L diaphragm with infiltrate or atelectasis.   -ND Pt. admitted from [facility] [place] with sx of nausea, SOB, generalized pain, generalized weakness, diffuse arthralgias, myalgias, decreased appetite, re-occuring chest pain/abdomen pain. Dx: Atypical chest discomft, increased troponin, ESRD, non-ischemic cardiomyopathy, hypokalemia. H&H 10.9/ 32.3 XR chest- silhoueting L diaphragm with infiltrate or atelectasis.   -YONIS    Precautions/Limitations fall precautions   dialysis MWF  -ND fall precautions;oxygen therapy device and L/min   dialysis MWF, Femoral line  -YONIS    Prior Level of Function --   unable to obtain at this time d/t decreased cognition  -ND --   unable to obtain at this time d/t decreased cognition  -YONIS    Equipment Currently Used at Home --   unable to obtain  -ND --   unable to obtain at this time d/t decreased cognition  -YONIS    Plans/Goals  Discussed With patient;agreed upon  -ND patient;agreed upon  -YONIS    Risks Reviewed patient:;LOB;nausea/vomiting;dizziness;change in vital signs;increased drainage;lines disloged;increased discomfort  -ND patient:;LOB;nausea/vomiting;dizziness;increased discomfort;change in vital signs;lines disloged  -YONIS    Benefits Reviewed patient:;improve function;increase independence;increase strength;increase balance;decrease pain;improve skin integrity;decrease risk of DVT;increase knowledge  -ND patient:;improve function;increase independence;increase strength;increase balance;decrease pain;increase knowledge;improve skin integrity  -YONIS    Barriers to Rehab medically complex;cognitive status;previous functional deficit;physical barrier;contractures  -ND medically complex;previous functional deficit;cognitive status;physical barrier;contractures  -YONIS    Living Environment    Lives With --  -ND     Living Arrangements --  -ND     Living Environment Comment unable to obtain at this time  -ND unable to obtain at this time d/t decreased cognition  -YONIS    Clinical Impression    Date of Referral to PT  06/14/17  -YONIS    PT Diagnosis  impaired mobility, weakness  -YONIS    Functional Level At Time Of Evaluation  dependent x 2 bed mobility  -YONIS    Patient/Family Goals Statement  PT goal to increase strength/mobility  -YONIS    Criteria for Skilled Therapeutic Interventions Met  yes;treatment indicated  -YONIS    Impairments Found (describe specific impairments)  arousal, attention, and cognition;gait, locomotion, and balance;joint integrity and mobility;muscle performance;posture;ROM  -YONIS    Functional Limitations in Following Categories (Describe Specific Limitations)  self-care;home management  -YONIS    Disability: Inability to Perform Actions/Activities of Required Roles (describe specific disability)  community/leisure  -YONIS    Rehab Potential  fair, will monitor progress closely  -YONIS    Predicted Duration of Therapy Intervention (days/wks)   until d/c  -YONIS    Vital Signs    Pre Systolic BP Rehab  106  -YONIS    Pre Treatment Diastolic BP  90  -YONIS    Post Systolic BP Rehab  82  -YONIS    Post Treatment Diastolic BP  53  -YONIS    Pretreatment Heart Rate (beats/min) 108  -  -YONIS    Posttreatment Heart Rate (beats/min)  103  -YONIS    Pre SpO2 (%) 97  -ND 97  -YONIS    O2 Delivery Pre Treatment  supplemental O2   2L/NC  -YONIS    Post SpO2 (%)  97  -YONIS    O2 Delivery Post Treatment  supplemental O2   2L/NC  -YONIS    Pre Patient Position  Supine  -YONIS    Intra Patient Position  Sitting  -YONIS    Post Patient Position  Supine  -YONIS    Pain Assessment    Pain Assessment Morrison-Cameron FACES  -ND Morrison-Cameron FACES  -YONIS    Morrison-Cameron FACES Pain Rating 6  -ND 6  -YONIS    Pain Location Generalized  -ND Generalized  -YONIS    Pain Descriptors  --   unable to state  -YONIS    Pain Intervention(s)  Repositioned  -YONIS    Response to Interventions  tolerated  -YONIS    Vision Assessment/Intervention    Visual Impairment unable/difficult to assess  -ND unable/difficult to assess  -YONIS    Cognitive Assessment/Intervention    Current Cognitive/Communication Assessment impaired   minimal speech  -ND impaired   minimal speech  -YONIS    Orientation Status oriented to;person   cues needed for name and age  -ND oriented to;person   cues needed for name and age  -YONIS    Follows Commands/Answers Questions 25% of the time;able to follow single-step instructions;needs cueing;needs increased time;needs repetition;speech unintelligible  -ND able to follow single-step instructions;25% of the time;needs cueing;needs increased time;needs repetition;speech unintelligible  -YONIS    Personal Safety severe impairment;decreased awareness, need for assist;decreased awareness, need for safety;decreased insight to deficits  -ND decreased awareness, need for assist;decreased awareness, need for safety;severe impairment  -YONIS    Personal Safety Interventions fall prevention program maintained;supervised activity  -ND fall prevention  program maintained;gait belt;muscle strengthening facilitated;nonskid shoes/slippers when out of bed  -YONIS    ROM (Range of Motion)    General ROM upper extremity range of motion deficits identified  -ND     General ROM Detail BUE 75+% impaired d/t pt. resisting and tone  -ND R LE AROM impaired ~ 25-50%, L LE AROM impaired ~ 75-90%. (tone vs pt. resisting?) See OT IE for B UE  -YONIS    MMT (Manual Muscle Testing)    General MMT Assessment Detail BUE 2-/5  -ND B LE ~ 2-/5, see OT IE for B UE  -YONIS    Muscle Tone Assessment    Muscle Tone Assessment LUE;RUE  -ND LLE;RLE  -YONIS    LUE Muscle Tone Assessment moderately increased tone  -ND     RUE Muscle Tone Assessment moderately increased tone  -ND     LLE Muscle Tone Assessment  severely increased tone  -YONIS    RLE Muscle Tone Assessment  moderately increased tone  -YONIS    Bed Mobility, Assessment/Treatment    Bed Mobility, Assistive Device head of bed elevated;draw sheet  -ND head of bed elevated;draw sheet  -YONIS    Bed Mobility, Scoot/Bridge, [place] dependent (less than 25% patient effort);verbal cues required;nonverbal cues required (demo/gesture)  -ND dependent (less than 25% patient effort);2 person assist required   draw sheet  -YONIS    Bed Mob, Supine to Sit, [place] verbal cues required;nonverbal cues required (demo/gesture);dependent (less than 25% patient effort);2 person assist required  -ND dependent (less than 25% patient effort);2 person assist required  -YONIS    Bed Mob, Sit to Supine, [place] verbal cues required;nonverbal cues required (demo/gesture);dependent (less than 25% patient effort);2 person assist required  -ND dependent (less than 25% patient effort);2 person assist required  -YONIS    Bed Mobility, Safety Issues decreased use of arms for pushing/pulling;decreased use of legs for bridging/pushing;impaired trunk control for bed mobility;cognitive deficits limit understanding  -ND cognitive deficits limit understanding;decreased use of arms  for pushing/pulling;decreased use of legs for bridging/pushing;impaired trunk control for bed mobility  -YONIS    Bed Mobility, Impairments ROM decreased;decreased flexibility;muscle tone abnormal;strength decreased;impaired balance;postural control impaired;motor control impaired  -ND ROM decreased;decreased flexibility;muscle tone abnormal;strength decreased;impaired balance;postural control impaired  -YONIS    Transfer Assessment/Treatment    Transfer, Comment deferred at this time d/t pt. having femoral line and decreased cognition/weakness/tone  -ND     Motor Skills/Interventions    Additional Documentation Balance Skills Training (Group)  -ND Balance Skills Training (Group)  -YONIS    Balance Skills Training    Sitting-Level of Assistance Moderate assistance;Maximum assistance  -ND Maximum assistance  -YONIS    Sitting-Balance Support Right upper extremity supported;Left upper extremity supported;Feet unsupported  -ND Right upper extremity supported;Left upper extremity supported;Feet unsupported  -YONIS    Sensory Assessment/Intervention    Sensory Impairment --   withdrew from  painful stimulus to BUE  -ND --   unable to accurately assess  -YONIS    Positioning and Restraints    Pre-Treatment Position in bed  -ND in bed  -YONIS    Post Treatment Position bed  -ND bed  -YONIS    In Bed side lying right;fowlers;call light within reach;encouraged to call for assist;side rails up x2;patient within staff view  -ND fowlers;side lying right;call light within reach;encouraged to call for assist;patient within staff view;notified nsg  -YONIS      06/14/17 1310 06/14/17 1305    Rehab Evaluation    Document Type --  -ND --  -YONIS    Evaluation Not Performed other (see comments)  -ND     Evaluation Not Performed, Comment Pt. receiving dialysis. Will check back at later time.   -ND On dialysis, PT to check back tomorrow.  -YONIS    General Information    Patient Profile Review yes  -ND yes  -YONIS    Onset of Illness/Injury or Date of Surgery Date  06/11/17  -ND 06/11/17  -YONIS    Referring Physician Dr. Llamas  -RITA Llamas  -YONIS    Pertinent History Of Current Problem Pt. admitted from [facility] [place] with sx of nausea, SOB, generalized pain, generalized weakness, diffuse arthralgias, myalgias, decreased appetite, re-occuring chest pain/abdomen pain. Dx: Atypical chest discomft, increased troponin, ESRD, non-ischemic cardiomyopathy, hypokalemia. H&H 10.9/ 32.3 XR chest- silhoueting L diaphragm with infiltrate or atelectasis.   -ND Pt. admitted from [facility] [place] with sx of nausea, SOB, generalized pain, generalized weakness, diffuse arthralgias, myalgias, decreased appetite, re-occuring chest pain/abdomen pain. Dx: Atypical chest discomft, increased troponin, ESRD, non-ischemic cardiomyopathy, hypokalemia. H&H 10.9/ 32.3 XR chest- silhoueting L diaphragm with infiltrate or atelectasis.  -YONIS    Precautions/Limitations fall precautions   Dialysis MWF  -ND     Living Environment    Lives With alone  -ND     Living Arrangements house  -ND     Clinical Impression    Date of Referral to PT  06/14/17  -YONIS      06/12/17 1351       General Information    Equipment Currently Used at Home walker, standard;walker, rolling;wheelchair  -CE     Living Environment    Lives With alone  -CE     Living Arrangements house  -CE     Transportation Available car;family or friend will provide;public transportation  -CE       User Key  (r) = Recorded By, (t) = Taken By, (c) = Cosigned By    Initials Name Provider Type    YONIS Yen, PT DPT Physical Therapist    CE ESPERANZA ZaragozaW     RITA Gaston, OTR/L Occupational Therapist          Physical Therapy Education     Title: PT OT SLP Therapies (Active)     Topic: Physical Therapy (Active)     Point: Mobility training (Active)    Learning Progress Summary    Learner Readiness Method Response Comment Documented by Status   Patient Acceptance E NR Educated pt. on progression of PT POC and benefits of activity YONIS 06/15/17  0925 Active                      User Key     Initials Effective Dates Name Provider Type Discipline    YONIS 08/02/16 -  Sal Yen, PT DPT Physical Therapist PT                PT Recommendation and Plan  Anticipated Discharge Disposition: skilled nursing facility  Planned Therapy Interventions: bed mobility training, balance training, home exercise program, patient/family education, postural re-education, ROM (Range of Motion), strengthening  PT Frequency: daily, 2 times/day  Plan of Care Review  Plan Of Care Reviewed With: patient  Outcome Summary/Follow up Plan: PT eval completed. Pt. dependent x 2 to get EOB and max assist to sit EOB. Pt. followed ~ 25% of simple commands. Pt. with weakness and tone vs resisting movement in B UE/LE. In bed B UE/LE flexed in the fetal position. PT will work to progress pt's strength, mobility, tone, and ROM. PT will place pt. on caseload, if unable to make progress PT will sign off. Anticipate d/c to SNF.           IP PT Goals       06/15/17 0925          Bed Mobility PT LTG    Bed Mobility PT LTG, Date Established 06/15/17  -YONIS      Bed Mobility PT LTG, Time to Achieve by discharge  -YONIS      Bed Mobility PT LTG, Activity Type roll left/roll right;supine to sit/sit to supine  -YONIS      Bed Mobility PT LTG, [place] Level minimum assist (75% patient effort)  -YONIS      Bed Mobility PT Goal  LTG, Assist Device bed rails  -YONIS      Strength Goal PT LTG    Strength Goal PT LTG, Date Established 06/15/17  -YONIS      Strength Goal PT LTG, Time to Achieve by discharge  -YONIS      Strength Goal PT LTG, Measure to Achieve AAROM, B UE/LE, 10-20 reps, min assist  -YONIS      Dynamic Sitting Balance PT LTG    Dynamic Sitting Balance PT LTG, Date Established 06/15/17  -YONIS      Dynamic Sitting Balance PT LTG, Time to Achieve by discharge  -YONIS      Dynamic Sitting Balance PT LTG, [place] Level minimum assist (75% patient effort)  -YONIS      Dynamic Sitting Balance PT LTG, Assist Device UE  Support;bed rails  -YONIS        User Key  (r) = Recorded By, (t) = Taken By, (c) = Cosigned By    Initials Name Provider Type    YONIS Yen, PT DPT Physical Therapist                Outcome Measures       06/15/17 1000 06/15/17 0925       How much help from another person do you currently need...    Turning from your back to your side while in flat bed without using bedrails?  1  -YONIS     Moving from lying on back to sitting on the side of a flat bed without bedrails?  1  -YONIS     Moving to and from a bed to a chair (including a wheelchair)?  1  -YONIS     Standing up from a chair using your arms (e.g., wheelchair, bedside chair)?  1  -YONIS     Climbing 3-5 steps with a railing?  1  -YONIS     To walk in hospital room?  1  -YONIS     AM-PAC 6 Clicks Score  6  -YNOIS     How much help from another is currently needed...    Putting on and taking off regular lower body clothing? 1  -ND      Bathing (including washing, rinsing, and drying) 1  -ND      Toileting (which includes using toilet bed pan or urinal) 1  -ND      Putting on and taking off regular upper body clothing 2  -ND      Taking care of personal grooming (such as brushing teeth) 2  -ND      Eating meals 2  -ND      Score 9  -ND      Functional Assessment    Outcome Measure Options AM-PAC 6 Clicks Daily Activity (OT)  -ND AM-PAC 6 Clicks Basic Mobility (PT)  -YONIS       User Key  (r) = Recorded By, (t) = Taken By, (c) = Cosigned By    Initials Name Provider Type    YONIS Yen, PT DPT Physical Therapist    RITA Gaston, OTR/L Occupational Therapist           Time Calculation:         PT Charges       06/15/17 1050          Time Calculation    Start Time 0925  -YONIS      Stop Time 1010   Attempted eval and chart review yesterday, 15 minutes. PT with 60 total minutes.   -YONIS      Time Calculation (min) 45 min  -YONIS      PT Received On 06/15/17  -YONIS      PT Goal Re-Cert Due Date 06/25/17  -YONIS        User Key  (r) = Recorded By, (t) = Taken By, (c) = Cosigned By     Initials Name Provider Type    YONIS Yen, PT DPT Physical Therapist          Therapy Charges for Today     Code Description Service Date Service Provider Modifiers Qty    31602922522 HC PT CHNG MAIN POS CURRENT 6/15/2017 Sal Yen, PT DPT GP, CN 1    17361752663 HC PT CHNG MAIN POS PROJECTED 6/15/2017 Sal Yen, PT DPT GP, CL 1    64209961608 HC PT EVAL MOD COMPLEXITY 4 6/15/2017 Sal Yen, PT DPT GP, KX 1          PT G-Codes  Outcome Measure Options: AM-PAC 6 Clicks Daily Activity (OT)  Score: 6  Functional Limitation: Changing and maintaining body position  Changing and Maintaining Body Position Current Status (): 100 percent impaired, limited or restricted  Changing and Maintaining Body Position Goal Status (): At least 60 percent but less than 80 percent impaired, limited or restricted      Sal Yen PT DPT  6/15/2017          Electronically signed by Sal Yen PT DPT at 6/15/2017 10:53 AM           Occupational Therapy Notes (most recent note)      Ailyn Gaston, OTR/L at 6/15/2017 10:21 AM  Version 1 of 1         Acute Care - Occupational Therapy Initial Evaluation  [facility]     Patient Name: Jennifer Marks  : 1945  MRN: [id number]  Today's Date: 6/15/2017  Onset of Illness/Injury or Date of Surgery Date: 17  Date of Referral to OT: 17  Referring Physician: Dr. Llamas    Admit Date: 2017       ICD-10-CM ICD-9-CM   1. Chest pain in adult R07.9 786.50   2. End stage renal disease N18.6 585.6   3. Hypotension, unspecified hypotension type I95.9 458.9   4. Chronic systolic (congestive) heart failure I50.22 428.22     428.0   5. Decreased activities of daily living (ADL) Z78.9 V49.89     Patient Active Problem List   Diagnosis   • Chest pain in adult   • Renal failure   • Hypotension   • Anemia   • Hypokalemia     Past Medical History:   Diagnosis Date   • A-fib    • Arthritis    • CHF (congestive heart failure)    •  Hypertension    • Renal failure      Past Surgical History:   Procedure Laterality Date   • ARTERIOVENOUS FISTULA LEG Left           OT ASSESSMENT FLOWSHEET (last 72 hours)      OT Evaluation       06/15/17 0927 06/15/17 0925 06/14/17 1310 06/14/17 1305 06/12/17 1351    Rehab Evaluation    Document Type evaluation   See MAR  -ND evaluation   See MAR  -YONIS --  -ND --  -YONIS     Subjective Information agree to therapy;complains of;weakness;fatigue;pain;nausea/vomiting  -ND agree to therapy;complains of;weakness;fatigue;dyspnea;pain  -YONIS       Evaluation Not Performed   other (see comments)  -ND      Evaluation Not Performed, Comment   Pt. receiving dialysis. Will check back at later time.   -ND On dialysis, PT to check back tomorrow.  -YONIS     General Information    Patient Profile Review yes  -ND yes  -YONIS yes  -ND yes  -YONIS     Onset of Illness/Injury or Date of Surgery Date 06/11/17  -ND 06/11/17  -YONIS 06/11/17  -ND 06/11/17  -YONIS     Referring Physician Dr. Farhad POLANCO     General Observations Pt. lying fowlers in fetal postion, R fem line, telemetry, confused  -ND Fowlers in bed, B knees flexed. confused  -YONIS       Pertinent History Of Current Problem [place] [facility] [place], SOB, generalized pain, generalized weakness, diffuse arthralgias, myalgias, decreased appetite, re-occuring chest pain/abdomen pain. Dx: Atypical chest discomft, increased troponin, ESRD, non-ischemic cardiomyopathy, hypokalemia. H&H 10.9/ 32.3 XR chest- silhoueting L diaphragm with infiltrate or atelectasis.   -ND [place] [facility] [place], SOB, generalized pain, generalized weakness, diffuse arthralgias, myalgias, decreased appetite, re-occuring chest pain/abdomen pain. Dx: Atypical chest discomft, increased troponin, ESRD, non-ischemic cardiomyopathy, hypokalemia. H&H 10.9/ 32.3 XR chest- silhoueting L diaphragm with infiltrate or atelectasis.   -YONIS Pt. admitted from  [facility] [place] with sx of nausea, SOB, generalized pain, generalized weakness, diffuse arthralgias, myalgias, decreased appetite, re-occuring chest pain/abdomen pain. Dx: Atypical chest discomft, increased troponin, ESRD, non-ischemic cardiomyopathy, hypokalemia. H&H 10.9/ 32.3 XR chest- silhoueting L diaphragm with infiltrate or atelectasis.   -ND Pt. admitted from [facility] [place] with sx of nausea, SOB, generalized pain, generalized weakness, diffuse arthralgias, myalgias, decreased appetite, re-occuring chest pain/abdomen pain. Dx: Atypical chest discomft, increased troponin, ESRD, non-ischemic cardiomyopathy, hypokalemia. H&H 10.9/ 32.3 XR chest- silhoueting L diaphragm with infiltrate or atelectasis.  -YONIS     Precautions/Limitations fall precautions   dialysis MWF  -ND fall precautions;oxygen therapy device and L/min   dialysis MWF  -YONIS fall precautions   Dialysis MWF  -ND      Prior Level of Function --   unable to obtain at this time d/t decreased cognition  -ND --   unable to obtain at this time d/t decreased cognition  -YONIS       Equipment Currently Used at Home --   unable to obtain  -ND --   unable to obtain at this time d/t decreased cognition  -YONIS   walker, standard;walker, rolling;wheelchair  -CE    Plans/Goals Discussed With patient;agreed upon  -ND patient;agreed upon  -YONIS       Risks Reviewed patient:;LOB;nausea/vomiting;dizziness;change in vital signs;increased drainage;lines disloged;increased discomfort  -ND patient:;LOB;nausea/vomiting;dizziness;increased discomfort;change in vital signs;lines disloged  -YONIS       Benefits Reviewed patient:;improve function;increase independence;increase strength;increase balance;decrease pain;improve skin integrity;decrease risk of DVT;increase knowledge  -ND        Barriers to Rehab medically complex;cognitive status;previous functional deficit;physical barrier;contractures  -ND        Living Environment    Lives With --  -ND  alone  -ND  alone  -CE    Living Arrangements --  -ND   house  -ND  house  -CE    Transportation Available     car;family or friend will provide;public transportation  -CE    Living Environment Comment unable to obtain at this time  -ND        Clinical Impression    Date of Referral to OT 06/14/17  -ND  06/14/17  -ND      OT Diagnosis DECREASED ADL  -ND  DECREASED ADL  -ND      Prognosis fair  -ND        Impairments Found (describe specific impairments) aerobic capacity/endurance;arousal, attention, and cognition;ergonomics and body mechanics;gait, locomotion, and balance;integumentary integrity;joint integrity and mobility;motor function;muscle performance;posture;ROM  -ND        Patient/Family Goals Statement pt. did not state a goal  -ND        Criteria for Skilled Therapeutic Interventions Met yes;treatment indicated  -ND        Rehab Potential fair, will monitor progress closely  -ND        Therapy Frequency 3-5 times/wk  -ND        Predicted Duration of Therapy Intervention (days/wks) 10 days  -ND        Anticipated Discharge Disposition skilled nursing facility  -ND        Vital Signs    Pretreatment Heart Rate (beats/min) 108  -  -YONIS       Pre SpO2 (%) 97  -ND 97  -YONIS       Pain Assessment    Pain Assessment Morrison-Cameron FACES  -ND Morrison-Cameron FACES  -YONIS       Morrison-Cameron FACES Pain Rating 6  -ND        Pain Location Generalized  -ND Generalized  -YONIS       Vision Assessment/Intervention    Visual Impairment unable/difficult to assess  -ND        Cognitive Assessment/Intervention    Current Cognitive/Communication Assessment impaired   minimal speech  -ND impaired   minimaal speech  -YONIS       Orientation Status oriented to;person   cues needed for name and age  -ND oriented to;person   cues needed for name and age  -YONIS       Follows Commands/Answers Questions 25% of the time;able to follow single-step instructions;needs cueing;needs increased time;needs repetition;speech unintelligible  -ND        Personal Safety severe impairment;decreased awareness, need for  assist;decreased awareness, need for safety;decreased insight to deficits  -ND        Personal Safety Interventions fall prevention program maintained;supervised activity  -ND        ROM (Range of Motion)    General ROM upper extremity range of motion deficits identified  -ND        General ROM Detail BUE 75+% impaired d/t pt. resisting and tone  -ND        MMT (Manual Muscle Testing)    General MMT Assessment Detail BUE 2-/5  -ND        Muscle Tone Assessment    Muscle Tone Assessment LUE;RUE  -ND        LUE Muscle Tone Assessment moderately increased tone  -ND        RUE Muscle Tone Assessment moderately increased tone  -ND        Bed Mobility, Assessment/Treatment    Bed Mobility, Assistive Device head of bed elevated;draw sheet  -ND        Bed Mobility, Scoot/Bridge, [place] dependent (less than 25% patient effort);verbal cues required;nonverbal cues required (demo/gesture)  -ND        Bed Mob, Supine to Sit, [place] verbal cues required;nonverbal cues required (demo/gesture);dependent (less than 25% patient effort);2 person assist required  -ND        Bed Mob, Sit to Supine, [place] verbal cues required;nonverbal cues required (demo/gesture);dependent (less than 25% patient effort);2 person assist required  -ND        Bed Mobility, Safety Issues decreased use of arms for pushing/pulling;decreased use of legs for bridging/pushing;impaired trunk control for bed mobility;cognitive deficits limit understanding  -ND        Bed Mobility, Impairments ROM decreased;decreased flexibility;muscle tone abnormal;strength decreased;impaired balance;postural control impaired;motor control impaired  -ND        Transfer Assessment/Treatment    Transfer, Comment deferred at this time d/t pt. having femoral line and decreased cognition/weakness/tone  -ND        Lower Body Dressing Assessment/Training    LB Dressing Assess/Train, Clothing Type doffing:;donning:;socks  -ND        LB Dressing Assess/Train, Position  edge of bed  -ND        LB Dressing Assess/Train, [place] dependent (less than 25% patient effort)  -ND        LB Dressing Assess/Train, Impairments decreased flexibility;ROM decreased;strength decreased;impaired balance;coordination impaired;motor control impaired;postural control impaired  -ND        Motor Skills/Interventions    Additional Documentation Balance Skills Training (Group)  -ND        Balance Skills Training    Sitting-Level of Assistance Moderate assistance;Maximum assistance  -ND        Sitting-Balance Support Right upper extremity supported;Left upper extremity supported;Feet unsupported  -ND        Sensory Assessment/Intervention    Sensory Impairment --   withdrew from  painful stimulus to BUE  -ND        General Therapy Interventions    Planned Therapy Interventions activity intolerance;ADL retraining;balance training;bed mobility training;home exercise program;energy conservation;stretching;strengthening  -ND        Positioning and Restraints    Pre-Treatment Position in bed  -ND        Post Treatment Position bed  -ND        In Bed side lying right;fowlers;call light within reach;encouraged to call for assist;side rails up x2;patient within staff view  -ND          User Key  (r) = Recorded By, (t) = Taken By, (c) = Cosigned By    Initials Name Effective Dates    YONIS Yen, PT DPT 08/02/16 -     CE Jackie Conn, BSW 09/15/16 -     ND Ailyn Gaston, OTR/L 10/21/16 -            Occupational Therapy Education     Title: PT OT SLP Therapies (Done)     Topic: Occupational Therapy (Done)     Point: ADL training (Done)    Description: Instruct learner(s) on proper safety adaptation and remediation techniques during self care or transfers.   Instruct in proper use of assistive devices.    Learning Progress Summary    Learner Readiness Method Response Comment Documented by Status   Patient Acceptance E VU,NR Pt. educated on role of OT, safe bed mob, benefit of activity, and  progression with poc ND 06/15/17 1015 Done               Point: Home exercise program (Done)    Description: Instruct learner(s) on appropriate technique for monitoring, assisting and/or progressing therapeutic exercises/activities.    Learning Progress Summary    Learner Readiness Method Response Comment Documented by Status   Patient Acceptance E VU,NR Pt. educated on role of OT, safe bed mob, benefit of activity, and progression with poc ND 06/15/17 1015 Done               Point: Body mechanics (Done)    Description: Instruct learner(s) on proper positioning and spine alignment during self-care, functional mobility activities and/or exercises.    Learning Progress Summary    Learner Readiness Method Response Comment Documented by Status   Patient Acceptance E VU,NR Pt. educated on role of OT, safe bed mob, benefit of activity, and progression with poc ND 06/15/17 1015 Done                      User Key     Initials Effective Dates Name Provider Type Discipline    ND 10/21/16 -  Ailyn Gaston, OTR/L Occupational Therapist OT                  OT Recommendation and Plan  Anticipated Discharge Disposition: skilled nursing facility  Planned Therapy Interventions: activity intolerance, ADL retraining, balance training, bed mobility training, home exercise program, energy conservation, stretching, strengthening  Therapy Frequency: 3-5 times/wk  Plan of Care Review  Plan Of Care Reviewed With: patient  Progress: progress toward functional goals as expected  Outcome Summary/Follow up Plan: OT eval completed. Pt. demonstrated decreased cognition limiting full ability to obtain information for thorough eval. Pt. lies in fetal position d/t flexion synergy BUE and BLE. Pt. depx 2 to complete bed mob. Pt. dep for LB dressing. Pt. followed 25% one step commands with mod/max verbal and tactile cues. Pt. cont to benefit from skilled OT d/t increased tone, decreased ROM, decreased cognition, decreased strength, decreased  independence in ADLs. At this time OT will pick pt. up on caseload and monitor closely. If pt. is not making progression OT can d/c. Anticipated dc SNF. 6/15/17          OT Goals       06/15/17 1015          Bed Mobility OT LTG    Bed Mobility OT LTG, Date Established 06/15/17  -ND      Bed Mobility OT LTG, Time to Achieve by discharge  -ND      Bed Mobility OT LTG, Activity Type all bed mobility  -ND      Bed Mobility OT LTG, [place] Level moderate assist (50% patient effort)  -ND      Bed Mobility OT LTG, Assist Device bed rails  -ND      Strength OT LTG    Strength Goal OT LTG, Date Established 06/15/17  -ND      Strength Goal OT LTG, Time to Achieve by discharge  -ND      Strength Goal OT LTG, Measure to Achieve Pt. will complete BUE strengthening exercsies to increase BUE strength and decreased tone for completing ADLs.   -ND      Grooming OT LTG    Grooming Goal OT LTG, Date Established 06/15/17  -ND      Grooming Goal OT LTG, Time to Achieve by discharge  -ND      Grooming Goal OT LTG, [place] Level moderate assist (50% patient effort)  -ND        User Key  (r) = Recorded By, (t) = Taken By, (c) = Cosigned By    Initials Name Provider Type    RITA Gaston, OTR/L Occupational Therapist                Outcome Measures       06/15/17 1000          How much help from another is currently needed...    Putting on and taking off regular lower body clothing? 1  -ND      Bathing (including washing, rinsing, and drying) 1  -ND      Toileting (which includes using toilet bed pan or urinal) 1  -ND      Putting on and taking off regular upper body clothing 2  -ND      Taking care of personal grooming (such as brushing teeth) 2  -ND      Eating meals 2  -ND      Score 9  -ND      Functional Assessment    Outcome Measure Options AM-PAC 6 Clicks Daily Activity (OT)  -ND        User Key  (r) = Recorded By, (t) = Taken By, (c) = Cosigned By    Initials Name Provider Type    RITA Gaston OTR/L  Occupational Therapist          Time Calculation:   OT Start Time: 0927 (15 min spent with pt. chart review/previous day checking on pt not included)  OT Stop Time: 1015  OT Time Calculation (min): 48 min    Therapy Charges for Today     Code Description Service Date Service Provider Modifiers Qty    44392115724 HC OT SELFCARE CURRENT 6/15/2017 Ailyn Gaston OTR/L GO, CL 1    34320675487 HC OT SELFCARE PROJECTED 6/15/2017 Ailyn Gaston OTR/L GO, CK 1    13963599525 HC OT EVAL MOD COMPLEXITY 4 6/15/2017 Ailyn Gaston OTR/L GO, KX 1          OT G-codes  OT Functional Scales Options: AM-PAC 6 Clicks Daily Activity (OT)  Functional Limitation: Self care  Self Care Current Status (): At least 60 percent but less than 80 percent impaired, limited or restricted  Self Care Goal Status (): At least 40 percent but less than 60 percent impaired, limited or restricted    Ailyn Gaston OTR/L  6/15/2017     Electronically signed by BRENTON Aquino at 6/15/2017 10:22 AM           Speech Language Pathology Notes (most recent note)      Cate Stein CCC-SLP at 6/16/2017  2:27 PM  Version 1 of 1         Screened to determine if pt would benefit from speech consult. Nursing reported pt has poor appetite, but no issues with speech, language, or swallowing.     06/16/17 1427   Time Calculation- SLP   SLP Start Time 1424   SLP Stop Time 1426   SLP Time Calculation (min) 2 min   SLP Non-Billable Time (min) 2 min   SLP Received On 06/16/17   CHRISTIAN Walker 6/16/2017 2:27 PM         Electronically signed by CHRISTIAN Pleitez at 6/16/2017  2:28 PM

## 2017-06-17 LAB
ALBUMIN SERPL-MCNC: 2 G/DL (ref 3.5–5)
ALBUMIN/GLOB SERPL: 0.6 G/DL (ref 1.1–2.5)
ALP SERPL-CCNC: 89 U/L (ref 24–120)
ALT SERPL W P-5'-P-CCNC: 25 U/L (ref 0–54)
ANION GAP SERPL CALCULATED.3IONS-SCNC: 12 MMOL/L (ref 4–13)
ANISOCYTOSIS BLD QL: ABNORMAL
AST SERPL-CCNC: 26 U/L (ref 7–45)
BILIRUB SERPL-MCNC: 2.7 MG/DL (ref 0.1–1)
BUN BLD-MCNC: 17 MG/DL (ref 5–21)
BUN/CREAT SERPL: 5.7 (ref 7–25)
BURR CELLS BLD QL SMEAR: ABNORMAL
CALCIUM SPEC-SCNC: 7.8 MG/DL (ref 8.4–10.4)
CHLORIDE SERPL-SCNC: 100 MMOL/L (ref 98–110)
CO2 SERPL-SCNC: 25 MMOL/L (ref 24–31)
CREAT BLD-MCNC: 3 MG/DL (ref 0.5–1.4)
DEPRECATED RDW RBC AUTO: 62 FL (ref 40–54)
EOSINOPHIL # BLD MANUAL: 0.24 10*3/MM3 (ref 0–0.7)
EOSINOPHIL NFR BLD MANUAL: 2 % (ref 0–4)
ERYTHROCYTE [DISTWIDTH] IN BLOOD BY AUTOMATED COUNT: 20.5 % (ref 12–15)
GFR SERPL CREATININE-BSD FRML MDRD: 18 ML/MIN/1.73
GLOBULIN UR ELPH-MCNC: 3.3 GM/DL
GLUCOSE BLD-MCNC: 146 MG/DL (ref 70–100)
GLUCOSE BLDC GLUCOMTR-MCNC: 122 MG/DL (ref 70–130)
GLUCOSE BLDC GLUCOMTR-MCNC: 127 MG/DL (ref 70–130)
GLUCOSE BLDC GLUCOMTR-MCNC: 158 MG/DL (ref 70–130)
GLUCOSE BLDC GLUCOMTR-MCNC: 162 MG/DL (ref 70–130)
GLUCOSE BLDC GLUCOMTR-MCNC: 70 MG/DL (ref 70–130)
GLUCOSE BLDC GLUCOMTR-MCNC: 86 MG/DL (ref 70–130)
HCT VFR BLD AUTO: 27.1 % (ref 37–47)
HGB BLD-MCNC: 9 G/DL (ref 12–16)
LYMPHOCYTES # BLD MANUAL: 0.35 10*3/MM3 (ref 0.72–4.86)
LYMPHOCYTES NFR BLD MANUAL: 1 % (ref 4–12)
LYMPHOCYTES NFR BLD MANUAL: 3 % (ref 15–45)
MCH RBC QN AUTO: 28.1 PG (ref 28–32)
MCHC RBC AUTO-ENTMCNC: 33.2 G/DL (ref 33–36)
MCV RBC AUTO: 84.7 FL (ref 82–98)
MONOCYTES # BLD AUTO: 0.12 10*3/MM3 (ref 0.19–1.3)
NEUTROPHILS # BLD AUTO: 10.93 10*3/MM3 (ref 1.87–8.4)
NEUTROPHILS NFR BLD MANUAL: 74 % (ref 39–78)
NEUTS BAND NFR BLD MANUAL: 19 % (ref 0–10)
PLATELET # BLD AUTO: 43 10*3/MM3 (ref 130–400)
POTASSIUM BLD-SCNC: 3.5 MMOL/L (ref 3.5–5.3)
PROT SERPL-MCNC: 5.3 G/DL (ref 6.3–8.7)
RBC # BLD AUTO: 3.2 10*6/MM3 (ref 4.2–5.4)
SCAN SLIDE: NORMAL
SMALL PLATELETS BLD QL SMEAR: ABNORMAL
SODIUM BLD-SCNC: 137 MMOL/L (ref 135–145)
TARGETS BLD QL SMEAR: ABNORMAL
VARIANT LYMPHS NFR BLD MANUAL: 1 % (ref 0–5)
WBC MORPH BLD: NORMAL
WBC NRBC COR # BLD: 11.75 10*3/MM3 (ref 4.8–10.8)

## 2017-06-17 PROCEDURE — 80053 COMPREHEN METABOLIC PANEL: CPT | Performed by: FAMILY MEDICINE

## 2017-06-17 PROCEDURE — 25010000002 HYDROMORPHONE PER 4 MG: Performed by: FAMILY MEDICINE

## 2017-06-17 PROCEDURE — 99232 SBSQ HOSP IP/OBS MODERATE 35: CPT | Performed by: INTERNAL MEDICINE

## 2017-06-17 PROCEDURE — 94799 UNLISTED PULMONARY SVC/PX: CPT

## 2017-06-17 PROCEDURE — 94660 CPAP INITIATION&MGMT: CPT

## 2017-06-17 PROCEDURE — 25010000002 THIAMINE PER 100 MG: Performed by: FAMILY MEDICINE

## 2017-06-17 PROCEDURE — 85025 COMPLETE CBC W/AUTO DIFF WBC: CPT | Performed by: FAMILY MEDICINE

## 2017-06-17 PROCEDURE — 97110 THERAPEUTIC EXERCISES: CPT

## 2017-06-17 PROCEDURE — 85007 BL SMEAR W/DIFF WBC COUNT: CPT | Performed by: FAMILY MEDICINE

## 2017-06-17 PROCEDURE — 97530 THERAPEUTIC ACTIVITIES: CPT

## 2017-06-17 PROCEDURE — 82962 GLUCOSE BLOOD TEST: CPT

## 2017-06-17 PROCEDURE — 94760 N-INVAS EAR/PLS OXIMETRY 1: CPT

## 2017-06-17 PROCEDURE — 25010000002 CEFTRIAXONE: Performed by: FAMILY MEDICINE

## 2017-06-17 RX ADMIN — MIDODRINE HYDROCHLORIDE 5 MG: 2.5 TABLET ORAL at 12:07

## 2017-06-17 RX ADMIN — IPRATROPIUM BROMIDE AND ALBUTEROL SULFATE 3 ML: 2.5; .5 SOLUTION RESPIRATORY (INHALATION) at 18:30

## 2017-06-17 RX ADMIN — PANTOPRAZOLE SODIUM 40 MG: 40 TABLET, DELAYED RELEASE ORAL at 06:25

## 2017-06-17 RX ADMIN — MEGESTROL ACETATE 400 MG: 40 SUSPENSION ORAL at 09:45

## 2017-06-17 RX ADMIN — MIDODRINE HYDROCHLORIDE 5 MG: 2.5 TABLET ORAL at 09:45

## 2017-06-17 RX ADMIN — NOREPINEPHRINE BITARTRATE 0.14 MCG/KG/MIN: 1 INJECTION INTRAVENOUS at 22:42

## 2017-06-17 RX ADMIN — HYDROMORPHONE HYDROCHLORIDE 0.25 MG: 1 INJECTION, SOLUTION INTRAMUSCULAR; INTRAVENOUS; SUBCUTANEOUS at 12:58

## 2017-06-17 RX ADMIN — ASPIRIN 81 MG: 81 TABLET ORAL at 09:45

## 2017-06-17 RX ADMIN — HYDROMORPHONE HYDROCHLORIDE 0.25 MG: 1 INJECTION, SOLUTION INTRAMUSCULAR; INTRAVENOUS; SUBCUTANEOUS at 07:03

## 2017-06-17 RX ADMIN — THIAMINE HYDROCHLORIDE 50 ML/HR: 100 INJECTION, SOLUTION INTRAMUSCULAR; INTRAVENOUS at 09:44

## 2017-06-17 RX ADMIN — CEFTRIAXONE 1 G: 1 INJECTION, POWDER, FOR SOLUTION INTRAMUSCULAR; INTRAVENOUS at 09:44

## 2017-06-17 RX ADMIN — DEXTROSE MONOHYDRATE 50 ML: 25 INJECTION, SOLUTION INTRAVENOUS at 08:00

## 2017-06-17 RX ADMIN — NOREPINEPHRINE BITARTRATE 0.26 MCG/KG/MIN: 1 INJECTION INTRAVENOUS at 05:51

## 2017-06-17 RX ADMIN — LEVOTHYROXINE SODIUM 50 MCG: 50 TABLET ORAL at 06:25

## 2017-06-17 NOTE — PLAN OF CARE
"Problem: Patient Care Overview (Adult)  Goal: Plan of Care Review    06/17/17 0956   Coping/Psychosocial Response Interventions   Plan Of Care Reviewed With patient   Patient Care Overview   Progress declining   Outcome Evaluation   Outcome Summary/Follow up Plan Pt not motivated to move, refused to sit eob, resisted all movements and asked me to \"please stop\". PROM-AAROM B LE/UE, reported to nsg.            "

## 2017-06-17 NOTE — PLAN OF CARE
Problem: Patient Care Overview (Adult)  Goal: Plan of Care Review  Outcome: Ongoing (interventions implemented as appropriate)  PT remains on levophed gtt to maintain blood pressure.  Pt/daughter refused dobhoff tube.    Goal: Adult Individualization and Mutuality  Outcome: Ongoing (interventions implemented as appropriate)  Goal: Discharge Needs Assessment  Outcome: Ongoing (interventions implemented as appropriate)

## 2017-06-17 NOTE — PLAN OF CARE
"Problem: Patient Care Overview (Adult)  Goal: Plan of Care Review  Outcome: Ongoing (interventions implemented as appropriate)    06/17/17 0956   Coping/Psychosocial Response Interventions   Plan Of Care Reviewed With patient   Patient Care Overview   Progress declining   Outcome Evaluation   Outcome Summary/Follow up Plan Pt not motivated to move, refused to sit eob, resisted all movements and asked me to \"please stop\". DId minimal PROM with pt resisting, reported to nsg.            "

## 2017-06-17 NOTE — PROGRESS NOTES
"DIALYSIS PROGRESS NOTE.      Patient:  Jennifer Marks  YOB: 1945  Date of Service: 6/17/2017  MRN: [id number]   Acct: [id number]   Primary Care Physician: No Known Provider  Advance Directive: Conditional Code  Admit Date: 6/11/2017       Hospital Day: 6  Referring Provider: Pino Tang MD      Patient Seen, Chart, Consults, Notes, Labs, Radiology studies reviewed.        Subjective:    Jennifer Marks is a 72 y.o. female whom we were consulted for end stage renal disease management, hypokalemia. She is on hemodialysis Monday Wednesday Friday; Missed hemodialysis on (6/09). Admitted with chest pain. Has had decreased oral intake and diarrhea for several days. She was hypotensive and was given levophed. She has also had tachycardia and given Cardizem (now off). Patient' status continued to decline. Family is aware and trying to make decisions for further life support and future procedures and nutrition. Today, patient is awake. She offered no new complaints. She was still anorectic. She is s/p dialysis yesterday.     Review of Systems:  History obtained from chart review and the patient  General ROS: No fever or chills  Respiratory ROS: No cough, +shortness of breath,- wheezing  Cardiovascular ROS: no chest pain but dyspnea on exertion  Gastrointestinal ROS: No abdominal pain or melena  Genito-Urinary ROS: No dysuria or hematuria  Musculoskeletal: negative  Skin: decubitus sacral ulcer    Objective:  /61  Pulse 76  Temp 98.1 °F (36.7 °C) (Temporal Artery )   Resp 18  Ht 62\" (157.5 cm)  Wt 111 lb (50.3 kg)  SpO2 95%  BMI 20.3 kg/m2    Intake/Output Summary (Last 24 hours) at 06/17/17 1230  Last data filed at 06/16/17 1846   Gross per 24 hour   Intake              120 ml   Output                0 ml   Net              120 ml       Physical examination:  General: awake/alert   Chest: Bilateral air entry  CVS: regular rate and rhythm  Abdominal: soft, nontender, normal bowel " sounds  Extremities: trace leg edema  Skin: warm and dry  Neuro: No focal motor deficits    Labs:  Lab Results (last 24 hours)     Procedure Component Value Units Date/Time    POC Glucose Fingerstick [[id number]]  (Normal) Collected:  06/16/17 1637    Specimen:  Blood Updated:  06/16/17 1649     Glucose 94 mg/dL       : [id number] Mirza AmberMeter ID: [id number]       POC Glucose Fingerstick [[id number]]  (Normal) Collected:  06/17/17 0258    Specimen:  Blood Updated:  06/17/17 0319     Glucose 86 mg/dL       : [id number] Vitaliy BergferMeter ID: [id number]       POC Glucose Fingerstick [[id number]]  (Normal) Collected:  06/17/17 0709    Specimen:  Blood Updated:  06/17/17 0728     Glucose 70 mg/dL       : [id number] Iván Marni ID: [id number]       CBC & Differential [[id number]] Collected:  06/17/17 0827    Specimen:  Blood Updated:  06/17/17 0909    Narrative:       The following orders were created for panel order CBC & Differential.  Procedure                               Abnormality         Status                     ---------                               -----------         ------                     Manual Differential[[id number]]          Abnormal            Final result               Scan Slide[[id number]]                                       Final result               CBC Auto Differential[[id number]]        Abnormal            Final result                 Please view results for these tests on the individual orders.    CBC Auto Differential [[id number]]  (Abnormal) Collected:  06/17/17 0827    Specimen:  Blood Updated:  06/17/17 0909     WBC 11.75 (H) 10*3/mm3      RBC 3.20 (L) 10*6/mm3      Hemoglobin 9.0 (L) g/dL      Hematocrit 27.1 (L) %      MCV 84.7 fL      MCH 28.1 pg      MCHC 33.2 g/dL      RDW 20.5 (H) %      RDW-SD 62.0 (H) fl      Platelets 43 (L) 10*3/mm3     Scan Slide [[id number]] Collected:  06/17/17 0827    Specimen:  Blood Updated:  06/17/17 0909     Scan Slide --      See  Manual Differential Results       Manual Differential [[id number]]  (Abnormal) Collected:  06/17/17 0827    Specimen:  Blood Updated:  06/17/17 0909     Neutrophil % 74.0 %      Lymphocyte % 3.0 (L) %      Monocyte % 1.0 (L) %      Eosinophil % 2.0 %      Bands %  19.0 (H) %      Atypical Lymphocyte % 1.0 %      Neutrophils Absolute 10.93 (H) 10*3/mm3      Lymphocytes Absolute 0.35 (L) 10*3/mm3      Monocytes Absolute 0.12 (L) 10*3/mm3      Eosinophils Absolute 0.24 10*3/mm3      Anisocytosis Slight/1+     Crenated RBC's Slight/1+     Target Cells Mod/2+     WBC Morphology Normal     Platelet Estimate Decreased    Comprehensive Metabolic Panel [[id number]]  (Abnormal) Collected:  06/17/17 0827    Specimen:  Blood Updated:  06/17/17 0915     Glucose 146 (H) mg/dL      BUN 17 mg/dL       Specimen hemolyzed. Results may be affected.        Creatinine 3.00 (H) mg/dL      Sodium 137 mmol/L      Potassium 3.5 mmol/L       Specimen hemolyzed.  Results may be affected.        Chloride 100 mmol/L      CO2 25.0 mmol/L      Calcium 7.8 (L) mg/dL      Total Protein 5.3 (L) g/dL      Albumin 2.00 (L) g/dL      ALT (SGPT) 25 U/L       Specimen hemolyzed.  Results may be affected.        AST (SGOT) 26 U/L       Specimen hemolyzed.  Results may be affected.        Alkaline Phosphatase 89 U/L       Specimen hemolyzed. Results may be affected.        Total Bilirubin 2.7 (H) mg/dL      eGFR  African Amer 18 (L) mL/min/1.73      Globulin 3.3 gm/dL      A/G Ratio 0.6 (L) g/dL      BUN/Creatinine Ratio 5.7 (L)     Anion Gap 12.0 mmol/L     Narrative:       The MDRD GFR formula is only valid for adults with stable renal function between ages 18 and 70.    POC Glucose Fingerstick [[id number]]  (Normal) Collected:  06/17/17 0937    Specimen:  Blood Updated:  06/17/17 0948     Glucose 122 mg/dL       : [id number] Iván Marin ID: [id number]       Blood Culture [[id number]]  (Normal) Collected:  06/16/17 0918    Specimen:  Blood from  Arm, Left Updated:  06/17/17 1001     Blood Culture No growth at 24 hours          Radiology:   Imaging Results (last 24 hours)     ** No results found for the last 24 hours. **              Assessment   1. End stage renal disease on maintenance hemodialysis Monday Wednesday Friday   2. Hypokalemia  3. Hypotension--  4. Anemia of chronic kidney disease  5. Diabetes mellitus type 2  6. Failure to thrive.     Plan:  Dialysis next on 6/19. I discussed patient's case with daughter and another family member. They still want her to go on ventilator if needed, receive necessary cardiac meds, and continue dialysis. She was judged poor candidate for PEG tube placement. The decision was then made to place a Dobbhoff tube for feeding.          Dg Ball MD  6/17/2017  12:30 PM

## 2017-06-17 NOTE — PLAN OF CARE
Problem: Patient Care Overview (Adult)  Goal: Plan of Care Review  Outcome: Ongoing (interventions implemented as appropriate)    06/17/17 1000   Coping/Psychosocial Response Interventions   Plan Of Care Reviewed With other (see comments)  (Danni RN)   Patient Care Overview   Progress no change   Outcome Evaluation   Outcome Summary/Follow up Plan Anticipating Dobhoff placement today, secondary to poor PO intake. RDN recommends initiation of Jevity 1.2 continuous per NG once placed and verified at 25ml/hr q24hrs then advance by 10ml/hr as tolerated to goal rate of 55ml/hr. Auto Flush with water 20ml/hr. Also recommend to continue Megace, Regular diet, and Glucerna BID. Encourage intake. RDN will continue to FU.         Problem: Nutrition, Imbalanced: Inadequate Oral Intake (Adult)  Goal: Improved Oral Intake  Outcome: Ongoing (interventions implemented as appropriate)  Goal: Prevent Further Weight Loss  Outcome: Ongoing (interventions implemented as appropriate)

## 2017-06-17 NOTE — NURSING NOTE
Attempted dobhoff insertion, patient started screaming to stop. Daughter at bedside and states to not attempt to initiate tube feeding. Patient continues to refuse to eat. Dr. Browning aware and updated family regarding pt. Condition.

## 2017-06-17 NOTE — THERAPY TREATMENT NOTE
Acute Care - Physical Therapy Treatment Note  [facility]     Patient Name: Jennifer Marks  : 1945  MRN: [id number]  Today's Date: 2017  Onset of Illness/Injury or Date of Surgery Date: 17  Date of Referral to PT: 17  Referring Physician: Dr. Llamas    Admit Date: 2017    Visit Dx:    ICD-10-CM ICD-9-CM   1. Chest pain in adult R07.9 786.50   2. End stage renal disease N18.6 585.6   3. Hypotension, unspecified hypotension type I95.9 458.9   4. Chronic systolic (congestive) heart failure I50.22 428.22     428.0   5. Decreased activities of daily living (ADL) Z78.9 V49.89   6. Impaired mobility Z74.09 799.89     Patient Active Problem List   Diagnosis   • Chest pain in adult   • Renal failure   • Hypotension   • Anemia   • Hypokalemia               Adult Rehabilitation Note       17 0956 17 1200       Rehab Assessment/Intervention    Discipline physical therapy assistant  -LG occupational therapy assistant  -KL     Document Type therapy note (daily note)  -      Subjective Information agree to therapy;complains of;weakness;fatigue;pain  -      Treatment Not Performed  other (see comments)  -     Treatment Not Performed, Comment  Nrsg refused;  at rest   -     Precautions/Limitations fall precautions  -      Specific Treatment Considerations Pt not motivated to move, nsg reports had to apply UE restraints last night. Pt resists all movements.   -LG      Recorded by [LG] Arcenio Ribeiro PTA [KL] KYA Falk/L     Pain Assessment    Pain Assessment Morrison-Cameron FACES  -LG      Morrison-Cameron FACES Pain Rating 6  -LG      Pain Location Generalized   more verbal when moving knees/hips  -LG      Pain Intervention(s) Repositioned  -LG      Response to Interventions tolerated  -LG      Recorded by [LG] Arcenio Ribeiro PTA      Bed Mobility, Assessment/Treatment    Bed Mobility, Roll Left, [place] verbal cues required;maximum assist (25% patient effort)  -LG       Bed Mobility, Roll Right, [place] verbal cues required;maximum assist (25% patient effort)  -      Bed Mobility, Scoot/Bridge, [place] dependent (less than 25% patient effort)   used drawsheet to reposition in bed  -LG      Bed Mobility, Safety Issues cognitive deficits limit understanding;decreased use of arms for pushing/pulling;decreased use of legs for bridging/pushing  -      Bed Mobility, Impairments strength decreased   poor initiative to move  -LG      Bed Mobility, Comment pt refused to try to sit eob  -LG      Recorded by [LG] Arcenio Ribeiro PTA      Therapy Exercises    Bilateral Lower Extremities AAROM:;15 reps;supine;ankle pumps/circles;heel slides;hip abduction/adduction   pt resisted most all movements, very contracted knees/hips  -LG      Recorded by [LG] Arcenio Ribeiro PTA      Positioning and Restraints    Pre-Treatment Position in bed  -LG      Post Treatment Position bed  -LG      In Bed fowlers;call light within reach;encouraged to call for assist;patient within staff view;heels elevated;notified nsg  -LG      Restraints released:;reapplied:   UE  -LG      Recorded by [LG] Arcenio Ribeiro PTA        User Key  (r) = Recorded By, (t) = Taken By, (c) = Cosigned By    Initials Name Effective Dates     Arcenio Ribeiro PTA 08/02/16 -     BEATRIZ Mendez 12/08/16 -                 IP PT Goals       06/15/17 0925          Bed Mobility PT LTG    Bed Mobility PT LTG, Date Established 06/15/17  -YONIS      Bed Mobility PT LTG, Time to Achieve by discharge  -YONIS      Bed Mobility PT LTG, Activity Type roll left/roll right;supine to sit/sit to supine  -YONIS      Bed Mobility PT LTG, [place] Level minimum assist (75% patient effort)  -YONIS      Bed Mobility PT Goal  LTG, Assist Device bed rails  -YONIS      Strength Goal PT LTG    Strength Goal PT LTG, Date Established 06/15/17  -YONIS      Strength Goal PT LTG, Time to Achieve by discharge  -YONIS      Strength Goal PT LTG, Measure to  Achieve AAROM, B UE/LE, 10-20 reps, min assist  -YONIS      Dynamic Sitting Balance PT LTG    Dynamic Sitting Balance PT LTG, Date Established 06/15/17  -YONIS      Dynamic Sitting Balance PT LTG, Time to Achieve by discharge  -YONIS      Dynamic Sitting Balance PT LTG, [place] Level minimum assist (75% patient effort)  -YONIS      Dynamic Sitting Balance PT LTG, Assist Device UE Support;bed rails  -YONIS        User Key  (r) = Recorded By, (t) = Taken By, (c) = Cosigned By    Initials Name Provider Type    YONIS Yen, PT DPT Physical Therapist          Physical Therapy Education     Title: PT OT SLP Therapies (Active)     Topic: Physical Therapy (Active)     Point: Mobility training (Done)    Learning Progress Summary    Learner Readiness Method Response Comment Documented by Status   Patient Acceptance EMARI,NR Educated in benefits of activity and need to move to decrease contractures.  06/17/17 0956 Done    Acceptance E NR Educated pt. on progression of PT POC and benefits of activity  06/15/17 0925 Active               Point: Home exercise program (Done)    Learning Progress Summary    Learner Readiness Method Response Comment Documented by Status   Patient Acceptance E,MARI MINOR,NR Educated in benefits of activity and need to move to decrease contractures.  06/17/17 0956 Done                      User Key     Initials Effective Dates Name Provider Type Discipline     08/02/16 -  Arcenio Ribeiro, HILLARY Physical Therapy Assistant PT     08/02/16 -  Sal Yen, PT DPT Physical Therapist PT                    PT Recommendation and Plan  Anticipated Discharge Disposition: skilled nursing facility  Planned Therapy Interventions: bed mobility training, balance training, home exercise program, patient/family education, postural re-education, ROM (Range of Motion), strengthening  PT Frequency: daily, 2 times/day  Plan of Care Review  Plan Of Care Reviewed With: patient  Progress: declining  Outcome Summary/Follow  "up Plan: Pt not motivated to move, refused to sit eob, resisted all movements and asked me to \"please stop\". PROM-HOLLY LAM/UMIMI, reported to [facility].           Outcome Measures       06/17/17 1015 06/15/17 1000 06/15/17 0925    How much help from another person do you currently need...    Turning from your back to your side while in flat bed without using bedrails? 1  -LG  1  -YONIS    Moving from lying on back to sitting on the side of a flat bed without bedrails? 1  -LG  1  -YONIS    Moving to and from a bed to a chair (including a wheelchair)? 1  -LG  1  -YONIS    Standing up from a chair using your arms (e.g., wheelchair, bedside chair)? 1  -LG  1  -YONIS    Climbing 3-5 steps with a railing? 1  -LG  1  -YONIS    To walk in hospital room? 1  -LG  1  -YONIS    AM-PAC 6 Clicks Score 6  -LG  6  -YONIS    How much help from another is currently needed...    Putting on and taking off regular lower body clothing?  1  -ND     Bathing (including washing, rinsing, and drying)  1  -ND     Toileting (which includes using toilet bed pan or urinal)  1  -ND     Putting on and taking off regular upper body clothing  2  -ND     Taking care of personal grooming (such as brushing teeth)  2  -ND     Eating meals  2  -ND     Score  9  -ND     Functional Assessment    Outcome Measure Options AM-PAC 6 Clicks Basic Mobility (PT)  -LG AM-PAC 6 Clicks Daily Activity (OT)  -ND AM-PAC 6 Clicks Basic Mobility (PT)  -YONIS      User Key  (r) = Recorded By, (t) = Taken By, (c) = Cosigned By    Initials Name Provider Type    ASHLEY Ribeiro, PTA Physical Therapy Assistant    YONIS Yen, PT DPT Physical Therapist    RITA Gaston, OTR/L Occupational Therapist           Time Calculation:         PT Charges       06/17/17 0956          Time Calculation    Start Time 0956  -LG      Stop Time 1019  -LG      Time Calculation (min) 23 min  -LG      PT Received On 06/17/17  -      PT Goal Re-Cert Due Date 06/25/17  -      Time Calculation- PT    Total Timed " Code Minutes- PT 23 minute(s)  -LG        User Key  (r) = Recorded By, (t) = Taken By, (c) = Cosigned By    Initials Name Provider Type    ASHLEY Ribeiro PTA Physical Therapy Assistant          Therapy Charges for Today     Code Description Service Date Service Provider Modifiers Qty    99970288098  PT THERAPEUTIC ACT EA 15 MIN 6/17/2017 Arcenio Ribeiro PTA GP, KX 1    [id number] HC PT THER PROC EA 15 MIN 6/17/2017 Arcenio Ribeiro PTA GP, KX 1          PT G-Codes  Outcome Measure Options: AM-PAC 6 Clicks Basic Mobility (PT)  Score: 6  Functional Limitation: Changing and maintaining body position  Changing and Maintaining Body Position Current Status (): 100 percent impaired, limited or restricted  Changing and Maintaining Body Position Goal Status (): At least 60 percent but less than 80 percent impaired, limited or restricted    Arcenio Ribeiro PTA  6/17/2017

## 2017-06-17 NOTE — PROGRESS NOTES
Referring Provider: Pino Tang MD    Length of Stay: 6    Chief Complaint:   Chief Complaint   Patient presents with   • Chest Pain       Subjective: nonverbal    Medications  Current Facility-Administered Medications   Medication Dose Route Frequency Provider Last Rate Last Dose   • aspirin EC tablet 81 mg  81 mg Oral Daily Pino Tang MD   81 mg at 06/17/17 0945   • cefTRIAXone (ROCEPHIN) 1 g/100 mL 0.9% NS (MBP)  1 g Intravenous Q24H Carter Llamas MD   1 g at 06/17/17 0944   • dextrose (D50W) solution 50 mL  50 mL Intravenous Q1H PRN Bree Soares MD   50 mL at 06/17/17 0800   • diltiaZEM (CARDIZEM) 125mg/125 mL (1 mg/mL) infusion  5-15 mg/hr Intravenous Titrated Carter Llamas MD 8 mL/hr at 06/17/17 0243 8 mg/hr at 06/17/17 0243   • HYDROmorphone (DILAUDID) injection 0.25 mg  0.25 mg Intravenous Q4H PRN Carter Llamas MD   0.25 mg at 06/17/17 0703   • ipratropium-albuterol (DUO-NEB) nebulizer solution 3 mL  3 mL Nebulization Q6H While Awake - RT Carter Llamas MD   3 mL at 06/16/17 1914   • levothyroxine (SYNTHROID, LEVOTHROID) tablet 50 mcg  50 mcg Oral Q AM Carter Llamas MD   50 mcg at 06/17/17 0625   • megestrol (MEGACE) 40 MG/ML suspension 400 mg  400 mg Oral Daily Carter Llamas MD   400 mg at 06/17/17 0945   • midodrine (PROAMATINE) tablet 5 mg  5 mg Oral TID AC FANNIE Blanco   5 mg at 06/17/17 0945   • multiple vitamin (M.V.I. Adult) 10 mL, thiamine (B-1) 100 mg, folic acid 1 mg in dextrose 5 % and sodium chloride 0.45 % 1,000 mL infusion  50 mL/hr Intravenous Daily Carter Llamas MD 50 mL/hr at 06/17/17 0944 50 mL/hr at 06/17/17 0944   • norepinephrine (LEVOPHED) 8,000 mcg in sodium chloride 0.9 % 250 mL (32 mcg/mL) infusion  0.02-0.3 mcg/kg/min Intravenous Titrated FANNIE Blanco 21.2 mL/hr at 06/17/17 0655 0.22 mcg/kg/min at 06/17/17 0655   • ondansetron (ZOFRAN) injection 4 mg  4 mg Intravenous Q6H PRN Pino Tang MD   4 mg at 06/15/17 0555   • pantoprazole (PROTONIX)  EC tablet 40 mg  40 mg Oral Q AM Pino Tang MD   40 mg at 06/17/17 0625   • sodium chloride 0.9 % flush 1-10 mL  1-10 mL Intravenous PRN Pino Tang MD           Past Medical History:   Diagnosis Date   • A-fib    • Arthritis    • CHF (congestive heart failure)    • Hypertension    • Renal failure    ,   Past Surgical History:   Procedure Laterality Date   • ARTERIOVENOUS FISTULA LEG Left    ,   History reviewed. No pertinent family history.,   Social History   Substance Use Topics   • Smoking status: Former Smoker   • Smokeless tobacco: None   • Alcohol use No   ,    Review of Systems  Review of Systems   Unable to perform ROS: patient nonverbal       Objective     Physical Exam:  Patient Vitals for the past 24 hrs:   BP Temp Temp src Pulse Resp SpO2   06/17/17 0900 95/55 - - 93 20 95 %   06/17/17 0800 (!) 85/47 - - 76 20 94 %   06/17/17 0720 - 98.1 °F (36.7 °C) Temporal Art - - -   06/17/17 0701 - - - - - 90 %   06/17/17 0615 109/90 - - - 21 91 %   06/17/17 0545 107/63 - - 99 21 (!) 88 %   06/17/17 0505 109/72 - - 89 20 (!) 81 %   06/17/17 0449 - - - 100 - (!) 80 %   06/17/17 0405 92/78 - - 100 21 -   06/17/17 0330 92/51 - - 90 21 91 %   06/17/17 0250 120/61 - - 98 20 95 %   06/17/17 0200 99/54 - - 99 20 (!) 88 %   06/17/17 0100 95/60 - - 100 19 98 %   06/17/17 0000 106/58 - - 98 17 98 %   06/16/17 2300 116/63 - - 96 24 100 %   06/16/17 2200 123/64 - - 94 18 97 %   06/16/17 2100 122/65 - - 94 18 100 %   06/16/17 2000 112/68 - - 89 17 97 %   06/16/17 1914 - - - - - 100 %   06/16/17 1900 112/58 - - 98 18 98 %   06/16/17 1800 91/59 - - 106 - (!) 89 %   06/16/17 1700 (!) 87/56 - - 91 - (!) 85 %   06/16/17 1600 113/65 97.8 °F (36.6 °C) Temporal Art 83 16 96 %   06/16/17 1500 115/58 - - 75 16 97 %   06/16/17 1400 114/64 - - 84 18 99 %   06/16/17 1345 - - - 80 19 95 %   06/16/17 1300 100/57 - - 118 17 -   06/16/17 1200 107/75 98 °F (36.7 °C) Temporal Art (!) 140 17 -   06/16/17 1100 111/60 - - 102 20 95 %      Physical Exam   Constitutional: She is oriented to person, place, and time. She appears well-developed and well-nourished. No distress.   HENT:   Head: Normocephalic and atraumatic.   Eyes: No scleral icterus.   Neck: Normal range of motion.   Cardiovascular: Normal rate, regular rhythm and normal heart sounds.  Exam reveals no gallop and no friction rub.    No murmur heard.  Pulmonary/Chest: Effort normal and breath sounds normal. No respiratory distress. She has no wheezes. She has no rales.   Abdominal: Soft. Bowel sounds are normal. She exhibits no distension. There is no tenderness.   Musculoskeletal: She exhibits no edema.   Neurological: She is alert and oriented to person, place, and time. No cranial nerve deficit.   Skin: Skin is warm and dry. She is not diaphoretic. No erythema.   Psychiatric: She has a normal mood and affect. Her behavior is normal.       Results Review:   I reviewed the patient's new clinical results.  Lab Results (last 24 hours)     Procedure Component Value Units Date/Time    POC Glucose Fingerstick [[id number]]  (Normal) Collected:  06/16/17 1207    Specimen:  Blood Updated:  06/16/17 1227     Glucose 89 mg/dL       : [id number] Mirza AmberMeter ID: [id number]       POC Glucose Fingerstick [[id number]]  (Normal) Collected:  06/16/17 1637    Specimen:  Blood Updated:  06/16/17 1649     Glucose 94 mg/dL       : [id number] Mirza AmberMeter ID: [id number]       POC Glucose Fingerstick [[id number]]  (Normal) Collected:  06/17/17 0258    Specimen:  Blood Updated:  06/17/17 0319     Glucose 86 mg/dL       : [id number] Vitaliy BergferMeter ID: [id number]       POC Glucose Fingerstick [[id number]]  (Normal) Collected:  06/17/17 0709    Specimen:  Blood Updated:  06/17/17 0728     Glucose 70 mg/dL       : [id number] Iván Marin ID: [id number]       CBC & Differential [[id number]] Collected:  06/17/17 0827    Specimen:  Blood Updated:  06/17/17 0909    Narrative:        The following orders were created for panel order CBC & Differential.  Procedure                               Abnormality         Status                     ---------                               -----------         ------                     Manual Differential[[id number]]          Abnormal            Final result               Scan Slide[[id number]]                                       Final result               CBC Auto Differential[[id number]]        Abnormal            Final result                 Please view results for these tests on the individual orders.    CBC Auto Differential [[id number]]  (Abnormal) Collected:  06/17/17 0827    Specimen:  Blood Updated:  06/17/17 0909     WBC 11.75 (H) 10*3/mm3      RBC 3.20 (L) 10*6/mm3      Hemoglobin 9.0 (L) g/dL      Hematocrit 27.1 (L) %      MCV 84.7 fL      MCH 28.1 pg      MCHC 33.2 g/dL      RDW 20.5 (H) %      RDW-SD 62.0 (H) fl      Platelets 43 (L) 10*3/mm3     Scan Slide [[id number]] Collected:  06/17/17 0827    Specimen:  Blood Updated:  06/17/17 0909     Scan Slide --      See Manual Differential Results       Manual Differential [[id number]]  (Abnormal) Collected:  06/17/17 0827    Specimen:  Blood Updated:  06/17/17 0909     Neutrophil % 74.0 %      Lymphocyte % 3.0 (L) %      Monocyte % 1.0 (L) %      Eosinophil % 2.0 %      Bands %  19.0 (H) %      Atypical Lymphocyte % 1.0 %      Neutrophils Absolute 10.93 (H) 10*3/mm3      Lymphocytes Absolute 0.35 (L) 10*3/mm3      Monocytes Absolute 0.12 (L) 10*3/mm3      Eosinophils Absolute 0.24 10*3/mm3      Anisocytosis Slight/1+     Crenated RBC's Slight/1+     Target Cells Mod/2+     WBC Morphology Normal     Platelet Estimate Decreased    Comprehensive Metabolic Panel [[id number]]  (Abnormal) Collected:  06/17/17 0827    Specimen:  Blood Updated:  06/17/17 0915     Glucose 146 (H) mg/dL      BUN 17 mg/dL       Specimen hemolyzed. Results may be affected.        Creatinine 3.00 (H) mg/dL      Sodium 137 mmol/L       Potassium 3.5 mmol/L       Specimen hemolyzed.  Results may be affected.        Chloride 100 mmol/L      CO2 25.0 mmol/L      Calcium 7.8 (L) mg/dL      Total Protein 5.3 (L) g/dL      Albumin 2.00 (L) g/dL      ALT (SGPT) 25 U/L       Specimen hemolyzed.  Results may be affected.        AST (SGOT) 26 U/L       Specimen hemolyzed.  Results may be affected.        Alkaline Phosphatase 89 U/L       Specimen hemolyzed. Results may be affected.        Total Bilirubin 2.7 (H) mg/dL      eGFR  African Amer 18 (L) mL/min/1.73      Globulin 3.3 gm/dL      A/G Ratio 0.6 (L) g/dL      BUN/Creatinine Ratio 5.7 (L)     Anion Gap 12.0 mmol/L     Narrative:       The MDRD GFR formula is only valid for adults with stable renal function between ages 18 and 70.    POC Glucose Fingerstick [[id number]]  (Normal) Collected:  06/17/17 0937    Specimen:  Blood Updated:  06/17/17 0948     Glucose 122 mg/dL       : [id number] Iván Marin ID: [id number]       Blood Culture [[id number]]  (Normal) Collected:  06/16/17 0918    Specimen:  Blood from Arm, Left Updated:  06/17/17 1001     Blood Culture No growth at 24 hours        No results found for: ECHOEFEST  Imaging Results (last 24 hours)     ** No results found for the last 24 hours. **          I have reviewed telemetry which reveals SR    Assessment/Plan   Active Problems:    Chest pain in adult    Renal failure    Hypotension    Anemia    Hypokalemia      New Problems that need treatment:  PAF, now SR.   Hypotension, on both levophed and Cardizem    Old problems that are stable:   ESRD  Anemia    MDM Moderate Complexity

## 2017-06-17 NOTE — PLAN OF CARE
Problem: Patient Care Overview (Adult)  Goal: Plan of Care Review  Outcome: Ongoing (interventions implemented as appropriate)    06/17/17 0722   Coping/Psychosocial Response Interventions   Plan Of Care Reviewed With patient   Patient Care Overview   Progress no change   Outcome Evaluation   Outcome Summary/Follow up Plan Cardizem continues at 8 and levophed at .22. One episode of afib rvr with a rate of 130. At approx. 0545 the pt became uncooperative and removed all of her monitor leads and both peripheral iv's. At shift change she did allow us to replace the monitor leads and an order for restraints was received.       Goal: Adult Individualization and Mutuality  Outcome: Ongoing (interventions implemented as appropriate)  Goal: Discharge Needs Assessment  Outcome: Ongoing (interventions implemented as appropriate)    Problem: Fall Risk (Adult)  Goal: Absence of Falls  Outcome: Ongoing (interventions implemented as appropriate)    Problem: Fluid Volume Excess (Adult,Obstetrics,Pediatric)  Goal: Stable Weight  Outcome: Ongoing (interventions implemented as appropriate)  Goal: Balanced Intake/Output  Outcome: Ongoing (interventions implemented as appropriate)    Problem: Pressure Ulcer Risk (New Scale) (Adult,Obstetrics,Pediatric)  Goal: Skin Integrity  Outcome: Ongoing (interventions implemented as appropriate)    Problem: Nutrition, Imbalanced: Inadequate Oral Intake (Adult)  Goal: Improved Oral Intake  Outcome: Ongoing (interventions implemented as appropriate)  Goal: Prevent Further Weight Loss  Outcome: Ongoing (interventions implemented as appropriate)    Problem: Skin Integrity Impairment, Risk/Actual (Adult)  Goal: Identify Related Risk Factors and Signs and Symptoms  Outcome: Ongoing (interventions implemented as appropriate)  Goal: Skin Integrity/Wound Healing  Outcome: Ongoing (interventions implemented as appropriate)

## 2017-06-17 NOTE — PROGRESS NOTES
[facility] Medicine Services  INPATIENT PROGRESS NOTE    Length of Stay: 6  Date of Admission: 6/11/2017  Primary Care Physician: No Known Provider    Subjective   Chief Complaint: Atrial fib, hypertension, weakness, kidney failure    HPI   Patient enough to do well.  Low sugar, start D5.  Patient has not been eating.  Blood pressure remained low.  Episode atrial fib yesterday, on Cardizem drip.  Hypertension on Levophed drip.  Patient has not been eating well, I would try NG tube feeding today.  Family change CODE STATUS to no chest compression.    Review of Systems   Constitutional: Positive for activity change, appetite change and fatigue. Negative for chills and fever.   HENT: Negative for hearing loss, nosebleeds, tinnitus and trouble swallowing.   Eyes: Negative for visual disturbance.   Respiratory: Negative for cough, chest tightness, shortness of breath and wheezing.   Cardiovascular: Negative for chest pain, palpitations and leg swelling.   Gastrointestinal: Negative for abdominal distention, abdominal pain, blood in stool, constipation, diarrhea, nausea and vomiting.   Endocrine: Negative for cold intolerance, heat intolerance, polydipsia, polyphagia and polyuria.   Genitourinary: Negative for decreased urine volume, difficulty urinating, dysuria, flank pain, frequency and hematuria.   Musculoskeletal: Positive for arthralgias, gait problem and myalgias. Negative for joint swelling.   Skin: Negative for rash.   Allergic/Immunologic: Negative for immunocompromised state.   Neurological: Positive for weakness. Negative for dizziness, syncope, light-headedness and headaches.   Hematological: Negative for adenopathy. Does not bruise/bleed easily.   Psychiatric/Behavioral: Negative for confusion and sleep disturbance. The patient is not nervous/anxious.     All pertinent negatives and positives are as above. All other systems have been reviewed and are negative unless  otherwise stated.     Objective    Temp:  [97.8 °F (36.6 °C)-98.1 °F (36.7 °C)] 98.1 °F (36.7 °C)  Heart Rate:  [] 99  Resp:  [16-24] 21  BP: ()/(51-90) 109/90    Intake/Output Summary (Last 24 hours) at 06/17/17 0742  Last data filed at 06/16/17 1846   Gross per 24 hour   Intake              323 ml   Output                0 ml   Net              323 ml     Physical Exam  Constitutional: She appears well-developed and well-nourished.   HENT:   Head: Normocephalic and atraumatic.   Eyes: Conjunctivae and EOM are normal. Pupils are equal, round, and reactive to light.   Neck: Neck supple. No JVD present. No thyromegaly present.   Cardiovascular: Normal rate, regular rhythm, normal heart sounds and intact distal pulses. Exam reveals no gallop and no friction rub.   No murmur heard.  Pulmonary/Chest: Effort normal. No respiratory distress. She has no wheezes. She has rales. She exhibits no tenderness.   Abdominal: Soft. Bowel sounds are normal. She exhibits no distension. There is no tenderness. There is no rebound and no guarding.   Musculoskeletal: Normal range of motion. She exhibits no edema, tenderness or deformity.   Lymphadenopathy:   She has no cervical adenopathy.   Neurological: She is alert. She displays normal reflexes. No cranial nerve deficit. She exhibits abnormal muscle tone. Coordination abnormal.   Skin: Skin is warm and dry. No rash noted.   Psychiatric: She has a normal mood and affect. Her behavior is normal.   Nursing note and vitals reviewed.     Results Review:  Lab Results (last 24 hours)     Procedure Component Value Units Date/Time    POC Glucose Fingerstick [[id number]]  (Normal) Collected:  06/16/17 1207    Specimen:  Blood Updated:  06/16/17 1227     Glucose 89 mg/dL       : [id number] Blue AmberMeter ID: [id number]       POC Glucose Fingerstick [[id number]]  (Normal) Collected:  06/16/17 1637    Specimen:  Blood Updated:  06/16/17 1649     Glucose 94 mg/dL       :  [id number] Mirza AmberMeter ID: [id number]       Blood Culture [[id number]]  (Normal) Collected:  06/16/17 0918    Specimen:  Blood from Arm, Left Updated:  06/16/17 2201     Blood Culture No growth at less than 24 hours    POC Glucose Fingerstick [[id number]]  (Normal) Collected:  06/17/17 0258    Specimen:  Blood Updated:  06/17/17 0319     Glucose 86 mg/dL       : [id number] Vitaliy BergferMeter ID: [id number]       POC Glucose Fingerstick [[id number]]  (Normal) Collected:  06/17/17 0709    Specimen:  Blood Updated:  06/17/17 0728     Glucose 70 mg/dL       : [id number] Iván Marin ID: [id number]              Cultures:  Blood Culture   Date Value Ref Range Status   06/16/2017 No growth at less than 24 hours  Preliminary       Radiology Data:    Imaging Results (last 24 hours)     ** No results found for the last 24 hours. **          Allergies   Allergen Reactions   • Heparin    • Iron    • Latex    • Levaquin [Levofloxacin]    • Shrimp (Diagnostic)    • Vancomycin        Scheduled meds:     aspirin 81 mg Oral Daily   ceftriaxone 1 g Intravenous Q24H   ipratropium-albuterol 3 mL Nebulization Q6H While Awake - RT   levothyroxine 50 mcg Oral Q AM   megestrol 400 mg Oral Daily   midodrine 5 mg Oral TID AC   pantoprazole 40 mg Oral Q AM       PRN meds:  dextrose  •  HYDROmorphone  •  ondansetron  •  sodium chloride    Assessment/Plan     Active Problems:    Chest pain in adult    Renal failure    Hypotension    Anemia    Hypokalemia      Plan:  Chest pain, atypical, echocardiogram/afib -Ejection fraction 61%, diastolic dysfunction, right ventricular dilatation, tricuspid valve regurgitation, right ventricle systolic pressure greater than 55. On Cardizem drip.    Hemoptysis ×1 6/15. Hx feliciano syndrome.    Pneumonia/ Slight leukocytosis.  Chest x-ray ? Retrocardiac opacity- start rocephin, respiratory culture and blood culture. Duo neb.      Anemia- Status post 2 units of blood transfusion 6/14.      Chronic  renal failure- nephrology is on board. Cont dialysis per nephrology.       Hyperlipidemia      Elevate TSH- free T4 is normal. Start synthroid.      Hypotension- Unable to wean pt off  levophed and continue midodrine.      Hypokalemia-- resolved      Calcium 7.7, corrected calcium 9.5 - 6/13/2017.      Nutrition- start megace, patient has not eating well.  Plan for Dobbhoff tube feeding.      Deconditioning- start PT and OT      Discharge Planning: unknown.  Prognosis poor.    Carter Llamas MD   06/17/17   7:42 AM

## 2017-06-17 NOTE — PLAN OF CARE
Problem: Patient Care Overview (Adult)  Goal: Discharge Needs Assessment  Outcome: Ongoing (interventions implemented as appropriate)    Problem: Fall Risk (Adult)  Goal: Identify Related Risk Factors and Signs and Symptoms  Outcome: Ongoing (interventions implemented as appropriate)  Goal: Absence of Falls  Outcome: Ongoing (interventions implemented as appropriate)  Goal: Absence of Falls  Outcome: Ongoing (interventions implemented as appropriate)    Problem: Fluid Volume Excess (Adult,Obstetrics,Pediatric)  Goal: Balanced Intake/Output  Outcome: Ongoing (interventions implemented as appropriate)    Problem: Nutrition, Imbalanced: Inadequate Oral Intake (Adult)  Goal: Improved Oral Intake  Outcome: Ongoing (interventions implemented as appropriate)  Goal: Prevent Further Weight Loss  Outcome: Ongoing (interventions implemented as appropriate)    Problem: Skin Integrity Impairment, Risk/Actual (Adult)  Goal: Identify Related Risk Factors and Signs and Symptoms  Outcome: Ongoing (interventions implemented as appropriate)  Goal: Skin Integrity/Wound Healing  Outcome: Ongoing (interventions implemented as appropriate)

## 2017-06-18 LAB
ALBUMIN SERPL-MCNC: 1.9 G/DL (ref 3.5–5)
ALBUMIN/GLOB SERPL: 0.6 G/DL (ref 1.1–2.5)
ALP SERPL-CCNC: 92 U/L (ref 24–120)
ALT SERPL W P-5'-P-CCNC: 28 U/L (ref 0–54)
ANION GAP SERPL CALCULATED.3IONS-SCNC: 9 MMOL/L (ref 4–13)
ANISOCYTOSIS BLD QL: NORMAL
AST SERPL-CCNC: 23 U/L (ref 7–45)
BASOPHILS # BLD AUTO: 0.01 10*3/MM3 (ref 0–0.2)
BASOPHILS NFR BLD AUTO: 0.1 % (ref 0–2)
BILIRUB SERPL-MCNC: 1.8 MG/DL (ref 0.1–1)
BUN BLD-MCNC: 23 MG/DL (ref 5–21)
BUN/CREAT SERPL: 6.6 (ref 7–25)
BURR CELLS BLD QL SMEAR: NORMAL
CALCIUM SPEC-SCNC: 7.8 MG/DL (ref 8.4–10.4)
CHLORIDE SERPL-SCNC: 100 MMOL/L (ref 98–110)
CO2 SERPL-SCNC: 28 MMOL/L (ref 24–31)
CREAT BLD-MCNC: 3.48 MG/DL (ref 0.5–1.4)
DEPRECATED RDW RBC AUTO: 61.5 FL (ref 40–54)
EOSINOPHIL # BLD AUTO: 0.2 10*3/MM3 (ref 0–0.7)
EOSINOPHIL NFR BLD AUTO: 2.2 % (ref 0–4)
ERYTHROCYTE [DISTWIDTH] IN BLOOD BY AUTOMATED COUNT: 20.3 % (ref 12–15)
GFR SERPL CREATININE-BSD FRML MDRD: 16 ML/MIN/1.73
GLOBULIN UR ELPH-MCNC: 3.1 GM/DL
GLUCOSE BLD-MCNC: 109 MG/DL (ref 70–100)
GLUCOSE BLDC GLUCOMTR-MCNC: 103 MG/DL (ref 70–130)
GLUCOSE BLDC GLUCOMTR-MCNC: 106 MG/DL (ref 70–130)
GLUCOSE BLDC GLUCOMTR-MCNC: 75 MG/DL (ref 70–130)
GLUCOSE BLDC GLUCOMTR-MCNC: 80 MG/DL (ref 70–130)
HCT VFR BLD AUTO: 26 % (ref 37–47)
HGB BLD-MCNC: 8.7 G/DL (ref 12–16)
HYPOCHROMIA BLD QL: NORMAL
IMM GRANULOCYTES # BLD: 0.05 10*3/MM3 (ref 0–0.03)
IMM GRANULOCYTES NFR BLD: 0.5 % (ref 0–5)
LARGE PLATELETS: NORMAL
LYMPHOCYTES # BLD AUTO: 0.68 10*3/MM3 (ref 0.72–4.86)
LYMPHOCYTES NFR BLD AUTO: 7.5 % (ref 15–45)
MCH RBC QN AUTO: 28.3 PG (ref 28–32)
MCHC RBC AUTO-ENTMCNC: 33.5 G/DL (ref 33–36)
MCV RBC AUTO: 84.7 FL (ref 82–98)
MONOCYTES # BLD AUTO: 0.26 10*3/MM3 (ref 0.19–1.3)
MONOCYTES NFR BLD AUTO: 2.9 % (ref 4–12)
NEUTROPHILS # BLD AUTO: 7.92 10*3/MM3 (ref 1.87–8.4)
NEUTROPHILS NFR BLD AUTO: 86.8 % (ref 39–78)
NRBC BLD MANUAL-RTO: 0.5 /100 WBC (ref 0–0)
PLATELET # BLD AUTO: 26 10*3/MM3 (ref 130–400)
POIKILOCYTOSIS BLD QL SMEAR: NORMAL
POTASSIUM BLD-SCNC: 3.2 MMOL/L (ref 3.5–5.3)
PROT SERPL-MCNC: 5 G/DL (ref 6.3–8.7)
RBC # BLD AUTO: 3.07 10*6/MM3 (ref 4.2–5.4)
SMALL PLATELETS BLD QL SMEAR: NORMAL
SODIUM BLD-SCNC: 137 MMOL/L (ref 135–145)
TARGETS BLD QL SMEAR: NORMAL
WBC MORPH BLD: NORMAL
WBC NRBC COR # BLD: 9.12 10*3/MM3 (ref 4.8–10.8)

## 2017-06-18 PROCEDURE — 30233R1 TRANSFUSION OF NONAUTOLOGOUS PLATELETS INTO PERIPHERAL VEIN, PERCUTANEOUS APPROACH: ICD-10-PCS | Performed by: FAMILY MEDICINE

## 2017-06-18 PROCEDURE — 94799 UNLISTED PULMONARY SVC/PX: CPT

## 2017-06-18 PROCEDURE — 85007 BL SMEAR W/DIFF WBC COUNT: CPT | Performed by: FAMILY MEDICINE

## 2017-06-18 PROCEDURE — 82962 GLUCOSE BLOOD TEST: CPT

## 2017-06-18 PROCEDURE — 94640 AIRWAY INHALATION TREATMENT: CPT

## 2017-06-18 PROCEDURE — P9035 PLATELET PHERES LEUKOREDUCED: HCPCS

## 2017-06-18 PROCEDURE — 36430 TRANSFUSION BLD/BLD COMPNT: CPT

## 2017-06-18 PROCEDURE — 80053 COMPREHEN METABOLIC PANEL: CPT | Performed by: FAMILY MEDICINE

## 2017-06-18 PROCEDURE — 85025 COMPLETE CBC W/AUTO DIFF WBC: CPT | Performed by: FAMILY MEDICINE

## 2017-06-18 PROCEDURE — 99232 SBSQ HOSP IP/OBS MODERATE 35: CPT | Performed by: INTERNAL MEDICINE

## 2017-06-18 PROCEDURE — 25010000002 CEFTRIAXONE: Performed by: FAMILY MEDICINE

## 2017-06-18 PROCEDURE — 25010000002 THIAMINE PER 100 MG: Performed by: FAMILY MEDICINE

## 2017-06-18 RX ADMIN — MEGESTROL ACETATE 400 MG: 40 SUSPENSION ORAL at 08:25

## 2017-06-18 RX ADMIN — MIDODRINE HYDROCHLORIDE 5 MG: 2.5 TABLET ORAL at 12:44

## 2017-06-18 RX ADMIN — IPRATROPIUM BROMIDE AND ALBUTEROL SULFATE 3 ML: 2.5; .5 SOLUTION RESPIRATORY (INHALATION) at 06:42

## 2017-06-18 RX ADMIN — PANTOPRAZOLE SODIUM 40 MG: 40 TABLET, DELAYED RELEASE ORAL at 05:59

## 2017-06-18 RX ADMIN — MIDODRINE HYDROCHLORIDE 5 MG: 2.5 TABLET ORAL at 17:21

## 2017-06-18 RX ADMIN — MIDODRINE HYDROCHLORIDE 5 MG: 2.5 TABLET ORAL at 08:25

## 2017-06-18 RX ADMIN — THIAMINE HYDROCHLORIDE 50 ML/HR: 100 INJECTION, SOLUTION INTRAMUSCULAR; INTRAVENOUS at 08:25

## 2017-06-18 RX ADMIN — CEFTRIAXONE 1 G: 1 INJECTION, POWDER, FOR SOLUTION INTRAMUSCULAR; INTRAVENOUS at 08:25

## 2017-06-18 RX ADMIN — LEVOTHYROXINE SODIUM 50 MCG: 50 TABLET ORAL at 05:59

## 2017-06-18 RX ADMIN — IPRATROPIUM BROMIDE AND ALBUTEROL SULFATE 3 ML: 2.5; .5 SOLUTION RESPIRATORY (INHALATION) at 13:43

## 2017-06-18 RX ADMIN — IPRATROPIUM BROMIDE AND ALBUTEROL SULFATE 3 ML: 2.5; .5 SOLUTION RESPIRATORY (INHALATION) at 19:24

## 2017-06-18 NOTE — PLAN OF CARE
Problem: Patient Care Overview (Adult)  Goal: Plan of Care Review  Outcome: Ongoing (interventions implemented as appropriate)  Goal: Adult Individualization and Mutuality  Outcome: Ongoing (interventions implemented as appropriate)  Goal: Discharge Needs Assessment  Outcome: Ongoing (interventions implemented as appropriate)    Problem: Fall Risk (Adult)  Goal: Identify Related Risk Factors and Signs and Symptoms  Outcome: Ongoing (interventions implemented as appropriate)  Goal: Absence of Falls  Outcome: Ongoing (interventions implemented as appropriate)  Goal: Absence of Falls  Outcome: Ongoing (interventions implemented as appropriate)    Problem: Fluid Volume Excess (Adult,Obstetrics,Pediatric)  Goal: Stable Weight  Outcome: Ongoing (interventions implemented as appropriate)  Goal: Balanced Intake/Output  Outcome: Ongoing (interventions implemented as appropriate)    Problem: Pressure Ulcer Risk (New Scale) (Adult,Obstetrics,Pediatric)  Goal: Skin Integrity  Outcome: Ongoing (interventions implemented as appropriate)    Problem: Nutrition, Imbalanced: Inadequate Oral Intake (Adult)  Goal: Improved Oral Intake  Outcome: Ongoing (interventions implemented as appropriate)  Goal: Prevent Further Weight Loss  Outcome: Ongoing (interventions implemented as appropriate)    Problem: Skin Integrity Impairment, Risk/Actual (Adult)  Goal: Identify Related Risk Factors and Signs and Symptoms  Outcome: Ongoing (interventions implemented as appropriate)  Goal: Skin Integrity/Wound Healing  Outcome: Ongoing (interventions implemented as appropriate)

## 2017-06-18 NOTE — PROGRESS NOTES
" PROGRESS NOTE.      Patient:  Jennifer Marks  YOB: 1945  Date of Service: 6/18/2017  MRN: [id number]   Acct: [id number]   Primary Care Physician: No Known Provider  Advance Directive: Conditional Code  Admit Date: 6/11/2017       Hospital Day: 7  Referring Provider: Pino Tang MD      Patient Seen, Chart, Consults, Notes, Labs, Radiology studies reviewed.        Subjective:  Jennifer Marks is a 72 y.o. female whom we were consulted for end stage renal disease management, hypokalemia. She is on hemodialysis Monday Wednesday Friday; Missed hemodialysis on (6/09). Admitted with chest pain. Has had decreased oral intake and diarrhea for several days. She was hypotensive and was given levophed. She has also had tachycardia and given Cardizem (now off). Patient' status continued to decline. Family is aware and trying to make decisions for further life support and future procedures and nutrition. Today, patient is awake and feeling more hungry.        Review of Systems:  History obtained from chart review and the patient  General ROS: No fever or chills  Respiratory ROS: No cough, +shortness of breath, -wheezing  Cardiovascular ROS: no chest pain but dyspnea on exertion  Gastrointestinal ROS: No abdominal pain or melena  Genito-Urinary ROS: No dysuria or hematuria  Musculoskeletal: negative  Skin: negative    Objective:  /71  Pulse 76  Temp 97.2 °F (36.2 °C) (Temporal Artery )   Resp 18  Ht 62\" (157.5 cm)  Wt 119 lb 3.2 oz (54.1 kg)  SpO2 97%  BMI 21.8 kg/m2    Intake/Output Summary (Last 24 hours) at 06/18/17 1051  Last data filed at 06/18/17 0400   Gross per 24 hour   Intake             1206 ml   Output                0 ml   Net             1206 ml       Physical examination:  General: awake/alert   Chest:  clear to auscultation bilaterally without respiratory distress  CVS: regular rate and rhythm  Abdominal: soft, nontender, normal bowel sounds  Extremities: no cyanosis or " edema  Skin: warm and dry without rash  Neuro: No focal motor deficits    Labs:  Lab Results (last 24 hours)     Procedure Component Value Units Date/Time    POC Glucose Fingerstick [[id number]]  (Abnormal) Collected:  06/17/17 1357    Specimen:  Blood Updated:  06/17/17 1408     Glucose 158 (H) mg/dL       : [id number] Blue AmberMeter ID: [id number]       POC Glucose Fingerstick [[id number]]  (Abnormal) Collected:  06/17/17 1705    Specimen:  Blood Updated:  06/17/17 1751     Glucose 162 (H) mg/dL       : [id number] Iván Mairn ID: [id number]       POC Glucose Fingerstick [[id number]]  (Normal) Collected:  06/17/17 2310    Specimen:  Blood Updated:  06/17/17 2321     Glucose 127 mg/dL       : [id number] Guzman HeatherMeter ID: [id number]       Comprehensive Metabolic Panel [[id number]]  (Abnormal) Collected:  06/18/17 0346    Specimen:  Blood Updated:  06/18/17 0415     Glucose 109 (H) mg/dL      BUN 23 (H) mg/dL      Creatinine 3.48 (H) mg/dL      Sodium 137 mmol/L      Potassium 3.2 (L) mmol/L      Chloride 100 mmol/L      CO2 28.0 mmol/L      Calcium 7.8 (L) mg/dL      Total Protein 5.0 (L) g/dL      Albumin 1.90 (L) g/dL      ALT (SGPT) 28 U/L      AST (SGOT) 23 U/L      Alkaline Phosphatase 92 U/L      Total Bilirubin 1.8 (H) mg/dL      eGFR  African Amer 16 (L) mL/min/1.73      Globulin 3.1 gm/dL      A/G Ratio 0.6 (L) g/dL      BUN/Creatinine Ratio 6.6 (L)     Anion Gap 9.0 mmol/L     Narrative:       The MDRD GFR formula is only valid for adults with stable renal function between ages 18 and 70.    CBC & Differential [[id number]] Collected:  06/18/17 0346    Specimen:  Blood Updated:  06/18/17 0441    Narrative:       The following orders were created for panel order CBC & Differential.  Procedure                               Abnormality         Status                     ---------                               -----------         ------                     Scan Slide[[id number]]                                        Final result               CBC Auto Differential[[id number]]        Abnormal            Final result                 Please view results for these tests on the individual orders.    CBC Auto Differential [[id number]]  (Abnormal) Collected:  06/18/17 0346    Specimen:  Blood Updated:  06/18/17 0441     WBC 9.12 10*3/mm3      RBC 3.07 (L) 10*6/mm3      Hemoglobin 8.7 (L) g/dL      Hematocrit 26.0 (L) %      MCV 84.7 fL      MCH 28.3 pg      MCHC 33.5 g/dL      RDW 20.3 (H) %      RDW-SD 61.5 (H) fl      Platelets 26 (C) 10*3/mm3      Neutrophil % 86.8 (H) %      Lymphocyte % 7.5 (L) %      Monocyte % 2.9 (L) %      Eosinophil % 2.2 %      Basophil % 0.1 %      Immature Grans % 0.5 %      Neutrophils, Absolute 7.92 10*3/mm3      Lymphocytes, Absolute 0.68 (L) 10*3/mm3      Monocytes, Absolute 0.26 10*3/mm3      Eosinophils, Absolute 0.20 10*3/mm3      Basophils, Absolute 0.01 10*3/mm3      Immature Grans, Absolute 0.05 (H) 10*3/mm3      nRBC 0.5 (H) /100 WBC     Scan Slide [[id number]] Collected:  06/18/17 0346    Specimen:  Blood Updated:  06/18/17 0441     Anisocytosis Slight/1+     Crenated RBC's Slight/1+     Hypochromia Slight/1+     Poikilocytes Slight/1+     Target Cells Slight/1+     WBC Morphology Normal     Platelet Estimate Decreased     Large Platelets Slight/1+    POC Glucose Fingerstick [[id number]]  (Normal) Collected:  06/18/17 0630    Specimen:  Blood Updated:  06/18/17 0644     Glucose 103 mg/dL       : [id number] Taylor HeatherMeter ID: [id number]       POC Glucose Fingerstick [[id number]]  (Normal) Collected:  06/18/17 0802    Specimen:  Blood Updated:  06/18/17 0832     Glucose 106 mg/dL       : [id number] Iván Marin ID: [id number]       Blood Culture [[id number]]  (Normal) Collected:  06/16/17 0918    Specimen:  Blood from Arm, Left Updated:  06/18/17 1001     Blood Culture No growth at 2 days          Radiology:   Imaging Results (last 24 hours)     ** No  results found for the last 24 hours. **              Assessment   1. End stage renal disease on maintenance hemodialysis Monday Wednesday Friday   2. Hypokalemia  3. Hypotension--  4. Anemia of chronic kidney disease  5. Diabetes mellitus type 2  6. Failure to thrive.       Plan:  Dialysis in AM.       Dg Ball MD  6/18/2017  10:51 AM

## 2017-06-18 NOTE — PROGRESS NOTES
[facility]  INPATIENT PROGRESS NOTE    Length of Stay: 7  Date of Admission: 6/11/2017  Primary Care Physician: No Known Provider    Subjective   Chief Complaint: Atrial fib, hypertension, weakness, kidney failure    HPI   Patient is not eating.  Patient states she is not hungry.  Attempt put a Dobbhoff tube for tube feeding yesterday, patient and family refused; discussed with nursing staff.  Platelets is low today, transfuse 6 packs of platelets.  Prognosis still remained poor.  Patient's off the Cardizem drip.  Blood pressure remained low, currently on the Midodrin and Levophed.    Review of Systems   Constitutional: Positive for activity change, appetite change and fatigue. Negative for chills and fever.   HENT: Negative for hearing loss, nosebleeds, tinnitus and trouble swallowing.   Eyes: Negative for visual disturbance.   Respiratory: Negative for cough, chest tightness, shortness of breath and wheezing.   Cardiovascular: Negative for chest pain, palpitations and leg swelling.   Gastrointestinal: Negative for abdominal distention, abdominal pain, blood in stool, constipation, diarrhea, nausea and vomiting.   Endocrine: Negative for cold intolerance, heat intolerance, polydipsia, polyphagia and polyuria.   Genitourinary: Negative for decreased urine volume, difficulty urinating, dysuria, flank pain, frequency and hematuria.   Musculoskeletal: Positive for arthralgias, gait problem and myalgias. Negative for joint swelling.   Skin: Negative for rash.   Allergic/Immunologic: Negative for immunocompromised state.   Neurological: Positive for weakness. Negative for dizziness, syncope, light-headedness and headaches.   Hematological: Negative for adenopathy. Does not bruise/bleed easily.   Psychiatric/Behavioral: Negative for confusion and sleep disturbance. The patient is not nervous/anxious.     All pertinent negatives and positives are as above. All other  systems have been reviewed and are negative unless otherwise stated.     Objective    Temp:  [97.2 °F (36.2 °C)-98.8 °F (37.1 °C)] 97.2 °F (36.2 °C)  Heart Rate:  [67-93] 86  Resp:  [16-33] 20  BP: ()/(34-87) 106/77    Intake/Output Summary (Last 24 hours) at 06/18/17 0807  Last data filed at 06/18/17 0400   Gross per 24 hour   Intake             1206 ml   Output                0 ml   Net             1206 ml     Physical Exam  Constitutional: She appears well-developed and well-nourished.   HENT:   Head: Normocephalic and atraumatic.   Eyes: Conjunctivae and EOM are normal. Pupils are equal, round, and reactive to light.   Neck: Neck supple. No JVD present. No thyromegaly present.   Cardiovascular: Normal rate, regular rhythm, normal heart sounds and intact distal pulses. Exam reveals no gallop and no friction rub.   No murmur heard.  Pulmonary/Chest: Effort normal. No respiratory distress. She has no wheezes. She has rales. She exhibits no tenderness.   Abdominal: Soft. Bowel sounds are normal. She exhibits no distension. There is no tenderness. There is no rebound and no guarding.   Musculoskeletal: Normal range of motion. She exhibits no edema, tenderness or deformity.   Lymphadenopathy:   She has no cervical adenopathy.   Neurological: She is alert. She displays normal reflexes. No cranial nerve deficit. She exhibits abnormal muscle tone. Coordination abnormal.   Skin: Skin is warm and dry. No rash noted.   Psychiatric: She has a normal mood and affect. Her behavior is normal.   Nursing note and vitals reviewed.  Results Review:  Lab Results (last 24 hours)     Procedure Component Value Units Date/Time    CBC & Differential [[id number]] Collected:  06/17/17 0827    Specimen:  Blood Updated:  06/17/17 0909    Narrative:       The following orders were created for panel order CBC & Differential.  Procedure                               Abnormality         Status                     ---------                                -----------         ------                     Manual Differential[[id number]]          Abnormal            Final result               Scan Slide[[id number]]                                       Final result               CBC Auto Differential[[id number]]        Abnormal            Final result                 Please view results for these tests on the individual orders.    CBC Auto Differential [[id number]]  (Abnormal) Collected:  06/17/17 0827    Specimen:  Blood Updated:  06/17/17 0909     WBC 11.75 (H) 10*3/mm3      RBC 3.20 (L) 10*6/mm3      Hemoglobin 9.0 (L) g/dL      Hematocrit 27.1 (L) %      MCV 84.7 fL      MCH 28.1 pg      MCHC 33.2 g/dL      RDW 20.5 (H) %      RDW-SD 62.0 (H) fl      Platelets 43 (L) 10*3/mm3     Scan Slide [[id number]] Collected:  06/17/17 0827    Specimen:  Blood Updated:  06/17/17 0909     Scan Slide --      See Manual Differential Results       Manual Differential [[id number]]  (Abnormal) Collected:  06/17/17 0827    Specimen:  Blood Updated:  06/17/17 0909     Neutrophil % 74.0 %      Lymphocyte % 3.0 (L) %      Monocyte % 1.0 (L) %      Eosinophil % 2.0 %      Bands %  19.0 (H) %      Atypical Lymphocyte % 1.0 %      Neutrophils Absolute 10.93 (H) 10*3/mm3      Lymphocytes Absolute 0.35 (L) 10*3/mm3      Monocytes Absolute 0.12 (L) 10*3/mm3      Eosinophils Absolute 0.24 10*3/mm3      Anisocytosis Slight/1+     Crenated RBC's Slight/1+     Target Cells Mod/2+     WBC Morphology Normal     Platelet Estimate Decreased    Comprehensive Metabolic Panel [[id number]]  (Abnormal) Collected:  06/17/17 0827    Specimen:  Blood Updated:  06/17/17 0915     Glucose 146 (H) mg/dL      BUN 17 mg/dL       Specimen hemolyzed. Results may be affected.        Creatinine 3.00 (H) mg/dL      Sodium 137 mmol/L      Potassium 3.5 mmol/L       Specimen hemolyzed.  Results may be affected.        Chloride 100 mmol/L      CO2 25.0 mmol/L      Calcium 7.8 (L) mg/dL      Total Protein 5.3 (L) g/dL       Albumin 2.00 (L) g/dL      ALT (SGPT) 25 U/L       Specimen hemolyzed.  Results may be affected.        AST (SGOT) 26 U/L       Specimen hemolyzed.  Results may be affected.        Alkaline Phosphatase 89 U/L       Specimen hemolyzed. Results may be affected.        Total Bilirubin 2.7 (H) mg/dL      eGFR  African Amer 18 (L) mL/min/1.73      Globulin 3.3 gm/dL      A/G Ratio 0.6 (L) g/dL      BUN/Creatinine Ratio 5.7 (L)     Anion Gap 12.0 mmol/L     Narrative:       The MDRD GFR formula is only valid for adults with stable renal function between ages 18 and 70.    POC Glucose Fingerstick [[id number]]  (Normal) Collected:  06/17/17 0937    Specimen:  Blood Updated:  06/17/17 0948     Glucose 122 mg/dL       : [id number] Iván Marin ID: [id number]       Blood Culture [[id number]]  (Normal) Collected:  06/16/17 0918    Specimen:  Blood from Arm, Left Updated:  06/17/17 1001     Blood Culture No growth at 24 hours    POC Glucose Fingerstick [[id number]]  (Abnormal) Collected:  06/17/17 1357    Specimen:  Blood Updated:  06/17/17 1408     Glucose 158 (H) mg/dL       : [id number] Blue AmberMeter ID: [id number]       POC Glucose Fingerstick [[id number]]  (Abnormal) Collected:  06/17/17 1705    Specimen:  Blood Updated:  06/17/17 1751     Glucose 162 (H) mg/dL       : [id number] Iván Marin ID: [id number]       POC Glucose Fingerstick [[id number]]  (Normal) Collected:  06/17/17 2310    Specimen:  Blood Updated:  06/17/17 2321     Glucose 127 mg/dL       : [id number] Thomas HeatherMeter ID: [id number]       Comprehensive Metabolic Panel [[id number]]  (Abnormal) Collected:  06/18/17 0346    Specimen:  Blood Updated:  06/18/17 0415     Glucose 109 (H) mg/dL      BUN 23 (H) mg/dL      Creatinine 3.48 (H) mg/dL      Sodium 137 mmol/L      Potassium 3.2 (L) mmol/L      Chloride 100 mmol/L      CO2 28.0 mmol/L      Calcium 7.8 (L) mg/dL      Total Protein 5.0 (L) g/dL      Albumin 1.90 (L) g/dL       ALT (SGPT) 28 U/L      AST (SGOT) 23 U/L      Alkaline Phosphatase 92 U/L      Total Bilirubin 1.8 (H) mg/dL      eGFR  African Amer 16 (L) mL/min/1.73      Globulin 3.1 gm/dL      A/G Ratio 0.6 (L) g/dL      BUN/Creatinine Ratio 6.6 (L)     Anion Gap 9.0 mmol/L     Narrative:       The MDRD GFR formula is only valid for adults with stable renal function between ages 18 and 70.    CBC & Differential [[id number]] Collected:  06/18/17 0346    Specimen:  Blood Updated:  06/18/17 0441    Narrative:       The following orders were created for panel order CBC & Differential.  Procedure                               Abnormality         Status                     ---------                               -----------         ------                     Scan Slide[[id number]]                                       Final result               CBC Auto Differential[[id number]]        Abnormal            Final result                 Please view results for these tests on the individual orders.    CBC Auto Differential [[id number]]  (Abnormal) Collected:  06/18/17 0346    Specimen:  Blood Updated:  06/18/17 0441     WBC 9.12 10*3/mm3      RBC 3.07 (L) 10*6/mm3      Hemoglobin 8.7 (L) g/dL      Hematocrit 26.0 (L) %      MCV 84.7 fL      MCH 28.3 pg      MCHC 33.5 g/dL      RDW 20.3 (H) %      RDW-SD 61.5 (H) fl      Platelets 26 (C) 10*3/mm3      Neutrophil % 86.8 (H) %      Lymphocyte % 7.5 (L) %      Monocyte % 2.9 (L) %      Eosinophil % 2.2 %      Basophil % 0.1 %      Immature Grans % 0.5 %      Neutrophils, Absolute 7.92 10*3/mm3      Lymphocytes, Absolute 0.68 (L) 10*3/mm3      Monocytes, Absolute 0.26 10*3/mm3      Eosinophils, Absolute 0.20 10*3/mm3      Basophils, Absolute 0.01 10*3/mm3      Immature Grans, Absolute 0.05 (H) 10*3/mm3      nRBC 0.5 (H) /100 WBC     Scan Slide [[id number]] Collected:  06/18/17 0346    Specimen:  Blood Updated:  06/18/17 0441     Anisocytosis Slight/1+     Crenated RBC's Slight/1+     Hypochromia  Slight/1+     Poikilocytes Slight/1+     Target Cells Slight/1+     WBC Morphology Normal     Platelet Estimate Decreased     Large Platelets Slight/1+    POC Glucose Fingerstick [[id number]]  (Normal) Collected:  06/18/17 0630    Specimen:  Blood Updated:  06/18/17 0644     Glucose 103 mg/dL       : [id number] Thomas HeatherMeter ID: [id number]              Cultures:  Blood Culture   Date Value Ref Range Status   06/16/2017 No growth at 24 hours  Preliminary       Radiology Data:    Imaging Results (last 24 hours)     ** No results found for the last 24 hours. **          Allergies   Allergen Reactions   • Heparin    • Iron    • Latex    • Levaquin [Levofloxacin]    • Shrimp (Diagnostic)    • Vancomycin        Scheduled meds:     ceftriaxone 1 g Intravenous Q24H   ipratropium-albuterol 3 mL Nebulization Q6H While Awake - RT   levothyroxine 50 mcg Oral Q AM   megestrol 400 mg Oral Daily   midodrine 5 mg Oral TID AC   IV Fluids 1000 mL + additives 50 mL/hr Intravenous Daily   pantoprazole 40 mg Oral Q AM       PRN meds:  dextrose  •  HYDROmorphone  •  ondansetron  •  sodium chloride    Assessment/Plan     Active Problems:    Chest pain in adult    Renal failure    Hypotension    Anemia    Hypokalemia      Plan:  Chest pain, atypical, echocardiogram/afib -Ejection fraction 61%, diastolic dysfunction, right ventricular dilatation, tricuspid valve regurgitation, right ventricle systolic pressure greater than 55. Off Cardizem.     Hemoptysis ×1 6/15. Hx feliciano syndrome.    Thrombopenia-6 packs of platelets.  No active bleeding.     Pneumonia/ Slight leukocytosis. Chest x-ray ? Retrocardiac opacity- on rocephin . Blood culture no growth in 24 hours. Duo neb.      Anemia- Status post 2 units of blood transfusion 6/14.      Chronic renal failure- nephrology is on board. Cont dialysis per nephrology.       Hyperlipidemia      Elevate TSH- free T4 is normal. Start synthroid.      Hypotension- Unable to wean pt off levophed  and continue midodrine.      Hypokalemia-- resolved      Calcium 7.7, corrected calcium 9.5 - 6/13/2017.      Nutrition- start megace, patient has not eating well. Plan for Dobbhoff tube feeding.      Deconditioning- start PT and OT      Discharge Planning: unknown.  Prognosis poor.    Carter Llamas MD   06/18/17   8:07 AM

## 2017-06-18 NOTE — PLAN OF CARE
Problem: Patient Care Overview (Adult)  Goal: Plan of Care Review  Outcome: Ongoing (interventions implemented as appropriate)    06/18/17 0227   Coping/Psychosocial Response Interventions   Plan Of Care Reviewed With patient   Patient Care Overview   Progress no change   Outcome Evaluation   Outcome Summary/Follow up Plan Patient continues refusal to eat and placement of NG tube. Continuously had to place nasal canula back in place. It aggravates her so she pulls it off. Difficult to get a good reading on pulse. placed on fingers, toes and forehead in an attempt to get a continuous quality reading. Patient states she is not in pain. When she is moved, she grimaces. She is contractured. Generalized edema persists.        Goal: Adult Individualization and Mutuality  Outcome: Ongoing (interventions implemented as appropriate)  Goal: Discharge Needs Assessment  Outcome: Ongoing (interventions implemented as appropriate)    Problem: Fall Risk (Adult)  Goal: Identify Related Risk Factors and Signs and Symptoms  Outcome: Ongoing (interventions implemented as appropriate)  Goal: Absence of Falls  Outcome: Ongoing (interventions implemented as appropriate)  Goal: Absence of Falls  Outcome: Ongoing (interventions implemented as appropriate)    Problem: Fluid Volume Excess (Adult,Obstetrics,Pediatric)  Goal: Stable Weight  Outcome: Ongoing (interventions implemented as appropriate)  Goal: Balanced Intake/Output  Outcome: Ongoing (interventions implemented as appropriate)    Problem: Pressure Ulcer Risk (New Scale) (Adult,Obstetrics,Pediatric)  Goal: Skin Integrity  Outcome: Ongoing (interventions implemented as appropriate)    Problem: Nutrition, Imbalanced: Inadequate Oral Intake (Adult)  Goal: Improved Oral Intake  Outcome: Ongoing (interventions implemented as appropriate)  Goal: Prevent Further Weight Loss  Outcome: Ongoing (interventions implemented as appropriate)    Problem: Skin Integrity Impairment, Risk/Actual  (Adult)  Goal: Identify Related Risk Factors and Signs and Symptoms  Outcome: Ongoing (interventions implemented as appropriate)  Goal: Skin Integrity/Wound Healing  Outcome: Ongoing (interventions implemented as appropriate)

## 2017-06-18 NOTE — PROGRESS NOTES
Referring Provider: Pino Tang MD    Length of Stay: 7    Chief Complaint:   Chief Complaint   Patient presents with   • Chest Pain       Subjective: Hungry    Medications  Current Facility-Administered Medications   Medication Dose Route Frequency Provider Last Rate Last Dose   • cefTRIAXone (ROCEPHIN) 1 g/100 mL 0.9% NS (MBP)  1 g Intravenous Q24H Carter Llamas MD   1 g at 06/18/17 0825   • dextrose (D50W) solution 50 mL  50 mL Intravenous Q1H PRN Bree Soares MD   50 mL at 06/17/17 0800   • diltiaZEM (CARDIZEM) 125mg/125 mL (1 mg/mL) infusion  5-15 mg/hr Intravenous Titrated Carter Llamas MD   Stopped at 06/17/17 1435   • HYDROmorphone (DILAUDID) injection 0.25 mg  0.25 mg Intravenous Q4H PRN Carter Llamas MD   0.25 mg at 06/17/17 1258   • ipratropium-albuterol (DUO-NEB) nebulizer solution 3 mL  3 mL Nebulization Q6H While Awake - RT Carter Llamas MD   3 mL at 06/18/17 0642   • levothyroxine (SYNTHROID, LEVOTHROID) tablet 50 mcg  50 mcg Oral Q AM Carter Llamas MD   50 mcg at 06/18/17 0559   • megestrol (MEGACE) 40 MG/ML suspension 400 mg  400 mg Oral Daily Carter Llamas MD   400 mg at 06/18/17 0825   • midodrine (PROAMATINE) tablet 5 mg  5 mg Oral TID AC FANNIE Blanco   5 mg at 06/18/17 0825   • multiple vitamin (M.V.I. Adult) 10 mL, thiamine (B-1) 100 mg, folic acid 1 mg in dextrose 5 % and sodium chloride 0.45 % 1,000 mL infusion  50 mL/hr Intravenous Daily Carter Llamas MD 50 mL/hr at 06/18/17 0825 50 mL/hr at 06/18/17 0825   • norepinephrine (LEVOPHED) 8,000 mcg in sodium chloride 0.9 % 250 mL (32 mcg/mL) infusion  0.02-0.3 mcg/kg/min Intravenous Titrated FANNIE Blanco 9.62 mL/hr at 06/18/17 0302 0.1 mcg/kg/min at 06/18/17 0302   • ondansetron (ZOFRAN) injection 4 mg  4 mg Intravenous Q6H PRN Pino Tagn MD   4 mg at 06/15/17 0555   • pantoprazole (PROTONIX) EC tablet 40 mg  40 mg Oral Q AM Pino Tang MD   40 mg at 06/18/17 0559   • sodium chloride 0.9 % flush 1-10 mL   1-10 mL Intravenous PRN Pino Tang MD           Past Medical History:   Diagnosis Date   • A-fib    • Arthritis    • CHF (congestive heart failure)    • Hypertension    • Renal failure    ,   Past Surgical History:   Procedure Laterality Date   • ARTERIOVENOUS FISTULA LEG Left    ,   History reviewed. No pertinent family history.,   Social History   Substance Use Topics   • Smoking status: Former Smoker   • Smokeless tobacco: None   • Alcohol use No   ,    Review of Systems  Review of Systems   HENT: Negative for nosebleeds.    Cardiovascular: Negative for chest pain, claudication, dyspnea on exertion, irregular heartbeat, leg swelling, near-syncope, orthopnea, palpitations, paroxysmal nocturnal dyspnea and syncope.   Respiratory: Negative for cough, hemoptysis and shortness of breath.    Gastrointestinal: Negative for dysphagia, hematemesis and melena.   Genitourinary: Negative for hematuria.       Objective     Physical Exam:  Patient Vitals for the past 24 hrs:   BP Temp Temp src Pulse Resp SpO2 Weight   06/18/17 0800 101/71 - - 76 18 97 % -   06/18/17 0705 106/77 - - 86 20 93 % -   06/18/17 0642 - - - 79 (!) 33 100 % -   06/18/17 0630 109/85 - - 77 - (!) 84 % -   06/18/17 0625 - - - 78 - 96 % -   06/18/17 0615 123/69 - - 77 - 100 % -   06/18/17 0605 - - - 78 - 100 % -   06/18/17 0600 121/58 - - 78 - (!) 82 % 119 lb 3.2 oz (54.1 kg)   06/18/17 0530 104/48 - - 79 - (!) 73 % -   06/18/17 0515 107/50 - - 76 - - -   06/18/17 0500 113/59 - - 71 - 94 % -   06/18/17 0445 112/50 - - 70 - - -   06/18/17 0430 108/55 - - 74 - - -   06/18/17 0415 100/51 - - 77 - - -   06/18/17 0400 110/87 97.2 °F (36.2 °C) Temporal Art 84 18 - -   06/18/17 0345 99/48 - - 81 - - -   06/18/17 0340 - - - 84 - 92 % -   06/18/17 0330 103/58 - - 81 - (!) 89 % -   06/18/17 0320 91/54 - - 80 - - -   06/18/17 0300 111/61 - - 85 16 94 % -   06/18/17 0245 123/84 - - 85 - - -   06/18/17 0230 114/60 - - 78 - 95 % -   06/18/17 0215 107/53 - - 67 - 93 %  -   06/18/17 0200 106/56 - - 71 - 98 % -   06/18/17 0145 109/55 - - 68 - 93 % -   06/18/17 0130 114/60 - - 71 - 93 % -   06/18/17 0115 118/55 - - 71 - 92 % -   06/18/17 0105 107/55 - - 70 - 92 % -   06/18/17 0050 - - - 69 - (!) 89 % -   06/18/17 0045 106/53 - - 69 - - -   06/18/17 0030 105/52 - - 71 - - -   06/18/17 0015 - - - 71 - - -   06/18/17 0000 97/51 97.5 °F (36.4 °C) - 71 - (!) 79 % -   06/17/17 2345 93/46 - - 70 - - -   06/17/17 2335 102/50 - - 72 - - -   06/17/17 2325 - - - 76 - 97 % -   06/17/17 2305 100/49 - - 75 - - -   06/17/17 2245 102/53 - - 73 - - -   06/17/17 2230 99/54 - - 72 - - -   06/17/17 2215 99/50 - - 71 - 91 % -   06/17/17 2200 100/47 - - 70 - - -   06/17/17 2145 97/48 - - 73 - - -   06/17/17 2130 100/45 - - - - - -   06/17/17 2115 97/52 - - 71 - 91 % -   06/17/17 2100 98/46 - - 75 - - -   06/17/17 2040 - - - 74 - 100 % -   06/17/17 2035 111/82 - - 82 - - -   06/17/17 2025 112/45 - - 77 - - -   06/17/17 2000 102/49 97.7 °F (36.5 °C) - 74 - (!) 88 % -   06/17/17 1945 96/46 - - 72 - - -   06/17/17 1935 93/43 - - 72 - - -   06/17/17 1915 95/44 - - 69 - 91 % -   06/17/17 1900 103/56 - - 72 18 100 % -   06/17/17 1800 95/51 - - 69 20 90 % -   06/17/17 1700 102/49 - - 69 20 94 % -   06/17/17 1605 (!) 100/34 98.7 °F (37.1 °C) Temporal Art 69 20 95 % -   06/17/17 1500 96/52 - - 68 20 90 % -   06/17/17 1400 (!) 86/48 - - 69 18 96 % -   06/17/17 1300 117/76 - - 79 17 96 % -   06/17/17 1200 115/61 98.8 °F (37.1 °C) Temporal Art 76 18 95 % -   06/17/17 1100 96/53 98.2 °F (36.8 °C) Temporal Art 75 20 99 % -   06/17/17 1000 93/61 - - 80 18 (!) 86 % -     Physical Exam   Constitutional: She appears well-developed and well-nourished. No distress.   HENT:   Head: Normocephalic and atraumatic.   Eyes: No scleral icterus.   Neck: Normal range of motion.   Cardiovascular: Normal rate, regular rhythm and normal heart sounds.  Exam reveals no gallop and no friction rub.    No murmur heard.  Pulmonary/Chest:  Effort normal and breath sounds normal. No respiratory distress. She has no wheezes. She has no rales.   Abdominal: Soft. Bowel sounds are normal. She exhibits no distension. There is no tenderness.   Musculoskeletal: She exhibits no edema.   Neurological: She is alert. No cranial nerve deficit.   Skin: Skin is warm and dry. She is not diaphoretic. No erythema.   Psychiatric: She has a normal mood and affect. Her behavior is normal.       Results Review:   I reviewed the patient's new clinical results.  Lab Results (last 24 hours)     Procedure Component Value Units Date/Time    Blood Culture [[id number]]  (Normal) Collected:  06/16/17 0918    Specimen:  Blood from Arm, Left Updated:  06/17/17 1001     Blood Culture No growth at 24 hours    POC Glucose Fingerstick [[id number]]  (Abnormal) Collected:  06/17/17 1357    Specimen:  Blood Updated:  06/17/17 1408     Glucose 158 (H) mg/dL       : [id number] Mirza AmberMeter ID: [id number]       POC Glucose Fingerstick [[id number]]  (Abnormal) Collected:  06/17/17 1705    Specimen:  Blood Updated:  06/17/17 1751     Glucose 162 (H) mg/dL       : [id number] Iván Marin ID: [id number]       POC Glucose Fingerstick [[id number]]  (Normal) Collected:  06/17/17 2310    Specimen:  Blood Updated:  06/17/17 2321     Glucose 127 mg/dL       : [id number] Thomas HeatherMeter ID: [id number]       Comprehensive Metabolic Panel [[id number]]  (Abnormal) Collected:  06/18/17 0346    Specimen:  Blood Updated:  06/18/17 0415     Glucose 109 (H) mg/dL      BUN 23 (H) mg/dL      Creatinine 3.48 (H) mg/dL      Sodium 137 mmol/L      Potassium 3.2 (L) mmol/L      Chloride 100 mmol/L      CO2 28.0 mmol/L      Calcium 7.8 (L) mg/dL      Total Protein 5.0 (L) g/dL      Albumin 1.90 (L) g/dL      ALT (SGPT) 28 U/L      AST (SGOT) 23 U/L      Alkaline Phosphatase 92 U/L      Total Bilirubin 1.8 (H) mg/dL      eGFR  African Amer 16 (L) mL/min/1.73      Globulin 3.1 gm/dL      A/G Ratio  0.6 (L) g/dL      BUN/Creatinine Ratio 6.6 (L)     Anion Gap 9.0 mmol/L     Narrative:       The MDRD GFR formula is only valid for adults with stable renal function between ages 18 and 70.    CBC & Differential [[id number]] Collected:  06/18/17 0346    Specimen:  Blood Updated:  06/18/17 0441    Narrative:       The following orders were created for panel order CBC & Differential.  Procedure                               Abnormality         Status                     ---------                               -----------         ------                     Scan Slide[[id number]]                                       Final result               CBC Auto Differential[[id number]]        Abnormal            Final result                 Please view results for these tests on the individual orders.    CBC Auto Differential [[id number]]  (Abnormal) Collected:  06/18/17 0346    Specimen:  Blood Updated:  06/18/17 0441     WBC 9.12 10*3/mm3      RBC 3.07 (L) 10*6/mm3      Hemoglobin 8.7 (L) g/dL      Hematocrit 26.0 (L) %      MCV 84.7 fL      MCH 28.3 pg      MCHC 33.5 g/dL      RDW 20.3 (H) %      RDW-SD 61.5 (H) fl      Platelets 26 (C) 10*3/mm3      Neutrophil % 86.8 (H) %      Lymphocyte % 7.5 (L) %      Monocyte % 2.9 (L) %      Eosinophil % 2.2 %      Basophil % 0.1 %      Immature Grans % 0.5 %      Neutrophils, Absolute 7.92 10*3/mm3      Lymphocytes, Absolute 0.68 (L) 10*3/mm3      Monocytes, Absolute 0.26 10*3/mm3      Eosinophils, Absolute 0.20 10*3/mm3      Basophils, Absolute 0.01 10*3/mm3      Immature Grans, Absolute 0.05 (H) 10*3/mm3      nRBC 0.5 (H) /100 WBC     Scan Slide [[id number]] Collected:  06/18/17 0346    Specimen:  Blood Updated:  06/18/17 0441     Anisocytosis Slight/1+     Crenated RBC's Slight/1+     Hypochromia Slight/1+     Poikilocytes Slight/1+     Target Cells Slight/1+     WBC Morphology Normal     Platelet Estimate Decreased     Large Platelets Slight/1+    POC Glucose Fingerstick [[id number]]   (Normal) Collected:  06/18/17 0630    Specimen:  Blood Updated:  06/18/17 0644     Glucose 103 mg/dL       : [id number] Guzman HeatherMeter ID: [id number]       POC Glucose Fingerstick [[id number]]  (Normal) Collected:  06/18/17 0802    Specimen:  Blood Updated:  06/18/17 0832     Glucose 106 mg/dL       : [id number] Iván Marin ID: [id number]           No results found for: ECHOEFEST  Imaging Results (last 24 hours)     ** No results found for the last 24 hours. **          I have reviewed telemetry which reveals SR    Assessment/Plan   Active Problems:    Chest pain in adult    Renal failure    Hypotension    Anemia    Hypokalemia      New Problems that need treatment:  1. PAF, maintaining SR off IV cardizem, not anticoagulated  2. Hypotension, much improved  3. Mental status changes resolved    Old problems that are stable:   ESRD  Anemia  ?dementia

## 2017-06-19 LAB
ABO + RH BLD: NORMAL
ALBUMIN SERPL-MCNC: 1.9 G/DL (ref 3.5–5)
ALBUMIN/GLOB SERPL: 0.6 G/DL (ref 1.1–2.5)
ALP SERPL-CCNC: 90 U/L (ref 24–120)
ALT SERPL W P-5'-P-CCNC: 22 U/L (ref 0–54)
ANION GAP SERPL CALCULATED.3IONS-SCNC: 9 MMOL/L (ref 4–13)
AST SERPL-CCNC: 28 U/L (ref 7–45)
BASOPHILS # BLD AUTO: 0.01 10*3/MM3 (ref 0–0.2)
BASOPHILS NFR BLD AUTO: 0.2 % (ref 0–2)
BH BB BLOOD EXPIRATION DATE: NORMAL
BH BB BLOOD TYPE BARCODE: 6200
BH BB DISPENSE STATUS: NORMAL
BH BB PRODUCT CODE: NORMAL
BH BB UNIT NUMBER: NORMAL
BILIRUB SERPL-MCNC: 1.8 MG/DL (ref 0.1–1)
BUN BLD-MCNC: 28 MG/DL (ref 5–21)
BUN/CREAT SERPL: 7.1 (ref 7–25)
CALCIUM SPEC-SCNC: 7.7 MG/DL (ref 8.4–10.4)
CHLORIDE SERPL-SCNC: 101 MMOL/L (ref 98–110)
CO2 SERPL-SCNC: 26 MMOL/L (ref 24–31)
CREAT BLD-MCNC: 3.93 MG/DL (ref 0.5–1.4)
DEPRECATED RDW RBC AUTO: 61.2 FL (ref 40–54)
EOSINOPHIL # BLD AUTO: 0.18 10*3/MM3 (ref 0–0.7)
EOSINOPHIL NFR BLD AUTO: 2.9 % (ref 0–4)
ERYTHROCYTE [DISTWIDTH] IN BLOOD BY AUTOMATED COUNT: 20.3 % (ref 12–15)
GFR SERPL CREATININE-BSD FRML MDRD: 14 ML/MIN/1.73
GLOBULIN UR ELPH-MCNC: 3.1 GM/DL
GLUCOSE BLD-MCNC: 72 MG/DL (ref 70–100)
GLUCOSE BLDC GLUCOMTR-MCNC: 115 MG/DL (ref 70–130)
GLUCOSE BLDC GLUCOMTR-MCNC: 66 MG/DL (ref 70–130)
GLUCOSE BLDC GLUCOMTR-MCNC: 92 MG/DL (ref 70–130)
GLUCOSE BLDC GLUCOMTR-MCNC: 93 MG/DL (ref 70–130)
HCT VFR BLD AUTO: 23.9 % (ref 37–47)
HGB BLD-MCNC: 8.1 G/DL (ref 12–16)
IMM GRANULOCYTES # BLD: 0.03 10*3/MM3 (ref 0–0.03)
IMM GRANULOCYTES NFR BLD: 0.5 % (ref 0–5)
LYMPHOCYTES # BLD AUTO: 0.61 10*3/MM3 (ref 0.72–4.86)
LYMPHOCYTES NFR BLD AUTO: 9.8 % (ref 15–45)
MCH RBC QN AUTO: 28.5 PG (ref 28–32)
MCHC RBC AUTO-ENTMCNC: 33.9 G/DL (ref 33–36)
MCV RBC AUTO: 84.2 FL (ref 82–98)
MONOCYTES # BLD AUTO: 0.36 10*3/MM3 (ref 0.19–1.3)
MONOCYTES NFR BLD AUTO: 5.8 % (ref 4–12)
NEUTROPHILS # BLD AUTO: 5.02 10*3/MM3 (ref 1.87–8.4)
NEUTROPHILS NFR BLD AUTO: 80.8 % (ref 39–78)
PLATELET # BLD AUTO: 75 10*3/MM3 (ref 130–400)
POTASSIUM BLD-SCNC: 3.5 MMOL/L (ref 3.5–5.3)
PROT SERPL-MCNC: 5 G/DL (ref 6.3–8.7)
RBC # BLD AUTO: 2.84 10*6/MM3 (ref 4.2–5.4)
RBC MORPH BLD: NORMAL
SMALL PLATELETS BLD QL SMEAR: NORMAL
SODIUM BLD-SCNC: 136 MMOL/L (ref 135–145)
UNIT  ABO: NORMAL
UNIT  RH: NORMAL
WBC MORPH BLD: NORMAL
WBC NRBC COR # BLD: 6.21 10*3/MM3 (ref 4.8–10.8)

## 2017-06-19 PROCEDURE — 99232 SBSQ HOSP IP/OBS MODERATE 35: CPT | Performed by: INTERNAL MEDICINE

## 2017-06-19 PROCEDURE — 25010000002 HYDROCORTISONE SODIUM SUCCINATE 100 MG RECONSTITUTED SOLUTION: Performed by: INTERNAL MEDICINE

## 2017-06-19 PROCEDURE — 85007 BL SMEAR W/DIFF WBC COUNT: CPT | Performed by: FAMILY MEDICINE

## 2017-06-19 PROCEDURE — 25010000002 CEFTRIAXONE: Performed by: FAMILY MEDICINE

## 2017-06-19 PROCEDURE — 94799 UNLISTED PULMONARY SVC/PX: CPT

## 2017-06-19 PROCEDURE — 82533 TOTAL CORTISOL: CPT | Performed by: INTERNAL MEDICINE

## 2017-06-19 PROCEDURE — 25010000002 THIAMINE PER 100 MG: Performed by: FAMILY MEDICINE

## 2017-06-19 PROCEDURE — 82962 GLUCOSE BLOOD TEST: CPT

## 2017-06-19 PROCEDURE — 80053 COMPREHEN METABOLIC PANEL: CPT | Performed by: FAMILY MEDICINE

## 2017-06-19 PROCEDURE — 85025 COMPLETE CBC W/AUTO DIFF WBC: CPT | Performed by: FAMILY MEDICINE

## 2017-06-19 PROCEDURE — 94760 N-INVAS EAR/PLS OXIMETRY 1: CPT

## 2017-06-19 RX ADMIN — HYDROCORTISONE SODIUM SUCCINATE 100 MG: 100 INJECTION, POWDER, FOR SOLUTION INTRAMUSCULAR; INTRAVENOUS at 12:12

## 2017-06-19 RX ADMIN — MIDODRINE HYDROCHLORIDE 5 MG: 2.5 TABLET ORAL at 18:45

## 2017-06-19 RX ADMIN — IPRATROPIUM BROMIDE AND ALBUTEROL SULFATE 3 ML: 2.5; .5 SOLUTION RESPIRATORY (INHALATION) at 06:55

## 2017-06-19 RX ADMIN — THIAMINE HYDROCHLORIDE 50 ML/HR: 100 INJECTION, SOLUTION INTRAMUSCULAR; INTRAVENOUS at 08:17

## 2017-06-19 RX ADMIN — MEGESTROL ACETATE 400 MG: 40 SUSPENSION ORAL at 08:18

## 2017-06-19 RX ADMIN — IPRATROPIUM BROMIDE AND ALBUTEROL SULFATE 3 ML: 2.5; .5 SOLUTION RESPIRATORY (INHALATION) at 18:52

## 2017-06-19 RX ADMIN — CEFTRIAXONE 1 G: 1 INJECTION, POWDER, FOR SOLUTION INTRAMUSCULAR; INTRAVENOUS at 08:18

## 2017-06-19 RX ADMIN — MIDODRINE HYDROCHLORIDE 5 MG: 2.5 TABLET ORAL at 08:18

## 2017-06-19 RX ADMIN — IPRATROPIUM BROMIDE AND ALBUTEROL SULFATE 3 ML: 2.5; .5 SOLUTION RESPIRATORY (INHALATION) at 12:58

## 2017-06-19 RX ADMIN — MIDODRINE HYDROCHLORIDE 5 MG: 2.5 TABLET ORAL at 12:12

## 2017-06-19 RX ADMIN — LEVOTHYROXINE SODIUM 50 MCG: 50 TABLET ORAL at 06:23

## 2017-06-19 RX ADMIN — PANTOPRAZOLE SODIUM 40 MG: 40 TABLET, DELAYED RELEASE ORAL at 06:23

## 2017-06-19 RX ADMIN — HYDROCORTISONE SODIUM SUCCINATE 100 MG: 100 INJECTION, POWDER, FOR SOLUTION INTRAMUSCULAR; INTRAVENOUS at 18:45

## 2017-06-19 RX ADMIN — NOREPINEPHRINE BITARTRATE 0.04 MCG/KG/MIN: 1 INJECTION INTRAVENOUS at 00:00

## 2017-06-19 NOTE — PROGRESS NOTES
[facility] -  Progress Note     LOS: 8 days   Patient Care Team:  No Known Provider as PCP - General  No Known Provider as PCP - Family Medicine    Chief Complaint:afib    Subjective  - ESRD    Interval History: spont conv to nsr    Patient Complaints: no cp or sob now with hd        Review of Systems:  Review of Systems   Respiratory: Negative for apnea, chest tightness and shortness of breath.    Cardiovascular: Negative for chest pain, palpitations and leg swelling.   Gastrointestinal: Negative for abdominal pain.   Genitourinary: Negative for dysuria.   Musculoskeletal: Negative for myalgias.   Neurological: Negative for weakness and light-headedness.   Psychiatric/Behavioral: Negative for sleep disturbance.           Vital Sign Min/Max for last 24 hours  Temp  Min: 97.4 °F (36.3 °C)  Max: 98.7 °F (37.1 °C)   BP  Min: 79/44  Max: 141/62   Pulse  Min: 77  Max: 104   Resp  Min: 16  Max: 22   SpO2  Min: 10 %  Max: 100 %   Flow (L/min)  Min: 4  Max: 5   Weight  Min: 119 lb 14.4 oz (54.4 kg)  Max: 119 lb 14.4 oz (54.4 kg)     Last Weight    06/19/17  0600   Weight: 119 lb 14.4 oz (54.4 kg)       Physical Exam:   Physical Exam   Constitutional: She appears chronically ill.   Eyes: Pupils are equal, round, and reactive to light.   Neck: Thyroid normal.   Pulmonary/Chest: Effort normal and breath sounds normal. She has no wheezes. She has no rales. She exhibits no tenderness.   Abdominal: Soft. Bowel sounds are normal. She exhibits no distension and no mass. There is no tenderness.   Musculoskeletal: She exhibits no edema or tenderness.   Neurological: She is alert and oriented to person, place, and time.   Skin: Skin is warm and dry.           Results Review:   Lab Results (last 24 hours)     Procedure Component Value Units Date/Time    Blood Culture [[id number]]  (Normal) Collected:  06/16/17 0918    Specimen:  Blood from Arm, Left Updated:  06/18/17 1001     Blood Culture No growth at 2 days     POC Glucose Fingerstick [[id number]]  (Normal) Collected:  06/18/17 1137    Specimen:  Blood Updated:  06/18/17 1149     Glucose 80 mg/dL       : [id number] Iván Marin ID: [id number]       POC Glucose Fingerstick [[id number]]  (Normal) Collected:  06/18/17 1711    Specimen:  Blood Updated:  06/18/17 1727     Glucose 75 mg/dL       : [id number] Iván Marin ID: [id number]       POC Glucose Fingerstick [[id number]]  (Normal) Collected:  06/18/17 2356    Specimen:  Blood Updated:  06/19/17 0009     Glucose 92 mg/dL       : [id number] Shanks HeatherMeter ID: [id number]       Comprehensive Metabolic Panel [[id number]]  (Abnormal) Collected:  06/19/17 0404    Specimen:  Blood Updated:  06/19/17 0423     Glucose 72 mg/dL      BUN 28 (H) mg/dL      Creatinine 3.93 (H) mg/dL      Sodium 136 mmol/L      Potassium 3.5 mmol/L      Chloride 101 mmol/L      CO2 26.0 mmol/L      Calcium 7.7 (L) mg/dL      Total Protein 5.0 (L) g/dL      Albumin 1.90 (L) g/dL      ALT (SGPT) 22 U/L      AST (SGOT) 28 U/L      Alkaline Phosphatase 90 U/L      Total Bilirubin 1.8 (H) mg/dL      eGFR  African Amer 14 (L) mL/min/1.73      Globulin 3.1 gm/dL      A/G Ratio 0.6 (L) g/dL      BUN/Creatinine Ratio 7.1     Anion Gap 9.0 mmol/L     Narrative:       The MDRD GFR formula is only valid for adults with stable renal function between ages 18 and 70.    CBC & Differential [[id number]] Collected:  06/19/17 0404    Specimen:  Blood Updated:  06/19/17 0512    Narrative:       The following orders were created for panel order CBC & Differential.  Procedure                               Abnormality         Status                     ---------                               -----------         ------                     Scan Slide[[id number]]                                       Final result               CBC Auto Differential[[id number]]        Abnormal            Final result                 Please view results for these tests on the  individual orders.    CBC Auto Differential [[id number]]  (Abnormal) Collected:  06/19/17 0404    Specimen:  Blood Updated:  06/19/17 0512     WBC 6.21 10*3/mm3      RBC 2.84 (L) 10*6/mm3      Hemoglobin 8.1 (L) g/dL      Hematocrit 23.9 (L) %      MCV 84.2 fL      MCH 28.5 pg      MCHC 33.9 g/dL      RDW 20.3 (H) %      RDW-SD 61.2 (H) fl      Platelets 75 (L) 10*3/mm3      Neutrophil % 80.8 (H) %      Lymphocyte % 9.8 (L) %      Monocyte % 5.8 %      Eosinophil % 2.9 %      Basophil % 0.2 %      Immature Grans % 0.5 %      Neutrophils, Absolute 5.02 10*3/mm3      Lymphocytes, Absolute 0.61 (L) 10*3/mm3      Monocytes, Absolute 0.36 10*3/mm3      Eosinophils, Absolute 0.18 10*3/mm3      Basophils, Absolute 0.01 10*3/mm3      Immature Grans, Absolute 0.03 10*3/mm3     Scan Slide [[id number]] Collected:  06/19/17 0404    Specimen:  Blood Updated:  06/19/17 0512     RBC Morphology Normal     WBC Morphology Normal     Platelet Estimate Decreased    POC Glucose Fingerstick [[id number]]  (Abnormal) Collected:  06/19/17 0610    Specimen:  Blood Updated:  06/19/17 0643     Glucose 66 (L) mg/dL       : [id number] Guzman HeatherMeter ID: [id number]       POC Glucose Fingerstick [[id number]]  (Normal) Collected:  06/19/17 0632    Specimen:  Blood Updated:  06/19/17 0643     Glucose 93 mg/dL       : [id number] Qufenqi HeatherMeter ID: [id number]                 Echo EF Estimated  normal    Cath Ejection Fraction Quantitative  No results found for: CATHEF        Medication Review: yes  Current Facility-Administered Medications   Medication Dose Route Frequency Provider Last Rate Last Dose   • cefTRIAXone (ROCEPHIN) 1 g/100 mL 0.9% NS (MBP)  1 g Intravenous Q24H Carter Llamas MD   1 g at 06/19/17 0818   • dextrose (D50W) solution 50 mL  50 mL Intravenous Q1H PRN Bree Soares MD   50 mL at 06/17/17 0800   • diltiaZEM (CARDIZEM) 125mg/125 mL (1 mg/mL) infusion  5-15 mg/hr Intravenous Titrated Carter Llamas MD   Stopped at  06/17/17 1435   • hydrocortisone sodium succinate (Solu-CORTEF) injection 100 mg  100 mg Intravenous Q6H Arthur Kerr MD       • HYDROmorphone (DILAUDID) injection 0.25 mg  0.25 mg Intravenous Q4H PRN Carter Llamas MD   0.25 mg at 06/17/17 1258   • ipratropium-albuterol (DUO-NEB) nebulizer solution 3 mL  3 mL Nebulization Q6H While Awake - RT Carter Llamas MD   3 mL at 06/19/17 0655   • levothyroxine (SYNTHROID, LEVOTHROID) tablet 50 mcg  50 mcg Oral Q AM Carter Llamas MD   50 mcg at 06/19/17 0623   • megestrol (MEGACE) 40 MG/ML suspension 400 mg  400 mg Oral Daily Carter Llamas MD   400 mg at 06/19/17 0818   • midodrine (PROAMATINE) tablet 5 mg  5 mg Oral TID AC FANNIE Blanco   5 mg at 06/19/17 0818   • multiple vitamin (M.V.I. Adult) 10 mL, thiamine (B-1) 100 mg, folic acid 1 mg in dextrose 5 % and sodium chloride 0.45 % 1,000 mL infusion  50 mL/hr Intravenous Daily Carter Llamas MD 50 mL/hr at 06/19/17 0817 50 mL/hr at 06/19/17 0817   • norepinephrine (LEVOPHED) 8,000 mcg in sodium chloride 0.9 % 250 mL (32 mcg/mL) infusion  0.02-0.3 mcg/kg/min Intravenous Titrated FANNIE Blanco 3.85 mL/hr at 06/19/17 0000 0.04 mcg/kg/min at 06/19/17 0000   • ondansetron (ZOFRAN) injection 4 mg  4 mg Intravenous Q6H PRN Pino Tang MD   4 mg at 06/15/17 0555   • pantoprazole (PROTONIX) EC tablet 40 mg  40 mg Oral Q AM Pino Tang MD   40 mg at 06/19/17 0623   • sodium chloride 0.9 % flush 1-10 mL  1-10 mL Intravenous PRN Pino Tang MD             Assessment/Plan    PAROXYSMAL ATRIAL FIBRILLATION - maintaining nsr  REMOTE CARDIOMYOPATHY HAS RESOLVED    Active Problems:    Chest pain in adult    Renal failure    Hypotension    Anemia    Hypokalemia      THANKS - WILL SEE PRN.    Luis Eduardo Ray MD  06/19/17  8:58 AM

## 2017-06-19 NOTE — PROGRESS NOTES
Continued Stay Note   Renea     Patient Name: Jennifer Marks  MRN: [id number]  Today's Date: 6/19/2017    Admit Date: 6/11/2017          Discharge Plan       06/19/17 1431    Case Management/Social Work Plan    Plan SNF    Patient/Family In Agreement With Plan yes    Additional Comments Referral made to [facility] on Friday.  No response as of time of this documentation.  Will follow up with SNF to ensure receipt of referral.              Discharge Codes     None            ANUPAMA Zaragoza

## 2017-06-19 NOTE — PROGRESS NOTES
Nephrology ([facility]) Progress Note      Patient:  Jennifer Marks  YOB: 1945  Date of Service: 6/19/2017  MRN: [id number]   Acct: [id number]   Primary Care Physician: No Known Provider  Advance Directive: Conditional Code  Admit Date: 6/11/2017       Hospital Day: 8  Referring Provider: Pino Tang MD      Patient personally seen and examined.  Complete chart including Consults, Notes, Operative Reports, Labs, Cardiology, and Radiology studies reviewed as able.        Subjective:  Jennifer Marks is a 72 y.o. female  whom we were consulted for end stage renal disease management, hypokalemia. She is on hemodialysis Monday Wednesday Friday.  Admitted with chest pain. Has had decreased oral intake and diarrhea for several days.  On 6/13 had femoral line placed.   Was on Cardizem drip for tachydysrhythmia.  Has been hypotensive and has needed Levophed drip intermittently. Today she is sleepy but answers questions appropriately.     Allergies:  Heparin; Iron; Latex; Levaquin [levofloxacin]; Shrimp (diagnostic); and Vancomycin    Home Meds:  Prescriptions Prior to Admission   Medication Sig Dispense Refill Last Dose   • carvedilol (COREG) 12.5 MG tablet Take 12.5 mg by mouth Daily.      • losartan (COZAAR) 25 MG tablet Take 25 mg by mouth Daily.      • pantoprazole (PROTONIX) 40 MG EC tablet Take 40 mg by mouth Daily.      • potassium chloride (KLOR-CON) 20 MEQ packet Take 20 mEq by mouth Daily.      • predniSONE (DELTASONE) 1 MG tablet Take 1 mg by mouth Daily.      • PREDNISONE PO Take  by mouth.      • traZODone (DESYREL) 50 MG tablet Take 50 mg by mouth Every Night.          Medicines:  Current Facility-Administered Medications   Medication Dose Route Frequency Provider Last Rate Last Dose   • cefTRIAXone (ROCEPHIN) 1 g/100 mL 0.9% NS (MBP)  1 g Intravenous Q24H Carter Llamas MD   1 g at 06/18/17 0825   • dextrose (D50W) solution 50 mL  50 mL Intravenous Q1H PRN Bree WHITING  MD Rodger   50 mL at 06/17/17 0800   • diltiaZEM (CARDIZEM) 125mg/125 mL (1 mg/mL) infusion  5-15 mg/hr Intravenous Titrated Carter Llamas MD   Stopped at 06/17/17 1435   • HYDROmorphone (DILAUDID) injection 0.25 mg  0.25 mg Intravenous Q4H PRN Carter Llamas MD   0.25 mg at 06/17/17 1258   • ipratropium-albuterol (DUO-NEB) nebulizer solution 3 mL  3 mL Nebulization Q6H While Awake - RT Carter Llamas MD   3 mL at 06/19/17 0655   • levothyroxine (SYNTHROID, LEVOTHROID) tablet 50 mcg  50 mcg Oral Q AM Carter Llamas MD   50 mcg at 06/19/17 0623   • megestrol (MEGACE) 40 MG/ML suspension 400 mg  400 mg Oral Daily Carter Llamas MD   400 mg at 06/18/17 0825   • midodrine (PROAMATINE) tablet 5 mg  5 mg Oral TID AC FANNIE Blanco   5 mg at 06/18/17 1721   • multiple vitamin (M.V.I. Adult) 10 mL, thiamine (B-1) 100 mg, folic acid 1 mg in dextrose 5 % and sodium chloride 0.45 % 1,000 mL infusion  50 mL/hr Intravenous Daily Carter Llamas MD 50 mL/hr at 06/18/17 0825 50 mL/hr at 06/18/17 0825   • norepinephrine (LEVOPHED) 8,000 mcg in sodium chloride 0.9 % 250 mL (32 mcg/mL) infusion  0.02-0.3 mcg/kg/min Intravenous Titrated FANNIE Blanco 3.85 mL/hr at 06/19/17 0000 0.04 mcg/kg/min at 06/19/17 0000   • ondansetron (ZOFRAN) injection 4 mg  4 mg Intravenous Q6H PRN Pino Tang MD   4 mg at 06/15/17 0555   • pantoprazole (PROTONIX) EC tablet 40 mg  40 mg Oral Q AM Pino Tang MD   40 mg at 06/19/17 0623   • sodium chloride 0.9 % flush 1-10 mL  1-10 mL Intravenous PRN Pino Tang MD           Past Medical History:  Past Medical History:   Diagnosis Date   • A-fib    • Arthritis    • CHF (congestive heart failure)    • Hypertension    • Renal failure        Past Surgical History:  Past Surgical History:   Procedure Laterality Date   • ARTERIOVENOUS FISTULA LEG Left        Family History  History reviewed. No pertinent family history.    Social History  Social History     Social History   • Marital  "status: Single     Spouse name: N/A   • Number of children: N/A   • Years of education: N/A     Occupational History   • Not on file.     Social History Main Topics   • Smoking status: Former Smoker   • Smokeless tobacco: Not on file   • Alcohol use No   • Drug use: No   • Sexual activity: Defer     Other Topics Concern   • Not on file     Social History Narrative         Review of Systems:  History obtained from chart review and the patient  General ROS: Patient complains of malaise and No fever or chills  Respiratory ROS: No cough, shortness of breath, wheezing  Cardiovascular ROS: no chest pain or dyspnea on exertion  Gastrointestinal ROS: No abdominal pain, no nausea  Genito-Urinary ROS: No dysuria or hematuria  Neurological ROS: negative  14 point ROS reviewed with the patient and negative except as noted above and in the HPI unless unable to obtain.    Objective:  /55  Pulse 92  Temp 98.2 °F (36.8 °C) (Temporal Artery )   Resp 20  Ht 62\" (157.5 cm)  Wt 119 lb 14.4 oz (54.4 kg)  SpO2 100%  BMI 21.93 kg/m2    Intake/Output Summary (Last 24 hours) at 06/19/17 0802  Last data filed at 06/19/17 0400   Gross per 24 hour   Intake           1532.5 ml   Output                0 ml   Net           1532.5 ml     General: awake/alert    Neck: supple, no JVD  Chest:  clear to auscultation bilaterally without respiratory distress  CVS: regular rate and rhythm  Abdominal: soft, nontender, normal bowel sounds  Extremities: no cyanosis or edema  Skin: warm and dry without rash   Neuro: no focal motor deficits      Labs:    Results from last 7 days  Lab Units 06/19/17  0404 06/18/17  0346 06/17/17  0827   WBC 10*3/mm3 6.21 9.12 11.75*   HEMOGLOBIN g/dL 8.1* 8.7* 9.0*   HEMATOCRIT % 23.9* 26.0* 27.1*   PLATELETS 10*3/mm3 75* 26* 43*           Results from last 7 days  Lab Units 06/19/17  0404 06/18/17  0346 06/17/17  0827   SODIUM mmol/L 136 137 137   POTASSIUM mmol/L 3.5 3.2* 3.5   CHLORIDE mmol/L 101 100 100 "   TOTAL CO2 mmol/L 26.0 28.0 25.0   BUN mg/dL 28* 23* 17   CREATININE mg/dL 3.93* 3.48* 3.00*   CALCIUM mg/dL 7.7* 7.8* 7.8*   BILIRUBIN mg/dL 1.8* 1.8* 2.7*   ALK PHOS U/L 90 92 89   ALT (SGPT) U/L 22 28 25   AST (SGOT) U/L 28 23 26   GLUCOSE mg/dL 72 109* 146*       Radiology:   Imaging Results (last 72 hours)     Procedure Component Value Units Date/Time    XR Chest 1 View [[id number]] Collected:  06/11/17 1803     Updated:  06/11/17 2054    Narrative:       EXAMINATION:   XR CHEST 1 VW-  6/11/2017 6:01 PM CDT     HISTORY: Chest pain     Prior exams 05/28/2016.     Single view of the chest is obtained. The pulmonary vascular margins are  slightly indistinct. This has the appearance of mild pulmonary vascular  congestion. Left diaphragm is indistinct. This may be infiltrate or  atelectasis left lower lobe.     Cardiac silhouette is enlarged and unchanged from May 28.       Impression:       1. Silhouetting of the left diaphragm consistent with infiltrate or  atelectasis left lower lobe.  2. Indistinct pulmonary vascular margins consistent with mild pulmonary  vascular congestion.  This report was finalized on 06/11/2017 20:51 by Dr. Herson Magaña MD.          Culture:         Assessment   1. End stage renal disease on maintenance hemodialysis Monday Wednesday Friday   2. Hypokalemia--improved  3. Hypotension--still on low-dose Levophed  4. Anemia of chronic kidney disease  5. Diabetes mellitus type 2    Plan:  1.  Maintenance dialysis today      Jaden Tovar, FANNIE  6/19/2017  8:02 AM

## 2017-06-19 NOTE — PLAN OF CARE
Problem: Patient Care Overview (Adult)  Goal: Plan of Care Review  Outcome: Ongoing (interventions implemented as appropriate)    06/19/17 0249   Coping/Psychosocial Response Interventions   Plan Of Care Reviewed With patient   Patient Care Overview   Progress progress toward functional goals as expected   Outcome Evaluation   Outcome Summary/Follow up Plan Patient is more alert this evening. Pt is very stiff and edemetous. Levophed was at .02 and is now at .04mcg.       Goal: Adult Individualization and Mutuality  Outcome: Ongoing (interventions implemented as appropriate)  Goal: Discharge Needs Assessment  Outcome: Ongoing (interventions implemented as appropriate)    Problem: Fall Risk (Adult)  Goal: Identify Related Risk Factors and Signs and Symptoms  Outcome: Ongoing (interventions implemented as appropriate)  Goal: Absence of Falls  Outcome: Ongoing (interventions implemented as appropriate)  Goal: Absence of Falls  Outcome: Ongoing (interventions implemented as appropriate)    Problem: Fluid Volume Excess (Adult,Obstetrics,Pediatric)  Goal: Stable Weight  Outcome: Ongoing (interventions implemented as appropriate)  Goal: Balanced Intake/Output  Outcome: Ongoing (interventions implemented as appropriate)    Problem: Pressure Ulcer Risk (New Scale) (Adult,Obstetrics,Pediatric)  Goal: Skin Integrity  Outcome: Ongoing (interventions implemented as appropriate)    Problem: Nutrition, Imbalanced: Inadequate Oral Intake (Adult)  Goal: Improved Oral Intake  Outcome: Ongoing (interventions implemented as appropriate)  Goal: Prevent Further Weight Loss  Outcome: Ongoing (interventions implemented as appropriate)    Problem: Skin Integrity Impairment, Risk/Actual (Adult)  Goal: Identify Related Risk Factors and Signs and Symptoms  Outcome: Ongoing (interventions implemented as appropriate)  Goal: Skin Integrity/Wound Healing  Outcome: Ongoing (interventions implemented as appropriate)

## 2017-06-19 NOTE — PAYOR COMM NOTE
"FROM: ETIENNE EPSTEIN  PHONE: [phone]  FAX: [phone]    AUTH: [id number]    Jennifer Zhao (72 y.o. Female)     Date of Birth Social Security Number Address Home Phone MRN    1945  [address] [place] [address] [phone] [id number]    Jainism Marital Status          Confucianist Single       Admission Date Admission Type Admitting Provider Attending Provider Department, Room/Bed    6/11/17 Emergency Arthur Kerr MD Finney, Patrick C, MD [facility] CARDIAC CARE, C002/1    Discharge Date Discharge Disposition Discharge Destination                      Attending Provider: Arthur Kerr MD     Allergies:  Heparin, Iron, Latex, Levaquin [Levofloxacin], Shrimp (Diagnostic), Vancomycin    Isolation:  None   Infection:  None   Code Status:  Conditional    Ht:  62\" (157.5 cm)   Wt:  119 lb 14.4 oz (54.4 kg)    Admission Cmt:  None   Principal Problem:  None                Active Insurance as of 6/11/2017     Primary Coverage     Payor Plan Insurance Group Employer/Plan Group    MEDICARE MEDICARE B ONLY      Payor Plan Address Payor Plan Phone Number Effective From Effective To    [address] [address] [phone] 5/1/2008     [place] [address]       Subscriber Name Subscriber Birth Date Member ID       JENNIFER ZHAO 1945 [id number]           Secondary Coverage     Payor Plan Insurance Group Employer/Plan Group    [place]      Payor Plan Address Payor Plan Phone Number Effective From Effective To    [address] [address]  1/1/2014     [place] [address]       Subscriber Name Subscriber Birth Date Member ID       JENNIFER ZHAO 1945 [id number]                 Emergency Contacts      (Rel.) Home Phone Work Phone Mobile Phone    Norma Amado (Relative) [phone] -- --    Mirza Peguero (Relative) [phone] -- --               Physician Progress Notes (last 72 hours) (Notes from 6/16/2017  7:50 AM through " 6/19/2017  7:50 AM)      FANNIE Blanco at 6/16/2017  9:43 AM  Version 1 of 1    Attestation signed by Dg Ball MD at 6/16/2017  4:14 PM        I saw and examined patient independently.I agree with the nurse practitioner's examination and assessment. She was still on Levophed for pressure support. She was having progressive decline in status. Today, she has runs of SVTs and she required Cardizem infusion. She was sleeepy and was having low oral intake . Vitals: were reviewed. Physical examination: S1S2 regular, lungs were clear to auscultation, abdomen was soft, non tender, legs with no edema. Labs were reviewed.      Assessment and plan:   1. End stage renal disease on maintenance hemodialysis Monday Wednesday Friday   2. Hypokalemia  3. Hypotension--on Levophed  4. Anemia of chronic kidney disease  5. Diabetes mellitus type 2  Patient changed her mind again, and wants to continue dialysis. Her family wanted her to be intubated if she has to, but refused CPR if she has cardiac arrest. Will provide then dialysis today. Will avoid overfiltration to prevent hypotension and further hemodynamic instability.                                 Nephrology ([facility]) Progress Note      Patient:  Jennifer Marks  YOB: 1945  Date of Service: 6/16/2017  MRN: [id number]   Acct: [id number]   Primary Care Physician: No Known Provider  Advance Directive: Full Code  Admit Date: 6/11/2017       Hospital Day: 5  Referring Provider: Pino Tang MD      Patient personally seen and examined.  Complete chart including Consults, Notes, Operative Reports, Labs, Cardiology, and Radiology studies reviewed as able.        Subjective:  Jennifer Marks is a 72 y.o. female  whom we were consulted for end stage renal disease management, hypokalemia. She is on hemodialysis Monday Wednesday Friday; Missed her Friday (6/09) treatment due to not feeling well. Admitted with chest  "pain. Has had decreased oral intake and diarrhea for several days.  On 6/13 had femoral line placed.  Today she is sleepy but answers questions appropriately.  Nods \"yes\" when asked if she wants dialysis today.  Per nursing staff; patient told family last evening that she was going to continue with her dialysis treatments.    Allergies:  Heparin; Iron; Latex; Levaquin [levofloxacin]; Shrimp (diagnostic); and Vancomycin    Home Meds:  Prescriptions Prior to Admission   Medication Sig Dispense Refill Last Dose   • carvedilol (COREG) 12.5 MG tablet Take 12.5 mg by mouth Daily.      • losartan (COZAAR) 25 MG tablet Take 25 mg by mouth Daily.      • pantoprazole (PROTONIX) 40 MG EC tablet Take 40 mg by mouth Daily.      • potassium chloride (KLOR-CON) 20 MEQ packet Take 20 mEq by mouth Daily.      • predniSONE (DELTASONE) 1 MG tablet Take 1 mg by mouth Daily.      • PREDNISONE PO Take  by mouth.      • traZODone (DESYREL) 50 MG tablet Take 50 mg by mouth Every Night.          Medicines:  Current Facility-Administered Medications   Medication Dose Route Frequency Provider Last Rate Last Dose   • aspirin EC tablet 81 mg  81 mg Oral Daily Pino Tang MD   81 mg at 06/16/17 0841   • cefTRIAXone (ROCEPHIN) 1 g/100 mL 0.9% NS (MBP)  1 g Intravenous Q24H Carter Llamas MD       • dextrose (D50W) solution 50 mL  50 mL Intravenous Q1H PRN Bree Soares MD   50 mL at 06/16/17 0502   • HYDROmorphone (DILAUDID) injection 0.25 mg  0.25 mg Intravenous Q4H PRN Carter Llamas MD   0.25 mg at 06/16/17 0436   • ipratropium-albuterol (DUO-NEB) nebulizer solution 3 mL  3 mL Nebulization Q6H While Awake - RT Carter Llamas MD       • levothyroxine (SYNTHROID, LEVOTHROID) tablet 50 mcg  50 mcg Oral Q AM Carter Llamas MD       • megestrol (MEGACE) 40 MG/ML suspension 400 mg  400 mg Oral Daily Carter Llamas MD   400 mg at 06/16/17 0841   • midodrine (PROAMATINE) tablet 5 mg  5 mg Oral TID AC Jaden D Tovar, APRN   5 mg at 06/16/17 0841 " "  • norepinephrine (LEVOPHED) 8,000 mcg in sodium chloride 0.9 % 250 mL (32 mcg/mL) infusion  0.02-0.3 mcg/kg/min Intravenous Titrated FANNIE Blanco 17.31 mL/hr at 06/16/17 0658 0.18 mcg/kg/min at 06/16/17 0658   • ondansetron (ZOFRAN) injection 4 mg  4 mg Intravenous Q6H PRN Pino Tang MD   4 mg at 06/15/17 0555   • pantoprazole (PROTONIX) EC tablet 40 mg  40 mg Oral Q AM Pino Tang MD   40 mg at 06/12/17 0610   • sodium chloride 0.9 % flush 1-10 mL  1-10 mL Intravenous PRN Pino Tang MD           Past Medical History:  Past Medical History:   Diagnosis Date   • A-fib    • Arthritis    • CHF (congestive heart failure)    • Hypertension    • Renal failure        Past Surgical History:  Past Surgical History:   Procedure Laterality Date   • ARTERIOVENOUS FISTULA LEG Left        Family History  History reviewed. No pertinent family history.    Social History  Social History     Social History   • Marital status: Single     Spouse name: N/A   • Number of children: N/A   • Years of education: N/A     Occupational History   • Not on file.     Social History Main Topics   • Smoking status: Former Smoker   • Smokeless tobacco: Not on file   • Alcohol use No   • Drug use: No   • Sexual activity: Defer     Other Topics Concern   • Not on file     Social History Narrative         Review of Systems:  History obtained from chart review and the patient  General ROS: Patient complains of malaise and No fever or chills  Respiratory ROS: No cough, shortness of breath, wheezing  Cardiovascular ROS: no chest pain or dyspnea on exertion  Gastrointestinal ROS: No abdominal pain, no nausea  Genito-Urinary ROS: No dysuria or hematuria  Neurological ROS: negative  14 point ROS reviewed with the patient and negative except as noted above and in the HPI unless unable to obtain.    Objective:  BP (!) 86/54  Pulse 101  Temp 97.7 °F (36.5 °C) (Temporal Artery )   Resp 20  Ht 62\" (157.5 cm)  Wt 111 lb (50.3 kg)  SpO2 " 92%  BMI 20.3 kg/m2    Intake/Output Summary (Last 24 hours) at 06/16/17 0943  Last data filed at 06/16/17 0502   Gross per 24 hour   Intake            423.5 ml   Output                0 ml   Net            423.5 ml     General: awake/alert    Neck: supple, no JVD  Chest:  clear to auscultation bilaterally without respiratory distress  CVS: regular rate and rhythm  Abdominal: soft, nontender, normal bowel sounds  Extremities: no cyanosis or edema  Skin: warm and dry without rash   Neuro: no focal motor deficits      Labs:    Results from last 7 days  Lab Units 06/16/17  0229 06/15/17  0405 06/14/17  0511   WBC 10*3/mm3 11.13* 7.60 6.99   HEMOGLOBIN g/dL 9.7* 10.3* 10.9*   HEMATOCRIT % 28.7* 31.0* 32.3*   PLATELETS 10*3/mm3 47* 72* 101*           Results from last 7 days  Lab Units 06/16/17  0229 06/15/17  0405 06/14/17  2212  06/14/17  0511  06/13/17  0556  06/11/17  2047   SODIUM mmol/L 140 139  --   --  141  --  138  < > 139   POTASSIUM mmol/L 3.7 3.2*  --   --  3.3*  --  2.4*  < > 2.0*   CHLORIDE mmol/L 104 101  --   --  103  --  100  < > 100   TOTAL CO2 mmol/L 22.0* 19.0*  --   --  22.0*  --  22.0*  < > 19.0*   BUN mg/dL 24* 16  --   --  27*  --  49*  < > 66*   CREATININE mg/dL 5.39* 4.95*  --   --  7.86*  --  11.87*  < > 16.31*   CALCIUM mg/dL 8.6 8.5  --   --  8.3*  --  7.7*  < > 8.1*   BILIRUBIN mg/dL 2.7*  --   --   --   --   --  1.8*  --  2.4*   ALK PHOS U/L 92  --   --   --   --   --  94  --  81   ALT (SGPT) U/L 20  --   --   --   --   --  20  --  17   AST (SGOT) U/L 30  --   --   --   --   --  30  --  18   GLUCOSE mg/dL 84 195* 286*  < > 102*  < > 89  < > 72   < > = values in this interval not displayed.    Radiology:   Imaging Results (last 72 hours)     Procedure Component Value Units Date/Time    XR Chest 1 View [[id number]] Collected:  06/11/17 1803     Updated:  06/11/17 2054    Narrative:       EXAMINATION:   XR CHEST 1 VW-  6/11/2017 6:01 PM CDT     HISTORY: Chest pain     Prior exams 05/28/2016.      Single view of the chest is obtained. The pulmonary vascular margins are  slightly indistinct. This has the appearance of mild pulmonary vascular  congestion. Left diaphragm is indistinct. This may be infiltrate or  atelectasis left lower lobe.     Cardiac silhouette is enlarged and unchanged from May 28.       Impression:       1. Silhouetting of the left diaphragm consistent with infiltrate or  atelectasis left lower lobe.  2. Indistinct pulmonary vascular margins consistent with mild pulmonary  vascular congestion.  This report was finalized on 06/11/2017 20:51 by Dr. Herson Magaña MD.          Culture:         Assessment   1. End stage renal disease on maintenance hemodialysis Monday Wednesday Friday   2. Hypokalemia--improved  3. Hypotension--still on low-dose Levophed  4. Anemia of chronic kidney disease  5. Diabetes mellitus type 2    Plan:  1.  Wean Levophed drip as tolerated.  2.  Continue hemodialysis; pt has now indicated that she wants dialysis today.      Jaden Tovar, APRN  6/16/2017  9:43 AM       Electronically signed by Dg Ball MD at 6/16/2017  4:14 PM      Dinh Prasad MD at 6/16/2017  3:28 PM  Version 1 of 1         Chief Complaint: Unobtainable from patient secondary to mental status    S: Cardiology was asked to evaluate the patient as she has developed atrial fibrillation with rapid ventricular response.  Review of records indicate that she was initially admitted with chest discomfort.  As evaluated by cardiology at that time.  Ultimately, it was thought that she had atypical chest pain that resolved.  She did have a mild troponin elevation but this was not thought to represent an acute coronary syndrome.  Overnight, the patient was noted to be in atrial fibrillation with rapid ventricular response.  She was given digoxin and placed on a Cardizem drip today and her heart rate has come down considerably.  In talking with the family, they feel that the  "patient's prognosis poor and are considering comfort measures at this time.  The patient is currently drowsy and does not answer questions at this time.    Medications: Reviewed  Telemetry: Currently Afib with HR 90's, overnight, considerably higher HR - up to 140's-150's periodically    O:  /60  Pulse 80  Temp 97.7 °F (36.5 °C) (Temporal Artery )   Resp 19  Ht 62\" (157.5 cm)  Wt 111 lb (50.3 kg)  SpO2 95%  BMI 20.3 kg/m2    /60  Pulse 80  Temp 97.7 °F (36.5 °C) (Temporal Artery )   Resp 19  Ht 62\" (157.5 cm)  Wt 111 lb (50.3 kg)  SpO2 95%  BMI 20.3 kg/m2    Gen: chronically ill appearing, lying flat, NAD  CV: irregularly, irregular, no mrg  Pulm: poor effort but appears CTAB  GI: thin, s, nt, ns, +bs  Ext: no c/c/e  Neuro: mental status - drowsy    Diagnostic Data:    Lab Results   Component Value Date    WBC 11.13 (H) 06/16/2017    HGB 9.7 (L) 06/16/2017    HCT 28.7 (L) 06/16/2017    MCV 83.4 06/16/2017    PLT 47 (L) 06/16/2017     Lab Results   Component Value Date    GLUCOSE 84 06/16/2017    CALCIUM 8.6 06/16/2017     06/16/2017    K 3.7 06/16/2017    CO2 22.0 (L) 06/16/2017     06/16/2017    BUN 24 (H) 06/16/2017    CREATININE 5.39 (H) 06/16/2017    EGFRIFAFRI 9 (L) 06/16/2017    BCR 4.5 (L) 06/16/2017    ANIONGAP 14.0 (H) 06/16/2017     ASSESSMENT/PLAN:    1. Paroxysmal atrial fibrillation with rapid ventricular response: The patient has been given digoxin and placed on a Cardizem drip and her heart rates are better control at this time.  She remains hypotensive and on Levophed at this time.  Her hypotension seems to be unrelated to her atrial fibrillation with rapid ventricular response as it does predate her rapid ventricular response.  - Agree with current management with rate controlling medications.  - In the patient's prognosis long term which seems to be rather poor, would not be in favor of long-term anticoagulation for this patient.  - Can wean Cardizem drip as " tolerated and convert to oral Cardizem when heart rates are fully controlled.    2.  Hypotension: Remains on Levophed at this time.  Wean as tolerated.  3.  End-stage renal disease on hemodialysis.  Nephrology is following.  4.  Anemia of chronic disease: Stable at this time  5.  Thrombocytopenia, no active bleeding at this time; however, platelets have shown steady decline    We will see again tomorrow.         Electronically signed by Dinh Prasad MD at 6/16/2017  3:36 PM      Carter Llamas MD at 6/17/2017  7:42 AM  Version 1 of 1             [facility] Medicine Services  INPATIENT PROGRESS NOTE    Length of Stay: 6  Date of Admission: 6/11/2017  Primary Care Physician: No Known Provider    Subjective   Chief Complaint: Atrial fib, hypertension, weakness, kidney failure    HPI   Patient enough to do well.  Low sugar, start D5.  Patient has not been eating.  Blood pressure remained low.  Episode atrial fib yesterday, on Cardizem drip.  Hypertension on Levophed drip.  Patient has not been eating well, I would try NG tube feeding today.  Family change CODE STATUS to no chest compression.    Review of Systems   Constitutional: Positive for activity change, appetite change and fatigue. Negative for chills and fever.   HENT: Negative for hearing loss, nosebleeds, tinnitus and trouble swallowing.   Eyes: Negative for visual disturbance.   Respiratory: Negative for cough, chest tightness, shortness of breath and wheezing.   Cardiovascular: Negative for chest pain, palpitations and leg swelling.   Gastrointestinal: Negative for abdominal distention, abdominal pain, blood in stool, constipation, diarrhea, nausea and vomiting.   Endocrine: Negative for cold intolerance, heat intolerance, polydipsia, polyphagia and polyuria.   Genitourinary: Negative for decreased urine volume, difficulty urinating, dysuria, flank pain, frequency and hematuria.   Musculoskeletal: Positive for  arthralgias, gait problem and myalgias. Negative for joint swelling.   Skin: Negative for rash.   Allergic/Immunologic: Negative for immunocompromised state.   Neurological: Positive for weakness. Negative for dizziness, syncope, light-headedness and headaches.   Hematological: Negative for adenopathy. Does not bruise/bleed easily.   Psychiatric/Behavioral: Negative for confusion and sleep disturbance. The patient is not nervous/anxious.     All pertinent negatives and positives are as above. All other systems have been reviewed and are negative unless otherwise stated.     Objective    Temp:  [97.8 °F (36.6 °C)-98.1 °F (36.7 °C)] 98.1 °F (36.7 °C)  Heart Rate:  [] 99  Resp:  [16-24] 21  BP: ()/(51-90) 109/90    Intake/Output Summary (Last 24 hours) at 06/17/17 0742  Last data filed at 06/16/17 1846   Gross per 24 hour   Intake              323 ml   Output                0 ml   Net              323 ml     Physical Exam  Constitutional: She appears well-developed and well-nourished.   HENT:   Head: Normocephalic and atraumatic.   Eyes: Conjunctivae and EOM are normal. Pupils are equal, round, and reactive to light.   Neck: Neck supple. No JVD present. No thyromegaly present.   Cardiovascular: Normal rate, regular rhythm, normal heart sounds and intact distal pulses. Exam reveals no gallop and no friction rub.   No murmur heard.  Pulmonary/Chest: Effort normal. No respiratory distress. She has no wheezes. She has rales. She exhibits no tenderness.   Abdominal: Soft. Bowel sounds are normal. She exhibits no distension. There is no tenderness. There is no rebound and no guarding.   Musculoskeletal: Normal range of motion. She exhibits no edema, tenderness or deformity.   Lymphadenopathy:   She has no cervical adenopathy.   Neurological: She is alert. She displays normal reflexes. No cranial nerve deficit. She exhibits abnormal muscle tone. Coordination abnormal.   Skin: Skin is warm and dry. No rash noted.    Psychiatric: She has a normal mood and affect. Her behavior is normal.   Nursing note and vitals reviewed.     Results Review:  Lab Results (last 24 hours)     Procedure Component Value Units Date/Time    POC Glucose Fingerstick [[id number]]  (Normal) Collected:  06/16/17 1207    Specimen:  Blood Updated:  06/16/17 1227     Glucose 89 mg/dL       : [id number] Mirza AmberMeter ID: [id number]       POC Glucose Fingerstick [[id number]]  (Normal) Collected:  06/16/17 1637    Specimen:  Blood Updated:  06/16/17 1649     Glucose 94 mg/dL       : [id number] Blue AmberMeter ID: [id number]       Blood Culture [[id number]]  (Normal) Collected:  06/16/17 0918    Specimen:  Blood from Arm, Left Updated:  06/16/17 2201     Blood Culture No growth at less than 24 hours    POC Glucose Fingerstick [[id number]]  (Normal) Collected:  06/17/17 0258    Specimen:  Blood Updated:  06/17/17 0319     Glucose 86 mg/dL       : [id number] Vitaliy BergferMeter ID: [id number]       POC Glucose Fingerstick [[id number]]  (Normal) Collected:  06/17/17 0709    Specimen:  Blood Updated:  06/17/17 0728     Glucose 70 mg/dL       : [id number] Iván Marin ID: [id number]              Cultures:  Blood Culture   Date Value Ref Range Status   06/16/2017 No growth at less than 24 hours  Preliminary       Radiology Data:    Imaging Results (last 24 hours)     ** No results found for the last 24 hours. **          Allergies   Allergen Reactions   • Heparin    • Iron    • Latex    • Levaquin [Levofloxacin]    • Shrimp (Diagnostic)    • Vancomycin        Scheduled meds:     aspirin 81 mg Oral Daily   ceftriaxone 1 g Intravenous Q24H   ipratropium-albuterol 3 mL Nebulization Q6H While Awake - RT   levothyroxine 50 mcg Oral Q AM   megestrol 400 mg Oral Daily   midodrine 5 mg Oral TID AC   pantoprazole 40 mg Oral Q AM       PRN meds:  dextrose  •  HYDROmorphone  •  ondansetron  •  sodium chloride    Assessment/Plan     Active Problems:     Chest pain in adult    Renal failure    Hypotension    Anemia    Hypokalemia      Plan:  Chest pain, atypical, echocardiogram/afib -Ejection fraction 61%, diastolic dysfunction, right ventricular dilatation, tricuspid valve regurgitation, right ventricle systolic pressure greater than 55. On Cardizem drip.    Hemoptysis ×1  6/15. Hx feliciano syndrome.    Pneumonia/ Slight leukocytosis.  Chest x-ray ? Retrocardiac opacity- start rocephin, respiratory culture and blood culture. Duo neb.      Anemia- Status post 2 units of blood transfusion 6/14.      Chronic renal failure- nephrology is on board. Cont dialysis per nephrology.       Hyperlipidemia      Elevate TSH- free T4 is normal. Start synthroid.      Hypotension- Unable to wean pt off  levophed and continue midodrine.      Hypokalemia-- resolved      Calcium 7.7, corrected calcium 9.5 - 6/13/2017.      Nutrition- start megace, patient has not eating well.  Plan for Dobbhoff tube feeding.      Deconditioning- start PT and OT      Discharge Planning: unknown.  Prognosis poor.    Carter Llamas MD   06/17/17   7:42 AM                     Electronically signed by Carter Llamas MD at 6/17/2017  8:09 AM      Vasquez Paredes MD at 6/17/2017 10:40 AM  Version 1 of 1           Referring Provider: Pino Tang MD    Length of Stay: 6    Chief Complaint:   Chief Complaint   Patient presents with   • Chest Pain       Subjective: nonverbal    Medications  Current Facility-Administered Medications   Medication Dose Route Frequency Provider Last Rate Last Dose   • aspirin EC tablet 81 mg  81 mg Oral Daily Pino Tang MD   81 mg at 06/17/17 0945   • cefTRIAXone (ROCEPHIN) 1 g/100 mL 0.9% NS (MBP)  1 g Intravenous Q24H Carter Llamas MD   1 g at 06/17/17 0944   • dextrose (D50W) solution 50 mL  50 mL Intravenous Q1H PRN Bree Soares MD   50 mL at 06/17/17 0800   • diltiaZEM (CARDIZEM) 125mg/125 mL (1 mg/mL) infusion  5-15 mg/hr Intravenous Titrated Carter Llamas MD 8 mL/hr at  06/17/17 0243 8 mg/hr at 06/17/17 0243   • HYDROmorphone (DILAUDID) injection 0.25 mg  0.25 mg Intravenous Q4H PRN Carter Llamas MD   0.25 mg at 06/17/17 0703   • ipratropium-albuterol (DUO-NEB) nebulizer solution 3 mL  3 mL Nebulization Q6H While Awake - RT Carter Llamas MD   3 mL at 06/16/17 1914   • levothyroxine (SYNTHROID, LEVOTHROID) tablet 50 mcg  50 mcg Oral Q AM Carter Llamas MD   50 mcg at 06/17/17 0625   • megestrol (MEGACE) 40 MG/ML suspension 400 mg  400 mg Oral Daily Carter Llamas MD   400 mg at 06/17/17 0945   • midodrine (PROAMATINE) tablet 5 mg  5 mg Oral TID AC FANNIE Blanco   5 mg at 06/17/17 0945   • multiple vitamin (M.V.I. Adult) 10 mL, thiamine (B-1) 100 mg, folic acid 1 mg in dextrose 5 % and sodium chloride 0.45 % 1,000 mL infusion  50 mL/hr Intravenous Daily Carter Llamas MD 50 mL/hr at 06/17/17 0944 50 mL/hr at 06/17/17 0944   • norepinephrine (LEVOPHED) 8,000 mcg in sodium chloride 0.9 % 250 mL (32 mcg/mL) infusion  0.02-0.3 mcg/kg/min Intravenous Titrated FANNIE Blanco 21.2 mL/hr at 06/17/17 0655 0.22 mcg/kg/min at 06/17/17 0655   • ondansetron (ZOFRAN) injection 4 mg  4 mg Intravenous Q6H PRN Pino Tang MD   4 mg at 06/15/17 0555   • pantoprazole (PROTONIX) EC tablet 40 mg  40 mg Oral Q AM Pino Tang MD   40 mg at 06/17/17 0625   • sodium chloride 0.9 % flush 1-10 mL  1-10 mL Intravenous PRN Pino Tang MD           Past Medical History:   Diagnosis Date   • A-fib    • Arthritis    • CHF (congestive heart failure)    • Hypertension    • Renal failure    ,   Past Surgical History:   Procedure Laterality Date   • ARTERIOVENOUS FISTULA LEG Left    ,   History reviewed. No pertinent family history.,   Social History   Substance Use Topics   • Smoking status: Former Smoker   • Smokeless tobacco: None   • Alcohol use No   ,    Review of Systems  Review of Systems   Unable to perform ROS: patient nonverbal       Objective     Physical Exam:  Patient Vitals  for the past 24 hrs:   BP Temp Temp src Pulse Resp SpO2   06/17/17 0900 95/55 - - 93 20 95 %   06/17/17 0800 (!) 85/47 - - 76 20 94 %   06/17/17 0720 - 98.1 °F (36.7 °C) Temporal Art - - -   06/17/17 0701 - - - - - 90 %   06/17/17 0615 109/90 - - - 21 91 %   06/17/17 0545 107/63 - - 99 21 (!) 88 %   06/17/17 0505 109/72 - - 89 20 (!) 81 %   06/17/17 0449 - - - 100 - (!) 80 %   06/17/17 0405 92/78 - - 100 21 -   06/17/17 0330 92/51 - - 90 21 91 %   06/17/17 0250 120/61 - - 98 20 95 %   06/17/17 0200 99/54 - - 99 20 (!) 88 %   06/17/17 0100 95/60 - - 100 19 98 %   06/17/17 0000 106/58 - - 98 17 98 %   06/16/17 2300 116/63 - - 96 24 100 %   06/16/17 2200 123/64 - - 94 18 97 %   06/16/17 2100 122/65 - - 94 18 100 %   06/16/17 2000 112/68 - - 89 17 97 %   06/16/17 1914 - - - - - 100 %   06/16/17 1900 112/58 - - 98 18 98 %   06/16/17 1800 91/59 - - 106 - (!) 89 %   06/16/17 1700 (!) 87/56 - - 91 - (!) 85 %   06/16/17 1600 113/65 97.8 °F (36.6 °C) Temporal Art 83 16 96 %   06/16/17 1500 115/58 - - 75 16 97 %   06/16/17 1400 114/64 - - 84 18 99 %   06/16/17 1345 - - - 80 19 95 %   06/16/17 1300 100/57 - - 118 17 -   06/16/17 1200 107/75 98 °F (36.7 °C) Temporal Art (!) 140 17 -   06/16/17 1100 111/60 - - 102 20 95 %     Physical Exam   Constitutional: She is oriented to person, place, and time. She appears well-developed and well-nourished. No distress.   HENT:   Head: Normocephalic and atraumatic.   Eyes: No scleral icterus.   Neck: Normal range of motion.   Cardiovascular: Normal rate, regular rhythm and normal heart sounds.  Exam reveals no gallop and no friction rub.    No murmur heard.  Pulmonary/Chest: Effort normal and breath sounds normal. No respiratory distress. She has no wheezes. She has no rales.   Abdominal: Soft. Bowel sounds are normal. She exhibits no distension. There is no tenderness.   Musculoskeletal: She exhibits no edema.   Neurological: She is alert and oriented to person, place, and time. No cranial  nerve deficit.   Skin: Skin is warm and dry. She is not diaphoretic. No erythema.   Psychiatric: She has a normal mood and affect. Her behavior is normal.       Results Review:   I reviewed the patient's new clinical results.  Lab Results (last 24 hours)     Procedure Component Value Units Date/Time    POC Glucose Fingerstick [[id number]]  (Normal) Collected:  06/16/17 1207    Specimen:  Blood Updated:  06/16/17 1227     Glucose 89 mg/dL       : [id number] Mirza AmberMeter ID: [id number]       POC Glucose Fingerstick [[id number]]  (Normal) Collected:  06/16/17 1637    Specimen:  Blood Updated:  06/16/17 1649     Glucose 94 mg/dL       : [id number] Mirza AmberMeter ID: [id number]       POC Glucose Fingerstick [[id number]]  (Normal) Collected:  06/17/17 0258    Specimen:  Blood Updated:  06/17/17 0319     Glucose 86 mg/dL       : [id number] Vitaliy BolandniferMeter ID: [id number]       POC Glucose Fingerstick [[id number]]  (Normal) Collected:  06/17/17 0709    Specimen:  Blood Updated:  06/17/17 0728     Glucose 70 mg/dL       : [id number] Iván Marin ID: [id number]       CBC & Differential [[id number]] Collected:  06/17/17 0827    Specimen:  Blood Updated:  06/17/17 0909    Narrative:       The following orders were created for panel order CBC & Differential.  Procedure                               Abnormality         Status                     ---------                               -----------         ------                     Manual Differential[[id number]]          Abnormal            Final result               Scan Slide[[id number]]                                       Final result               CBC Auto Differential[[id number]]        Abnormal            Final result                 Please view results for these tests on the individual orders.    CBC Auto Differential [[id number]]  (Abnormal) Collected:  06/17/17 0827    Specimen:  Blood Updated:  06/17/17 0909     WBC 11.75 (H) 10*3/mm3       RBC 3.20 (L) 10*6/mm3      Hemoglobin 9.0 (L) g/dL      Hematocrit 27.1 (L) %      MCV 84.7 fL      MCH 28.1 pg      MCHC 33.2 g/dL      RDW 20.5 (H) %      RDW-SD 62.0 (H) fl      Platelets 43 (L) 10*3/mm3     Scan Slide [[id number]] Collected:  06/17/17 0827    Specimen:  Blood Updated:  06/17/17 0909     Scan Slide --      See Manual Differential Results       Manual Differential [[id number]]  (Abnormal) Collected:  06/17/17 0827    Specimen:  Blood Updated:  06/17/17 0909     Neutrophil % 74.0 %      Lymphocyte % 3.0 (L) %      Monocyte % 1.0 (L) %      Eosinophil % 2.0 %      Bands %  19.0 (H) %      Atypical Lymphocyte % 1.0 %      Neutrophils Absolute 10.93 (H) 10*3/mm3      Lymphocytes Absolute 0.35 (L) 10*3/mm3      Monocytes Absolute 0.12 (L) 10*3/mm3      Eosinophils Absolute 0.24 10*3/mm3      Anisocytosis Slight/1+     Crenated RBC's Slight/1+     Target Cells Mod/2+     WBC Morphology Normal     Platelet Estimate Decreased    Comprehensive Metabolic Panel [[id number]]  (Abnormal) Collected:  06/17/17 0827    Specimen:  Blood Updated:  06/17/17 0915     Glucose 146 (H) mg/dL      BUN 17 mg/dL       Specimen hemolyzed. Results may be affected.        Creatinine 3.00 (H) mg/dL      Sodium 137 mmol/L      Potassium 3.5 mmol/L       Specimen hemolyzed.  Results may be affected.        Chloride 100 mmol/L      CO2 25.0 mmol/L      Calcium 7.8 (L) mg/dL      Total Protein 5.3 (L) g/dL      Albumin 2.00 (L) g/dL      ALT (SGPT) 25 U/L       Specimen hemolyzed.  Results may be affected.        AST (SGOT) 26 U/L       Specimen hemolyzed.  Results may be affected.        Alkaline Phosphatase 89 U/L       Specimen hemolyzed. Results may be affected.        Total Bilirubin 2.7 (H) mg/dL      eGFR  African Amer 18 (L) mL/min/1.73      Globulin 3.3 gm/dL      A/G Ratio 0.6 (L) g/dL      BUN/Creatinine Ratio 5.7 (L)     Anion Gap 12.0 mmol/L     Narrative:       The MDRD GFR formula is only valid for adults with stable  renal function between ages 18 and 70.    POC Glucose Fingerstick [[id number]]  (Normal) Collected:  06/17/17 0937    Specimen:  Blood Updated:  06/17/17 0948     Glucose 122 mg/dL       : [id number] Iván Marin ID: [id number]       Blood Culture [[id number]]  (Normal) Collected:  06/16/17 0918    Specimen:  Blood from Arm, Left Updated:  06/17/17 1001     Blood Culture No growth at 24 hours        No results found for: ECHOEFEST  Imaging Results (last 24 hours)     ** No results found for the last 24 hours. **          I have reviewed telemetry which reveals SR    Assessment/Plan   Active Problems:    Chest pain in adult    Renal failure    Hypotension    Anemia    Hypokalemia      New Problems that need treatment:  PAF, now SR.   Hypotension, on both levophed and Cardizem    Old problems that are stable:   ESRD  Anemia    MDM Moderate Complexity             Electronically signed by Vasquez Paredes MD at 6/17/2017 10:43 AM      Dg Ball MD at 6/17/2017 12:30 PM  Version 1 of 1         DIALYSIS PROGRESS NOTE.      Patient:  Jennifer Marks  YOB: 1945  Date of Service: 6/17/2017  MRN: [id number]   Acct: [id number]   Primary Care Physician: No Known Provider  Advance Directive: Conditional Code  Admit Date: 6/11/2017       Hospital Day: 6  Referring Provider: Pino Tang MD      Patient Seen, Chart, Consults, Notes, Labs, Radiology studies reviewed.        Subjective:    Jennifer Marks is a 72 y.o. female whom we were consulted for end stage renal disease management, hypokalemia. She is on hemodialysis Monday Wednesday Friday; Missed hemodialysis on (6/09). Admitted with chest pain. Has had decreased oral intake and diarrhea for several days. She was hypotensive and was given levophed. She has also had tachycardia and given Cardizem (now off). Patient' status continued to decline. Family is aware and trying to make decisions for further life support and future procedures and  "nutrition. Today, patient is awake. She offered no new complaints. She was still anorectic. She is s/p dialysis yesterday.     Review of Systems:  History obtained from chart review and the patient  General ROS: No fever or chills  Respiratory ROS: No cough, +shortness of breath,- wheezing  Cardiovascular ROS: no chest pain but dyspnea on exertion  Gastrointestinal ROS: No abdominal pain or melena  Genito-Urinary ROS: No dysuria or hematuria  Musculoskeletal: negative  Skin: decubitus sacral ulcer    Objective:  /61  Pulse 76  Temp 98.1 °F (36.7 °C) (Temporal Artery )   Resp 18  Ht 62\" (157.5 cm)  Wt 111 lb (50.3 kg)  SpO2 95%  BMI 20.3 kg/m2    Intake/Output Summary (Last 24 hours) at 06/17/17 1230  Last data filed at 06/16/17 1846   Gross per 24 hour   Intake              120 ml   Output                0 ml   Net              120 ml       Physical examination:  General: awake/alert   Chest: Bilateral air entry  CVS: regular rate and rhythm  Abdominal: soft, nontender, normal bowel sounds  Extremities: trace leg edema  Skin: warm and dry  Neuro: No focal motor deficits    Labs:  Lab Results (last 24 hours)     Procedure Component Value Units Date/Time    POC Glucose Fingerstick [[id number]]  (Normal) Collected:  06/16/17 1637    Specimen:  Blood Updated:  06/16/17 1649     Glucose 94 mg/dL       : [id number] Mirza AmberMeter ID: [id number]       POC Glucose Fingerstick [[id number]]  (Normal) Collected:  06/17/17 0258    Specimen:  Blood Updated:  06/17/17 0319     Glucose 86 mg/dL       : [id number] Vitaliy BergferMeter ID: [id number]       POC Glucose Fingerstick [[id number]]  (Normal) Collected:  06/17/17 0709    Specimen:  Blood Updated:  06/17/17 0728     Glucose 70 mg/dL       : [id number] Iván Marin ID: [id number]       CBC & Differential [[id number]] Collected:  06/17/17 0827    Specimen:  Blood Updated:  06/17/17 0909    Narrative:       The following orders were created for " panel order CBC & Differential.  Procedure                               Abnormality         Status                     ---------                               -----------         ------                     Manual Differential[[id number]]          Abnormal            Final result               Scan Slide[[id number]]                                       Final result               CBC Auto Differential[[id number]]        Abnormal            Final result                 Please view results for these tests on the individual orders.    CBC Auto Differential [[id number]]  (Abnormal) Collected:  06/17/17 0827    Specimen:  Blood Updated:  06/17/17 0909     WBC 11.75 (H) 10*3/mm3      RBC 3.20 (L) 10*6/mm3      Hemoglobin 9.0 (L) g/dL      Hematocrit 27.1 (L) %      MCV 84.7 fL      MCH 28.1 pg      MCHC 33.2 g/dL      RDW 20.5 (H) %      RDW-SD 62.0 (H) fl      Platelets 43 (L) 10*3/mm3     Scan Slide [[id number]] Collected:  06/17/17 0827    Specimen:  Blood Updated:  06/17/17 0909     Scan Slide --      See Manual Differential Results       Manual Differential [[id number]]  (Abnormal) Collected:  06/17/17 0827    Specimen:  Blood Updated:  06/17/17 0909     Neutrophil % 74.0 %      Lymphocyte % 3.0 (L) %      Monocyte % 1.0 (L) %      Eosinophil % 2.0 %      Bands %  19.0 (H) %      Atypical Lymphocyte % 1.0 %      Neutrophils Absolute 10.93 (H) 10*3/mm3      Lymphocytes Absolute 0.35 (L) 10*3/mm3      Monocytes Absolute 0.12 (L) 10*3/mm3      Eosinophils Absolute 0.24 10*3/mm3      Anisocytosis Slight/1+     Crenated RBC's Slight/1+     Target Cells Mod/2+     WBC Morphology Normal     Platelet Estimate Decreased    Comprehensive Metabolic Panel [[id number]]  (Abnormal) Collected:  06/17/17 0827    Specimen:  Blood Updated:  06/17/17 0915     Glucose 146 (H) mg/dL      BUN 17 mg/dL       Specimen hemolyzed. Results may be affected.        Creatinine 3.00 (H) mg/dL      Sodium 137 mmol/L      Potassium 3.5 mmol/L        Specimen hemolyzed.  Results may be affected.        Chloride 100 mmol/L      CO2 25.0 mmol/L      Calcium 7.8 (L) mg/dL      Total Protein 5.3 (L) g/dL      Albumin 2.00 (L) g/dL      ALT (SGPT) 25 U/L       Specimen hemolyzed.  Results may be affected.        AST (SGOT) 26 U/L       Specimen hemolyzed.  Results may be affected.        Alkaline Phosphatase 89 U/L       Specimen hemolyzed. Results may be affected.        Total Bilirubin 2.7 (H) mg/dL      eGFR  African Amer 18 (L) mL/min/1.73      Globulin 3.3 gm/dL      A/G Ratio 0.6 (L) g/dL      BUN/Creatinine Ratio 5.7 (L)     Anion Gap 12.0 mmol/L     Narrative:       The MDRD GFR formula is only valid for adults with stable renal function between ages 18 and 70.    POC Glucose Fingerstick [[id number]]  (Normal) Collected:  06/17/17 0937    Specimen:  Blood Updated:  06/17/17 0948     Glucose 122 mg/dL       : [id number] Ivánedel Marin ID: [id number]       Blood Culture [[id number]]  (Normal) Collected:  06/16/17 0918    Specimen:  Blood from Arm, Left Updated:  06/17/17 1001     Blood Culture No growth at 24 hours          Radiology:   Imaging Results (last 24 hours)     ** No results found for the last 24 hours. **              Assessment   1. End stage renal disease on maintenance hemodialysis Monday Wednesday Friday   2. Hypokalemia  3. Hypotension--  4. Anemia of chronic kidney disease  5. Diabetes mellitus type 2  6. Failure to thrive.     Plan:  Dialysis next on 6/19. I discussed patient's case with daughter and another family member. They still want her to go on ventilator if needed, receive necessary cardiac meds, and continue dialysis. She was judged poor candidate for PEG tube placement. The decision was then made to place a Dobbhoff tube for feeding.          Dg Ball MD  6/17/2017  12:30 PM     Electronically signed by gD Ball MD at 6/17/2017 12:50 PM      Carter Llamas MD at 6/18/2017  8:05 AM  Version 1 of 1              [facility]  INPATIENT PROGRESS NOTE    Length of Stay: 7  Date of Admission: 6/11/2017  Primary Care Physician: No Known Provider    Subjective   Chief Complaint: Atrial fib, hypertension, weakness, kidney failure    HPI   Patient is not eating.  Patient states she is not hungry.  Attempt put a Dobbhoff tube for tube feeding yesterday, patient and family refused; discussed with nursing staff.  Platelets is low today, transfuse 6 packs of platelets.  Prognosis still remained poor.  Patient's off the Cardizem drip.  Blood pressure remained low, currently on the Midodrin and Levophed.    Review of Systems   Constitutional: Positive for activity change, appetite change and fatigue. Negative for chills and fever.   HENT: Negative for hearing loss, nosebleeds, tinnitus and trouble swallowing.   Eyes: Negative for visual disturbance.   Respiratory: Negative for cough, chest tightness, shortness of breath and wheezing.   Cardiovascular: Negative for chest pain, palpitations and leg swelling.   Gastrointestinal: Negative for abdominal distention, abdominal pain, blood in stool, constipation, diarrhea, nausea and vomiting.   Endocrine: Negative for cold intolerance, heat intolerance, polydipsia, polyphagia and polyuria.   Genitourinary: Negative for decreased urine volume, difficulty urinating, dysuria, flank pain, frequency and hematuria.   Musculoskeletal: Positive for arthralgias, gait problem and myalgias. Negative for joint swelling.   Skin: Negative for rash.   Allergic/Immunologic: Negative for immunocompromised state.   Neurological: Positive for weakness. Negative for dizziness, syncope, light-headedness and headaches.   Hematological: Negative for adenopathy. Does not bruise/bleed easily.   Psychiatric/Behavioral: Negative for confusion and sleep disturbance. The patient is not nervous/anxious.     All pertinent negatives and positives are as above. All  other systems have been reviewed and are negative unless otherwise stated.     Objective    Temp:  [97.2 °F (36.2 °C)-98.8 °F (37.1 °C)] 97.2 °F (36.2 °C)  Heart Rate:  [67-93] 86  Resp:  [16-33] 20  BP: ()/(34-87) 106/77    Intake/Output Summary (Last 24 hours) at 06/18/17 0807  Last data filed at 06/18/17 0400   Gross per 24 hour   Intake             1206 ml   Output                0 ml   Net             1206 ml     Physical Exam  Constitutional: She appears well-developed and well-nourished.   HENT:   Head: Normocephalic and atraumatic.   Eyes: Conjunctivae and EOM are normal. Pupils are equal, round, and reactive to light.   Neck: Neck supple. No JVD present. No thyromegaly present.   Cardiovascular: Normal rate, regular rhythm, normal heart sounds and intact distal pulses. Exam reveals no gallop and no friction rub.   No murmur heard.  Pulmonary/Chest: Effort normal. No respiratory distress. She has no wheezes. She has rales. She exhibits no tenderness.   Abdominal: Soft. Bowel sounds are normal. She exhibits no distension. There is no tenderness. There is no rebound and no guarding.   Musculoskeletal: Normal range of motion. She exhibits no edema, tenderness or deformity.   Lymphadenopathy:   She has no cervical adenopathy.   Neurological: She is alert. She displays normal reflexes. No cranial nerve deficit. She exhibits abnormal muscle tone. Coordination abnormal.   Skin: Skin is warm and dry. No rash noted.   Psychiatric: She has a normal mood and affect. Her behavior is normal.   Nursing note and vitals reviewed.  Results Review:  Lab Results (last 24 hours)     Procedure Component Value Units Date/Time    CBC & Differential [[id number]] Collected:  06/17/17 0827    Specimen:  Blood Updated:  06/17/17 0909    Narrative:       The following orders were created for panel order CBC & Differential.  Procedure                               Abnormality         Status                     ---------                                -----------         ------                     Manual Differential[[id number]]          Abnormal            Final result               Scan Slide[[id number]]                                       Final result               CBC Auto Differential[[id number]]        Abnormal            Final result                 Please view results for these tests on the individual orders.    CBC Auto Differential [[id number]]  (Abnormal) Collected:  06/17/17 0827    Specimen:  Blood Updated:  06/17/17 0909     WBC 11.75 (H) 10*3/mm3      RBC 3.20 (L) 10*6/mm3      Hemoglobin 9.0 (L) g/dL      Hematocrit 27.1 (L) %      MCV 84.7 fL      MCH 28.1 pg      MCHC 33.2 g/dL      RDW 20.5 (H) %      RDW-SD 62.0 (H) fl      Platelets 43 (L) 10*3/mm3     Scan Slide [[id number]] Collected:  06/17/17 0827    Specimen:  Blood Updated:  06/17/17 0909     Scan Slide --      See Manual Differential Results       Manual Differential [[id number]]  (Abnormal) Collected:  06/17/17 0827    Specimen:  Blood Updated:  06/17/17 0909     Neutrophil % 74.0 %      Lymphocyte % 3.0 (L) %      Monocyte % 1.0 (L) %      Eosinophil % 2.0 %      Bands %  19.0 (H) %      Atypical Lymphocyte % 1.0 %      Neutrophils Absolute 10.93 (H) 10*3/mm3      Lymphocytes Absolute 0.35 (L) 10*3/mm3      Monocytes Absolute 0.12 (L) 10*3/mm3      Eosinophils Absolute 0.24 10*3/mm3      Anisocytosis Slight/1+     Crenated RBC's Slight/1+     Target Cells Mod/2+     WBC Morphology Normal     Platelet Estimate Decreased    Comprehensive Metabolic Panel [[id number]]  (Abnormal) Collected:  06/17/17 0827    Specimen:  Blood Updated:  06/17/17 0915     Glucose 146 (H) mg/dL      BUN 17 mg/dL       Specimen hemolyzed. Results may be affected.        Creatinine 3.00 (H) mg/dL      Sodium 137 mmol/L      Potassium 3.5 mmol/L       Specimen hemolyzed.  Results may be affected.        Chloride 100 mmol/L      CO2 25.0 mmol/L      Calcium 7.8 (L) mg/dL      Total Protein 5.3 (L)  g/dL      Albumin 2.00 (L) g/dL      ALT (SGPT) 25 U/L       Specimen hemolyzed.  Results may be affected.        AST (SGOT) 26 U/L       Specimen hemolyzed.  Results may be affected.        Alkaline Phosphatase 89 U/L       Specimen hemolyzed. Results may be affected.        Total Bilirubin 2.7 (H) mg/dL      eGFR  African Amer 18 (L) mL/min/1.73      Globulin 3.3 gm/dL      A/G Ratio 0.6 (L) g/dL      BUN/Creatinine Ratio 5.7 (L)     Anion Gap 12.0 mmol/L     Narrative:       The MDRD GFR formula is only valid for adults with stable renal function between ages 18 and 70.    POC Glucose Fingerstick [[id number]]  (Normal) Collected:  06/17/17 0937    Specimen:  Blood Updated:  06/17/17 0948     Glucose 122 mg/dL       : [id number] Iván Marin ID: [id number]       Blood Culture [[id number]]  (Normal) Collected:  06/16/17 0918    Specimen:  Blood from Arm, Left Updated:  06/17/17 1001     Blood Culture No growth at 24 hours    POC Glucose Fingerstick [[id number]]  (Abnormal) Collected:  06/17/17 1357    Specimen:  Blood Updated:  06/17/17 1408     Glucose 158 (H) mg/dL       : [id number] Mirza AmberMeter ID: [id number]       POC Glucose Fingerstick [[id number]]  (Abnormal) Collected:  06/17/17 1705    Specimen:  Blood Updated:  06/17/17 1751     Glucose 162 (H) mg/dL       : [id number] Iván Marin ID: [id number]       POC Glucose Fingerstick [[id number]]  (Normal) Collected:  06/17/17 2310    Specimen:  Blood Updated:  06/17/17 2321     Glucose 127 mg/dL       : [id number] Thomas NogueraherMeter ID: [id number]       Comprehensive Metabolic Panel [[id number]]  (Abnormal) Collected:  06/18/17 0346    Specimen:  Blood Updated:  06/18/17 0415     Glucose 109 (H) mg/dL      BUN 23 (H) mg/dL      Creatinine 3.48 (H) mg/dL      Sodium 137 mmol/L      Potassium 3.2 (L) mmol/L      Chloride 100 mmol/L      CO2 28.0 mmol/L      Calcium 7.8 (L) mg/dL      Total Protein 5.0 (L) g/dL      Albumin 1.90 (L)  g/dL      ALT (SGPT) 28 U/L      AST (SGOT) 23 U/L      Alkaline Phosphatase 92 U/L      Total Bilirubin 1.8 (H) mg/dL      eGFR  African Amer 16 (L) mL/min/1.73      Globulin 3.1 gm/dL      A/G Ratio 0.6 (L) g/dL      BUN/Creatinine Ratio 6.6 (L)     Anion Gap 9.0 mmol/L     Narrative:       The MDRD GFR formula is only valid for adults with stable renal function between ages 18 and 70.    CBC & Differential [[id number]] Collected:  06/18/17 0346    Specimen:  Blood Updated:  06/18/17 0441    Narrative:       The following orders were created for panel order CBC & Differential.  Procedure                               Abnormality         Status                     ---------                               -----------         ------                     Scan Slide[[id number]]                                       Final result               CBC Auto Differential[[id number]]        Abnormal            Final result                 Please view results for these tests on the individual orders.    CBC Auto Differential [[id number]]  (Abnormal) Collected:  06/18/17 0346    Specimen:  Blood Updated:  06/18/17 0441     WBC 9.12 10*3/mm3      RBC 3.07 (L) 10*6/mm3      Hemoglobin 8.7 (L) g/dL      Hematocrit 26.0 (L) %      MCV 84.7 fL      MCH 28.3 pg      MCHC 33.5 g/dL      RDW 20.3 (H) %      RDW-SD 61.5 (H) fl      Platelets 26 (C) 10*3/mm3      Neutrophil % 86.8 (H) %      Lymphocyte % 7.5 (L) %      Monocyte % 2.9 (L) %      Eosinophil % 2.2 %      Basophil % 0.1 %      Immature Grans % 0.5 %      Neutrophils, Absolute 7.92 10*3/mm3      Lymphocytes, Absolute 0.68 (L) 10*3/mm3      Monocytes, Absolute 0.26 10*3/mm3      Eosinophils, Absolute 0.20 10*3/mm3      Basophils, Absolute 0.01 10*3/mm3      Immature Grans, Absolute 0.05 (H) 10*3/mm3      nRBC 0.5 (H) /100 WBC     Scan Slide [[id number]] Collected:  06/18/17 0346    Specimen:  Blood Updated:  06/18/17 0441     Anisocytosis Slight/1+     Crenated RBC's Slight/1+      Hypochromia Slight/1+     Poikilocytes Slight/1+     Target Cells Slight/1+     WBC Morphology Normal     Platelet Estimate Decreased     Large Platelets Slight/1+    POC Glucose Fingerstick [[id number]]  (Normal) Collected:  06/18/17 0630    Specimen:  Blood Updated:  06/18/17 0644     Glucose 103 mg/dL       : [id number] Thomas HeatherMeter ID: [id number]              Cultures:  Blood Culture   Date Value Ref Range Status   06/16/2017 No growth at 24 hours  Preliminary       Radiology Data:    Imaging Results (last 24 hours)     ** No results found for the last 24 hours. **          Allergies   Allergen Reactions   • Heparin    • Iron    • Latex    • Levaquin [Levofloxacin]    • Shrimp (Diagnostic)    • Vancomycin        Scheduled meds:     ceftriaxone 1 g Intravenous Q24H   ipratropium-albuterol 3 mL Nebulization Q6H While Awake - RT   levothyroxine 50 mcg Oral Q AM   megestrol 400 mg Oral Daily   midodrine 5 mg Oral TID AC   IV Fluids 1000 mL + additives 50 mL/hr Intravenous Daily   pantoprazole 40 mg Oral Q AM       PRN meds:  dextrose  •  HYDROmorphone  •  ondansetron  •  sodium chloride    Assessment/Plan     Active Problems:    Chest pain in adult    Renal failure    Hypotension    Anemia    Hypokalemia      Plan:  Chest pain, atypical, echocardiogram/afib -Ejection fraction 61%, diastolic dysfunction, right ventricular dilatation, tricuspid valve regurgitation, right ventricle systolic pressure greater than 55. Off Cardizem.     Hemoptysis ×1 6/15. Hx feliciano syndrome.    Thrombopenia-6 packs of platelets.  No active bleeding.     Pneumonia/ Slight leukocytosis. Chest x-ray ? Retrocardiac opacity- on rocephin . Blood culture no growth in 24 hours. Duo neb.      Anemia- Status post 2 units of blood transfusion 6/14.      Chronic renal failure- nephrology is on board. Cont dialysis per nephrology.       Hyperlipidemia      Elevate TSH- free T4 is normal. Start synthroid.      Hypotension- Unable to wean pt  off levophed and continue midodrine.      Hypokalemia-- resolved      Calcium 7.7, corrected calcium 9.5 - 6/13/2017.      Nutrition- start megace, patient has not eating well. Plan for Dobbhoff tube feeding.      Deconditioning- start PT and OT      Discharge Planning: unknown.  Prognosis poor.    Carter Llamas MD   06/18/17   8:07 AM                     Electronically signed by Carter Llamas MD at 6/18/2017  8:32 AM      Vasquez Paredes MD at 6/18/2017  9:56 AM  Version 1 of 1           Referring Provider: Pino Tang MD    Length of Stay: 7    Chief Complaint:   Chief Complaint   Patient presents with   • Chest Pain       Subjective: Hungry    Medications  Current Facility-Administered Medications   Medication Dose Route Frequency Provider Last Rate Last Dose   • cefTRIAXone (ROCEPHIN) 1 g/100 mL 0.9% NS (MBP)  1 g Intravenous Q24H Carter Llamas MD   1 g at 06/18/17 0825   • dextrose (D50W) solution 50 mL  50 mL Intravenous Q1H PRN Bree Soares MD   50 mL at 06/17/17 0800   • diltiaZEM (CARDIZEM) 125mg/125 mL (1 mg/mL) infusion  5-15 mg/hr Intravenous Titrated Carter Llamas MD   Stopped at 06/17/17 1435   • HYDROmorphone (DILAUDID) injection 0.25 mg  0.25 mg Intravenous Q4H PRN Carter Llamas MD   0.25 mg at 06/17/17 1258   • ipratropium-albuterol (DUO-NEB) nebulizer solution 3 mL  3 mL Nebulization Q6H While Awake - RT Carter Llamas MD   3 mL at 06/18/17 0642   • levothyroxine (SYNTHROID, LEVOTHROID) tablet 50 mcg  50 mcg Oral Q AM Carter Llamas MD   50 mcg at 06/18/17 0559   • megestrol (MEGACE) 40 MG/ML suspension 400 mg  400 mg Oral Daily Carter Llamas MD   400 mg at 06/18/17 0825   • midodrine (PROAMATINE) tablet 5 mg  5 mg Oral TID AC FANNIE Blanco   5 mg at 06/18/17 0825   • multiple vitamin (M.V.I. Adult) 10 mL, thiamine (B-1) 100 mg, folic acid 1 mg in dextrose 5 % and sodium chloride 0.45 % 1,000 mL infusion  50 mL/hr Intravenous Daily Carter Llamas MD 50 mL/hr at 06/18/17 0825 50  mL/hr at 06/18/17 0825   • norepinephrine (LEVOPHED) 8,000 mcg in sodium chloride 0.9 % 250 mL (32 mcg/mL) infusion  0.02-0.3 mcg/kg/min Intravenous Titrated FANNIE Blanco 9.62 mL/hr at 06/18/17 0302 0.1 mcg/kg/min at 06/18/17 0302   • ondansetron (ZOFRAN) injection 4 mg  4 mg Intravenous Q6H PRN Pino Tang MD   4 mg at 06/15/17 0555   • pantoprazole (PROTONIX) EC tablet 40 mg  40 mg Oral Q AM Pino Tang MD   40 mg at 06/18/17 0559   • sodium chloride 0.9 % flush 1-10 mL  1-10 mL Intravenous PRN Pino Tang MD           Past Medical History:   Diagnosis Date   • A-fib    • Arthritis    • CHF (congestive heart failure)    • Hypertension    • Renal failure    ,   Past Surgical History:   Procedure Laterality Date   • ARTERIOVENOUS FISTULA LEG Left    ,   History reviewed. No pertinent family history.,   Social History   Substance Use Topics   • Smoking status: Former Smoker   • Smokeless tobacco: None   • Alcohol use No   ,    Review of Systems  Review of Systems   HENT: Negative for nosebleeds.    Cardiovascular: Negative for chest pain, claudication, dyspnea on exertion, irregular heartbeat, leg swelling, near-syncope, orthopnea, palpitations, paroxysmal nocturnal dyspnea and syncope.   Respiratory: Negative for cough, hemoptysis and shortness of breath.    Gastrointestinal: Negative for dysphagia, hematemesis and melena.   Genitourinary: Negative for hematuria.       Objective     Physical Exam:  Patient Vitals for the past 24 hrs:   BP Temp Temp src Pulse Resp SpO2 Weight   06/18/17 0800 101/71 - - 76 18 97 % -   06/18/17 0705 106/77 - - 86 20 93 % -   06/18/17 0642 - - - 79 (!) 33 100 % -   06/18/17 0630 109/85 - - 77 - (!) 84 % -   06/18/17 0625 - - - 78 - 96 % -   06/18/17 0615 123/69 - - 77 - 100 % -   06/18/17 0605 - - - 78 - 100 % -   06/18/17 0600 121/58 - - 78 - (!) 82 % 119 lb 3.2 oz (54.1 kg)   06/18/17 0530 104/48 - - 79 - (!) 73 % -   06/18/17 0515 107/50 - - 76 - - -   06/18/17 0500  113/59 - - 71 - 94 % -   06/18/17 0445 112/50 - - 70 - - -   06/18/17 0430 108/55 - - 74 - - -   06/18/17 0415 100/51 - - 77 - - -   06/18/17 0400 110/87 97.2 °F (36.2 °C) Temporal Art 84 18 - -   06/18/17 0345 99/48 - - 81 - - -   06/18/17 0340 - - - 84 - 92 % -   06/18/17 0330 103/58 - - 81 - (!) 89 % -   06/18/17 0320 91/54 - - 80 - - -   06/18/17 0300 111/61 - - 85 16 94 % -   06/18/17 0245 123/84 - - 85 - - -   06/18/17 0230 114/60 - - 78 - 95 % -   06/18/17 0215 107/53 - - 67 - 93 % -   06/18/17 0200 106/56 - - 71 - 98 % -   06/18/17 0145 109/55 - - 68 - 93 % -   06/18/17 0130 114/60 - - 71 - 93 % -   06/18/17 0115 118/55 - - 71 - 92 % -   06/18/17 0105 107/55 - - 70 - 92 % -   06/18/17 0050 - - - 69 - (!) 89 % -   06/18/17 0045 106/53 - - 69 - - -   06/18/17 0030 105/52 - - 71 - - -   06/18/17 0015 - - - 71 - - -   06/18/17 0000 97/51 97.5 °F (36.4 °C) - 71 - (!) 79 % -   06/17/17 2345 93/46 - - 70 - - -   06/17/17 2335 102/50 - - 72 - - -   06/17/17 2325 - - - 76 - 97 % -   06/17/17 2305 100/49 - - 75 - - -   06/17/17 2245 102/53 - - 73 - - -   06/17/17 2230 99/54 - - 72 - - -   06/17/17 2215 99/50 - - 71 - 91 % -   06/17/17 2200 100/47 - - 70 - - -   06/17/17 2145 97/48 - - 73 - - -   06/17/17 2130 100/45 - - - - - -   06/17/17 2115 97/52 - - 71 - 91 % -   06/17/17 2100 98/46 - - 75 - - -   06/17/17 2040 - - - 74 - 100 % -   06/17/17 2035 111/82 - - 82 - - -   06/17/17 2025 112/45 - - 77 - - -   06/17/17 2000 102/49 97.7 °F (36.5 °C) - 74 - (!) 88 % -   06/17/17 1945 96/46 - - 72 - - -   06/17/17 1935 93/43 - - 72 - - -   06/17/17 1915 95/44 - - 69 - 91 % -   06/17/17 1900 103/56 - - 72 18 100 % -   06/17/17 1800 95/51 - - 69 20 90 % -   06/17/17 1700 102/49 - - 69 20 94 % -   06/17/17 1605 (!) 100/34 98.7 °F (37.1 °C) Temporal Art 69 20 95 % -   06/17/17 1500 96/52 - - 68 20 90 % -   06/17/17 1400 (!) 86/48 - - 69 18 96 % -   06/17/17 1300 117/76 - - 79 17 96 % -   06/17/17 1200 115/61 98.8 °F (37.1 °C)  Temporal Art 76 18 95 % -   06/17/17 1100 96/53 98.2 °F (36.8 °C) Temporal Art 75 20 99 % -   06/17/17 1000 93/61 - - 80 18 (!) 86 % -     Physical Exam   Constitutional: She appears well-developed and well-nourished. No distress.   HENT:   Head: Normocephalic and atraumatic.   Eyes: No scleral icterus.   Neck: Normal range of motion.   Cardiovascular: Normal rate, regular rhythm and normal heart sounds.  Exam reveals no gallop and no friction rub.    No murmur heard.  Pulmonary/Chest: Effort normal and breath sounds normal. No respiratory distress. She has no wheezes. She has no rales.   Abdominal: Soft. Bowel sounds are normal. She exhibits no distension. There is no tenderness.   Musculoskeletal: She exhibits no edema.   Neurological: She is alert. No cranial nerve deficit.   Skin: Skin is warm and dry. She is not diaphoretic. No erythema.   Psychiatric: She has a normal mood and affect. Her behavior is normal.       Results Review:   I reviewed the patient's new clinical results.  Lab Results (last 24 hours)     Procedure Component Value Units Date/Time    Blood Culture [[id number]]  (Normal) Collected:  06/16/17 0918    Specimen:  Blood from Arm, Left Updated:  06/17/17 1001     Blood Culture No growth at 24 hours    POC Glucose Fingerstick [[id number]]  (Abnormal) Collected:  06/17/17 1357    Specimen:  Blood Updated:  06/17/17 1408     Glucose 158 (H) mg/dL       : [id number] Mirza AmberMeter ID: [id number]       POC Glucose Fingerstick [[id number]]  (Abnormal) Collected:  06/17/17 1705    Specimen:  Blood Updated:  06/17/17 1751     Glucose 162 (H) mg/dL       : [id number] Iván Renatatim ID: [id number]       POC Glucose Fingerstick [[id number]]  (Normal) Collected:  06/17/17 2310    Specimen:  Blood Updated:  06/17/17 2321     Glucose 127 mg/dL       : [id number] Thomas HeatherMeter ID: [id number]       Comprehensive Metabolic Panel [[id number]]  (Abnormal) Collected:  06/18/17 0346    Specimen:   Blood Updated:  06/18/17 0415     Glucose 109 (H) mg/dL      BUN 23 (H) mg/dL      Creatinine 3.48 (H) mg/dL      Sodium 137 mmol/L      Potassium 3.2 (L) mmol/L      Chloride 100 mmol/L      CO2 28.0 mmol/L      Calcium 7.8 (L) mg/dL      Total Protein 5.0 (L) g/dL      Albumin 1.90 (L) g/dL      ALT (SGPT) 28 U/L      AST (SGOT) 23 U/L      Alkaline Phosphatase 92 U/L      Total Bilirubin 1.8 (H) mg/dL      eGFR  African Amer 16 (L) mL/min/1.73      Globulin 3.1 gm/dL      A/G Ratio 0.6 (L) g/dL      BUN/Creatinine Ratio 6.6 (L)     Anion Gap 9.0 mmol/L     Narrative:       The MDRD GFR formula is only valid for adults with stable renal function between ages 18 and 70.    CBC & Differential [[id number]] Collected:  06/18/17 0346    Specimen:  Blood Updated:  06/18/17 0441    Narrative:       The following orders were created for panel order CBC & Differential.  Procedure                               Abnormality         Status                     ---------                               -----------         ------                     Scan Slide[[id number]]                                       Final result               CBC Auto Differential[[id number]]        Abnormal            Final result                 Please view results for these tests on the individual orders.    CBC Auto Differential [[id number]]  (Abnormal) Collected:  06/18/17 0346    Specimen:  Blood Updated:  06/18/17 0441     WBC 9.12 10*3/mm3      RBC 3.07 (L) 10*6/mm3      Hemoglobin 8.7 (L) g/dL      Hematocrit 26.0 (L) %      MCV 84.7 fL      MCH 28.3 pg      MCHC 33.5 g/dL      RDW 20.3 (H) %      RDW-SD 61.5 (H) fl      Platelets 26 (C) 10*3/mm3      Neutrophil % 86.8 (H) %      Lymphocyte % 7.5 (L) %      Monocyte % 2.9 (L) %      Eosinophil % 2.2 %      Basophil % 0.1 %      Immature Grans % 0.5 %      Neutrophils, Absolute 7.92 10*3/mm3      Lymphocytes, Absolute 0.68 (L) 10*3/mm3      Monocytes, Absolute 0.26 10*3/mm3      Eosinophils, Absolute  0.20 10*3/mm3      Basophils, Absolute 0.01 10*3/mm3      Immature Grans, Absolute 0.05 (H) 10*3/mm3      nRBC 0.5 (H) /100 WBC     Scan Slide [[id number]] Collected:  06/18/17 0346    Specimen:  Blood Updated:  06/18/17 0441     Anisocytosis Slight/1+     Crenated RBC's Slight/1+     Hypochromia Slight/1+     Poikilocytes Slight/1+     Target Cells Slight/1+     WBC Morphology Normal     Platelet Estimate Decreased     Large Platelets Slight/1+    POC Glucose Fingerstick [[id number]]  (Normal) Collected:  06/18/17 0630    Specimen:  Blood Updated:  06/18/17 0644     Glucose 103 mg/dL       : [id number] [place] HeatherMeter ID: [id number]       POC Glucose Fingerstick [[id number]]  (Normal) Collected:  06/18/17 0802    Specimen:  Blood Updated:  06/18/17 0832     Glucose 106 mg/dL       : [id number] Iván Yaneztim ID: [id number]           No results found for: ECHOEFEST  Imaging Results (last 24 hours)     ** No results found for the last 24 hours. **          I have reviewed telemetry which reveals SR    Assessment/Plan   Active Problems:    Chest pain in adult    Renal failure    Hypotension    Anemia    Hypokalemia      New Problems that need treatment:  1. PAF, maintaining SR off IV cardizem, not anticoagulated  2. Hypotension, much improved  3. Mental status changes resolved    Old problems that are stable:   ESRD  Anemia  ?dementia                 Electronically signed by Vasquez Paredes MD at 6/18/2017 10:00 AM      Dg Ball MD at 6/18/2017 10:51 AM  Version 2 of 2          PROGRESS NOTE.      Patient:  Jennifer Marks  YOB: 1945  Date of Service: 6/18/2017  MRN: [id number]   Acct: [id number]   Primary Care Physician: No Known Provider  Advance Directive: Conditional Code  Admit Date: 6/11/2017       Hospital Day: 7  Referring Provider: Pino Tang MD      Patient Seen, Chart, Consults, Notes, Labs, Radiology studies reviewed.        Subjective:  Jennifer Marks is a  "72 y.o. female whom we were consulted for end stage renal disease management, hypokalemia. She is on hemodialysis Monday Wednesday Friday; Missed hemodialysis on (6/09). Admitted with chest pain. Has had decreased oral intake and diarrhea for several days. She was hypotensive and was given levophed. She has also had tachycardia and given Cardizem (now off). Patient' status continued to decline. Family is aware and trying to make decisions for further life support and future procedures and nutrition. Today, patient is awake and feeling more hungry.        Review of Systems:  History obtained from chart review and the patient  General ROS: No fever or chills  Respiratory ROS: No cough, +shortness of breath, -wheezing  Cardiovascular ROS: no chest pain but dyspnea on exertion  Gastrointestinal ROS: No abdominal pain or melena  Genito-Urinary ROS: No dysuria or hematuria  Musculoskeletal: negative  Skin: negative    Objective:  /71  Pulse 76  Temp 97.2 °F (36.2 °C) (Temporal Artery )   Resp 18  Ht 62\" (157.5 cm)  Wt 119 lb 3.2 oz (54.1 kg)  SpO2 97%  BMI 21.8 kg/m2    Intake/Output Summary (Last 24 hours) at 06/18/17 1051  Last data filed at 06/18/17 0400   Gross per 24 hour   Intake             1206 ml   Output                0 ml   Net             1206 ml       Physical examination:  General: awake/alert   Chest:  clear to auscultation bilaterally without respiratory distress  CVS: regular rate and rhythm  Abdominal: soft, nontender, normal bowel sounds  Extremities: no cyanosis or edema  Skin: warm and dry without rash  Neuro: No focal motor deficits    Labs:  Lab Results (last 24 hours)     Procedure Component Value Units Date/Time    POC Glucose Fingerstick [[id number]]  (Abnormal) Collected:  06/17/17 1357    Specimen:  Blood Updated:  06/17/17 1408     Glucose 158 (H) mg/dL       : [id number] Blue AmberMeter ID: [id number]       POC Glucose Fingerstick [[id number]]  (Abnormal) Collected:  " 06/17/17 1705    Specimen:  Blood Updated:  06/17/17 1751     Glucose 162 (H) mg/dL       : [id number] Iván Marin ID: [id number]       POC Glucose Fingerstick [[id number]]  (Normal) Collected:  06/17/17 2310    Specimen:  Blood Updated:  06/17/17 2321     Glucose 127 mg/dL       : [id number] Thomas HeatherMeter ID: [id number]       Comprehensive Metabolic Panel [[id number]]  (Abnormal) Collected:  06/18/17 0346    Specimen:  Blood Updated:  06/18/17 0415     Glucose 109 (H) mg/dL      BUN 23 (H) mg/dL      Creatinine 3.48 (H) mg/dL      Sodium 137 mmol/L      Potassium 3.2 (L) mmol/L      Chloride 100 mmol/L      CO2 28.0 mmol/L      Calcium 7.8 (L) mg/dL      Total Protein 5.0 (L) g/dL      Albumin 1.90 (L) g/dL      ALT (SGPT) 28 U/L      AST (SGOT) 23 U/L      Alkaline Phosphatase 92 U/L      Total Bilirubin 1.8 (H) mg/dL      eGFR  African Amer 16 (L) mL/min/1.73      Globulin 3.1 gm/dL      A/G Ratio 0.6 (L) g/dL      BUN/Creatinine Ratio 6.6 (L)     Anion Gap 9.0 mmol/L     Narrative:       The MDRD GFR formula is only valid for adults with stable renal function between ages 18 and 70.    CBC & Differential [[id number]] Collected:  06/18/17 0346    Specimen:  Blood Updated:  06/18/17 0441    Narrative:       The following orders were created for panel order CBC & Differential.  Procedure                               Abnormality         Status                     ---------                               -----------         ------                     Scan Slide[[id number]]                                       Final result               CBC Auto Differential[[id number]]        Abnormal            Final result                 Please view results for these tests on the individual orders.    CBC Auto Differential [[id number]]  (Abnormal) Collected:  06/18/17 0346    Specimen:  Blood Updated:  06/18/17 0441     WBC 9.12 10*3/mm3      RBC 3.07 (L) 10*6/mm3      Hemoglobin 8.7 (L) g/dL      Hematocrit 26.0  (L) %      MCV 84.7 fL      MCH 28.3 pg      MCHC 33.5 g/dL      RDW 20.3 (H) %      RDW-SD 61.5 (H) fl      Platelets 26 (C) 10*3/mm3      Neutrophil % 86.8 (H) %      Lymphocyte % 7.5 (L) %      Monocyte % 2.9 (L) %      Eosinophil % 2.2 %      Basophil % 0.1 %      Immature Grans % 0.5 %      Neutrophils, Absolute 7.92 10*3/mm3      Lymphocytes, Absolute 0.68 (L) 10*3/mm3      Monocytes, Absolute 0.26 10*3/mm3      Eosinophils, Absolute 0.20 10*3/mm3      Basophils, Absolute 0.01 10*3/mm3      Immature Grans, Absolute 0.05 (H) 10*3/mm3      nRBC 0.5 (H) /100 WBC     Scan Slide [[id number]] Collected:  06/18/17 0346    Specimen:  Blood Updated:  06/18/17 0441     Anisocytosis Slight/1+     Crenated RBC's Slight/1+     Hypochromia Slight/1+     Poikilocytes Slight/1+     Target Cells Slight/1+     WBC Morphology Normal     Platelet Estimate Decreased     Large Platelets Slight/1+    POC Glucose Fingerstick [[id number]]  (Normal) Collected:  06/18/17 0630    Specimen:  Blood Updated:  06/18/17 0644     Glucose 103 mg/dL       : [id number] Guzman HeatherMeter ID: [id number]       POC Glucose Fingerstick [[id number]]  (Normal) Collected:  06/18/17 0802    Specimen:  Blood Updated:  06/18/17 0832     Glucose 106 mg/dL       : [id number] Iván Marin ID: [id number]       Blood Culture [[id number]]  (Normal) Collected:  06/16/17 0918    Specimen:  Blood from Arm, Left Updated:  06/18/17 1001     Blood Culture No growth at 2 days          Radiology:   Imaging Results (last 24 hours)     ** No results found for the last 24 hours. **              Assessment   1. End stage renal disease on maintenance hemodialysis Monday Wednesday Friday   2. Hypokalemia  3. Hypotension--  4. Anemia of chronic kidney disease  5. Diabetes mellitus type 2  6. Failure to thrive.       Plan:  Dialysis in AM.       Dg Ball MD  6/18/2017  10:51 AM     Electronically signed by Dg Ball MD at 6/18/2017 11:23  "AM      Dg Ball MD at 6/18/2017 10:51 AM  Version 1 of 2         DIALYSIS PROGRESS NOTE.      Patient:  Jennifer Marks  YOB: 1945  Date of Service: 6/18/2017  MRN: [id number]   Acct: [id number]   Primary Care Physician: No Known Provider  Advance Directive: Conditional Code  Admit Date: 6/11/2017       Hospital Day: 7  Referring Provider: Pino Tang MD      Patient Seen, Chart, Consults, Notes, Labs, Radiology studies reviewed.        Subjective:  Jennifer Marks is a 72 y.o. female whom we were consulted for end stage renal disease management, hypokalemia. She is on hemodialysis Monday Wednesday Friday; Missed hemodialysis on (6/09). Admitted with chest pain. Has had decreased oral intake and diarrhea for several days. She was hypotensive and was given levophed. She has also had tachycardia and given Cardizem (now off). Patient' status continued to decline. Family is aware and trying to make decisions for further life support and future procedures and nutrition. Today, patient is awake and feeling more hungry.        Review of Systems:  History obtained from chart review and the patient  General ROS: No fever or chills  Respiratory ROS: No cough, +shortness of breath, -wheezing  Cardiovascular ROS: no chest pain but dyspnea on exertion  Gastrointestinal ROS: No abdominal pain or melena  Genito-Urinary ROS: No dysuria or hematuria  Musculoskeletal: negative  Skin: negative    Objective:  /71  Pulse 76  Temp 97.2 °F (36.2 °C) (Temporal Artery )   Resp 18  Ht 62\" (157.5 cm)  Wt 119 lb 3.2 oz (54.1 kg)  SpO2 97%  BMI 21.8 kg/m2    Intake/Output Summary (Last 24 hours) at 06/18/17 1051  Last data filed at 06/18/17 0400   Gross per 24 hour   Intake             1206 ml   Output                0 ml   Net             1206 ml       Physical examination:  General: awake/alert   Chest:  clear to auscultation bilaterally without respiratory distress  CVS: regular rate and " rhythm  Abdominal: soft, nontender, normal bowel sounds  Extremities: no cyanosis or edema  Skin: warm and dry without rash  Neuro: No focal motor deficits    Labs:  Lab Results (last 24 hours)     Procedure Component Value Units Date/Time    POC Glucose Fingerstick [[id number]]  (Abnormal) Collected:  06/17/17 1357    Specimen:  Blood Updated:  06/17/17 1408     Glucose 158 (H) mg/dL       : [id number] Blue AmberMeter ID: [id number]       POC Glucose Fingerstick [[id number]]  (Abnormal) Collected:  06/17/17 1705    Specimen:  Blood Updated:  06/17/17 1751     Glucose 162 (H) mg/dL       : [id number] Iván Marin ID: [id number]       POC Glucose Fingerstick [[id number]]  (Normal) Collected:  06/17/17 2310    Specimen:  Blood Updated:  06/17/17 2321     Glucose 127 mg/dL       : [id number] Guzman HeatherMeter ID: [id number]       Comprehensive Metabolic Panel [[id number]]  (Abnormal) Collected:  06/18/17 0346    Specimen:  Blood Updated:  06/18/17 0415     Glucose 109 (H) mg/dL      BUN 23 (H) mg/dL      Creatinine 3.48 (H) mg/dL      Sodium 137 mmol/L      Potassium 3.2 (L) mmol/L      Chloride 100 mmol/L      CO2 28.0 mmol/L      Calcium 7.8 (L) mg/dL      Total Protein 5.0 (L) g/dL      Albumin 1.90 (L) g/dL      ALT (SGPT) 28 U/L      AST (SGOT) 23 U/L      Alkaline Phosphatase 92 U/L      Total Bilirubin 1.8 (H) mg/dL      eGFR  African Amer 16 (L) mL/min/1.73      Globulin 3.1 gm/dL      A/G Ratio 0.6 (L) g/dL      BUN/Creatinine Ratio 6.6 (L)     Anion Gap 9.0 mmol/L     Narrative:       The MDRD GFR formula is only valid for adults with stable renal function between ages 18 and 70.    CBC & Differential [[id number]] Collected:  06/18/17 0346    Specimen:  Blood Updated:  06/18/17 2821    Narrative:       The following orders were created for panel order CBC & Differential.  Procedure                               Abnormality         Status                     ---------                                -----------         ------                     Scan Slide[[id number]]                                       Final result               CBC Auto Differential[[id number]]        Abnormal            Final result                 Please view results for these tests on the individual orders.    CBC Auto Differential [[id number]]  (Abnormal) Collected:  06/18/17 0346    Specimen:  Blood Updated:  06/18/17 0441     WBC 9.12 10*3/mm3      RBC 3.07 (L) 10*6/mm3      Hemoglobin 8.7 (L) g/dL      Hematocrit 26.0 (L) %      MCV 84.7 fL      MCH 28.3 pg      MCHC 33.5 g/dL      RDW 20.3 (H) %      RDW-SD 61.5 (H) fl      Platelets 26 (C) 10*3/mm3      Neutrophil % 86.8 (H) %      Lymphocyte % 7.5 (L) %      Monocyte % 2.9 (L) %      Eosinophil % 2.2 %      Basophil % 0.1 %      Immature Grans % 0.5 %      Neutrophils, Absolute 7.92 10*3/mm3      Lymphocytes, Absolute 0.68 (L) 10*3/mm3      Monocytes, Absolute 0.26 10*3/mm3      Eosinophils, Absolute 0.20 10*3/mm3      Basophils, Absolute 0.01 10*3/mm3      Immature Grans, Absolute 0.05 (H) 10*3/mm3      nRBC 0.5 (H) /100 WBC     Scan Slide [[id number]] Collected:  06/18/17 0346    Specimen:  Blood Updated:  06/18/17 0441     Anisocytosis Slight/1+     Crenated RBC's Slight/1+     Hypochromia Slight/1+     Poikilocytes Slight/1+     Target Cells Slight/1+     WBC Morphology Normal     Platelet Estimate Decreased     Large Platelets Slight/1+    POC Glucose Fingerstick [[id number]]  (Normal) Collected:  06/18/17 0630    Specimen:  Blood Updated:  06/18/17 0644     Glucose 103 mg/dL       : [id number] Guzman HeatherMeter ID: [id number]       POC Glucose Fingerstick [[id number]]  (Normal) Collected:  06/18/17 0802    Specimen:  Blood Updated:  06/18/17 0832     Glucose 106 mg/dL       : [id number] Iván Marin ID: [id number]       Blood Culture [[id number]]  (Normal) Collected:  06/16/17 0918    Specimen:  Blood from Arm, Left Updated:  06/18/17 1001     Blood Culture No  growth at 2 days          Radiology:   Imaging Results (last 24 hours)     ** No results found for the last 24 hours. **              Assessment   1. End stage renal disease on maintenance hemodialysis Monday Wednesday Friday   2. Hypokalemia  3. Hypotension--  4. Anemia of chronic kidney disease  5. Diabetes mellitus type 2  6. Failure to thrive.       Plan:  Dialysis in AM.       Dg Ball MD  6/18/2017  10:51 AM     Electronically signed by Dg Ball MD at 6/18/2017 11:04 AM        Consult Notes (last 72 hours) (Notes from 6/16/2017  7:50 AM through 6/19/2017  7:50 AM)     No notes of this type exist for this encounter.

## 2017-06-19 NOTE — PROGRESS NOTES
[facility] Medicine Services  INPATIENT PROGRESS NOTE    Length of Stay: 8  Date of Admission: 6/11/2017  Primary Care Physician: No Known Provider    Subjective   Chief Complaint: Atrial fib, hypertension, weakness, kidney failure    Chest Pain         Patient is not eating.  Patient states she is not hungry.  Attempt put a Dobbhoff tube for tube feeding yesterday, patient and family refused; discussed with nursing staff.    Prognosis still remained poor.  Patient's off the Cardizem drip.  Blood pressure remained low, currently on the Midodrin and Levophed. Still requiring pressors.  Says feels better today.  Review of Systems   Cardiovascular: Positive for chest pain.      Constitutional: Positive for activity change, appetite change and fatigue. Negative for chills and fever.   HENT: Negative for hearing loss, nosebleeds, tinnitus and trouble swallowing.   Eyes: Negative for visual disturbance.   Respiratory: Negative for cough, chest tightness, shortness of breath and wheezing.   Cardiovascular: Negative for chest pain, palpitations and leg swelling.   Gastrointestinal: Negative for abdominal distention, abdominal pain, blood in stool, constipation, diarrhea, nausea and vomiting.   Endocrine: Negative for cold intolerance, heat intolerance, polydipsia, polyphagia and polyuria.   Genitourinary: Negative for decreased urine volume, difficulty urinating, dysuria, flank pain, frequency and hematuria.   Musculoskeletal: Positive for arthralgias, gait problem and myalgias. Negative for joint swelling.   Skin: Negative for rash.   Allergic/Immunologic: Negative for immunocompromised state.   Neurological: Positive for weakness. Negative for dizziness, syncope, light-headedness and headaches.   Hematological: Negative for adenopathy. Does not bruise/bleed easily.   Psychiatric/Behavioral: Negative for confusion and sleep disturbance. The patient is not nervous/anxious.     All  pertinent negatives and positives are as above. All other systems have been reviewed and are negative unless otherwise stated.     Objective    Temp:  [97.4 °F (36.3 °C)-98.7 °F (37.1 °C)] 98.2 °F (36.8 °C)  Heart Rate:  [] 92  Resp:  [16-22] 20  BP: ()/(42-97) 108/55    Intake/Output Summary (Last 24 hours) at 06/19/17 0819  Last data filed at 06/19/17 0400   Gross per 24 hour   Intake           1532.5 ml   Output                0 ml   Net           1532.5 ml     Physical Exam  Constitutional: She appears well-developed and well-nourished.   HENT:   Head: Normocephalic and atraumatic.   Eyes: Conjunctivae and EOM are normal. Pupils are equal, round, and reactive to light.   Neck: Neck supple. No JVD present. No thyromegaly present.   Cardiovascular: Normal rate, regular rhythm, normal heart sounds and intact distal pulses. Exam reveals no gallop and no friction rub.   No murmur heard.  Pulmonary/Chest: Effort normal. No respiratory distress. She has no wheezes. She has rales. She exhibits no tenderness.   Abdominal: Soft. Bowel sounds are normal. She exhibits no distension. There is no tenderness. There is no rebound and no guarding.   Musculoskeletal: Normal range of motion. She exhibits no edema, tenderness or deformity.   Lymphadenopathy:   She has no cervical adenopathy.   Neurological: She is alert. She displays normal reflexes. No cranial nerve deficit. She exhibits abnormal muscle tone. Coordination abnormal.   Skin: Skin is warm and dry. No rash noted.   Psychiatric: She has a normal mood and affect. Her behavior is normal.   Nursing note and vitals reviewed.  Results Review:  Lab Results (last 24 hours)     Procedure Component Value Units Date/Time    POC Glucose Fingerstick [[id number]]  (Normal) Collected:  06/18/17 0802    Specimen:  Blood Updated:  06/18/17 0832     Glucose 106 mg/dL       : [id number] Iván Marin ID: [id number]       Blood Culture [[id number]]  (Normal) Collected:   06/16/17 0918    Specimen:  Blood from Arm, Left Updated:  06/18/17 1001     Blood Culture No growth at 2 days    POC Glucose Fingerstick [[id number]]  (Normal) Collected:  06/18/17 1137    Specimen:  Blood Updated:  06/18/17 1149     Glucose 80 mg/dL       : [id number] Iván Marin ID: [id number]       POC Glucose Fingerstick [[id number]]  (Normal) Collected:  06/18/17 1711    Specimen:  Blood Updated:  06/18/17 1727     Glucose 75 mg/dL       : [id number] Iván ShanKarenter ID: [id number]       POC Glucose Fingerstick [[id number]]  (Normal) Collected:  06/18/17 2356    Specimen:  Blood Updated:  06/19/17 0009     Glucose 92 mg/dL       : [id number] [place] HeatherMeter ID: [id number]       Comprehensive Metabolic Panel [[id number]]  (Abnormal) Collected:  06/19/17 0404    Specimen:  Blood Updated:  06/19/17 0423     Glucose 72 mg/dL      BUN 28 (H) mg/dL      Creatinine 3.93 (H) mg/dL      Sodium 136 mmol/L      Potassium 3.5 mmol/L      Chloride 101 mmol/L      CO2 26.0 mmol/L      Calcium 7.7 (L) mg/dL      Total Protein 5.0 (L) g/dL      Albumin 1.90 (L) g/dL      ALT (SGPT) 22 U/L      AST (SGOT) 28 U/L      Alkaline Phosphatase 90 U/L      Total Bilirubin 1.8 (H) mg/dL      eGFR  African Amer 14 (L) mL/min/1.73      Globulin 3.1 gm/dL      A/G Ratio 0.6 (L) g/dL      BUN/Creatinine Ratio 7.1     Anion Gap 9.0 mmol/L     Narrative:       The MDRD GFR formula is only valid for adults with stable renal function between ages 18 and 70.    CBC & Differential [[id number]] Collected:  06/19/17 0404    Specimen:  Blood Updated:  06/19/17 0512    Narrative:       The following orders were created for panel order CBC & Differential.  Procedure                               Abnormality         Status                     ---------                               -----------         ------                     Scan Slide[[id number]]                                       Final result               CBC Auto  Differential[[id number]]        Abnormal            Final result                 Please view results for these tests on the individual orders.    CBC Auto Differential [[id number]]  (Abnormal) Collected:  06/19/17 0404    Specimen:  Blood Updated:  06/19/17 0512     WBC 6.21 10*3/mm3      RBC 2.84 (L) 10*6/mm3      Hemoglobin 8.1 (L) g/dL      Hematocrit 23.9 (L) %      MCV 84.2 fL      MCH 28.5 pg      MCHC 33.9 g/dL      RDW 20.3 (H) %      RDW-SD 61.2 (H) fl      Platelets 75 (L) 10*3/mm3      Neutrophil % 80.8 (H) %      Lymphocyte % 9.8 (L) %      Monocyte % 5.8 %      Eosinophil % 2.9 %      Basophil % 0.2 %      Immature Grans % 0.5 %      Neutrophils, Absolute 5.02 10*3/mm3      Lymphocytes, Absolute 0.61 (L) 10*3/mm3      Monocytes, Absolute 0.36 10*3/mm3      Eosinophils, Absolute 0.18 10*3/mm3      Basophils, Absolute 0.01 10*3/mm3      Immature Grans, Absolute 0.03 10*3/mm3     Scan Slide [[id number]] Collected:  06/19/17 0404    Specimen:  Blood Updated:  06/19/17 0512     RBC Morphology Normal     WBC Morphology Normal     Platelet Estimate Decreased    POC Glucose Fingerstick [[id number]]  (Abnormal) Collected:  06/19/17 0610    Specimen:  Blood Updated:  06/19/17 0643     Glucose 66 (L) mg/dL       : [id number] AFAR HeatherMeter ID: [id number]       POC Glucose Fingerstick [[id number]]  (Normal) Collected:  06/19/17 0632    Specimen:  Blood Updated:  06/19/17 0643     Glucose 93 mg/dL       : [id number] AFAR HeatherMeter ID: [id number]              Cultures:  Blood Culture   Date Value Ref Range Status   06/16/2017 No growth at 24 hours  Preliminary       Radiology Data:    Imaging Results (last 24 hours)     ** No results found for the last 24 hours. **          Allergies   Allergen Reactions   • Heparin    • Iron    • Latex    • Levaquin [Levofloxacin]    • Shrimp (Diagnostic)    • Vancomycin        Scheduled meds:     ceftriaxone 1 g Intravenous Q24H   ipratropium-albuterol 3 mL  Nebulization Q6H While Awake - RT   levothyroxine 50 mcg Oral Q AM   megestrol 400 mg Oral Daily   midodrine 5 mg Oral TID AC   IV Fluids 1000 mL + additives 50 mL/hr Intravenous Daily   pantoprazole 40 mg Oral Q AM       PRN meds:  dextrose  •  HYDROmorphone  •  ondansetron  •  sodium chloride    Assessment/Plan     Active Problems:    Chest pain in adult    Renal failure    Hypotension    Anemia    Hypokalemia      Plan:  Chest pain, atypical, echocardiogram/afib -Ejection fraction 61%, diastolic dysfunction, right ventricular dilatation, tricuspid valve regurgitation, right ventricle systolic pressure greater than 55. Off Cardizem.  Failure to thrive check cortisol.  Possible add steroids may help BP and appetite.   Hemoptysis ×1 6/15. Hx feliciano syndrome.    Thrombopenia-6 packs of platelets.  No active bleeding.     Pneumonia/ Slight leukocytosis. Chest x-ray ? Retrocardiac opacity- on rocephin . Blood culture no growth in 24 hours. Duo neb.      Anemia- Status post 2 units of blood transfusion 6/14.      Chronic renal failure- nephrology is on board. Cont dialysis per nephrology.       Hyperlipidemia      Elevate TSH- free T4 is normal. Start synthroid.      Hypotension- Unable to wean pt off levophed and continue midodrine.      Hypokalemia-- resolved      Calcium 7.7, corrected calcium 9.5 - 6/13/2017.      Nutrition- start megace, patient has not eating well. Plan for Dobbhoff tube feeding.      Deconditioning- start PT and OT      Discharge Planning: unknown.  Prognosis poor.    Arthur Kerr MD   06/19/17   8:19 AM

## 2017-06-20 LAB
ANION GAP SERPL CALCULATED.3IONS-SCNC: 8 MMOL/L (ref 4–13)
ANISOCYTOSIS BLD QL: NORMAL
BASOPHILS # BLD AUTO: 0.01 10*3/MM3 (ref 0–0.2)
BASOPHILS NFR BLD AUTO: 0.2 % (ref 0–2)
BUN BLD-MCNC: 17 MG/DL (ref 5–21)
BUN/CREAT SERPL: 6.4 (ref 7–25)
CALCIUM SPEC-SCNC: 7.8 MG/DL (ref 8.4–10.4)
CHLORIDE SERPL-SCNC: 98 MMOL/L (ref 98–110)
CO2 SERPL-SCNC: 27 MMOL/L (ref 24–31)
CORTIS SERPL-MCNC: 10.9 UG/DL
CREAT BLD-MCNC: 2.66 MG/DL (ref 0.5–1.4)
DEPRECATED RDW RBC AUTO: 60.7 FL (ref 40–54)
EOSINOPHIL # BLD AUTO: 0 10*3/MM3 (ref 0–0.7)
EOSINOPHIL NFR BLD AUTO: 0 % (ref 0–4)
ERYTHROCYTE [DISTWIDTH] IN BLOOD BY AUTOMATED COUNT: 20.2 % (ref 12–15)
GFR SERPL CREATININE-BSD FRML MDRD: 21 ML/MIN/1.73
GLUCOSE BLD-MCNC: 151 MG/DL (ref 70–100)
GLUCOSE BLDC GLUCOMTR-MCNC: 114 MG/DL (ref 70–130)
GLUCOSE BLDC GLUCOMTR-MCNC: 122 MG/DL (ref 70–130)
GLUCOSE BLDC GLUCOMTR-MCNC: 134 MG/DL (ref 70–130)
GLUCOSE BLDC GLUCOMTR-MCNC: 150 MG/DL (ref 70–130)
GLUCOSE BLDC GLUCOMTR-MCNC: 186 MG/DL (ref 70–130)
GLUCOSE BLDC GLUCOMTR-MCNC: 201 MG/DL (ref 70–130)
HCT VFR BLD AUTO: 24.3 % (ref 37–47)
HGB BLD-MCNC: 8.2 G/DL (ref 12–16)
HYPOCHROMIA BLD QL: NORMAL
IMM GRANULOCYTES # BLD: 0.05 10*3/MM3 (ref 0–0.03)
IMM GRANULOCYTES NFR BLD: 1.2 % (ref 0–5)
LYMPHOCYTES # BLD AUTO: 0.36 10*3/MM3 (ref 0.72–4.86)
LYMPHOCYTES NFR BLD AUTO: 8.9 % (ref 15–45)
MCH RBC QN AUTO: 28.4 PG (ref 28–32)
MCHC RBC AUTO-ENTMCNC: 33.7 G/DL (ref 33–36)
MCV RBC AUTO: 84.1 FL (ref 82–98)
MONOCYTES # BLD AUTO: 0.11 10*3/MM3 (ref 0.19–1.3)
MONOCYTES NFR BLD AUTO: 2.7 % (ref 4–12)
NEUTROPHILS # BLD AUTO: 3.5 10*3/MM3 (ref 1.87–8.4)
NEUTROPHILS NFR BLD AUTO: 87 % (ref 39–78)
NRBC BLD MANUAL-RTO: 0 /100 WBC (ref 0–0)
PLATELET # BLD AUTO: 53 10*3/MM3 (ref 130–400)
POIKILOCYTOSIS BLD QL SMEAR: NORMAL
POTASSIUM BLD-SCNC: 3.6 MMOL/L (ref 3.5–5.3)
RBC # BLD AUTO: 2.89 10*6/MM3 (ref 4.2–5.4)
SMALL PLATELETS BLD QL SMEAR: NORMAL
SODIUM BLD-SCNC: 133 MMOL/L (ref 135–145)
WBC MORPH BLD: NORMAL
WBC NRBC COR # BLD: 4.03 10*3/MM3 (ref 4.8–10.8)

## 2017-06-20 PROCEDURE — 25010000002 THIAMINE PER 100 MG: Performed by: FAMILY MEDICINE

## 2017-06-20 PROCEDURE — 94799 UNLISTED PULMONARY SVC/PX: CPT

## 2017-06-20 PROCEDURE — 80048 BASIC METABOLIC PNL TOTAL CA: CPT | Performed by: INTERNAL MEDICINE

## 2017-06-20 PROCEDURE — 97110 THERAPEUTIC EXERCISES: CPT

## 2017-06-20 PROCEDURE — 85025 COMPLETE CBC W/AUTO DIFF WBC: CPT | Performed by: INTERNAL MEDICINE

## 2017-06-20 PROCEDURE — 82962 GLUCOSE BLOOD TEST: CPT

## 2017-06-20 PROCEDURE — 85007 BL SMEAR W/DIFF WBC COUNT: CPT | Performed by: INTERNAL MEDICINE

## 2017-06-20 PROCEDURE — 25010000002 HYDROMORPHONE PER 4 MG: Performed by: FAMILY MEDICINE

## 2017-06-20 PROCEDURE — 97530 THERAPEUTIC ACTIVITIES: CPT

## 2017-06-20 PROCEDURE — 25010000002 HYDROCORTISONE SODIUM SUCCINATE 100 MG RECONSTITUTED SOLUTION: Performed by: INTERNAL MEDICINE

## 2017-06-20 PROCEDURE — 25010000002 CEFTRIAXONE: Performed by: FAMILY MEDICINE

## 2017-06-20 RX ADMIN — MIDODRINE HYDROCHLORIDE 5 MG: 2.5 TABLET ORAL at 18:29

## 2017-06-20 RX ADMIN — HYDROCORTISONE SODIUM SUCCINATE 100 MG: 100 INJECTION, POWDER, FOR SOLUTION INTRAMUSCULAR; INTRAVENOUS at 06:04

## 2017-06-20 RX ADMIN — MIDODRINE HYDROCHLORIDE 5 MG: 2.5 TABLET ORAL at 12:22

## 2017-06-20 RX ADMIN — HYDROCORTISONE SODIUM SUCCINATE 100 MG: 100 INJECTION, POWDER, FOR SOLUTION INTRAMUSCULAR; INTRAVENOUS at 00:31

## 2017-06-20 RX ADMIN — IPRATROPIUM BROMIDE AND ALBUTEROL SULFATE 3 ML: 2.5; .5 SOLUTION RESPIRATORY (INHALATION) at 19:04

## 2017-06-20 RX ADMIN — CEFTRIAXONE 1 G: 1 INJECTION, POWDER, FOR SOLUTION INTRAMUSCULAR; INTRAVENOUS at 08:25

## 2017-06-20 RX ADMIN — HYDROMORPHONE HYDROCHLORIDE 0.25 MG: 1 INJECTION, SOLUTION INTRAMUSCULAR; INTRAVENOUS; SUBCUTANEOUS at 00:20

## 2017-06-20 RX ADMIN — PANTOPRAZOLE SODIUM 40 MG: 40 TABLET, DELAYED RELEASE ORAL at 06:04

## 2017-06-20 RX ADMIN — LEVOTHYROXINE SODIUM 50 MCG: 50 TABLET ORAL at 06:04

## 2017-06-20 RX ADMIN — IPRATROPIUM BROMIDE AND ALBUTEROL SULFATE 3 ML: 2.5; .5 SOLUTION RESPIRATORY (INHALATION) at 07:19

## 2017-06-20 RX ADMIN — THIAMINE HYDROCHLORIDE 50 ML/HR: 100 INJECTION, SOLUTION INTRAMUSCULAR; INTRAVENOUS at 08:25

## 2017-06-20 RX ADMIN — IPRATROPIUM BROMIDE AND ALBUTEROL SULFATE 3 ML: 2.5; .5 SOLUTION RESPIRATORY (INHALATION) at 12:32

## 2017-06-20 RX ADMIN — MIDODRINE HYDROCHLORIDE 5 MG: 2.5 TABLET ORAL at 08:24

## 2017-06-20 RX ADMIN — HYDROCORTISONE SODIUM SUCCINATE 100 MG: 100 INJECTION, POWDER, FOR SOLUTION INTRAMUSCULAR; INTRAVENOUS at 12:22

## 2017-06-20 RX ADMIN — HYDROCORTISONE SODIUM SUCCINATE 100 MG: 100 INJECTION, POWDER, FOR SOLUTION INTRAMUSCULAR; INTRAVENOUS at 18:29

## 2017-06-20 NOTE — PROGRESS NOTES
Continued Stay Note   Renea     Patient Name: Jennifer Marks  MRN: [id number]  Today's Date: 6/20/2017    Admit Date: 6/11/2017          Discharge Plan       06/20/17 1403    Case Management/Social Work Plan    Plan LTAC referral    Patient/Family In Agreement With Plan yes    Additional Comments LTAC referral called to [facility].  [facility] did receive referral.  Will await decision on LTAC referral at this time.              Discharge Codes     None            ANUPAMA Zaragoza

## 2017-06-20 NOTE — PLAN OF CARE
Problem: Patient Care Overview (Adult)  Goal: Plan of Care Review  Outcome: Ongoing (interventions implemented as appropriate)    06/20/17 1433   Coping/Psychosocial Response Interventions   Plan Of Care Reviewed With patient   Patient Care Overview   Progress progress toward functional goals is gradual   Outcome Evaluation   Outcome Summary/Follow up Plan Pts' progress is gradual, pt. unable to  wts, and unable to actively perform rom with c/o's pain, slow stretch and prom performed to bue's by this kimble/l, pt. sattes that her ue's feel better afterward!!

## 2017-06-20 NOTE — PLAN OF CARE
Problem: Patient Care Overview (Adult)  Goal: Plan of Care Review  Outcome: Ongoing (interventions implemented as appropriate)    06/20/17 0527   Coping/Psychosocial Response Interventions   Plan Of Care Reviewed With patient   Patient Care Overview   Progress progress toward functional goals as expected   Outcome Evaluation   Outcome Summary/Follow up Plan pt very alert last night but very confused. disoriented to situation and place. pt was noncompliant . pt is still very edematous. levophed was dropped from .04 to .02 and is now on hold and stable.          Problem: Fall Risk (Adult)  Goal: Absence of Falls  Outcome: Ongoing (interventions implemented as appropriate)    Problem: Fluid Volume Excess (Adult,Obstetrics,Pediatric)  Goal: Stable Weight  Outcome: Ongoing (interventions implemented as appropriate)  Goal: Balanced Intake/Output  Outcome: Ongoing (interventions implemented as appropriate)    Problem: Pressure Ulcer Risk (New Scale) (Adult,Obstetrics,Pediatric)  Goal: Skin Integrity  Outcome: Ongoing (interventions implemented as appropriate)    Problem: Nutrition, Imbalanced: Inadequate Oral Intake (Adult)  Goal: Improved Oral Intake  Outcome: Ongoing (interventions implemented as appropriate)  Goal: Prevent Further Weight Loss  Outcome: Ongoing (interventions implemented as appropriate)    Problem: Skin Integrity Impairment, Risk/Actual (Adult)  Goal: Identify Related Risk Factors and Signs and Symptoms  Outcome: Ongoing (interventions implemented as appropriate)  Goal: Skin Integrity/Wound Healing  Outcome: Ongoing (interventions implemented as appropriate)

## 2017-06-20 NOTE — PROGRESS NOTES
Nephrology ([facility]) Progress Note      Patient:  Jennifer Marks  YOB: 1945  Date of Service: 6/20/2017  MRN: [id number]   Acct: [id number]   Primary Care Physician: No Known Provider  Advance Directive: Conditional Code  Admit Date: 6/11/2017       Hospital Day: 9  Referring Provider: Pino Tang MD      Patient personally seen and examined.  Complete chart including Consults, Notes, Operative Reports, Labs, Cardiology, and Radiology studies reviewed as able.        Subjective:  Jennifer Marks is a 72 y.o. female  whom we were consulted for end stage renal disease management, hypokalemia. She is on hemodialysis Monday Wednesday Friday.  Admitted with chest pain. Has had decreased oral intake and diarrhea for several days.  On 6/13 had femoral line placed.   Was on Cardizem drip for tachydysrhythmia.  Has been hypotensive and has needed Levophed drip intermittently; has now been weaned off Levophed.  Discussed with nursing staff; had episode of acute confusion/delirium last night, but is alert and oriented this morning.     Allergies:  Heparin; Iron; Latex; Levaquin [levofloxacin]; Shrimp (diagnostic); and Vancomycin    Home Meds:  Prescriptions Prior to Admission   Medication Sig Dispense Refill Last Dose   • carvedilol (COREG) 12.5 MG tablet Take 12.5 mg by mouth Daily.      • losartan (COZAAR) 25 MG tablet Take 25 mg by mouth Daily.      • pantoprazole (PROTONIX) 40 MG EC tablet Take 40 mg by mouth Daily.      • potassium chloride (KLOR-CON) 20 MEQ packet Take 20 mEq by mouth Daily.      • predniSONE (DELTASONE) 1 MG tablet Take 1 mg by mouth Daily.      • PREDNISONE PO Take  by mouth.      • traZODone (DESYREL) 50 MG tablet Take 50 mg by mouth Every Night.          Medicines:  Current Facility-Administered Medications   Medication Dose Route Frequency Provider Last Rate Last Dose   • cefTRIAXone (ROCEPHIN) 1 g/100 mL 0.9% NS (MBP)  1 g Intravenous Q24H Carter COOEPR  MD Farhad   1 g at 06/19/17 0818   • dextrose (D50W) solution 50 mL  50 mL Intravenous Q1H PRN Bree Soares MD   50 mL at 06/17/17 0800   • diltiaZEM (CARDIZEM) 125mg/125 mL (1 mg/mL) infusion  5-15 mg/hr Intravenous Titrated Carter Llamas MD   Stopped at 06/17/17 1435   • hydrocortisone sodium succinate (Solu-CORTEF) injection 100 mg  100 mg Intravenous Q6H Arthur Kerr MD   100 mg at 06/20/17 0604   • HYDROmorphone (DILAUDID) injection 0.25 mg  0.25 mg Intravenous Q4H PRN Carter Llamas MD   0.25 mg at 06/20/17 0020   • ipratropium-albuterol (DUO-NEB) nebulizer solution 3 mL  3 mL Nebulization Q6H While Awake - RT Carter Llamas MD   3 mL at 06/20/17 0719   • levothyroxine (SYNTHROID, LEVOTHROID) tablet 50 mcg  50 mcg Oral Q AM Carter Llamas MD   50 mcg at 06/20/17 0604   • megestrol (MEGACE) 40 MG/ML suspension 400 mg  400 mg Oral Daily Carter Llamas MD   400 mg at 06/19/17 0818   • midodrine (PROAMATINE) tablet 5 mg  5 mg Oral TID AC FANNIE Blanco   5 mg at 06/19/17 1845   • multiple vitamin (M.V.I. Adult) 10 mL, thiamine (B-1) 100 mg, folic acid 1 mg in dextrose 5 % and sodium chloride 0.45 % 1,000 mL infusion  50 mL/hr Intravenous Daily Carter Llamas MD 50 mL/hr at 06/19/17 0817 50 mL/hr at 06/19/17 0817   • norepinephrine (LEVOPHED) 8,000 mcg in sodium chloride 0.9 % 250 mL (32 mcg/mL) infusion  0.02-0.3 mcg/kg/min Intravenous Titrated FANNIE Balnco   Stopped at 06/20/17 0313   • ondansetron (ZOFRAN) injection 4 mg  4 mg Intravenous Q6H PRN Pino Tang MD   4 mg at 06/15/17 0555   • pantoprazole (PROTONIX) EC tablet 40 mg  40 mg Oral Q AM Pino Tang MD   40 mg at 06/20/17 0604   • sodium chloride 0.9 % flush 1-10 mL  1-10 mL Intravenous PRN Pino Tang MD           Past Medical History:  Past Medical History:   Diagnosis Date   • A-fib    • Arthritis    • CHF (congestive heart failure)    • Hypertension    • Renal failure        Past Surgical History:  Past Surgical  "History:   Procedure Laterality Date   • ARTERIOVENOUS FISTULA LEG Left        Family History  History reviewed. No pertinent family history.    Social History  Social History     Social History   • Marital status: Single     Spouse name: N/A   • Number of children: N/A   • Years of education: N/A     Occupational History   • Not on file.     Social History Main Topics   • Smoking status: Former Smoker   • Smokeless tobacco: Not on file   • Alcohol use No   • Drug use: No   • Sexual activity: Defer     Other Topics Concern   • Not on file     Social History Narrative         Review of Systems:  History obtained from chart review and the patient  General ROS: Patient complains of malaise and No fever or chills  Respiratory ROS: No cough, shortness of breath, wheezing  Cardiovascular ROS: no chest pain or dyspnea on exertion  Gastrointestinal ROS: No abdominal pain, no nausea  Genito-Urinary ROS: No dysuria or hematuria  Neurological ROS: negative  Dermatological ROS: negative  14 point ROS reviewed with the patient and negative except as noted above and in the HPI unless unable to obtain.    Objective:  /67  Pulse 108  Temp 96.7 °F (35.9 °C) (Temporal Artery )   Resp 18  Ht 62\" (157.5 cm)  Wt 116 lb 1 oz (52.6 kg)  SpO2 94%  BMI 21.23 kg/m2    Intake/Output Summary (Last 24 hours) at 06/20/17 0804  Last data filed at 06/20/17 0749   Gross per 24 hour   Intake          1958.19 ml   Output             2000 ml   Net           -41.81 ml     General: awake/alert    Neck: supple, no JVD  Chest:  clear to auscultation bilaterally without respiratory distress  CVS: regular rate and rhythm  Abdominal: soft, nontender, normal bowel sounds  Extremities: no cyanosis or edema  Skin: warm and dry without rash   Neuro: no focal motor deficits      Labs:    Results from last 7 days  Lab Units 06/20/17  0421 06/19/17  0404 06/18/17  0346   WBC 10*3/mm3 4.03* 6.21 9.12   HEMOGLOBIN g/dL 8.2* 8.1* 8.7*   HEMATOCRIT % 24.3* " 23.9* 26.0*   PLATELETS 10*3/mm3 53* 75* 26*           Results from last 7 days  Lab Units 06/20/17  0439 06/19/17  0404 06/18/17  0346 06/17/17  0827   SODIUM mmol/L 133* 136 137 137   POTASSIUM mmol/L 3.6 3.5 3.2* 3.5   CHLORIDE mmol/L 98 101 100 100   TOTAL CO2 mmol/L 27.0 26.0 28.0 25.0   BUN mg/dL 17 28* 23* 17   CREATININE mg/dL 2.66* 3.93* 3.48* 3.00*   CALCIUM mg/dL 7.8* 7.7* 7.8* 7.8*   BILIRUBIN mg/dL  --  1.8* 1.8* 2.7*   ALK PHOS U/L  --  90 92 89   ALT (SGPT) U/L  --  22 28 25   AST (SGOT) U/L  --  28 23 26   GLUCOSE mg/dL 151* 72 109* 146*       Radiology:   Imaging Results (last 72 hours)     Procedure Component Value Units Date/Time    XR Chest 1 View [[id number]] Collected:  06/11/17 1803     Updated:  06/11/17 2054    Narrative:       EXAMINATION:   XR CHEST 1 VW-  6/11/2017 6:01 PM CDT     HISTORY: Chest pain     Prior exams 05/28/2016.     Single view of the chest is obtained. The pulmonary vascular margins are  slightly indistinct. This has the appearance of mild pulmonary vascular  congestion. Left diaphragm is indistinct. This may be infiltrate or  atelectasis left lower lobe.     Cardiac silhouette is enlarged and unchanged from May 28.       Impression:       1. Silhouetting of the left diaphragm consistent with infiltrate or  atelectasis left lower lobe.  2. Indistinct pulmonary vascular margins consistent with mild pulmonary  vascular congestion.  This report was finalized on 06/11/2017 20:51 by Dr. Herson Magaña MD.          Culture:         Assessment   1. End stage renal disease on maintenance hemodialysis Monday Wednesday Friday   2. Pneumonia  3. Hypotension--improving, Levophed now off  4. Anemia of chronic kidney disease  5. Diabetes mellitus type 2 with nephropathy  6.  Hyponatremia     Plan:  1.  Dialysis tomorrow AM  2.  Continue IV antibiotics      Jaden Tovar, APRN  6/20/2017  8:04 AM

## 2017-06-20 NOTE — PLAN OF CARE
"Problem: Patient Care Overview (Adult)  Goal: Plan of Care Review  Outcome: Ongoing (interventions implemented as appropriate)    06/20/17 6577   Coping/Psychosocial Response Interventions   Plan Of Care Reviewed With patient   Patient Care Overview   Progress unable to show any progress toward functional goals   Outcome Evaluation   Outcome Summary/Follow up Plan Pt. agreed to sit EOB but asked to lay down as soon as sat up. Put no effort into transfer. Did sit approx 5 min. Stated \"I can't to all lower extremety ex's. Did a few very weak ankle pumps and barely swang LE's when asked. When not asked pt. was sitting swinging legs!.Will continue to work on bed mobility and strengthening if patient participates or will D/C 2nd to lack of participation.           "

## 2017-06-20 NOTE — PLAN OF CARE
Problem: Patient Care Overview (Adult)  Goal: Plan of Care Review  Outcome: Ongoing (interventions implemented as appropriate)    06/20/17 5305   Coping/Psychosocial Response Interventions   Plan Of Care Reviewed With patient;spouse;daughter   Patient Care Overview   Progress declining       Goal: Adult Individualization and Mutuality  Outcome: Ongoing (interventions implemented as appropriate)  Goal: Discharge Needs Assessment  Outcome: Ongoing (interventions implemented as appropriate)    Problem: Fall Risk (Adult)  Goal: Identify Related Risk Factors and Signs and Symptoms  Outcome: Ongoing (interventions implemented as appropriate)  Goal: Absence of Falls  Outcome: Ongoing (interventions implemented as appropriate)  Goal: Absence of Falls  Outcome: Ongoing (interventions implemented as appropriate)    Problem: Fluid Volume Excess (Adult,Obstetrics,Pediatric)  Goal: Stable Weight  Outcome: Ongoing (interventions implemented as appropriate)  Goal: Balanced Intake/Output  Outcome: Ongoing (interventions implemented as appropriate)    Problem: Pressure Ulcer Risk (New Scale) (Adult,Obstetrics,Pediatric)  Goal: Skin Integrity  Outcome: Ongoing (interventions implemented as appropriate)    Problem: Nutrition, Imbalanced: Inadequate Oral Intake (Adult)  Goal: Improved Oral Intake  Outcome: Ongoing (interventions implemented as appropriate)  Goal: Prevent Further Weight Loss  Outcome: Ongoing (interventions implemented as appropriate)    Problem: Skin Integrity Impairment, Risk/Actual (Adult)  Goal: Identify Related Risk Factors and Signs and Symptoms  Outcome: Ongoing (interventions implemented as appropriate)  Goal: Skin Integrity/Wound Healing  Outcome: Ongoing (interventions implemented as appropriate)

## 2017-06-20 NOTE — PROGRESS NOTES
[facility] Medicine Services  INPATIENT PROGRESS NOTE    Length of Stay: 9  Date of Admission: 6/11/2017  Primary Care Physician: No Known Provider    Subjective   Chief Complaint: Atrial fib, hypertension, weakness, kidney failure    Chest Pain        BP better with IV steroids.  Says feels better today.  Patient is eating some better. Off pressors.        Review of Systems   Cardiovascular: Positive for chest pain.      Constitutional: Positive for activity change, appetite change and fatigue. Negative for chills and fever.   HENT: Negative for hearing loss, nosebleeds, tinnitus and trouble swallowing.   Eyes: Negative for visual disturbance.   Respiratory: Negative for cough, chest tightness, shortness of breath and wheezing.   Cardiovascular: Negative for chest pain, palpitations and leg swelling.   Gastrointestinal: Negative for abdominal distention, abdominal pain, blood in stool, constipation, diarrhea, nausea and vomiting.   Endocrine: Negative for cold intolerance, heat intolerance, polydipsia, polyphagia and polyuria.   Genitourinary: Negative for decreased urine volume, difficulty urinating, dysuria, flank pain, frequency and hematuria.   Musculoskeletal: Positive for arthralgias, gait problem and myalgias. Negative for joint swelling.   Skin: Negative for rash.   Allergic/Immunologic: Negative for immunocompromised state.   Neurological: Positive for weakness. Negative for dizziness, syncope, light-headedness and headaches.   Hematological: Negative for adenopathy. Does not bruise/bleed easily.   Psychiatric/Behavioral: Negative for confusion and sleep disturbance. The patient is not nervous/anxious.     All pertinent negatives and positives are as above. All other systems have been reviewed and are negative unless otherwise stated.     Objective    Temp:  [96.7 °F (35.9 °C)-98.8 °F (37.1 °C)] 96.7 °F (35.9 °C)  Heart Rate:  [] 99  Resp:  [13-24] 16  BP:  ()/(43-85) 95/55    Intake/Output Summary (Last 24 hours) at 06/20/17 0757  Last data filed at 06/20/17 0749   Gross per 24 hour   Intake          1958.19 ml   Output             2000 ml   Net           -41.81 ml     Physical Exam  Constitutional: She appears well-developed and well-nourished.   HENT:   Head: Normocephalic and atraumatic.   Eyes: Conjunctivae and EOM are normal. Pupils are equal, round, and reactive to light.   Neck: Neck supple. No JVD present. No thyromegaly present.   Cardiovascular: Normal rate, regular rhythm, normal heart sounds and intact distal pulses. Exam reveals no gallop and no friction rub.   No murmur heard.  Pulmonary/Chest: Effort normal. No respiratory distress. She has no wheezes. She has rales. She exhibits no tenderness.   Abdominal: Soft. Bowel sounds are normal. She exhibits no distension. There is no tenderness. There is no rebound and no guarding.   Musculoskeletal: Normal range of motion. She exhibits no edema, tenderness or deformity.   Lymphadenopathy:   She has no cervical adenopathy.   Neurological: She is alert. She displays normal reflexes. No cranial nerve deficit. She exhibits abnormal muscle tone. Coordination abnormal.   Skin: Skin is warm and dry. No rash noted.   Psychiatric: She has a normal mood and affect. Her behavior is normal.   Nursing note and vitals reviewed.  Results Review:  Lab Results (last 24 hours)     Procedure Component Value Units Date/Time    Blood Culture [[id number]]  (Normal) Collected:  06/16/17 0918    Specimen:  Blood from Arm, Left Updated:  06/19/17 1001     Blood Culture No growth at 3 days    POC Glucose Fingerstick [[id number]]  (Normal) Collected:  06/19/17 1830    Specimen:  Blood Updated:  06/19/17 1850     Glucose 115 mg/dL       : [id number] Head Angy DMeter ID: [id number]       POC Glucose Fingerstick [[id number]]  (Normal) Collected:  06/20/17 0244    Specimen:  Blood Updated:  06/20/17 0257     Glucose 122 mg/dL        : [id number] Harrison WhitneyMeter ID: [id number]       Basic Metabolic Panel [[id number]]  (Abnormal) Collected:  06/20/17 0439    Specimen:  Blood Updated:  06/20/17 0458     Glucose 151 (H) mg/dL      BUN 17 mg/dL      Creatinine 2.66 (H) mg/dL      Sodium 133 (L) mmol/L      Potassium 3.6 mmol/L      Chloride 98 mmol/L      CO2 27.0 mmol/L      Calcium 7.8 (L) mg/dL      eGFR  African Amer 21 (L) mL/min/1.73      BUN/Creatinine Ratio 6.4 (L)     Anion Gap 8.0 mmol/L     Narrative:       The MDRD GFR formula is only valid for adults with stable renal function between ages 18 and 70.    CBC & Differential [[id number]] Collected:  06/20/17 0421    Specimen:  Blood Updated:  06/20/17 0515    Narrative:       The following orders were created for panel order CBC & Differential.  Procedure                               Abnormality         Status                     ---------                               -----------         ------                     Scan Slide[[id number]]                                       Final result               CBC Auto Differential[[id number]]        Abnormal            Final result                 Please view results for these tests on the individual orders.    CBC Auto Differential [[id number]]  (Abnormal) Collected:  06/20/17 0421    Specimen:  Blood Updated:  06/20/17 0515     WBC 4.03 (L) 10*3/mm3      RBC 2.89 (L) 10*6/mm3      Hemoglobin 8.2 (L) g/dL      Hematocrit 24.3 (L) %      MCV 84.1 fL      MCH 28.4 pg      MCHC 33.7 g/dL      RDW 20.2 (H) %      RDW-SD 60.7 (H) fl      Platelets 53 (L) 10*3/mm3      Neutrophil % 87.0 (H) %      Lymphocyte % 8.9 (L) %      Monocyte % 2.7 (L) %      Eosinophil % 0.0 %      Basophil % 0.2 %      Immature Grans % 1.2 %      Neutrophils, Absolute 3.50 10*3/mm3      Lymphocytes, Absolute 0.36 (L) 10*3/mm3      Monocytes, Absolute 0.11 (L) 10*3/mm3      Eosinophils, Absolute 0.00 10*3/mm3      Basophils, Absolute 0.01 10*3/mm3      Immature Grans,  Absolute 0.05 (H) 10*3/mm3      nRBC 0.0 /100 WBC     Scan Slide [[id number]] Collected:  06/20/17 0421    Specimen:  Blood Updated:  06/20/17 0515     Anisocytosis Slight/1+     Hypochromia Slight/1+     Poikilocytes Slight/1+     WBC Morphology Normal     Platelet Estimate Decreased    POC Glucose Fingerstick [[id number]]  (Abnormal) Collected:  06/20/17 0615    Specimen:  Blood Updated:  06/20/17 0628     Glucose 134 (H) mg/dL       : [id number] Harrison WhitneyMeter ID: [id number]       Cortisol [[id number]] Collected:  06/19/17 0911    Specimen:  Blood from Blood, Central Line Updated:  06/20/17 0731     Cortisol 10.9 ug/dL                               Cortisol AM         6.2 - 19.4                          Cortisol PM         2.3 - 11.9       Narrative:       Performed at:  [address] [place], [place] [id number]  : Bhargav Vallecillo PhD, Phone:  [phone]    POC Glucose Fingerstick [[id number]]  (Normal) Collected:  06/20/17 0732    Specimen:  Blood Updated:  06/20/17 0749     Glucose 114 mg/dL       : [id number] Suresh OrinyMeter ID: [id number]              Cultures:  Blood Culture   Date Value Ref Range Status   06/16/2017 No growth at 24 hours  Preliminary       Radiology Data:    Imaging Results (last 24 hours)     ** No results found for the last 24 hours. **          Allergies   Allergen Reactions   • Heparin    • Iron    • Latex    • Levaquin [Levofloxacin]    • Shrimp (Diagnostic)    • Vancomycin        Scheduled meds:     ceftriaxone 1 g Intravenous Q24H   hydrocortisone sodium succinate 100 mg Intravenous Q6H   ipratropium-albuterol 3 mL Nebulization Q6H While Awake - RT   levothyroxine 50 mcg Oral Q AM   megestrol 400 mg Oral Daily   midodrine 5 mg Oral TID AC   IV Fluids 1000 mL + additives 50 mL/hr Intravenous Daily   pantoprazole 40 mg Oral Q AM       PRN meds:  dextrose  •  HYDROmorphone  •  ondansetron  •  sodium chloride    Assessment/Plan     Active  Problems:    Chest pain in adult    Renal failure    Hypotension    Anemia    Hypokalemia      Plan:  Chest pain, atypical, echocardiogram/afib -Ejection fraction 61%, diastolic dysfunction, right ventricular dilatation, tricuspid valve regurgitation, right ventricle systolic pressure greater than 55. Off Cardizem.  Failure to thrive check cortisol.  Steroids helping  BP and appetite.   Hemoptysis ×1 6/15. Hx feliciano syndrome.    Thrombopenia-6 packs of platelets.  No active bleeding.     Pneumonia/ Slight leukocytosis. Chest x-ray ? Retrocardiac opacity- on rocephin . Blood culture no growth in 24 hours. Duo neb.      Anemia- Status post 2 units of blood transfusion 6/14.      Chronic renal failure- nephrology is on board. Cont dialysis per nephrology.       Hyperlipidemia      Elevate TSH- free T4 is normal. Start synthroid.      Hypotension- Resolved      Hypokalemia-- resolved      Calcium 7.7, corrected calcium 9.5 - 6/13/2017.      Nutrition- continue current improving      Deconditioning- start PT and OT      Discharge Planning: continue in unit to floor tomorrow  Adrenal insufficiency responding to steroids.  Arthur Kerr MD   06/20/17   7:57 AM

## 2017-06-21 LAB
ANION GAP SERPL CALCULATED.3IONS-SCNC: 12 MMOL/L (ref 4–13)
BACTERIA SPEC AEROBE CULT: NORMAL
BASOPHILS # BLD AUTO: 0 10*3/MM3 (ref 0–0.2)
BASOPHILS NFR BLD AUTO: 0 % (ref 0–2)
BUN BLD-MCNC: 29 MG/DL (ref 5–21)
BUN/CREAT SERPL: 8.2 (ref 7–25)
CALCIUM SPEC-SCNC: 8 MG/DL (ref 8.4–10.4)
CHLORIDE SERPL-SCNC: 98 MMOL/L (ref 98–110)
CO2 SERPL-SCNC: 23 MMOL/L (ref 24–31)
CREAT BLD-MCNC: 3.53 MG/DL (ref 0.5–1.4)
DEPRECATED RDW RBC AUTO: 60.4 FL (ref 40–54)
EOSINOPHIL # BLD AUTO: 0 10*3/MM3 (ref 0–0.7)
EOSINOPHIL NFR BLD AUTO: 0 % (ref 0–4)
ERYTHROCYTE [DISTWIDTH] IN BLOOD BY AUTOMATED COUNT: 20.1 % (ref 12–15)
GFR SERPL CREATININE-BSD FRML MDRD: 15 ML/MIN/1.73
GLUCOSE BLD-MCNC: 185 MG/DL (ref 70–100)
GLUCOSE BLDC GLUCOMTR-MCNC: 162 MG/DL (ref 70–130)
GLUCOSE BLDC GLUCOMTR-MCNC: 184 MG/DL (ref 70–130)
GLUCOSE BLDC GLUCOMTR-MCNC: 193 MG/DL (ref 70–130)
HCT VFR BLD AUTO: 21.2 % (ref 37–47)
HGB BLD-MCNC: 7.3 G/DL (ref 12–16)
HYPOCHROMIA BLD QL: NORMAL
IMM GRANULOCYTES # BLD: 0.02 10*3/MM3 (ref 0–0.03)
IMM GRANULOCYTES NFR BLD: 0.4 % (ref 0–5)
LYMPHOCYTES # BLD AUTO: 0.38 10*3/MM3 (ref 0.72–4.86)
LYMPHOCYTES NFR BLD AUTO: 8.3 % (ref 15–45)
MCH RBC QN AUTO: 28.7 PG (ref 28–32)
MCHC RBC AUTO-ENTMCNC: 34.4 G/DL (ref 33–36)
MCV RBC AUTO: 83.5 FL (ref 82–98)
MICROCYTES BLD QL: NORMAL
MONOCYTES # BLD AUTO: 0.33 10*3/MM3 (ref 0.19–1.3)
MONOCYTES NFR BLD AUTO: 7.2 % (ref 4–12)
NEUTROPHILS # BLD AUTO: 3.86 10*3/MM3 (ref 1.87–8.4)
NEUTROPHILS NFR BLD AUTO: 84.1 % (ref 39–78)
NRBC BLD MANUAL-RTO: 0.7 /100 WBC (ref 0–0)
PLATELET # BLD AUTO: 66 10*3/MM3 (ref 130–400)
POTASSIUM BLD-SCNC: 3.6 MMOL/L (ref 3.5–5.3)
RBC # BLD AUTO: 2.54 10*6/MM3 (ref 4.2–5.4)
SMALL PLATELETS BLD QL SMEAR: NORMAL
SODIUM BLD-SCNC: 133 MMOL/L (ref 135–145)
TARGETS BLD QL SMEAR: NORMAL
WBC MORPH BLD: NORMAL
WBC NRBC COR # BLD: 4.59 10*3/MM3 (ref 4.8–10.8)

## 2017-06-21 PROCEDURE — 94799 UNLISTED PULMONARY SVC/PX: CPT

## 2017-06-21 PROCEDURE — 25010000002 THIAMINE PER 100 MG: Performed by: FAMILY MEDICINE

## 2017-06-21 PROCEDURE — 85007 BL SMEAR W/DIFF WBC COUNT: CPT | Performed by: INTERNAL MEDICINE

## 2017-06-21 PROCEDURE — 85025 COMPLETE CBC W/AUTO DIFF WBC: CPT | Performed by: INTERNAL MEDICINE

## 2017-06-21 PROCEDURE — 97110 THERAPEUTIC EXERCISES: CPT

## 2017-06-21 PROCEDURE — 25010000002 CEFTRIAXONE: Performed by: FAMILY MEDICINE

## 2017-06-21 PROCEDURE — 25010000002 HYDROCORTISONE SODIUM SUCCINATE 100 MG RECONSTITUTED SOLUTION: Performed by: INTERNAL MEDICINE

## 2017-06-21 PROCEDURE — 97530 THERAPEUTIC ACTIVITIES: CPT

## 2017-06-21 PROCEDURE — 80048 BASIC METABOLIC PNL TOTAL CA: CPT | Performed by: INTERNAL MEDICINE

## 2017-06-21 PROCEDURE — 82962 GLUCOSE BLOOD TEST: CPT

## 2017-06-21 RX ADMIN — IPRATROPIUM BROMIDE AND ALBUTEROL SULFATE 3 ML: 2.5; .5 SOLUTION RESPIRATORY (INHALATION) at 20:22

## 2017-06-21 RX ADMIN — IPRATROPIUM BROMIDE AND ALBUTEROL SULFATE 3 ML: 2.5; .5 SOLUTION RESPIRATORY (INHALATION) at 12:35

## 2017-06-21 RX ADMIN — HYDROCORTISONE SODIUM SUCCINATE 100 MG: 100 INJECTION, POWDER, FOR SOLUTION INTRAMUSCULAR; INTRAVENOUS at 00:27

## 2017-06-21 RX ADMIN — MIDODRINE HYDROCHLORIDE 5 MG: 2.5 TABLET ORAL at 19:56

## 2017-06-21 RX ADMIN — MIDODRINE HYDROCHLORIDE 5 MG: 2.5 TABLET ORAL at 08:55

## 2017-06-21 RX ADMIN — IPRATROPIUM BROMIDE AND ALBUTEROL SULFATE 3 ML: 2.5; .5 SOLUTION RESPIRATORY (INHALATION) at 06:40

## 2017-06-21 RX ADMIN — MIDODRINE HYDROCHLORIDE 5 MG: 2.5 TABLET ORAL at 12:02

## 2017-06-21 RX ADMIN — PANTOPRAZOLE SODIUM 40 MG: 40 TABLET, DELAYED RELEASE ORAL at 05:50

## 2017-06-21 RX ADMIN — LEVOTHYROXINE SODIUM 50 MCG: 50 TABLET ORAL at 05:50

## 2017-06-21 RX ADMIN — THIAMINE HYDROCHLORIDE 50 ML/HR: 100 INJECTION, SOLUTION INTRAMUSCULAR; INTRAVENOUS at 08:56

## 2017-06-21 RX ADMIN — HYDROCORTISONE SODIUM SUCCINATE 50 MG: 100 INJECTION, POWDER, FOR SOLUTION INTRAMUSCULAR; INTRAVENOUS at 19:56

## 2017-06-21 RX ADMIN — CEFTRIAXONE 1 G: 1 INJECTION, POWDER, FOR SOLUTION INTRAMUSCULAR; INTRAVENOUS at 08:56

## 2017-06-21 RX ADMIN — HYDROCORTISONE SODIUM SUCCINATE 100 MG: 100 INJECTION, POWDER, FOR SOLUTION INTRAMUSCULAR; INTRAVENOUS at 05:50

## 2017-06-21 RX ADMIN — HYDROCORTISONE SODIUM SUCCINATE 50 MG: 100 INJECTION, POWDER, FOR SOLUTION INTRAMUSCULAR; INTRAVENOUS at 12:02

## 2017-06-21 NOTE — PLAN OF CARE
Problem: Pressure Ulcer (Adult)  Goal: Signs and Symptoms of Listed Potential Problems Will be Absent or Manageable (Pressure Ulcer)  Outcome: Ongoing (interventions implemented as appropriate)  Pt has stage II pressure ulcer to coccyx.      06/21/17 0430   Pressure Ulcer   Problems Assessed (Pressure Ulcer) all

## 2017-06-21 NOTE — PLAN OF CARE
Problem: Patient Care Overview (Adult)  Goal: Plan of Care Review  Outcome: Ongoing (interventions implemented as appropriate)    06/21/17 9305   Coping/Psychosocial Response Interventions   Plan Of Care Reviewed With patient   Patient Care Overview   Progress progress toward functional goals is gradual   Outcome Evaluation   Outcome Summary/Follow up Plan Pts' progress is gradual, pt. c/o's of pain during slow stretch to Bue's, hands/elbows/phalanges!

## 2017-06-21 NOTE — PLAN OF CARE
Problem: Patient Care Overview (Adult)  Goal: Plan of Care Review  Outcome: Ongoing (interventions implemented as appropriate)    06/21/17 1126   Coping/Psychosocial Response Interventions   Plan Of Care Reviewed With patient   Patient Care Overview   Progress progress toward functional goals as expected   Outcome Evaluation   Outcome Summary/Follow up Plan Pts' progress is as expected, Pt. participates, but has limitations in arom sec. pain with flex/ext in hand and phalanges!

## 2017-06-21 NOTE — THERAPY TREATMENT NOTE
Acute Care - Physical Therapy Treatment Note  [facility]     Patient Name: Jennifer Marks  : 1945  MRN: [id number]  Today's Date: 2017  Onset of Illness/Injury or Date of Surgery Date: 17  Date of Referral to PT: 17  Referring Physician: Dr. Llamas    Admit Date: 2017    Visit Dx:    ICD-10-CM ICD-9-CM   1. Chest pain in adult R07.9 786.50   2. End stage renal disease N18.6 585.6   3. Hypotension, unspecified hypotension type I95.9 458.9   4. Chronic systolic (congestive) heart failure I50.22 428.22     428.0   5. Decreased activities of daily living (ADL) Z78.9 V49.89   6. Impaired mobility Z74.09 799.89     Patient Active Problem List   Diagnosis   • Chest pain in adult   • Renal failure   • Hypotension   • Anemia   • Hypokalemia               Adult Rehabilitation Note       17 0901 17 1414 17 1328    Rehab Assessment/Intervention    Discipline physical therapy assistant  -JUANY physical therapy assistant  -JUANY occupational therapy assistant  -TRACY    Document Type therapy note (daily note)  -JUANY therapy note (daily note)  -JUANY therapy note (daily note)  -    Subjective Information agree to therapy;complains of;pain   and being cold  -JUANY agree to therapy;complains of;fatigue;pain  -JUANY agree to therapy;complains of;weakness;fatigue;pain  -CJ    Patient Effort, Rehab Treatment adequate  -JUANY  adequate  -CJ    Precautions/Limitations fall precautions  -JUANY fall precautions  -JUANY fall precautions  -CJ    Recorded by [JUANY] Tori Malave PTA [JUANY] Tori Malave PTA [CJ] KYA Metcalf/L    Vital Signs    Pre Systolic BP Rehab 86  -JUANY 85  -JUANY     Pre Treatment Diastolic BP 74  -JUANY 73  -JUANY     Pretreatment Heart Rate (beats/min) 108  -JUANY 96  -JUANY     Recorded by [JUANY] Tori Malave PTA [JUANY] Tori Malave PTA     Pain Assessment    Pain Assessment Morrison-Cameron FACES  -JUANY Morrison-Baker FACES  -JUANY No/denies pain  -CJ    Morrison-Cameron FACES Pain Rating 4   with movement   "-JUANY 4   with all movement  -JUANY 4  -CJ    Pain Type Chronic pain  -JUANY Chronic pain  -JUANY Chronic pain  -CJ    Pain Location Generalized  -JUANY Generalized  -JUANY Hand   phalanges!  -CJ    Pain Orientation   Right;Left  -CJ    Pain Intervention(s) Repositioned  -JUANY  Repositioned  -CJ    Recorded by [JUANY] Tori Malave PTA [JUANY] Tori Malave PTA [CJ] Marcin Robbins, BOLAND/L    ROM (Range of Motion)    General ROM   upper extremity range of motion deficits identified;hand range of motion deficits identified   ulnar deviation in b. hands/phalanges!  -CJ    General ROM Detail   --   slow stretch to b.upper extremities to increase arom!   -CJ    Additional Documentation   --   pain with slow stretch techniques!  -CJ    Recorded by   [CJ] KYA Metcalf/L    Bed Mobility, Assessment/Treatment    Bed Mobility, Scoot/Bridge, [place] dependent (less than 25% patient effort)  -      Bed Mob, Supine to Sit, [place] maximum assist (25% patient effort)  -JUANY dependent (less than 25% patient effort)  -     Bed Mob, Sit to Supine, [place] dependent (less than 25% patient effort)  -JUANY dependent (less than 25% patient effort)  -     Bed Mobility, Comment Pt. tends to lay with knees flexed. Instruct ed pt. to push legs out straight when notice they are \"curled up\". [place]sted pt. move her body throughout the day & not lay still.   -JUANY Agreed but asked to lay down as soon as sat up!!  -JUANY fowlers in bed!  -CJ    Recorded by [JUANY] Tori Malave PTA [JUANY] Tori Malave PTA [CJ] Marcin Robbins, BOLAND/L    Motor Skills/Interventions    Additional Documentation Balance Skills Training (Group)  -JUANY Balance Skills Training (Group)  -JUANY     Recorded by [JUANY] Tori Malave PTA [JUANY] Tori Malave PTA     Balance Skills Training    Sitting-Level of Assistance Contact guard;Minimum assistance  -JUANY Minimum assistance  -JUANY     Sitting-Balance Support No upper extremity supported;Feet unsupported " "  worked on posture  -      Sitting-Balance Activities --   xhibits forward flexed neck, worked on ext and rotation.   -JUANY --   orient to midline  -JUANY     Sitting # of Minutes 18  -JUANY 5  -JUANY     Recorded by [JUANY] Tori Malave PTA [JUANY] Tori Malave PTA     Therapy Exercises    Bilateral Lower Extremities AAROM:;sitting;ankle pumps/circles;hamstring stretch;hip flexion;LAQ   very slow gentle stretching  - --   a few weak AP's & very little LAQ  -JUANY     Bilateral Upper Extremity   --   ue exs attempted, but unable sec. arom and !  -CJ    Recorded by [JUANY] Tori Malave PTA [JUANY] Tori Malave PTA [CJ] Marcin Robbins, BOLAND/L    Positioning and Restraints    Pre-Treatment Position in bed  -JUANY in bed  -JUANY in bed  -CJ    Post Treatment Position bed  -JUANY bed  -JUANY bed  -CJ    In Bed fowlers;call light within reach;encouraged to call for assist;patient within staff view;with nsg;side rails up x3  -JUANY notified nsg;fowlers;call light within reach;with family/caregiver;side rails up x3  -JUANY fowlers;call light within reach;encouraged to call for assist;side rails up x2  -CJ    Recorded by [JUANY] Tori Malave PTA [JUANY] Tori Malave PTA [CJ] Marcin Robbins, BOLAND/L      06/20/17 0935 06/19/17 1308 06/19/17 1057    Rehab Assessment/Intervention    Discipline physical therapy assistant  -JUANY physical therapy assistant  -JUANY occupational therapist  -CH    Document Type therapy note (daily note)  -JUANY therapy note (daily note)  -JUANY     Treatment Not Performed patient/family declined treatment   Pt. won't open eys, \"NO,uh,uh'. Nsg encouraged also.  -JUANY patient/family declined treatment   Strongly denied ex's in bed or sit EOB. NSG aware  - patient unavailable for treatment  -CH    Treatment Not Performed, Comment   dialysis  -CH    Recorded by [JUANY] Tori Malave PTA [JUANY] Tori Malave PTA [CH] Nargis Faulkner, OTR/L    Vital Signs    Pre Systolic BP Rehab  99  -JUANY     Pre Treatment Diastolic BP  " 44  -JUANY     Pretreatment Heart Rate (beats/min)  101  -JUANY     Pre SpO2 (%)  96  -JUANY     Recorded by  [JUANY] Tori Malave, PTA       06/19/17 1013 06/18/17 1544 06/18/17 1017    Rehab Assessment/Intervention    Discipline physical therapy assistant  -JUANY physical therapy assistant  -LG physical therapy assistant  -LG    Treatment Not Performed patient unavailable for treatment   dialysis  -JUANY other (see comments)  -LG other (see comments)  -LG    Treatment Not Performed, Comment  Nsg declined PT  -LG Renny HUIZAR declined PT treatment this a.m.  -LG    Recorded by [JUANY] Tori Malave, PTA [LG] Arcenio Ribeiro, PTA [LG] Arcenio Ribeiro, PTA      User Key  (r) = Recorded By, (t) = Taken By, (c) = Cosigned By    Initials Name Effective Dates    LG Arcenio Ribeiro, PTA 08/02/16 -      Nargis Faulkner, OTR/L 08/02/16 -     CJ Marcin Robbins, BOLAND/L 08/02/16 -     JUANY Tori Malave, PTA 08/02/16 -                 IP PT Goals       06/15/17 0925          Bed Mobility PT LTG    Bed Mobility PT LTG, Date Established 06/15/17  -YONIS      Bed Mobility PT LTG, Time to Achieve by discharge  -YONIS      Bed Mobility PT LTG, Activity Type roll left/roll right;supine to sit/sit to supine  -YONIS      Bed Mobility PT LTG, [place] minimum assist (75% patient effort)  -YONIS      Bed Mobility PT Goal  LTG, Assist Device bed rails  -YONIS      Strength Goal PT LTG    Strength Goal PT LTG, Date Established 06/15/17  -YONIS      Strength Goal PT LTG, Time to Achieve by discharge  -YONIS      Strength Goal PT LTG, Measure to Achieve AAROM, B UE/LE, 10-20 reps, min assist  -YONIS      Dynamic Sitting Balance PT LTG    Dynamic Sitting Balance PT LTG, Date Established 06/15/17  -YONIS      Dynamic Sitting Balance PT LTG, Time to Achieve by discharge  -YONIS      Dynamic Sitting Balance PT LTG, [place] minimum assist (75% patient effort)  -YONIS      Dynamic Sitting Balance PT LTG, Assist Device UE Support;bed rails  -YONIS        User Key  (r) = Recorded By,  (t) = Taken By, (c) = Cosigned By    Initials Name Provider Type    YONIS Yen, PT DPT Physical Therapist          Physical Therapy Education     Title: PT OT SLP Therapies (Active)     Topic: Physical Therapy (Active)     Point: Mobility training (Done)    Learning Progress Summary    Learner Readiness Method Response Comment Documented by Status   Patient Acceptance E,D NR,VU Instruct to move self in bed and importance of participating.  06/21/17 0934 Done    Acceptance E,D VU,NR Educated in benefits of activity and need to move to decrease contractures.  06/17/17 0956 Done    Acceptance E NR Educated pt. on progression of PT POC and benefits of activity  06/15/17 0925 Active               Point: Home exercise program (Done)    Learning Progress Summary    Learner Readiness Method Response Comment Documented by Status   Patient Acceptance E,D VU,NR Educated in benefits of activity and need to move to decrease contractures.  06/17/17 0956 Done                      User Key     Initials Effective Dates Name Provider Type Discipline     08/02/16 -  Arcenio Ribeiro, HILLARY Physical Therapy Assistant PT    YONIS 08/02/16 -  Sal Yen, PT DPT Physical Therapist PT     08/02/16 -  Tori Malave PTA Physical Therapy Assistant PT                    PT Recommendation and Plan  Anticipated Discharge Disposition: skilled nursing facility  Planned Therapy Interventions: bed mobility training, balance training, home exercise program, patient/family education, postural re-education, ROM (Range of Motion), strengthening  PT Frequency: daily, 2 times/day  Plan of Care Review  Plan Of Care Reviewed With: patient  Progress: progress toward functional goals is gradual  Outcome Summary/Follow up Plan: Pt. agreed to sit EOB. Total assist for supine to sit to supine. Sat EOB 18 minutes this a.m. Participated in LE ex's. Very little movement. Gently stretched B LE hamstrings. Sitting balance fair. Will continue to  work on bed mobility, strengthening as patient particpates.           Outcome Measures       06/21/17 0900 06/20/17 1328       How much help from another person do you currently need...    Turning from your back to your side while in flat bed without using bedrails? 1  -JUANY      Moving from lying on back to sitting on the side of a flat bed without bedrails? 1  -JUANY      Moving to and from a bed to a chair (including a wheelchair)? 1  -JUANY      Standing up from a chair using your arms (e.g., wheelchair, bedside chair)? 1  -JUANY      Climbing 3-5 steps with a railing? 1  -JUANY      To walk in hospital room? 1  -JUANY      AM-PAC 6 Clicks Score 6  -JUANY      How much help from another is currently needed...    Putting on and taking off regular lower body clothing?  1  -CJ     Bathing (including washing, rinsing, and drying)  1  -CJ     Toileting (which includes using toilet bed pan or urinal)  1  -CJ     Putting on and taking off regular upper body clothing  2  -CJ     Taking care of personal grooming (such as brushing teeth)  2  -CJ     Eating meals  2  -CJ     Score  9  -CJ     Functional Assessment    Outcome Measure Options  AM-PAC 6 Clicks Daily Activity (OT)  -CJ       User Key  (r) = Recorded By, (t) = Taken By, (c) = Cosigned By    Initials Name Provider Type    BEATRIZ Cavazos Occupational Therapy Assistant    JUANY Malave PTA Physical Therapy Assistant           Time Calculation:       Therapy Charges for Today     Code Description Service Date Service Provider Modifiers Qty    [id number] HC PT THERAPEUTIC ACT EA 15 MIN 6/20/2017 Tori Malave PTA GP, KX 1          PT G-Codes  Outcome Measure Options: AM-PAC 6 Clicks Daily Activity (OT)  Score: 6  Functional Limitation: Changing and maintaining body position  Changing and Maintaining Body Position Current Status (): 100 percent impaired, limited or restricted  Changing and Maintaining Body Position Goal Status (): At least 60 percent  but less than 80 percent impaired, limited or restricted    Tori Malave, PTA  6/21/2017

## 2017-06-21 NOTE — PROGRESS NOTES
[facility] Medicine Services  INPATIENT PROGRESS NOTE    Length of Stay: 10  Date of Admission: 6/11/2017  Primary Care Physician: No Known Provider    Subjective   Chief Complaint: Atrial fib, hypertension, weakness, kidney failure    Chest Pain         Patient eating well.  Had [employer] last night. BP better with IV steroids.  Says feels better today.  Review of Systems   Cardiovascular: Positive for chest pain.      Constitutional: Positive for activity change, appetite change and fatigue. Negative for chills and fever.   HENT: Negative for hearing loss, nosebleeds, tinnitus and trouble swallowing.   Eyes: Negative for visual disturbance.   Respiratory: Negative for cough, chest tightness, shortness of breath and wheezing.   Cardiovascular: Negative for chest pain, palpitations and leg swelling.   Gastrointestinal: Negative for abdominal distention, abdominal pain, blood in stool, constipation, diarrhea, nausea and vomiting.   Endocrine: Negative for cold intolerance, heat intolerance, polydipsia, polyphagia and polyuria.   Genitourinary: Negative for decreased urine volume, difficulty urinating, dysuria, flank pain, frequency and hematuria.   Musculoskeletal: Positive for arthralgias, gait problem and myalgias. Negative for joint swelling.   Skin: Negative for rash.   Allergic/Immunologic: Negative for immunocompromised state.   Neurological: Positive for weakness. Negative for dizziness, syncope, light-headedness and headaches.   Hematological: Negative for adenopathy. Does not bruise/bleed easily.   Psychiatric/Behavioral: Negative for confusion and sleep disturbance. The patient is not nervous/anxious.     All pertinent negatives and positives are as above. All other systems have been reviewed and are negative unless otherwise stated.     Objective    Temp:  [97 °F (36.1 °C)-97.8 °F (36.6 °C)] 97.5 °F (36.4 °C)  Heart Rate:  [] 100  Resp:  [14-24] 20  BP: ()/(49-96)  121/96    Intake/Output Summary (Last 24 hours) at 06/21/17 0742  Last data filed at 06/21/17 0741   Gross per 24 hour   Intake          1435.71 ml   Output                0 ml   Net          1435.71 ml     Physical Exam  Constitutional: She appears well-developed and well-nourished.   HENT:   Head: Normocephalic and atraumatic.   Eyes: Conjunctivae and EOM are normal. Pupils are equal, round, and reactive to light.   Neck: Neck supple. No JVD present. No thyromegaly present.   Cardiovascular: Normal rate, regular rhythm, normal heart sounds and intact distal pulses. Exam reveals no gallop and no friction rub.   No murmur heard.  Pulmonary/Chest: Effort normal. No respiratory distress. She has no wheezes. She has rales. She exhibits no tenderness.   Abdominal: Soft. Bowel sounds are normal. She exhibits no distension. There is no tenderness. There is no rebound and no guarding.   Musculoskeletal: Normal range of motion. She exhibits no edema, tenderness or deformity.   Lymphadenopathy:   She has no cervical adenopathy.   Neurological: She is alert. She displays normal reflexes. No cranial nerve deficit. She exhibits abnormal muscle tone. Coordination abnormal.   Skin: Skin is warm and dry. No rash noted.   Psychiatric: She has a normal mood and affect. Her behavior is normal.   Nursing note and vitals reviewed.  Results Review:  Lab Results (last 24 hours)     Procedure Component Value Units Date/Time    POC Glucose Fingerstick [[id number]]  (Normal) Collected:  06/20/17 0732    Specimen:  Blood Updated:  06/20/17 0749     Glucose 114 mg/dL       : [id number] Suresh PennyMeter ID: [id number]       Blood Culture [[id number]]  (Normal) Collected:  06/16/17 0918    Specimen:  Blood from Arm, Left Updated:  06/20/17 1001     Blood Culture No growth at 4 days    POC Glucose Fingerstick [[id number]]  (Abnormal) Collected:  06/20/17 1108    Specimen:  Blood Updated:  06/20/17 1120     Glucose 150 (H) mg/dL       :  [id number] Suresh PennyMeter ID: [id number]       SCANNED - LABS [[id number]] Resulted:  06/11/17      Updated:  06/20/17 1552    POC Glucose Fingerstick [[id number]]  (Abnormal) Collected:  06/20/17 1638    Specimen:  Blood Updated:  06/20/17 1649     Glucose 201 (H) mg/dL       : [id number] Suresh PennyMeter ID: [id number]       POC Glucose Fingerstick [[id number]]  (Abnormal) Collected:  06/20/17 2224    Specimen:  Blood Updated:  06/20/17 2235     Glucose 186 (H) mg/dL       : [id number] Harrison WhitneyMeter ID: [id number]       POC Glucose Fingerstick [[id number]]  (Abnormal) Collected:  06/21/17 0611    Specimen:  Blood Updated:  06/21/17 0632     Glucose 193 (H) mg/dL       : [id number] Canup DonnaMeter ID: [id number]              Cultures:  Blood Culture   Date Value Ref Range Status   06/16/2017 No growth at 24 hours  Preliminary       Radiology Data:    Imaging Results (last 24 hours)     ** No results found for the last 24 hours. **          Allergies   Allergen Reactions   • Heparin    • Iron    • Latex    • Levaquin [Levofloxacin]    • Shrimp (Diagnostic)    • Vancomycin        Scheduled meds:     ceftriaxone 1 g Intravenous Q24H   hydrocortisone sodium succinate 100 mg Intravenous Q6H   ipratropium-albuterol 3 mL Nebulization Q6H While Awake - RT   levothyroxine 50 mcg Oral Q AM   megestrol 400 mg Oral Daily   midodrine 5 mg Oral TID AC   IV Fluids 1000 mL + additives 50 mL/hr Intravenous Daily   pantoprazole 40 mg Oral Q AM       PRN meds:  dextrose  •  HYDROmorphone  •  ondansetron  •  sodium chloride    Assessment/Plan     Active Problems:    Chest pain in adult    Renal failure    Hypotension    Anemia    Hypokalemia      Plan:  Chest pain, atypical, echocardiogram/afib -Ejection fraction 61%, diastolic dysfunction, right ventricular dilatation, tricuspid valve regurgitation, right ventricle systolic pressure greater than 55. Off Cardizem.  Doing much better on steroids BP good off levophed  eating continue same wean steroids.  Hemoptysis ×1 6/15. Hx feliciano syndrome.    Thrombopenia-6 packs of platelets.  No active bleeding.     Pneumonia/ Slight leukocytosis. Chest x-ray ? Retrocardiac opacity- on rocephin . Blood culture no growth in 24 hours. Duo neb.      Anemia- Status post 2 units of blood transfusion 6/14.      Chronic renal failure- nephrology is on board. Cont dialysis per nephrology.       Hyperlipidemia      Elevate TSH- free T4 is normal. Start synthroid.      Hypotension- resoloved      Hypokalemia-- resolved      Calcium 7.7, corrected calcium 9.5 - 6/13/2017.      Nutrition- start megace, patient has is eating [employer] now.      Deconditioning- start PT and OT      Discharge Planning: continue in unit to floor today after HD.  Adrenal insufficiency responding to steroids.  Arthur Kerr MD   06/21/17   7:42 AM

## 2017-06-21 NOTE — PLAN OF CARE
Problem: Patient Care Overview (Adult)  Goal: Plan of Care Review  Outcome: Ongoing (interventions implemented as appropriate)  Frequent turning and repositioning.  Complains of pain with repositioning, otherwise denies.  Pt oriented x 3.  Continues to pull at lines and wires.     Problem: Fall Risk (Adult)  Goal: Identify Related Risk Factors and Signs and Symptoms  Outcome: Ongoing (interventions implemented as appropriate)  Goal: Absence of Falls  Outcome: Ongoing (interventions implemented as appropriate)    Problem: Fluid Volume Excess (Adult,Obstetrics,Pediatric)  Goal: Stable Weight  Outcome: Ongoing (interventions implemented as appropriate)  Goal: Balanced Intake/Output  Outcome: Ongoing (interventions implemented as appropriate)    Problem: Pressure Ulcer Risk (New Scale) (Adult,Obstetrics,Pediatric)  Goal: Skin Integrity  Outcome: Ongoing (interventions implemented as appropriate)    Problem: Nutrition, Imbalanced: Inadequate Oral Intake (Adult)  Goal: Improved Oral Intake  Outcome: Ongoing (interventions implemented as appropriate)  Goal: Prevent Further Weight Loss  Outcome: Ongoing (interventions implemented as appropriate)    Problem: Skin Integrity Impairment, Risk/Actual (Adult)  Goal: Identify Related Risk Factors and Signs and Symptoms  Outcome: Ongoing (interventions implemented as appropriate)  Goal: Skin Integrity/Wound Healing  Outcome: Ongoing (interventions implemented as appropriate)

## 2017-06-21 NOTE — PLAN OF CARE
Problem: Patient Care Overview (Adult)  Goal: Plan of Care Review  Outcome: Ongoing (interventions implemented as appropriate)    06/21/17 0935   Coping/Psychosocial Response Interventions   Plan Of Care Reviewed With patient   Patient Care Overview   Progress progress toward functional goals is gradual   Outcome Evaluation   Outcome Summary/Follow up Plan Pt. agreed to sit EOB. Total assist for supine to sit to supine. Sat EOB 18 minutes this a.m. Participated in LE ex's. Very little movement. Gently stretched B LE hamstrings. Sitting balance fair. Will continue to work on bed mobility, strengthening as patient particpates.

## 2017-06-21 NOTE — PROGRESS NOTES
Nephrology ([facility]) Progress Note      Patient:  Jennifer Marks  YOB: 1945  Date of Service: 6/21/2017  MRN: [id number]   Acct: [id number]   Primary Care Physician: No Known Provider  Advance Directive: Conditional Code  Admit Date: 6/11/2017       Hospital Day: 10  Referring Provider: Pino Tang MD      Patient personally seen and examined.  Complete chart including Consults, Notes, Operative Reports, Labs, Cardiology, and Radiology studies reviewed as able.        Subjective:  Jennifer Marks is a 72 y.o. female  whom we were consulted for end stage renal disease management, hypokalemia. She is on hemodialysis Monday Wednesday Friday.  Admitted with chest pain. Has had decreased oral intake and diarrhea for several days.  On 6/13 had femoral line placed.   Was on Cardizem drip for tachydysrhythmia.  Had been hypotensive a needing Levophed drip intermittently; has now been weaned off Levophed.  Much more alert and talkative today, feeling better and is looking forward to eating pizza for lunch.    Allergies:  Heparin; Iron; Latex; Levaquin [levofloxacin]; Shrimp (diagnostic); and Vancomycin    Home Meds:  Prescriptions Prior to Admission   Medication Sig Dispense Refill Last Dose   • carvedilol (COREG) 12.5 MG tablet Take 12.5 mg by mouth Daily.      • losartan (COZAAR) 25 MG tablet Take 25 mg by mouth Daily.      • pantoprazole (PROTONIX) 40 MG EC tablet Take 40 mg by mouth Daily.      • potassium chloride (KLOR-CON) 20 MEQ packet Take 20 mEq by mouth Daily.      • predniSONE (DELTASONE) 1 MG tablet Take 1 mg by mouth Daily.      • PREDNISONE PO Take  by mouth.      • traZODone (DESYREL) 50 MG tablet Take 50 mg by mouth Every Night.          Medicines:  Current Facility-Administered Medications   Medication Dose Route Frequency Provider Last Rate Last Dose   • cefTRIAXone (ROCEPHIN) 1 g/100 mL 0.9% NS (MBP)  1 g Intravenous Q24H Carter Llamas MD   1 g at 06/21/17  0856   • dextrose (D50W) solution 50 mL  50 mL Intravenous Q1H PRN Bree Soares MD   50 mL at 06/17/17 0800   • diltiaZEM (CARDIZEM) 125mg/125 mL (1 mg/mL) infusion  5-15 mg/hr Intravenous Titrated Carter Llamas MD   Stopped at 06/17/17 1435   • hydrocortisone sodium succinate (Solu-CORTEF) injection 50 mg  50 mg Intravenous Q6H Arthur Kerr MD       • HYDROmorphone (DILAUDID) injection 0.25 mg  0.25 mg Intravenous Q4H PRN Carter Llamas MD   0.25 mg at 06/20/17 0020   • ipratropium-albuterol (DUO-NEB) nebulizer solution 3 mL  3 mL Nebulization Q6H While Awake - RT Carter Llamas MD   3 mL at 06/21/17 0640   • levothyroxine (SYNTHROID, LEVOTHROID) tablet 50 mcg  50 mcg Oral Q AM Carter Llamas MD   50 mcg at 06/21/17 0550   • megestrol (MEGACE) 40 MG/ML suspension 400 mg  400 mg Oral Daily Carter Llamas MD   400 mg at 06/19/17 0818   • midodrine (PROAMATINE) tablet 5 mg  5 mg Oral TID AC FANNIE Blanco   5 mg at 06/21/17 0855   • multiple vitamin (M.V.I. Adult) 10 mL, thiamine (B-1) 100 mg, folic acid 1 mg in dextrose 5 % and sodium chloride 0.45 % 1,000 mL infusion  50 mL/hr Intravenous Daily Carter Llamas MD 50 mL/hr at 06/21/17 0856 50 mL/hr at 06/21/17 0856   • norepinephrine (LEVOPHED) 8,000 mcg in sodium chloride 0.9 % 250 mL (32 mcg/mL) infusion  0.02-0.3 mcg/kg/min Intravenous Titrated FANNIE Blanco   Stopped at 06/20/17 0313   • ondansetron (ZOFRAN) injection 4 mg  4 mg Intravenous Q6H PRN Pino Tang MD   4 mg at 06/15/17 0555   • pantoprazole (PROTONIX) EC tablet 40 mg  40 mg Oral Q AM Pino Tang MD   40 mg at 06/21/17 0550   • sodium chloride 0.9 % flush 1-10 mL  1-10 mL Intravenous PRN Pino Tang MD           Past Medical History:  Past Medical History:   Diagnosis Date   • A-fib    • Arthritis    • CHF (congestive heart failure)    • Hypertension    • Renal failure        Past Surgical History:  Past Surgical History:   Procedure Laterality Date   • ARTERIOVENOUS  "FISTULA LEG Left        Family History  History reviewed. No pertinent family history.    Social History  Social History     Social History   • Marital status: Single     Spouse name: N/A   • Number of children: N/A   • Years of education: N/A     Occupational History   • Not on file.     Social History Main Topics   • Smoking status: Former Smoker   • Smokeless tobacco: Not on file   • Alcohol use No   • Drug use: No   • Sexual activity: Defer     Other Topics Concern   • Not on file     Social History Narrative         Review of Systems:  History obtained from chart review and the patient  General ROS: Patient complains of malaise and No fever or chills  Respiratory ROS: No cough, shortness of breath, wheezing  Cardiovascular ROS: no chest pain or dyspnea on exertion  Gastrointestinal ROS: No abdominal pain, no nausea  Genito-Urinary ROS: No dysuria or hematuria  Neurological ROS: negative  Dermatological ROS: negative  14 point ROS reviewed with the patient and negative except as noted above and in the HPI unless unable to obtain.    Objective:  /83  Pulse 102  Temp 97.5 °F (36.4 °C)  Resp 20  Ht 62\" (157.5 cm)  Wt 117 lb 12.8 oz (53.4 kg)  SpO2 96%  BMI 21.55 kg/m2    Intake/Output Summary (Last 24 hours) at 06/21/17 0904  Last data filed at 06/21/17 0741   Gross per 24 hour   Intake          1297.46 ml   Output                0 ml   Net          1297.46 ml     General: awake/alert    Neck: supple, no JVD  Chest:  clear to auscultation bilaterally without respiratory distress  CVS: regular rate and rhythm  Abdominal: soft, nontender, normal bowel sounds  Extremities: no cyanosis or edema  Skin: warm and dry without rash   Neuro: no focal motor deficits      Labs:    Results from last 7 days  Lab Units 06/20/17  0421 06/19/17  0404 06/18/17  0346   WBC 10*3/mm3 4.03* 6.21 9.12   HEMOGLOBIN g/dL 8.2* 8.1* 8.7*   HEMATOCRIT % 24.3* 23.9* 26.0*   PLATELETS 10*3/mm3 53* 75* 26*           Results from " last 7 days  Lab Units 06/20/17  0439 06/19/17  0404 06/18/17  0346 06/17/17  0827   SODIUM mmol/L 133* 136 137 137   POTASSIUM mmol/L 3.6 3.5 3.2* 3.5   CHLORIDE mmol/L 98 101 100 100   TOTAL CO2 mmol/L 27.0 26.0 28.0 25.0   BUN mg/dL 17 28* 23* 17   CREATININE mg/dL 2.66* 3.93* 3.48* 3.00*   CALCIUM mg/dL 7.8* 7.7* 7.8* 7.8*   BILIRUBIN mg/dL  --  1.8* 1.8* 2.7*   ALK PHOS U/L  --  90 92 89   ALT (SGPT) U/L  --  22 28 25   AST (SGOT) U/L  --  28 23 26   GLUCOSE mg/dL 151* 72 109* 146*       Radiology:   Imaging Results (last 72 hours)     Procedure Component Value Units Date/Time    XR Chest 1 View [[id number]] Collected:  06/11/17 1803     Updated:  06/11/17 2054    Narrative:       EXAMINATION:   XR CHEST 1 VW-  6/11/2017 6:01 PM CDT     HISTORY: Chest pain     Prior exams 05/28/2016.     Single view of the chest is obtained. The pulmonary vascular margins are  slightly indistinct. This has the appearance of mild pulmonary vascular  congestion. Left diaphragm is indistinct. This may be infiltrate or  atelectasis left lower lobe.     Cardiac silhouette is enlarged and unchanged from May 28.       Impression:       1. Silhouetting of the left diaphragm consistent with infiltrate or  atelectasis left lower lobe.  2. Indistinct pulmonary vascular margins consistent with mild pulmonary  vascular congestion.  This report was finalized on 06/11/2017 20:51 by Dr. Herson Magaña MD.          Culture:         Assessment   1. End stage renal disease on maintenance hemodialysis Monday Wednesday Friday   2. Pneumonia  3. Hypotension--resolved  4. Anemia of chronic kidney disease  5. Diabetes mellitus type 2 with nephropathy  6.  Hyponatremia     Plan:  1.  Maintenance dialysis today  2.  Continue IV antibiotics      Jaden Tovar, FANNIE  6/21/2017  9:04 AM

## 2017-06-21 NOTE — PLAN OF CARE
Problem: Patient Care Overview (Adult)  Goal: Plan of Care Review  Outcome: Ongoing (interventions implemented as appropriate)    06/21/17 1112   Coping/Psychosocial Response Interventions   Plan Of Care Reviewed With patient   Patient Care Overview   Progress progress toward functional goals is gradual   Outcome Evaluation   Outcome Summary/Follow up Plan Pt reports she doesn't eat a lot at home, poor oral intake this stay. 25% of 3 meals d ocumented. Adjustemen of supplements, adding magic cup bid, d/c glucerna. Encouraged oral intake. cont to follow         Problem: Nutrition, Imbalanced: Inadequate Oral Intake (Adult)  Goal: Identify Related Risk Factors and Signs and Symptoms  Outcome: Ongoing (interventions implemented as appropriate)

## 2017-06-21 NOTE — PROGRESS NOTES
Continued Stay Note   Renea     Patient Name: Jennifer Marks  MRN: [id number]  Today's Date: 6/21/2017    Admit Date: 6/11/2017          Discharge Plan       06/21/17 1412    Case Management/Social Work Plan    Plan [facility] precert pending    Additional Comments [facility] has started precert with patient's insurance.              Discharge Codes     None            ANUPAMA Zaragoza

## 2017-06-22 LAB
ANION GAP SERPL CALCULATED.3IONS-SCNC: 11 MMOL/L (ref 4–13)
BASOPHILS # BLD AUTO: 0.01 10*3/MM3 (ref 0–0.2)
BASOPHILS NFR BLD AUTO: 0.2 % (ref 0–2)
BUN BLD-MCNC: 18 MG/DL (ref 5–21)
BUN/CREAT SERPL: 7 (ref 7–25)
CALCIUM SPEC-SCNC: 8.2 MG/DL (ref 8.4–10.4)
CHLORIDE SERPL-SCNC: 99 MMOL/L (ref 98–110)
CO2 SERPL-SCNC: 28 MMOL/L (ref 24–31)
CREAT BLD-MCNC: 2.57 MG/DL (ref 0.5–1.4)
DEPRECATED RDW RBC AUTO: 61.9 FL (ref 40–54)
DEPRECATED RDW RBC AUTO: 62 FL (ref 40–54)
EOSINOPHIL # BLD AUTO: 0 10*3/MM3 (ref 0–0.7)
EOSINOPHIL NFR BLD AUTO: 0 % (ref 0–4)
ERYTHROCYTE [DISTWIDTH] IN BLOOD BY AUTOMATED COUNT: 20.6 % (ref 12–15)
ERYTHROCYTE [DISTWIDTH] IN BLOOD BY AUTOMATED COUNT: 20.7 % (ref 12–15)
GFR SERPL CREATININE-BSD FRML MDRD: 22 ML/MIN/1.73
GLUCOSE BLD-MCNC: 144 MG/DL (ref 70–100)
GLUCOSE BLDC GLUCOMTR-MCNC: 131 MG/DL (ref 70–130)
GLUCOSE BLDC GLUCOMTR-MCNC: 136 MG/DL (ref 70–130)
GLUCOSE BLDC GLUCOMTR-MCNC: 178 MG/DL (ref 70–130)
HCT VFR BLD AUTO: 22 % (ref 37–47)
HCT VFR BLD AUTO: 24.6 % (ref 37–47)
HGB BLD-MCNC: 7.3 G/DL (ref 12–16)
HGB BLD-MCNC: 8.2 G/DL (ref 12–16)
HYPOCHROMIA BLD QL: NORMAL
IMM GRANULOCYTES # BLD: 0.04 10*3/MM3 (ref 0–0.03)
IMM GRANULOCYTES NFR BLD: 1 % (ref 0–5)
LYMPHOCYTES # BLD AUTO: 0.46 10*3/MM3 (ref 0.72–4.86)
LYMPHOCYTES NFR BLD AUTO: 11.1 % (ref 15–45)
MCH RBC QN AUTO: 28.2 PG (ref 28–32)
MCH RBC QN AUTO: 28.3 PG (ref 28–32)
MCHC RBC AUTO-ENTMCNC: 33.2 G/DL (ref 33–36)
MCHC RBC AUTO-ENTMCNC: 33.3 G/DL (ref 33–36)
MCV RBC AUTO: 84.8 FL (ref 82–98)
MCV RBC AUTO: 84.9 FL (ref 82–98)
MONOCYTES # BLD AUTO: 0.35 10*3/MM3 (ref 0.19–1.3)
MONOCYTES NFR BLD AUTO: 8.4 % (ref 4–12)
NEUTROPHILS # BLD AUTO: 3.29 10*3/MM3 (ref 1.87–8.4)
NEUTROPHILS NFR BLD AUTO: 79.3 % (ref 39–78)
NRBC BLD MANUAL-RTO: 0 /100 WBC (ref 0–0)
PLATELET # BLD AUTO: 61 10*3/MM3 (ref 130–400)
PLATELET # BLD AUTO: 76 10*3/MM3 (ref 130–400)
POIKILOCYTOSIS BLD QL SMEAR: NORMAL
POTASSIUM BLD-SCNC: 3.4 MMOL/L (ref 3.5–5.3)
RBC # BLD AUTO: 2.59 10*6/MM3 (ref 4.2–5.4)
RBC # BLD AUTO: 2.9 10*6/MM3 (ref 4.2–5.4)
SMALL PLATELETS BLD QL SMEAR: NORMAL
SODIUM BLD-SCNC: 138 MMOL/L (ref 135–145)
WBC MORPH BLD: NORMAL
WBC NRBC COR # BLD: 4.15 10*3/MM3 (ref 4.8–10.8)
WBC NRBC COR # BLD: 5.19 10*3/MM3 (ref 4.8–10.8)

## 2017-06-22 PROCEDURE — 25010000002 HYDROMORPHONE PER 4 MG: Performed by: FAMILY MEDICINE

## 2017-06-22 PROCEDURE — 85025 COMPLETE CBC W/AUTO DIFF WBC: CPT | Performed by: INTERNAL MEDICINE

## 2017-06-22 PROCEDURE — 80048 BASIC METABOLIC PNL TOTAL CA: CPT | Performed by: INTERNAL MEDICINE

## 2017-06-22 PROCEDURE — 94799 UNLISTED PULMONARY SVC/PX: CPT

## 2017-06-22 PROCEDURE — 25010000002 CEFTRIAXONE: Performed by: FAMILY MEDICINE

## 2017-06-22 PROCEDURE — 82962 GLUCOSE BLOOD TEST: CPT

## 2017-06-22 PROCEDURE — 25010000002 HYDROCORTISONE SODIUM SUCCINATE 100 MG RECONSTITUTED SOLUTION: Performed by: INTERNAL MEDICINE

## 2017-06-22 PROCEDURE — 85007 BL SMEAR W/DIFF WBC COUNT: CPT | Performed by: INTERNAL MEDICINE

## 2017-06-22 PROCEDURE — 02HV33Z INSERTION OF INFUSION DEVICE INTO SUPERIOR VENA CAVA, PERCUTANEOUS APPROACH: ICD-10-PCS | Performed by: FAMILY MEDICINE

## 2017-06-22 PROCEDURE — 85027 COMPLETE CBC AUTOMATED: CPT | Performed by: INTERNAL MEDICINE

## 2017-06-22 PROCEDURE — 99221 1ST HOSP IP/OBS SF/LOW 40: CPT | Performed by: NURSE PRACTITIONER

## 2017-06-22 PROCEDURE — 94760 N-INVAS EAR/PLS OXIMETRY 1: CPT

## 2017-06-22 RX ORDER — PREDNISONE 20 MG/1
40 TABLET ORAL
Status: DISCONTINUED | OUTPATIENT
Start: 2017-06-23 | End: 2017-06-24

## 2017-06-22 RX ADMIN — LEVOTHYROXINE SODIUM 50 MCG: 50 TABLET ORAL at 06:34

## 2017-06-22 RX ADMIN — IPRATROPIUM BROMIDE AND ALBUTEROL SULFATE 3 ML: 2.5; .5 SOLUTION RESPIRATORY (INHALATION) at 19:57

## 2017-06-22 RX ADMIN — CEFTRIAXONE 1 G: 1 INJECTION, POWDER, FOR SOLUTION INTRAMUSCULAR; INTRAVENOUS at 08:52

## 2017-06-22 RX ADMIN — MIDODRINE HYDROCHLORIDE 5 MG: 2.5 TABLET ORAL at 06:34

## 2017-06-22 RX ADMIN — IPRATROPIUM BROMIDE AND ALBUTEROL SULFATE 3 ML: 2.5; .5 SOLUTION RESPIRATORY (INHALATION) at 12:30

## 2017-06-22 RX ADMIN — PANTOPRAZOLE SODIUM 40 MG: 40 TABLET, DELAYED RELEASE ORAL at 06:34

## 2017-06-22 RX ADMIN — HYDROMORPHONE HYDROCHLORIDE 0.25 MG: 1 INJECTION, SOLUTION INTRAMUSCULAR; INTRAVENOUS; SUBCUTANEOUS at 00:33

## 2017-06-22 RX ADMIN — HYDROCORTISONE SODIUM SUCCINATE 50 MG: 100 INJECTION, POWDER, FOR SOLUTION INTRAMUSCULAR; INTRAVENOUS at 13:14

## 2017-06-22 RX ADMIN — MIDODRINE HYDROCHLORIDE 5 MG: 2.5 TABLET ORAL at 17:18

## 2017-06-22 RX ADMIN — MIDODRINE HYDROCHLORIDE 5 MG: 2.5 TABLET ORAL at 13:14

## 2017-06-22 RX ADMIN — HYDROCORTISONE SODIUM SUCCINATE 50 MG: 100 INJECTION, POWDER, FOR SOLUTION INTRAMUSCULAR; INTRAVENOUS at 06:34

## 2017-06-22 RX ADMIN — IPRATROPIUM BROMIDE AND ALBUTEROL SULFATE 3 ML: 2.5; .5 SOLUTION RESPIRATORY (INHALATION) at 06:35

## 2017-06-22 RX ADMIN — HYDROCORTISONE SODIUM SUCCINATE 50 MG: 100 INJECTION, POWDER, FOR SOLUTION INTRAMUSCULAR; INTRAVENOUS at 00:24

## 2017-06-22 NOTE — CONSULTS
Jennifer Marks  [id number]  [id number]  406/1  Arthur Kerr MD  6/11/2017      HPI: Jennifer Marks is a 72 y.o. 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  She presented this admission with new onset atrial fibrillation, volume overload, and failure to thrive, and chest pain.  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  She has had multiple attempt for IV access including bilateral internal jugular vein catheterization by anesthesia and PICC lines.  Dr. Magdaleno did place a right femoral line, but will not draw blood currently and they have removed.  We have been consulted for access.  Julia Valle did speak with Dr. Romo about need for IV access.  He states is not needed now and will change her medications to oral.    Past Medical History:   Diagnosis Date   • A-fib    • Arthritis    • CHF (congestive heart failure)    • Hypertension    • Renal failure        Past Surgical History:   Procedure Laterality Date   • ARTERIOVENOUS FISTULA LEG Left        History reviewed. No pertinent family history.    Social History     Social History   • Marital status: Single     Spouse name: N/A   • Number of children: N/A   • Years of education: N/A     Occupational History   • Not on file.     Social History Main Topics   • Smoking status: Former Smoker   • Smokeless tobacco: Not on file   • Alcohol use No   • Drug use: No   • Sexual activity: Defer     Other Topics Concern   • Not on file     Social History Narrative       Allergies   Allergen Reactions   • Heparin    • Iron    • Latex    • Levaquin [Levofloxacin]    • Shrimp (Diagnostic)    • Vancomycin        Hospital Medications (active)       Dose Frequency Start End    HYDROmorphone (DILAUDID) injection 0.25 mg 0.25 mg Every 4 Hours PRN 6/15/2017 6/23/2017    Sig - Route: Infuse 0.25 mg into a venous catheter Every 4 (Four) Hours As  Needed for Severe Pain (7-10). - Intravenous    ipratropium-albuterol (DUO-NEB) nebulizer solution 3 mL 3 mL Every 6 Hours While Awake - RT 6/16/2017     Sig - Route: Take 3 mL by nebulization Every 6 (Six) Hours While Awake. - Nebulization    levothyroxine (SYNTHROID, LEVOTHROID) tablet 50 mcg 50 mcg Every Early Morning 6/14/2017     Sig - Route: Take 1 tablet by mouth Every Morning. - Oral    megestrol (MEGACE) 40 MG/ML suspension 400 mg 400 mg Daily 6/15/2017     Sig - Route: Take 10 mL by mouth Daily. - Oral    midodrine (PROAMATINE) tablet 5 mg 5 mg 3 Times Daily Before Meals 6/15/2017     Sig - Route: Take 2 tablets by mouth 3 (Three) Times a Day Before Meals. - Oral    ondansetron (ZOFRAN) injection 4 mg 4 mg Every 6 Hours PRN 6/11/2017     Sig - Route: Infuse 2 mL into a venous catheter Every 6 (Six) Hours As Needed for Nausea or Vomiting. - Intravenous    pantoprazole (PROTONIX) EC tablet 40 mg 40 mg Every Early Morning 6/12/2017     Sig - Route: Take 1 tablet by mouth Every Morning. - Oral    predniSONE (DELTASONE) tablet 40 mg 40 mg Daily With Breakfast 6/23/2017     Sig - Route: Take 2 tablets by mouth Daily With Breakfast. - Oral    cefTRIAXone (ROCEPHIN) 1 g/100 mL 0.9% NS (MBP) (Discontinued) 1 g Every 24 Hours 6/16/2017 6/22/2017    Sig - Route: Infuse 100 mL into a venous catheter Daily. - Intravenous    dextrose (D50W) solution 50 mL (Discontinued) 50 mL Every 1 Hour PRN 6/12/2017 6/22/2017    Sig - Route: Infuse 50 mL into a venous catheter Every 1 (One) Hour As Needed for Low Blood Sugar (Blood sugar < 70). - Intravenous    diltiaZEM (CARDIZEM) 125mg/125 mL (1 mg/mL) infusion (Discontinued) 5-15 mg/hr Titrated 6/16/2017 6/22/2017    Sig - Route: Infuse 5-15 mg/hr into a venous catheter Dose Adjusted By Provider As Needed. - Intravenous    hydrocortisone sodium succinate (Solu-CORTEF) injection 50 mg (Discontinued) 50 mg Every 6 Hours 6/21/2017 6/22/2017    Sig - Route: Infuse 50 mg into a venous  catheter Every 6 (Six) Hours. - Intravenous    multiple vitamin (M.V.I. Adult) 10 mL, thiamine (B-1) 100 mg, folic acid 1 mg in dextrose 5 % and sodium chloride 0.45 % 1,000 mL infusion (Discontinued) 50 mL/hr Daily 6/17/2017 6/22/2017    Sig - Route: Infuse 50 mL/hr into a venous catheter Daily. - Intravenous    norepinephrine (LEVOPHED) 8,000 mcg in sodium chloride 0.9 % 250 mL (32 mcg/mL) infusion (Discontinued) 0.02-0.3 mcg/kg/min × 51.3 kg Titrated 6/13/2017 6/22/2017    Sig - Route: Infuse 1.026-15.39 mcg/min into a venous catheter Dose Adjusted By Provider As Needed. - Intravenous    sodium chloride 0.9 % flush 1-10 mL (Discontinued) 1-10 mL As Needed 6/11/2017 6/22/2017    Sig - Route: Infuse 1-10 mL into a venous catheter As Needed for Line Care. - Intravenous          Review of Systems   Constitutional: Positive for activity change, appetite change and fatigue.   HENT: Negative.    Eyes: Negative.    Respiratory: Negative.    Cardiovascular: Negative.    Gastrointestinal: Negative.    Endocrine: Negative.    Genitourinary: Negative.    Musculoskeletal: Positive for arthralgias and gait problem.   Skin: Negative.    Allergic/Immunologic: Negative.    Neurological: Positive for weakness.   Hematological: Negative.    Psychiatric/Behavioral: Negative.        Physical Exam  Constitutional: She appears well-developed and well-nourished.   HENT:   Head: Normocephalic and atraumatic.   Eyes: Conjunctivae and EOM are normal. Pupils are equal, round, and reactive to light.   Neck: Neck supple. No JVD present. No thyromegaly present.   Cardiovascular: Normal rate, regular rhythm, normal heart sounds and intact distal pulses. Exam reveals no gallop and no friction rub.   No murmur heard.  Pulmonary/Chest: Effort normal. No respiratory distress. She has no wheezes. She has rales. She exhibits no tenderness.   Abdominal: Soft. Bowel sounds are normal. She exhibits no distension. There is no tenderness. There is no  rebound and no guarding.   Musculoskeletal: Normal range of motion. She exhibits no edema, tenderness or deformity.   Lymphadenopathy:   She has no cervical adenopathy.   Neurological: She is alert. She displays normal reflexes. No cranial nerve deficit. She exhibits abnormal muscle tone. Coordination abnormal.   Skin: Skin is warm and dry. No rash noted.   Psychiatric: She has a normal mood and affect. Her behavior is normal.   Nursing note and vitals reviewed.  Results Review:  Laboratory Data:    Results from last 7 days  Lab Units 06/22/17  1155 06/22/17  0812 06/21/17  1548   WBC 10*3/mm3 4.15* 5.19 4.59*   HEMOGLOBIN g/dL 7.3* 8.2* 7.3*   HEMATOCRIT % 22.0* 24.6* 21.2*   PLATELETS 10*3/mm3 61* 76* 66*         Results from last 7 days  Lab Units 06/22/17  1155 06/21/17  1548 06/20/17  0439 06/19/17  0404 06/18/17  0346 06/17/17  0827   SODIUM mmol/L 138 133* 133* 136 137 137   POTASSIUM mmol/L 3.4* 3.6 3.6 3.5 3.2* 3.5   CHLORIDE mmol/L 99 98 98 101 100 100   TOTAL CO2 mmol/L 28.0 23.0* 27.0 26.0 28.0 25.0   BUN mg/dL 18 29* 17 28* 23* 17   CREATININE mg/dL 2.57* 3.53* 2.66* 3.93* 3.48* 3.00*   CALCIUM mg/dL 8.2* 8.0* 7.8* 7.7* 7.8* 7.8*   BILIRUBIN mg/dL  --   --   --  1.8* 1.8* 2.7*   ALK PHOS U/L  --   --   --  90 92 89   ALT (SGPT) U/L  --   --   --  22 28 25   AST (SGOT) U/L  --   --   --  28 23 26   GLUCOSE mg/dL 144* 185* 151* 72 109* 146*             Diagnostic Data:  Imaging Results (last 24 hours)     ** No results found for the last 24 hours. **          Impression:  Active Problems:    Chest pain in adult    Renal failure    Hypotension    Anemia    Hypokalemia  CHF with diastolic dysfunction    Plan: After thoroughly evaluating Jennifer Marks, I believe the best course of action is to remain conservative from a vascular standpoint.  Dr. Valle did have a discussion with Dr. Romo regarding the patient.  He was changing her medications to oral.  She does have IV access currently.  This was  discussed with family and patient.  Thank you for allowing me to participate in the care of your patient.  Please do not hesitate to call with any questions or concerns.    FANNIE Hirsch       Attestation: The patient was seen/examined at the bedside.  Agree with above evaluation.  We will remain conservative at this time.  Patient can be switched over to oral medication and does not need IV access at this time.  This was discussed with Dr. Romo.

## 2017-06-22 NOTE — PROGRESS NOTES
Nephrology ([facility]) Progress Note      Patient:  Jennifer Marks  YOB: 1945  Date of Service: 6/22/2017  MRN: [id number]   Acct: [id number]   Primary Care Physician: No Known Provider  Advance Directive: Conditional Code  Admit Date: 6/11/2017       Hospital Day: 11  Referring Provider: Pino Tang MD      Patient personally seen and examined.  Complete chart including Consults, Notes, Operative Reports, Labs, Cardiology, and Radiology studies reviewed as able.        Subjective:  Jennifer Marks is a 72 y.o. female  whom we were consulted for end stage renal disease management, hypokalemia. She is on hemodialysis Monday Wednesday Friday.  Admitted with chest pain. Has had decreased oral intake and diarrhea for several days.  On 6/13 had femoral line placed.   Was on Cardizem drip for tachydysrhythmia.  Had been hypotensive a needing Levophed drip intermittently; has now been weaned off Levophed.  Today is awake and alert, generalized weakness.  Discussed with nursing; having significant difficulty with vascular access for blood draws.    Allergies:  Heparin; Iron; Latex; Levaquin [levofloxacin]; Shrimp (diagnostic); and Vancomycin    Home Meds:  Prescriptions Prior to Admission   Medication Sig Dispense Refill Last Dose   • carvedilol (COREG) 12.5 MG tablet Take 12.5 mg by mouth Daily.      • losartan (COZAAR) 25 MG tablet Take 25 mg by mouth Daily.      • pantoprazole (PROTONIX) 40 MG EC tablet Take 40 mg by mouth Daily.      • potassium chloride (KLOR-CON) 20 MEQ packet Take 20 mEq by mouth Daily.      • predniSONE (DELTASONE) 1 MG tablet Take 1 mg by mouth Daily.      • PREDNISONE PO Take  by mouth.      • traZODone (DESYREL) 50 MG tablet Take 50 mg by mouth Every Night.          Medicines:  Current Facility-Administered Medications   Medication Dose Route Frequency Provider Last Rate Last Dose   • cefTRIAXone (ROCEPHIN) 1 g/100 mL 0.9% NS (MBP)  1 g Intravenous  Q24H Carter Llamas MD   1 g at 06/21/17 0856   • dextrose (D50W) solution 50 mL  50 mL Intravenous Q1H PRN Bree Soares MD   50 mL at 06/17/17 0800   • hydrocortisone sodium succinate (Solu-CORTEF) injection 50 mg  50 mg Intravenous Q6H Arthur Kerr MD   50 mg at 06/22/17 0634   • HYDROmorphone (DILAUDID) injection 0.25 mg  0.25 mg Intravenous Q4H PRN Carter Llamas MD   0.25 mg at 06/22/17 0033   • ipratropium-albuterol (DUO-NEB) nebulizer solution 3 mL  3 mL Nebulization Q6H While Awake - RT Carter Llamas MD   3 mL at 06/22/17 0635   • levothyroxine (SYNTHROID, LEVOTHROID) tablet 50 mcg  50 mcg Oral Q AM Carter Llamas MD   50 mcg at 06/22/17 0634   • megestrol (MEGACE) 40 MG/ML suspension 400 mg  400 mg Oral Daily Carter Llamas MD   400 mg at 06/19/17 0818   • midodrine (PROAMATINE) tablet 5 mg  5 mg Oral TID AC FANNIE Blanco   5 mg at 06/22/17 0634   • multiple vitamin (M.V.I. Adult) 10 mL, thiamine (B-1) 100 mg, folic acid 1 mg in dextrose 5 % and sodium chloride 0.45 % 1,000 mL infusion  50 mL/hr Intravenous Daily Carter Llamas MD 50 mL/hr at 06/21/17 0856 50 mL/hr at 06/21/17 0856   • ondansetron (ZOFRAN) injection 4 mg  4 mg Intravenous Q6H PRN Pino Tang MD   4 mg at 06/15/17 0555   • pantoprazole (PROTONIX) EC tablet 40 mg  40 mg Oral Q AM Pino Tang MD   40 mg at 06/22/17 0634   • sodium chloride 0.9 % flush 1-10 mL  1-10 mL Intravenous PRN Pino Tang MD           Past Medical History:  Past Medical History:   Diagnosis Date   • A-fib    • Arthritis    • CHF (congestive heart failure)    • Hypertension    • Renal failure        Past Surgical History:  Past Surgical History:   Procedure Laterality Date   • ARTERIOVENOUS FISTULA LEG Left        Family History  History reviewed. No pertinent family history.    Social History  Social History     Social History   • Marital status: Single     Spouse name: N/A   • Number of children: N/A   • Years of education: N/A     Occupational  "History   • Not on file.     Social History Main Topics   • Smoking status: Former Smoker   • Smokeless tobacco: Not on file   • Alcohol use No   • Drug use: No   • Sexual activity: Defer     Other Topics Concern   • Not on file     Social History Narrative         Review of Systems:  History obtained from chart review and the patient  General ROS: Patient complains of malaise and No fever or chills  Respiratory ROS: No cough, shortness of breath, wheezing  Cardiovascular ROS: no chest pain or dyspnea on exertion  Gastrointestinal ROS: No abdominal pain, no nausea  Genito-Urinary ROS: No dysuria or hematuria  Neurological ROS: negative  Dermatological ROS: negative  14 point ROS reviewed with the patient and negative except as noted above and in the HPI unless unable to obtain.    Objective:  /61  Pulse 103  Temp 97.4 °F (36.3 °C) (Temporal Artery )   Resp 19  Ht 62\" (157.5 cm)  Wt 116 lb (52.6 kg)  SpO2 94%  BMI 21.22 kg/m2    Intake/Output Summary (Last 24 hours) at 06/22/17 0808  Last data filed at 06/22/17 0400   Gross per 24 hour   Intake             1044 ml   Output                0 ml   Net             1044 ml     General: awake/alert    Neck: supple, no JVD  Chest:  clear to auscultation bilaterally without respiratory distress  CVS: regular rate and rhythm  Abdominal: soft, nontender, normal bowel sounds  Extremities: no cyanosis or edema  Skin: warm and dry without rash   Neuro: no focal motor deficits      Labs:    Results from last 7 days  Lab Units 06/21/17  1548 06/20/17  0421 06/19/17  0404   WBC 10*3/mm3 4.59* 4.03* 6.21   HEMOGLOBIN g/dL 7.3* 8.2* 8.1*   HEMATOCRIT % 21.2* 24.3* 23.9*   PLATELETS 10*3/mm3 66* 53* 75*           Results from last 7 days  Lab Units 06/21/17  1548 06/20/17  0439 06/19/17  0404 06/18/17  0346 06/17/17  0827   SODIUM mmol/L 133* 133* 136 137 137   POTASSIUM mmol/L 3.6 3.6 3.5 3.2* 3.5   CHLORIDE mmol/L 98 98 101 100 100   TOTAL CO2 mmol/L 23.0* 27.0 26.0 28.0 " 25.0   BUN mg/dL 29* 17 28* 23* 17   CREATININE mg/dL 3.53* 2.66* 3.93* 3.48* 3.00*   CALCIUM mg/dL 8.0* 7.8* 7.7* 7.8* 7.8*   BILIRUBIN mg/dL  --   --  1.8* 1.8* 2.7*   ALK PHOS U/L  --   --  90 92 89   ALT (SGPT) U/L  --   --  22 28 25   AST (SGOT) U/L  --   --  28 23 26   GLUCOSE mg/dL 185* 151* 72 109* 146*       Radiology:   Imaging Results (last 72 hours)     Procedure Component Value Units Date/Time    XR Chest 1 View [[id number]] Collected:  06/11/17 1803     Updated:  06/11/17 2054    Narrative:       EXAMINATION:   XR CHEST 1 VW-  6/11/2017 6:01 PM CDT     HISTORY: Chest pain     Prior exams 05/28/2016.     Single view of the chest is obtained. The pulmonary vascular margins are  slightly indistinct. This has the appearance of mild pulmonary vascular  congestion. Left diaphragm is indistinct. This may be infiltrate or  atelectasis left lower lobe.     Cardiac silhouette is enlarged and unchanged from May 28.       Impression:       1. Silhouetting of the left diaphragm consistent with infiltrate or  atelectasis left lower lobe.  2. Indistinct pulmonary vascular margins consistent with mild pulmonary  vascular congestion.  This report was finalized on 06/11/2017 20:51 by Dr. Herson Magaña MD.          Culture:         Assessment   1.  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  On maintenance hemodialysis Monday Wednesday Friday   2.  Pneumonia  3.  Hyponatremia  4.  Anemia of chronic kidney disease  5.  Congestive heart failure with diastolic dysfunction  6.  Hyponatremia     Plan:  1.  OK from renal standpoint to transfer out of ICU  2.  Maintenance dialysis tomorrow AM    Jaden Tovar, FANNIE  6/22/2017  8:08 AM

## 2017-06-22 NOTE — PROGRESS NOTES
[facility]  INPATIENT PROGRESS NOTE    Length of Stay: 11  Date of Admission: 6/11/2017  Primary Care Physician: No Known Provider    Subjective   Chief Complaint: Atrial fib, hypertension, weakness, kidney failure    Chest Pain         Patient eating well.  Had [employer] last night. BP better with IV steroids.  Says feels better today. Didn't get dialysis until late yesterday so held in unit overnight. Doing OK no complaints sleepy this AM BP ok off pressors  Review of Systems   Cardiovascular: Positive for chest pain.      Constitutional: Positive for activity change, appetite change and fatigue. Negative for chills and fever.   HENT: Negative for hearing loss, nosebleeds, tinnitus and trouble swallowing.   Eyes: Negative for visual disturbance.   Respiratory: Negative for cough, chest tightness, shortness of breath and wheezing.   Cardiovascular: Negative for chest pain, palpitations and leg swelling.   Gastrointestinal: Negative for abdominal distention, abdominal pain, blood in stool, constipation, diarrhea, nausea and vomiting.   Endocrine: Negative for cold intolerance, heat intolerance, polydipsia, polyphagia and polyuria.   Genitourinary: Negative for decreased urine volume, difficulty urinating, dysuria, flank pain, frequency and hematuria.   Musculoskeletal: Positive for arthralgias, gait problem and myalgias. Negative for joint swelling.   Skin: Negative for rash.   Allergic/Immunologic: Negative for immunocompromised state.   Neurological: Positive for weakness. Negative for dizziness, syncope, light-headedness and headaches.   Hematological: Negative for adenopathy. Does not bruise/bleed easily.   Psychiatric/Behavioral: Negative for confusion and sleep disturbance. The patient is not nervous/anxious.     All pertinent negatives and positives are as above. All other systems have been reviewed and are negative unless otherwise stated.     Objective     Temp:  [97.5 °F (36.4 °C)-98.3 °F (36.8 °C)] 97.8 °F (36.6 °C)  Heart Rate:  [] 103  Resp:  [13-24] 19  BP: ()/(48-96) 107/61    Intake/Output Summary (Last 24 hours) at 06/22/17 0754  Last data filed at 06/22/17 0400   Gross per 24 hour   Intake             1044 ml   Output                0 ml   Net             1044 ml     Physical Exam  Constitutional: She appears well-developed and well-nourished.   HENT:   Head: Normocephalic and atraumatic.   Eyes: Conjunctivae and EOM are normal. Pupils are equal, round, and reactive to light.   Neck: Neck supple. No JVD present. No thyromegaly present.   Cardiovascular: Normal rate, regular rhythm, normal heart sounds and intact distal pulses. Exam reveals no gallop and no friction rub.   No murmur heard.  Pulmonary/Chest: Effort normal. No respiratory distress. She has no wheezes. She has rales. She exhibits no tenderness.   Abdominal: Soft. Bowel sounds are normal. She exhibits no distension. There is no tenderness. There is no rebound and no guarding.   Musculoskeletal: Normal range of motion. She exhibits no edema, tenderness or deformity.   Lymphadenopathy:   She has no cervical adenopathy.   Neurological: She is alert. She displays normal reflexes. No cranial nerve deficit. She exhibits abnormal muscle tone. Coordination abnormal.   Skin: Skin is warm and dry. No rash noted.   Psychiatric: She has a normal mood and affect. Her behavior is normal.   Nursing note and vitals reviewed.  Results Review:  Lab Results (last 24 hours)     Procedure Component Value Units Date/Time    Blood Culture [[id number]]  (Normal) Collected:  06/16/17 0918    Specimen:  Blood from Arm, Left Updated:  06/21/17 1001     Blood Culture No growth at 5 days    POC Glucose Fingerstick [[id number]]  (Abnormal) Collected:  06/21/17 1059    Specimen:  Blood Updated:  06/21/17 1114     Glucose 184 (H) mg/dL       : [id number] [facility] [employer]eter ID: [id number]       Basic Metabolic  Panel [[id number]]  (Abnormal) Collected:  06/21/17 1548    Specimen:  Blood Updated:  06/21/17 1628     Glucose 185 (H) mg/dL      BUN 29 (H) mg/dL      Creatinine 3.53 (H) mg/dL      Sodium 133 (L) mmol/L      Potassium 3.6 mmol/L      Chloride 98 mmol/L      CO2 23.0 (L) mmol/L      Calcium 8.0 (L) mg/dL      eGFR  African Amer 15 (L) mL/min/1.73      BUN/Creatinine Ratio 8.2     Anion Gap 12.0 mmol/L     Narrative:       The MDRD GFR formula is only valid for adults with stable renal function between ages 18 and 70.    CBC & Differential [[id number]] Collected:  06/21/17 1548    Specimen:  Blood Updated:  06/21/17 1636    Narrative:       The following orders were created for panel order CBC & Differential.  Procedure                               Abnormality         Status                     ---------                               -----------         ------                     Scan Slide[[id number]]                                       Final result               CBC Auto Differential[[id number]]        Abnormal            Final result                 Please view results for these tests on the individual orders.    CBC Auto Differential [[id number]]  (Abnormal) Collected:  06/21/17 1548    Specimen:  Blood Updated:  06/21/17 1636     WBC 4.59 (L) 10*3/mm3      RBC 2.54 (L) 10*6/mm3      Hemoglobin 7.3 (L) g/dL      Hematocrit 21.2 (L) %      MCV 83.5 fL      MCH 28.7 pg      MCHC 34.4 g/dL      RDW 20.1 (H) %      RDW-SD 60.4 (H) fl      Platelets 66 (L) 10*3/mm3      Neutrophil % 84.1 (H) %      Lymphocyte % 8.3 (L) %      Monocyte % 7.2 %      Eosinophil % 0.0 %      Basophil % 0.0 %      Immature Grans % 0.4 %      Neutrophils, Absolute 3.86 10*3/mm3      Lymphocytes, Absolute 0.38 (L) 10*3/mm3      Monocytes, Absolute 0.33 10*3/mm3      Eosinophils, Absolute 0.00 10*3/mm3      Basophils, Absolute 0.00 10*3/mm3      Immature Grans, Absolute 0.02 10*3/mm3      nRBC 0.7 (H) /100 WBC     Scan Slide [[id number]]  Collected:  06/21/17 1548    Specimen:  Blood Updated:  06/21/17 1636     Hypochromia Mod/2+     Microcytes Slight/1+     Target Cells Slight/1+     WBC Morphology Normal     Platelet Estimate Decreased    POC Glucose Fingerstick [[id number]]  (Abnormal) Collected:  06/21/17 2245    Specimen:  Blood Updated:  06/21/17 2257     Glucose 162 (H) mg/dL       : [id number] [facility] [employer]eter ID: [id number]              Cultures:  Blood Culture   Date Value Ref Range Status   06/16/2017 No growth at 24 hours  Preliminary       Radiology Data:    Imaging Results (last 24 hours)     ** No results found for the last 24 hours. **          Allergies   Allergen Reactions   • Heparin    • Iron    • Latex    • Levaquin [Levofloxacin]    • Shrimp (Diagnostic)    • Vancomycin        Scheduled meds:     ceftriaxone 1 g Intravenous Q24H   hydrocortisone sodium succinate 50 mg Intravenous Q6H   ipratropium-albuterol 3 mL Nebulization Q6H While Awake - RT   levothyroxine 50 mcg Oral Q AM   megestrol 400 mg Oral Daily   midodrine 5 mg Oral TID AC   IV Fluids 1000 mL + additives 50 mL/hr Intravenous Daily   pantoprazole 40 mg Oral Q AM       PRN meds:  dextrose  •  HYDROmorphone  •  ondansetron  •  sodium chloride    Assessment/Plan     Active Problems:    Chest pain in adult    Renal failure    Hypotension    Anemia    Hypokalemia      Plan:  Chest pain, atypical, echocardiogram/afib -Ejection fraction 61%, diastolic dysfunction, right ventricular dilatation, tricuspid valve regurgitation, right ventricle systolic pressure greater than 55. Off Cardizem.  Doing much better on steroids BP good off levophed eating continue same wean steroids.  Hemoptysis ×1 6/15. Hx feliciano syndrome.    Thrombopenia-6 packs of platelets.  No active bleeding.     Pneumonia/ Slight leukocytosis. Chest x-ray ? Retrocardiac opacity- on rocephin . Blood culture no growth in 24 hours. Duo neb.      Anemia- Status post 2 units of blood transfusion  6/14.      Chronic renal failure- nephrology is on board. Cont dialysis per nephrology.       Hyperlipidemia      Elevate TSH- free T4 is normal. Start synthroid.      Hypotension- resoloved      Hypokalemia-- resolved      Calcium 7.7, corrected calcium 9.5 - 6/13/2017.      Nutrition- start megace, patient has is eating [employer] now.      Deconditioning- start PT and OT      Discharge Planning: transfer to floor. ? Needs LTAC or NHP?  Adrenal insufficiency responding to steroids.  Arthur Kerr MD   06/22/17   7:54 AM

## 2017-06-22 NOTE — PLAN OF CARE
Problem: Patient Care Overview (Adult)  Goal: Plan of Care Review  Outcome: Ongoing (interventions implemented as appropriate)    06/22/17 0513   Coping/Psychosocial Response Interventions   Plan Of Care Reviewed With patient   Patient Care Overview   Progress progress toward functional goals is gradual   Outcome Evaluation   Outcome Summary/Follow up Plan pt c/o of pain in all joints. she gets very restless and agitated at times. Couldn't draw blood from any line and Arterial draw was also tried but not achieved.          Problem: Fall Risk (Adult)  Goal: Identify Related Risk Factors and Signs and Symptoms  Outcome: Ongoing (interventions implemented as appropriate)  Goal: Absence of Falls  Outcome: Ongoing (interventions implemented as appropriate)  Goal: Absence of Falls  Outcome: Ongoing (interventions implemented as appropriate)    Problem: Fluid Volume Excess (Adult,Obstetrics,Pediatric)  Goal: Stable Weight  Outcome: Ongoing (interventions implemented as appropriate)  Goal: Balanced Intake/Output  Outcome: Ongoing (interventions implemented as appropriate)    Problem: Nutrition, Imbalanced: Inadequate Oral Intake (Adult)  Goal: Identify Related Risk Factors and Signs and Symptoms  Outcome: Ongoing (interventions implemented as appropriate)  Goal: Improved Oral Intake  Outcome: Ongoing (interventions implemented as appropriate)  Goal: Prevent Further Weight Loss  Outcome: Ongoing (interventions implemented as appropriate)    Problem: Skin Integrity Impairment, Risk/Actual (Adult)  Goal: Identify Related Risk Factors and Signs and Symptoms  Outcome: Ongoing (interventions implemented as appropriate)  Goal: Skin Integrity/Wound Healing  Outcome: Ongoing (interventions implemented as appropriate)    Problem: Pressure Ulcer (Adult)  Goal: Signs and Symptoms of Listed Potential Problems Will be Absent or Manageable (Pressure Ulcer)  Outcome: Ongoing (interventions implemented as appropriate)

## 2017-06-22 NOTE — CONSULTS
No viable veins for PICC or midline placement.  Attempted peripheral IVs x3 without success.  Pt began fighting and jerked arms away and refused further sticks.

## 2017-06-22 NOTE — NURSING NOTE
Patient has very poor vasculature.  Right femoral TLC does not draw blood and concerns related to drainage.  Okay to pull line, but needed access first.  Renal NP on unit and got okay to attempt PICC line despite failed HD shunts bilateral arms.  VAT unable to place PICC.  They then tried to place PIV x 3 with ultrasound with no results.  Dr. Magdaleno called per Dr. Kerr and orders receive to consult Dr. Valle for possible line placement.  Anesthesia called and they were able to place a 22G to R shoulder after 3 attempts.  Dr. Valle and Dr. Romo clarified need for IV access and both were okay with switching to PO medications.  Before transfer to floor, patient inadvertently pulled out PIV.  Dr. Kerr already in the unit.  Still okay to send to the floor.  Viviana RN on 4B aware of lack of access at this time.

## 2017-06-23 LAB
ANION GAP SERPL CALCULATED.3IONS-SCNC: 13 MMOL/L (ref 4–13)
ANISOCYTOSIS BLD QL: NORMAL
BASOPHILS # BLD AUTO: 0.03 10*3/MM3 (ref 0–0.2)
BASOPHILS NFR BLD AUTO: 0.5 % (ref 0–2)
BUN BLD-MCNC: 25 MG/DL (ref 5–21)
BUN/CREAT SERPL: 8.6 (ref 7–25)
C3 FRG RBC-MCNC: NORMAL
CALCIUM SPEC-SCNC: 8.1 MG/DL (ref 8.4–10.4)
CHLORIDE SERPL-SCNC: 101 MMOL/L (ref 98–110)
CO2 SERPL-SCNC: 24 MMOL/L (ref 24–31)
CREAT BLD-MCNC: 2.92 MG/DL (ref 0.5–1.4)
DEPRECATED RDW RBC AUTO: 61.6 FL (ref 40–54)
EOSINOPHIL # BLD AUTO: 0 10*3/MM3 (ref 0–0.7)
EOSINOPHIL NFR BLD AUTO: 0 % (ref 0–4)
ERYTHROCYTE [DISTWIDTH] IN BLOOD BY AUTOMATED COUNT: 20.7 % (ref 12–15)
GFR SERPL CREATININE-BSD FRML MDRD: 19 ML/MIN/1.73
GLUCOSE BLD-MCNC: 131 MG/DL (ref 70–100)
GLUCOSE BLDC GLUCOMTR-MCNC: 120 MG/DL (ref 70–130)
GLUCOSE BLDC GLUCOMTR-MCNC: 164 MG/DL (ref 70–130)
GLUCOSE BLDC GLUCOMTR-MCNC: 98 MG/DL (ref 70–130)
HCT VFR BLD AUTO: 22.1 % (ref 37–47)
HGB BLD-MCNC: 7.3 G/DL (ref 12–16)
HYPOCHROMIA BLD QL: NORMAL
IMM GRANULOCYTES # BLD: 0.05 10*3/MM3 (ref 0–0.03)
IMM GRANULOCYTES NFR BLD: 0.9 % (ref 0–5)
LYMPHOCYTES # BLD AUTO: 0.67 10*3/MM3 (ref 0.72–4.86)
LYMPHOCYTES NFR BLD AUTO: 12.2 % (ref 15–45)
MCH RBC QN AUTO: 28.2 PG (ref 28–32)
MCHC RBC AUTO-ENTMCNC: 33 G/DL (ref 33–36)
MCV RBC AUTO: 85.3 FL (ref 82–98)
MONOCYTES # BLD AUTO: 0.48 10*3/MM3 (ref 0.19–1.3)
MONOCYTES NFR BLD AUTO: 8.7 % (ref 4–12)
NEUTROPHILS # BLD AUTO: 4.26 10*3/MM3 (ref 1.87–8.4)
NEUTROPHILS NFR BLD AUTO: 77.7 % (ref 39–78)
NRBC BLD MANUAL-RTO: 0 /100 WBC (ref 0–0)
PLATELET # BLD AUTO: 58 10*3/MM3 (ref 130–400)
POIKILOCYTOSIS BLD QL SMEAR: NORMAL
POTASSIUM BLD-SCNC: 3.4 MMOL/L (ref 3.5–5.3)
RBC # BLD AUTO: 2.59 10*6/MM3 (ref 4.2–5.4)
SMALL PLATELETS BLD QL SMEAR: NORMAL
SODIUM BLD-SCNC: 138 MMOL/L (ref 135–145)
WBC MORPH BLD: NORMAL
WBC NRBC COR # BLD: 5.49 10*3/MM3 (ref 4.8–10.8)

## 2017-06-23 PROCEDURE — G8988 SELF CARE GOAL STATUS: HCPCS

## 2017-06-23 PROCEDURE — 80048 BASIC METABOLIC PNL TOTAL CA: CPT | Performed by: INTERNAL MEDICINE

## 2017-06-23 PROCEDURE — 97530 THERAPEUTIC ACTIVITIES: CPT

## 2017-06-23 PROCEDURE — 85007 BL SMEAR W/DIFF WBC COUNT: CPT | Performed by: INTERNAL MEDICINE

## 2017-06-23 PROCEDURE — 94799 UNLISTED PULMONARY SVC/PX: CPT

## 2017-06-23 PROCEDURE — 97168 OT RE-EVAL EST PLAN CARE: CPT

## 2017-06-23 PROCEDURE — 85025 COMPLETE CBC W/AUTO DIFF WBC: CPT | Performed by: INTERNAL MEDICINE

## 2017-06-23 PROCEDURE — 63710000001 PREDNISONE PER 1 MG: Performed by: INTERNAL MEDICINE

## 2017-06-23 PROCEDURE — 97110 THERAPEUTIC EXERCISES: CPT

## 2017-06-23 PROCEDURE — 94760 N-INVAS EAR/PLS OXIMETRY 1: CPT

## 2017-06-23 PROCEDURE — 82962 GLUCOSE BLOOD TEST: CPT

## 2017-06-23 PROCEDURE — G8987 SELF CARE CURRENT STATUS: HCPCS

## 2017-06-23 RX ORDER — TRAZODONE HYDROCHLORIDE 50 MG/1
50 TABLET ORAL NIGHTLY PRN
Status: DISCONTINUED | OUTPATIENT
Start: 2017-06-23 | End: 2017-06-26 | Stop reason: HOSPADM

## 2017-06-23 RX ADMIN — TRAZODONE HYDROCHLORIDE 50 MG: 50 TABLET ORAL at 22:27

## 2017-06-23 RX ADMIN — IPRATROPIUM BROMIDE AND ALBUTEROL SULFATE 3 ML: 2.5; .5 SOLUTION RESPIRATORY (INHALATION) at 18:57

## 2017-06-23 RX ADMIN — MIDODRINE HYDROCHLORIDE 5 MG: 2.5 TABLET ORAL at 12:07

## 2017-06-23 RX ADMIN — PREDNISONE 40 MG: 20 TABLET ORAL at 09:25

## 2017-06-23 RX ADMIN — MIDODRINE HYDROCHLORIDE 5 MG: 2.5 TABLET ORAL at 17:12

## 2017-06-23 RX ADMIN — MIDODRINE HYDROCHLORIDE 5 MG: 2.5 TABLET ORAL at 09:25

## 2017-06-23 RX ADMIN — LEVOTHYROXINE SODIUM 50 MCG: 50 TABLET ORAL at 06:47

## 2017-06-23 RX ADMIN — IPRATROPIUM BROMIDE AND ALBUTEROL SULFATE 3 ML: 2.5; .5 SOLUTION RESPIRATORY (INHALATION) at 06:26

## 2017-06-23 RX ADMIN — PANTOPRAZOLE SODIUM 40 MG: 40 TABLET, DELAYED RELEASE ORAL at 06:47

## 2017-06-23 NOTE — THERAPY TREATMENT NOTE
Acute Care - Physical Therapy Treatment Note  [facility]     Patient Name: Jennifer Marks  : 1945  MRN: [id number]  Today's Date: 2017  Onset of Illness/Injury or Date of Surgery Date: 17  Date of Referral to PT: 17  Referring Physician: Dr. Llamas    Admit Date: 2017    Visit Dx:    ICD-10-CM ICD-9-CM   1. Chest pain in adult R07.9 786.50   2. End stage renal disease N18.6 585.6   3. Hypotension, unspecified hypotension type I95.9 458.9   4. Chronic systolic (congestive) heart failure I50.22 428.22     428.0   5. Decreased activities of daily living (ADL) Z78.9 V49.89   6. Impaired mobility Z74.09 799.89     Patient Active Problem List   Diagnosis   • Chest pain in adult   • Renal failure   • Hypotension   • Anemia   • Hypokalemia               Adult Rehabilitation Note       17 0826 17 1421 17 1420    Rehab Assessment/Intervention    Discipline physical therapy assistant  -GRADY physical therapy assistant  -LG occupational therapy assistant  -    Document Type therapy note (daily note)  -GRADY  therapy note (daily note)  -    Subjective Information agree to therapy;complains of;weakness;fatigue  -GRADY  agree to therapy;complains of;weakness;fatigue;pain;dizziness  -    Patient Effort, Rehab Treatment   adequate  -CJ    Treatment Not Performed  patient unavailable for treatment  -LG     Treatment Not Performed, Comment  nsg getting pt ready to transfer to floor, pt pulled her IV out  -     Symptoms Noted Comment pt was anxious about knowing when she was going to dialysis  -GRADY      Precautions/Limitations fall precautions  -GRADY  fall precautions  -    Recorded by [GRADY] Riccardo Eng, PTA [LG] Arcenio Ribeiro PTA [CJ] KYA Metcalf/L    Pain Assessment    Pain Assessment Morrison-Cameron FACES  -GRADY  0-10  -CJ    Morrison-Cameron FACES Pain Rating 4  -GRADY  4  -CJ    Pain Score   4  -CJ    Pain Type Chronic pain  -GRADY  Chronic pain  -    Pain Location Generalized  -GRADY   Hand   phalanges!  -CJ    Pain Orientation   Right;Left  -CJ    Pain Intervention(s) Repositioned  -GRADY      Response to Interventions tolerated  -GRADY      Recorded by [GRADY] Riccardo Eng PTA  [CJ] BEATRIZ Metcalf    ROM (Range of Motion)    General ROM   upper extremity range of motion deficits identified   During slow stretch, pt. c/o's pain with flex/ext to hands!  -CJ    Recorded by   [CJ] KYA Metcalf/L    Bed Mobility, Assessment/Treatment    Bed Mob, Supine to Sit, [place] verbal cues required;moderate assist (50% patient effort)  -GRADY      Bed Mob, Sit to Supine, [place] --   up in the chair  -GRADY      Bed Mobility, Comment   fowlers in bed!  -CJ    Recorded by [GRADY] Riccardo Eng PTA  [CJ] KYA Metcalf/L    Transfer Assessment/Treatment    Transfers, Bed-Chair [place] verbal cues required;minimum assist (75% patient effort);moderate assist (50% patient effort)   HHA  -GRADY      Transfers, Sit-Stand [place] verbal cues required;moderate assist (50% patient effort)  -GRADY      Transfers, Stand-Sit [place] verbal cues required;minimum assist (75% patient effort)  -GRADY      Transfer, Comment Pt took a few steps from the bed to the chair.  Brought pt a walker to use this afternoon.  -GRADY      Recorded by [GRADY] Riccardo Eng PTA      Therapy Exercises    Bilateral Lower Extremities AAROM:;AROM:;10 reps;sitting  -GRADY      Recorded by [GRADY] Riccardo Eng PTA      Positioning and Restraints    Pre-Treatment Position in bed  -GRADY  in bed  -CJ    Post Treatment Position chair  -GRADY  bed  -CJ    In Bed   fowlers;call light within reach;encouraged to call for assist;side rails up x3;RUE elevated;LUE elevated  -CJ    In Chair notified nsg;reclined;call light within reach;encouraged to call for assist  -GRADY      Recorded by [GRADY] Riccardo Eng PTA  [CJ] BEATRIZ Metcalf      06/21/17 1334 06/21/17 0901 06/21/17 0833    Rehab Assessment/Intervention     Discipline physical therapy assistant  -JUANY physical therapy assistant  -JUANY occupational therapy assistant  -CJ    Document Type therapy note (daily note)  -JUANY therapy note (daily note)  -JUANY therapy note (daily note)  -CJ    Subjective Information agree to therapy;complains of;pain  -JUANY agree to therapy;complains of;pain   and being cold  -JUANY agree to therapy;complains of;weakness;fatigue;pain  -CJ    Patient Effort, Rehab Treatment  adequate  -JUANY adequate  -CJ    Precautions/Limitations fall precautions  -JUANY fall precautions  -JUANY fall precautions  -CJ    Recorded by [JUANY] Tori Malave, HILLARY [JUANY] Tori Malave, PTA [CJ] KEYANA MetcalfA/L    Vital Signs    Pre Systolic BP Rehab 110  -JUANY 86  -JUANY     Pre Treatment Diastolic BP 80  -JUANY 74  -JUANY     Pretreatment Heart Rate (beats/min) 103  -JUANY 108  -JUANY     Posttreatment Heart Rate (beats/min) 101  -JUANY      Recorded by [JUANY] Tori Malave, HILLARY [JUAYN] Tori Malave, PTA     Pain Assessment    Pain Assessment 0-10  -JUANY Morrison-Baker FACES  -JUANY 0-10  -CJ    Morrison-Cameron FACES Pain Rating  4   with movement  -JUANY 2  -CJ    Pain Score 5  -JUANY  2  -CJ    Pain Type Chronic pain  -JUANY Chronic pain  -JUANY Chronic pain  -CJ    Pain Location Knee  -JUANY Generalized  -JUANY Hand   phalanges  -CJ    Pain Orientation Left  -JUANY  Right;Left  -CJ    Pain Intervention(s) Repositioned  -JUANY Repositioned  -JUANY Repositioned;Rest  -CJ    Recorded by [JUANY] Tori Malave, HILLARY [JUANY] Tori Malave, PTA [CJ] Marcin Robbins, KIMBLE/L    General UE Assessment    ROM   --   prom to Bue's to increase arom with extremities, slow stretc  -CJ    ROM Detail   slow stretch initiated by this kimble/l for increasing pt's arom!  -CJ    Recorded by   [CJ] KEYANA MetcalfA/L    Bed Mobility, Assessment/Treatment    Bed Mobility, Scoot/Bridge, [place]  dependent (less than 25% patient effort)  -JUANY     Bed Mob, Supine to Sit, [place]  maximum assist (25% patient effort)  -JUANY     Bed Mob, Sit to  "Supine, Door  dependent (less than 25% patient effort)  -JUANY     Bed Mobility, Comment  Pt. tends to lay with knees flexed. Instruct ed pt. to push legs out straight when notice they are \"curled up\". Suggessted pt. move her body throughout the day & not lay still.   -JUANY     Recorded by  [JUANY] Tori Malave PTA     Motor Skills/Interventions    Additional Documentation  Balance Skills Training (Group)  -JUANY     Recorded by  [JUANY] Tori Malave PTA     Balance Skills Training    Sitting-Level of Assistance  Contact guard;Minimum assistance  -JUANY     Sitting-Balance Support  No upper extremity supported;Feet unsupported   worked on posture  -JUANY     Sitting-Balance Activities  --   xhibits forward flexed neck, worked on ext and rotation.   -JUANY     Sitting # of Minutes  18  -JUANY     Recorded by  [JUANY] Tori Malave PTA     Therapy Exercises    Bilateral Lower Extremities AAROM:;20 reps;supine;ankle pumps/circles;heel slides;hip abduction/adduction;hip ER;hip IR   no L ir/er  -JUANY AAROM:;sitting;ankle pumps/circles;hamstring stretch;hip flexion;LAQ   very slow gentle stretching  -JUANY     Recorded by [JUANY] Tori Malave PTA [JUANY] Tori Malave PTA     Positioning and Restraints    Pre-Treatment Position in bed  - in bed  - in bed  -    Post Treatment Position bed  - bed  - bed  -    In Bed notified nsg;fowlers;call light within reach;encouraged to call for assist  -JUANY fowlers;call light within reach;encouraged to call for assist;patient within staff view;with nsg;side rails up x3  -JUANY fowlers;call light within reach;encouraged to call for assist;exit alarm on;patient within staff view;side rails up x3  -CJ    Recorded by [JUANY] Tori Malave PTA [JUANY] Tori Malave PTA [CJ] BEATRIZ Metcalf      06/20/17 1414 06/20/17 1328 06/20/17 0935    Rehab Assessment/Intervention    Discipline physical therapy assistant  -JUANY occupational therapy assistant  -TRACY physical therapy assistant  -JUANY " "   Document Type therapy note (daily note)  -JUANY therapy note (daily note)  - therapy note (daily note)  -    Subjective Information agree to therapy;complains of;fatigue;pain  -JUANY agree to therapy;complains of;weakness;fatigue;pain  -CJ     Patient Effort, Rehab Treatment  adequate  -CJ     Treatment Not Performed   patient/family declined treatment   Pt. won't open eys, \"NO,uh,uh'. Nsg encouraged also.  -JUANY    Precautions/Limitations fall precautions  -JUANY fall precautions  -CJ     Recorded by [JUANY] Tori Malave PTA [CJ] KYA Metcalf/CATHIE [JUANY] Tori Malave PTA    Vital Signs    Pre Systolic BP Rehab 85  -JUANY      Pre Treatment Diastolic BP 73  -JUANY      Pretreatment Heart Rate (beats/min) 96  -JUANY      Recorded by [JUANY] Tori Malave PTA      Pain Assessment    Pain Assessment Morrison-Cameron FACES  -JUANY No/denies pain  -CJ     Morrison-Cameron FACES Pain Rating 4   with all movement  -JUANY 4  -CJ     Pain Type Chronic pain  -JUANY Chronic pain  -CJ     Pain Location Generalized  - Hand   phalanges!  -CJ     Pain Orientation  Right;Left  -CJ     Pain Intervention(s)  Repositioned  -CJ     Recorded by [JUANY] Tori Malave PTA [CJ] KYA Metcalf/L     ROM (Range of Motion)    General ROM  upper extremity range of motion deficits identified;hand range of motion deficits identified   ulnar deviation in b. hands/phalanges!  -     General ROM Detail  --   slow stretch to b.upper extremities to increase arom!   -CJ     Additional Documentation  --   pain with slow stretch techniques!  -CJ     Recorded by  [CJ] KYA Metcalf/L     Bed Mobility, Assessment/Treatment    Bed Mob, Supine to Sit, [place] dependent (less than 25% patient effort)  -JUANY      Bed Mob, Sit to Supine, [place] dependent (less than 25% patient effort)  -JUANY      Bed Mobility, Comment Agreed but asked to lay down as soon as sat up!!  -JUANY fowlers in bed!  -CJ     Recorded by [JUANY] Tori Malave PTA [CJ] Marcin WHITING" KYA Robbins/L     Motor Skills/Interventions    Additional Documentation Balance Skills Training (Group)  -      Recorded by [JUANY] Tori Malave PTA      Balance Skills Training    Sitting-Level of Assistance Minimum assistance  -      Sitting-Balance Activities --   orient to midline  -      Sitting # of Minutes 5  -JUANY      Recorded by [JUANY] Tori Malave PTA      Therapy Exercises    Bilateral Lower Extremities --   a few weak AP's & very little LAQ  -JUANY      Bilateral Upper Extremity  --   ue exs attempted, but unable sec. arom and !  -CJ     Recorded by [JUANY] Tori Malave PTA [CJ] KYA Metcalf/CATHIE     Positioning and Restraints    Pre-Treatment Position in bed  - in bed  -     Post Treatment Position bed  - bed  -     In Bed notified nsg;fowlers;call light within reach;with family/caregiver;side rails up x3  -JUANY fowlers;call light within reach;encouraged to call for assist;side rails up x2  -CJ     Recorded by [JUANY] Tori Malave PTA [CJ] KYA Metcalf/L       User Key  (r) = Recorded By, (t) = Taken By, (c) = Cosigned By    Initials Name Effective Dates    LG Arcenio Ribeiro, [facility] 08/02/16 -     KYA Cavazos/CATHIE 08/02/16 -     JUANY Malave, [facility] 08/02/16 -     GRADY Riccardo Eng, [facility] 12/08/16 -                 IP PT Goals       06/15/17 0925          Bed Mobility PT LTG    Bed Mobility PT LTG, Date Established 06/15/17  -YONIS      Bed Mobility PT LTG, Time to Achieve by discharge  -      Bed Mobility PT LTG, Activity Type roll left/roll right;supine to sit/sit to supine  -      Bed Mobility PT LTG, [place] Level minimum assist (75% patient effort)  -      Bed Mobility PT Goal  LTG, Assist Device bed rails  -      Strength Goal PT LTG    Strength Goal PT LTG, Date Established 06/15/17  -YONIS      Strength Goal PT LTG, Time to Achieve by discharge  -      Strength Goal PT LTG, Measure to Achieve AAROM, B UE/LE, 10-20 reps, min assist   -YONIS      Dynamic Sitting Balance PT LTG    Dynamic Sitting Balance PT LTG, Date Established 06/15/17  -YONIS      Dynamic Sitting Balance PT LTG, Time to Achieve by discharge  -YONIS      Dynamic Sitting Balance PT LTG, [place] Level minimum assist (75% patient effort)  -YONIS      Dynamic Sitting Balance PT LTG, Assist Device UE Support;bed rails  -YONIS        User Key  (r) = Recorded By, (t) = Taken By, (c) = Cosigned By    Initials Name Provider Type    YONIS Yen, PT DPT Physical Therapist          Physical Therapy Education     Title: PT OT SLP Therapies (Active)     Topic: Physical Therapy (Active)     Point: Mobility training (Done)    Learning Progress Summary    Learner Readiness Method Response Comment Documented by Status   Patient Acceptance E VU,NR progression with POC, safety with transfers and amb GRADY 06/23/17 0826 Done    Acceptance E,D NR,VU Instruct to move self in bed and importance of participating. JUAYN 06/21/17 0934 Done    Acceptance E,D VU,NR Educated in benefits of activity and need to move to decrease contractures.  06/17/17 0956 Done    Acceptance E NR Educated pt. on progression of PT POC and benefits of activity  06/15/17 0925 Active               Point: Home exercise program (Done)    Learning Progress Summary    Learner Readiness Method Response Comment Documented by Status   Patient Acceptance E,D VU,NR Educated in benefits of activity and need to move to decrease contractures.  06/17/17 0956 Done                      User Key     Initials Effective Dates Name Provider Type Discipline     08/02/16 -  Arcenio Ribeiro PTA Physical Therapy Assistant PT    YONIS 08/02/16 -  Sal Yen, PT DPT Physical Therapist PT    JUANY 08/02/16 -  Tori Malave PTA Physical Therapy Assistant PT    GRADY 12/08/16 -  Riccardo Eng PTA Physical Therapy Assistant PT                    PT Recommendation and Plan  Anticipated Discharge Disposition: skilled nursing facility  Planned Therapy  Interventions: bed mobility training, balance training, home exercise program, patient/family education, postural re-education, ROM (Range of Motion), strengthening  PT Frequency: daily, 2 times/day  Plan of Care Review  Plan Of Care Reviewed With: patient  Progress: progress towards functional goals is fair  Outcome Summary/Follow up Plan: Pt was wanting to get up upon entering her room.  She was able to perform supine to sit with mod assist.  Sitting EOB maintained balance with CGA while performing LE exercises.  Transfered sit to stand with mod assist and HHA.  Then transfered from the bed to the chair with mod assist, HHA..  Will continue to work with pt to increase strength and safety with mobility and progress pt with ambulation.          Outcome Measures       06/23/17 0826 06/21/17 1420 06/21/17 0900    How much help from another person do you currently need...    Turning from your back to your side while in flat bed without using bedrails? 2  -GRADY  1  -JUANY    Moving from lying on back to sitting on the side of a flat bed without bedrails? 2  -GRADY  1  -JUANY    Moving to and from a bed to a chair (including a wheelchair)? 2  -GRADY  1  -JUANY    Standing up from a chair using your arms (e.g., wheelchair, bedside chair)? 2  -GRADY  1  -JUANY    Climbing 3-5 steps with a railing? 1  -GRADY  1  -JUANY    To walk in hospital room? 1  -GRADY  1  -JUANY    AM-PAC 6 Clicks Score 10  -GRADY  6  -JUANY    How much help from another is currently needed...    Putting on and taking off regular lower body clothing?  1  -CJ     Bathing (including washing, rinsing, and drying)  1  -CJ     Toileting (which includes using toilet bed pan or urinal)  1  -CJ     Putting on and taking off regular upper body clothing  2  -CJ     Taking care of personal grooming (such as brushing teeth)  2  -CJ     Eating meals  2  -CJ     Score  9  -CJ     Functional Assessment    Outcome Measure Options AM-PAC 6 Clicks Basic Mobility (PT)  -GRADY        06/21/17 0833 06/20/17 1323        How much help from another is currently needed...    Putting on and taking off regular lower body clothing? 1  -CJ 1  -CJ     Bathing (including washing, rinsing, and drying) 1  -CJ 1  -CJ     Toileting (which includes using toilet bed pan or urinal) 1  -CJ 1  -CJ     Putting on and taking off regular upper body clothing 2  -CJ 2  -CJ     Taking care of personal grooming (such as brushing teeth) 2  -CJ 2  -CJ     Eating meals 2  -CJ 2  -CJ     Score 9  -CJ 9  -CJ     Functional Assessment    Outcome Measure Options AM-PAC 6 Clicks Daily Activity (OT)  -CJ AM-PAC 6 Clicks Daily Activity (OT)  -CJ       User Key  (r) = Recorded By, (t) = Taken By, (c) = Cosigned By    Initials Name Provider Type     BEATRIZ Metcalf Occupational Therapy Assistant    JUANY Malave PTA Physical Therapy Assistant    GRADY Eng PTA Physical Therapy Assistant           Time Calculation:         PT Charges       06/23/17 0826          Time Calculation    Start Time 0826  -GRADY      Stop Time 0850  -GRADY      Time Calculation (min) 24 min  -GRADY      PT Received On 06/23/17  -GRADY      PT Goal Re-Cert Due Date 06/25/17  -GRADY      Time Calculation- PT    Total Timed Code Minutes- PT 24 minute(s)  -GRADY        User Key  (r) = Recorded By, (t) = Taken By, (c) = Cosigned By    Initials Name Provider Type    GRADY Eng PTA Physical Therapy Assistant          Therapy Charges for Today     Code Description Service Date Service Provider Modifiers Qty    82012490565 HC PT THERAPEUTIC ACT EA 15 MIN 6/23/2017 Riccardo Eng PTA GP, KX 1    99790132662 HC PT THER PROC EA 15 MIN 6/23/2017 Riccardo Eng PTA GP, KX 1          PT G-Codes  Outcome Measure Options: AM-PAC 6 Clicks Basic Mobility (PT)  Score: 6  Functional Limitation: Changing and maintaining body position  Changing and Maintaining Body Position Current Status (): 100 percent impaired, limited or restricted  Changing and Maintaining Body Position Goal  Status (): At least 60 percent but less than 80 percent impaired, limited or restricted    Riccardo Eng, PTA  6/23/2017

## 2017-06-23 NOTE — PAYOR COMM NOTE
"6/23 CLINICAL UPDATE  NOW ON TELEMETRY UNIT  [id number]  UR PHONE LIDIA       Jennifer Zhao (72 y.o. Female)     Date of Birth Social Security Number Address Home Phone MRN    1945  [address] [place] [address] [phone] [id number]    Congregation Marital Status          Orthodoxy Single       Admission Date Admission Type Admitting Provider Attending Provider Department, Room/Bed    6/11/17 Emergency Arthur Kerr MD Finney, Patrick C, MD [facility] 4B, 406/1    Discharge Date Discharge Disposition Discharge Destination                      Attending Provider: Arthur Kerr MD     Allergies:  Heparin, Iron, Latex, Levaquin [Levofloxacin], Shrimp (Diagnostic), Vancomycin    Isolation:  None   Infection:  None   Code Status:  Conditional    Ht:  62\" (157.5 cm)   Wt:  119 lb 4 oz (54.1 kg)    Admission Cmt:  None   Principal Problem:  None                Active Insurance as of 6/11/2017     Primary Coverage     Payor Plan Insurance Group Employer/Plan Group    MEDICARE MEDICARE B ONLY      Payor Plan Address Payor Plan Phone Number Effective From Effective To    [address] [address] [phone] 5/1/2008     [place] [address]       Subscriber Name Subscriber Birth Date Member ID       JENNIFER ZHAO 1945 [id number]           Secondary Coverage     Payor Plan Insurance Group Employer/Plan Group    [employer]      Payor Plan Address Payor Plan Phone Number Effective From Effective To    [address] [address]  1/1/2014     [place] [address]       Subscriber Name Subscriber Birth Date Member ID       JENNIFER ZHAO 1945 [id number]                 Emergency Contacts      (Rel.) Home Phone Work Phone Mobile Phone    Norma Amado (Relative) [phone] -- --    Mirza Peguero (Relative) [phone] -- --            Vital Signs (last 24 hours)       06/21 0700  -  06/22 0659 06/22 0700  -  06/23 0603   Most " Recent    Temp (°F) 97.5 -  98.3    97.1 -  97.8     97.4 (36.3)    Heart Rate 94 -  112    93 -  110     101    Resp 13 -  24    18 -  22     20    BP (!)86/74 -  127/69    110/64 -  134/71     116/97    SpO2 (%) 93 -  98    94 -  99     96          Intake & Output (last day)     None        Lines, Drains & Airways    Active LDAs     Name:   Placement date:   Placement time:   Site:   Days:    Hemodialysis AV Access Device                 Hemodialysis AV Access Device                 Hemodialysis AV Access Device                             Hospital Medications (active)       Dose Frequency Start End    HYDROmorphone (DILAUDID) injection 0.25 mg 0.25 mg Every 4 Hours PRN 6/15/2017 6/23/2017    Sig - Route: Infuse 0.25 mg into a venous catheter Every 4 (Four) Hours As Needed for Severe Pain (7-10). - Intravenous    ipratropium-albuterol (DUO-NEB) nebulizer solution 3 mL 3 mL Every 6 Hours While Awake - RT 6/16/2017     Sig - Route: Take 3 mL by nebulization Every 6 (Six) Hours While Awake. - Nebulization    levothyroxine (SYNTHROID, LEVOTHROID) tablet 50 mcg 50 mcg Every Early Morning 6/14/2017     Sig - Route: Take 1 tablet by mouth Every Morning. - Oral    megestrol (MEGACE) 40 MG/ML suspension 400 mg 400 mg Daily 6/15/2017     Sig - Route: Take 10 mL by mouth Daily. - Oral    midodrine (PROAMATINE) tablet 5 mg 5 mg 3 Times Daily Before Meals 6/15/2017     Sig - Route: Take 2 tablets by mouth 3 (Three) Times a Day Before Meals. - Oral    ondansetron (ZOFRAN) injection 4 mg 4 mg Every 6 Hours PRN 6/11/2017     Sig - Route: Infuse 2 mL into a venous catheter Every 6 (Six) Hours As Needed for Nausea or Vomiting. - Intravenous    pantoprazole (PROTONIX) EC tablet 40 mg 40 mg Every Early Morning 6/12/2017     Sig - Route: Take 1 tablet by mouth Every Morning. - Oral    predniSONE (DELTASONE) tablet 40 mg 40 mg Daily With Breakfast 6/23/2017     Sig - Route: Take 2 tablets by mouth Daily With Breakfast. - Oral     cefTRIAXone (ROCEPHIN) 1 g/100 mL 0.9% NS (MBP) (Discontinued) 1 g Every 24 Hours 6/16/2017 6/22/2017    Sig - Route: Infuse 100 mL into a venous catheter Daily. - Intravenous    dextrose (D50W) solution 50 mL (Discontinued) 50 mL Every 1 Hour PRN 6/12/2017 6/22/2017    Sig - Route: Infuse 50 mL into a venous catheter Every 1 (One) Hour As Needed for Low Blood Sugar (Blood sugar < 70). - Intravenous    diltiaZEM (CARDIZEM) 125mg/125 mL (1 mg/mL) infusion (Discontinued) 5-15 mg/hr Titrated 6/16/2017 6/22/2017    Sig - Route: Infuse 5-15 mg/hr into a venous catheter Dose Adjusted By Provider As Needed. - Intravenous    hydrocortisone sodium succinate (Solu-CORTEF) injection 50 mg (Discontinued) 50 mg Every 6 Hours 6/21/2017 6/22/2017    Sig - Route: Infuse 50 mg into a venous catheter Every 6 (Six) Hours. - Intravenous    multiple vitamin (M.V.I. Adult) 10 mL, thiamine (B-1) 100 mg, folic acid 1 mg in dextrose 5 % and sodium chloride 0.45 % 1,000 mL infusion (Discontinued) 50 mL/hr Daily 6/17/2017 6/22/2017    Sig - Route: Infuse 50 mL/hr into a venous catheter Daily. - Intravenous    norepinephrine (LEVOPHED) 8,000 mcg in sodium chloride 0.9 % 250 mL (32 mcg/mL) infusion (Discontinued) 0.02-0.3 mcg/kg/min × 51.3 kg Titrated 6/13/2017 6/22/2017    Sig - Route: Infuse 1.026-15.39 mcg/min into a venous catheter Dose Adjusted By Provider As Needed. - Intravenous    sodium chloride 0.9 % flush 1-10 mL (Discontinued) 1-10 mL As Needed 6/11/2017 6/22/2017    Sig - Route: Infuse 1-10 mL into a venous catheter As Needed for Line Care. - Intravenous            Lab Results (last 24 hours)     Procedure Component Value Units Date/Time    POC Glucose Fingerstick [[id number]]  (Abnormal) Collected:  06/22/17 0737    Specimen:  Blood Updated:  06/22/17 0758     Glucose 136 (H) mg/dL       : [id number] Eran Ying ID: [id number]       CBC (No Diff) [[id number]]  (Abnormal) Collected:  06/22/17 0812    Specimen:  Blood  Updated:  06/22/17 0851     WBC 5.19 10*3/mm3      RBC 2.90 (L) 10*6/mm3      Hemoglobin 8.2 (L) g/dL      Hematocrit 24.6 (L) %      MCV 84.8 fL      MCH 28.3 pg      MCHC 33.3 g/dL      RDW 20.7 (H) %      RDW-SD 62.0 (H) fl      Platelets 76 (L) 10*3/mm3     POC Glucose Fingerstick [[id number]]  (Abnormal) Collected:  06/22/17 1146    Specimen:  Blood Updated:  06/22/17 1213     Glucose 131 (H) mg/dL       : [id number] Eran Ying ID: [id number]       Basic Metabolic Panel [[id number]]  (Abnormal) Collected:  06/22/17 1155    Specimen:  Blood Updated:  06/22/17 1237     Glucose 144 (H) mg/dL      BUN 18 mg/dL      Creatinine 2.57 (H) mg/dL      Sodium 138 mmol/L      Potassium 3.4 (L) mmol/L      Chloride 99 mmol/L      CO2 28.0 mmol/L      Calcium 8.2 (L) mg/dL      eGFR  African Amer 22 (L) mL/min/1.73      BUN/Creatinine Ratio 7.0     Anion Gap 11.0 mmol/L     Narrative:       The MDRD GFR formula is only valid for adults with stable renal function between ages 18 and 70.    CBC Auto Differential [[id number]]  (Abnormal) Collected:  06/22/17 1155    Specimen:  Blood Updated:  06/22/17 1306     WBC 4.15 (L) 10*3/mm3      RBC 2.59 (L) 10*6/mm3      Hemoglobin 7.3 (L) g/dL      Hematocrit 22.0 (L) %      MCV 84.9 fL      MCH 28.2 pg      MCHC 33.2 g/dL      RDW 20.6 (H) %      RDW-SD 61.9 (H) fl      Platelets 61 (L) 10*3/mm3      Neutrophil % 79.3 (H) %      Lymphocyte % 11.1 (L) %      Monocyte % 8.4 %      Eosinophil % 0.0 %      Basophil % 0.2 %      Immature Grans % 1.0 %      Neutrophils, Absolute 3.29 10*3/mm3      Lymphocytes, Absolute 0.46 (L) 10*3/mm3      Monocytes, Absolute 0.35 10*3/mm3      Eosinophils, Absolute 0.00 10*3/mm3      Basophils, Absolute 0.01 10*3/mm3      Immature Grans, Absolute 0.04 (H) 10*3/mm3      nRBC 0.0 /100 WBC     CBC & Differential [[id number]] Collected:  06/22/17 1155    Specimen:  Blood Updated:  06/22/17 1306    Narrative:       The following orders were created  for panel order CBC & Differential.  Procedure                               Abnormality         Status                     ---------                               -----------         ------                     Scan Slide[[id number]]                                       Final result               CBC Auto Differential[[id number]]        Abnormal            Final result                 Please view results for these tests on the individual orders.    Scan Slide [[id number]] Collected:  06/22/17 1155    Specimen:  Blood Updated:  06/22/17 1306     Hypochromia Slight/1+     Poikilocytes Slight/1+     WBC Morphology Normal     Platelet Estimate Decreased    POC Glucose Fingerstick [[id number]]  (Abnormal) Collected:  06/22/17 2003    Specimen:  Blood Updated:  06/22/17 2015     Glucose 178 (H) mg/dL       : Genoveva Melo ID: [id number]       Basic Metabolic Panel [[id number]]  (Abnormal) Collected:  06/23/17 0359    Specimen:  Blood Updated:  06/23/17 0431     Glucose 131 (H) mg/dL      BUN 25 (H) mg/dL      Creatinine 2.92 (H) mg/dL      Sodium 138 mmol/L      Potassium 3.4 (L) mmol/L      Chloride 101 mmol/L      CO2 24.0 mmol/L      Calcium 8.1 (L) mg/dL      eGFR  African Amer 19 (L) mL/min/1.73      BUN/Creatinine Ratio 8.6     Anion Gap 13.0 mmol/L     Narrative:       The MDRD GFR formula is only valid for adults with stable renal function between ages 18 and 70.    CBC & Differential [[id number]] Collected:  06/23/17 0359    Specimen:  Blood Updated:  06/23/17 0523    Narrative:       The following orders were created for panel order CBC & Differential.  Procedure                               Abnormality         Status                     ---------                               -----------         ------                     Scan Slide[[id number]]                                       Final result               CBC Auto Differential[[id number]]        Abnormal            Final result                  Please view results for these tests on the individual orders.    CBC Auto Differential [[id number]]  (Abnormal) Collected:  06/23/17 0359    Specimen:  Blood Updated:  06/23/17 0523     WBC 5.49 10*3/mm3      RBC 2.59 (L) 10*6/mm3      Hemoglobin 7.3 (L) g/dL      Hematocrit 22.1 (L) %      MCV 85.3 fL      MCH 28.2 pg      MCHC 33.0 g/dL      RDW 20.7 (H) %      RDW-SD 61.6 (H) fl      Platelets 58 (L) 10*3/mm3      Neutrophil % 77.7 %      Lymphocyte % 12.2 (L) %      Monocyte % 8.7 %      Eosinophil % 0.0 %      Basophil % 0.5 %      Immature Grans % 0.9 %      Neutrophils, Absolute 4.26 10*3/mm3      Lymphocytes, Absolute 0.67 (L) 10*3/mm3      Monocytes, Absolute 0.48 10*3/mm3      Eosinophils, Absolute 0.00 10*3/mm3      Basophils, Absolute 0.03 10*3/mm3      Immature Grans, Absolute 0.05 (H) 10*3/mm3      nRBC 0.0 /100 WBC     Scan Slide [[id number]] Collected:  06/23/17 0359    Specimen:  Blood Updated:  06/23/17 0523     Anisocytosis Slight/1+     Hypochromia Slight/1+     Poikilocytes Slight/1+     RBC Fragments Slight/1+     WBC Morphology Normal     Platelet Estimate Decreased        Imaging Results (last 24 hours)     ** No results found for the last 24 hours. **        ECG/EMG Results (last 24 hours)     ** No results found for the last 24 hours. **        Orders (last 24 hrs)     Start     Ordered    06/23/17 0800  predniSONE (DELTASONE) tablet 40 mg  Daily With Breakfast      06/22/17 1320    06/23/17 0800  Hemodialysis inpatient  Once     Comments:  UF 2000 cc    06/22/17 1320    06/23/17 0600  Basic Metabolic Panel  Morning Draw      06/22/17 1320    06/23/17 0600  CBC & Differential  Morning Draw      06/22/17 1320    06/23/17 0600  CBC Auto Differential  PROCEDURE ONCE      06/23/17 0001    06/23/17 0428  Scan Slide  Once      06/23/17 0427    06/22/17 2015  POC Glucose Fingerstick  Once      06/22/17 2014 06/22/17 1227  Scan Slide  Once      06/22/17 1226    06/22/17 1214  POC Glucose  Fingerstick  Once      06/22/17 1213    06/22/17 1118  Inpatient Consult to Vascular Surgery  Once     Specialty:  Vascular Surgery  Provider:  Lux Valle, DO    06/22/17 1119    06/22/17 0858  Basic Metabolic Panel  Once      06/22/17 0730    06/22/17 0849  Inpatient Consult For PICC  Once     Comments:  After Line Placement, Flushes and Line Care Should Be Ordered Using the Line Care (Flushes & Dressing Changes) - All Line Types Order Set   Provider:  (Not yet assigned)    06/22/17 0848    06/22/17 0804  Transfer Patient  Once      06/22/17 0803    06/22/17 0759  POC Glucose Fingerstick  Once      06/22/17 0758    06/22/17 0731  CBC (No Diff)  Once      06/22/17 0730    06/21/17 1200  hydrocortisone sodium succinate (Solu-CORTEF) injection 50 mg  Every 6 Hours,   Status:  Discontinued      06/21/17 0750    06/21/17 1200  Dietary Nutrition Supplements Magic Cup  Daily With Lunch & Dinner     Comments:  Alternate flavors    06/21/17 1112    06/17/17 1200  POC Glucose Fingerstick  Every 6 Hours,   Status:  Canceled      06/17/17 0959    06/17/17 0951  Residual Volume Assessment - Gastric Feedings  As Needed,   Status:  Canceled     Comments:  Measure Gastric Residual Volume if Patient Complains of Nausea, Abdominal Distention, Pain, Tenderness or Firmness  If Residual Volume Greater Than 500 mL, Re-Feed Amount That Was Removed, Hold Tube Feeding for 1 Hour & Recheck Residual.  Notify Provider if Second Residual Remains Greater Than 500 mL  Document Residual Volume, Amount Discarded & Appearance of Residual.    06/17/17 0959    06/17/17 0951  Flush Feeding Tube With 30-50mL Water As Needed  As Needed,   Status:  Canceled      06/17/17 0959    06/17/17 0951  Verify Tube Placement Initially & As Needed  As Needed,   Status:  Canceled      06/17/17 0959    06/17/17 0950  Jay and Document Tube Depth (in cm)  As Needed,   Status:  Canceled      06/17/17 0959    06/17/17 0900  multiple vitamin (M.V.I. Adult) 10 mL,  thiamine (B-1) 100 mg, folic acid 1 mg in dextrose 5 % and sodium chloride 0.45 % 1,000 mL infusion  Daily,   Status:  Discontinued      06/17/17 0803    06/16/17 1300  diltiaZEM (CARDIZEM) 125mg/125 mL (1 mg/mL) infusion  Titrated,   Status:  Discontinued      06/16/17 1216    06/16/17 0900  cefTRIAXone (ROCEPHIN) 1 g/100 mL 0.9% NS (MBP)  Every 24 Hours,   Status:  Discontinued      06/16/17 0744    06/16/17 0830  ipratropium-albuterol (DUO-NEB) nebulizer solution 3 mL  Every 6 Hours While Awake - RT      06/16/17 0745    06/15/17 0900  megestrol (MEGACE) 40 MG/ML suspension 400 mg  Daily      06/15/17 0827    06/15/17 0845  midodrine (PROAMATINE) tablet 5 mg  3 Times Daily Before Meals      06/15/17 0804    06/15/17 0806  HYDROmorphone (DILAUDID) injection 0.25 mg  Every 4 Hours PRN      06/15/17 0806    06/14/17 0845  levothyroxine (SYNTHROID, LEVOTHROID) tablet 50 mcg  Every Early Morning      06/14/17 0801    06/13/17 1700  POC Glucose Fingerstick  4 Times Daily Before Meals & at Bedtime      06/13/17 1639    06/13/17 0900  norepinephrine (LEVOPHED) 8,000 mcg in sodium chloride 0.9 % 250 mL (32 mcg/mL) infusion  Titrated,   Status:  Discontinued      06/13/17 0824    06/12/17 2346  dextrose (D50W) solution 50 mL  Every 1 Hour PRN,   Status:  Discontinued      06/12/17 2346    06/12/17 0600  pantoprazole (PROTONIX) EC tablet 40 mg  Every Early Morning      06/11/17 2302    06/11/17 2155  sodium chloride 0.9 % flush 1-10 mL  As Needed,   Status:  Discontinued      06/11/17 2155    06/11/17 1858  ondansetron (ZOFRAN) injection 4 mg  Every 6 Hours PRN      06/11/17 1859    Unscheduled  Oral Care  As Needed      06/17/17 0959    --  carvedilol (COREG) 12.5 MG tablet  Daily      06/11/17 1750    --  potassium chloride (KLOR-CON) 20 MEQ packet  Daily      06/11/17 1750    --  traZODone (DESYREL) 50 MG tablet  Nightly      06/11/17 1750    --  predniSONE (DELTASONE) 1 MG tablet  Daily      06/11/17 1750    --   PREDNISONE PO      06/11/17 1750    --  losartan (COZAAR) 25 MG tablet  Daily      06/11/17 1750    --  pantoprazole (PROTONIX) 40 MG EC tablet  Daily      06/11/17 1750    --  SCANNED EKG      06/11/17 0000    --  SCANNED - TELEMETRY        06/11/17 0000    --  SCANNED - TELEMETRY        06/11/17 0000    --  SCANNED - TELEMETRY        06/11/17 0000    --  SCANNED EKG      06/11/17 0000    --  SCANNED - TELEMETRY        06/11/17 0000    --  SCANNED - TELEMETRY        06/11/17 0000    --  SCANNED - TELEMETRY        06/11/17 0000    --  SCANNED - TELEMETRY        06/11/17 0000    --  SCANNED - LABS      06/11/17 0000    --  SCANNED - TELEMETRY        06/11/17 0000    --  SCANNED - TELEMETRY        06/11/17 0000    --  SCANNED - TELEMETRY        06/11/17 0000    --  SCANNED - TELEMETRY        06/11/17 0000          Ventilator/Non-Invasive Ventilation Settings     None           Physician Progress Notes (last 24 hours) (Notes from 6/22/2017  6:03 AM through 6/23/2017  6:03 AM)      Arthur Kerr MD at 6/22/2017  7:54 AM  Version 1 of 1             [facility] Medicine Services  INPATIENT PROGRESS NOTE    Length of Stay: 11  Date of Admission: 6/11/2017  Primary Care Physician: No Known Provider    Subjective   Chief Complaint: Atrial fib, hypertension, weakness, kidney failure    Chest Pain         Patient eating well.  Had [employer] last night. BP better with IV steroids.  Says feels better today. Didn't get dialysis until late yesterday so held in unit overnight. Doing OK no complaints sleepy this AM BP ok off pressors  Review of Systems   Cardiovascular: Positive for chest pain.      Constitutional: Positive for activity change, appetite change and fatigue. Negative for chills and fever.   HENT: Negative for hearing loss, nosebleeds, tinnitus and trouble swallowing.   Eyes: Negative for visual disturbance.   Respiratory: Negative for cough, chest tightness, shortness of breath and  wheezing.   Cardiovascular: Negative for chest pain, palpitations and leg swelling.   Gastrointestinal: Negative for abdominal distention, abdominal pain, blood in stool, constipation, diarrhea, nausea and vomiting.   Endocrine: Negative for cold intolerance, heat intolerance, polydipsia, polyphagia and polyuria.   Genitourinary: Negative for decreased urine volume, difficulty urinating, dysuria, flank pain, frequency and hematuria.   Musculoskeletal: Positive for arthralgias, gait problem and myalgias. Negative for joint swelling.   Skin: Negative for rash.   Allergic/Immunologic: Negative for immunocompromised state.   Neurological: Positive for weakness. Negative for dizziness, syncope, light-headedness and headaches.   Hematological: Negative for adenopathy. Does not bruise/bleed easily.   Psychiatric/Behavioral: Negative for confusion and sleep disturbance. The patient is not nervous/anxious.     All pertinent negatives and positives are as above. All other systems have been reviewed and are negative unless otherwise stated.     Objective    Temp:  [97.5 °F (36.4 °C)-98.3 °F (36.8 °C)] 97.8 °F (36.6 °C)  Heart Rate:  [] 103  Resp:  [13-24] 19  BP: ()/(48-96) 107/61    Intake/Output Summary (Last 24 hours) at 06/22/17 0754  Last data filed at 06/22/17 0400   Gross per 24 hour   Intake             1044 ml   Output                0 ml   Net             1044 ml     Physical Exam  Constitutional: She appears well-developed and well-nourished.   HENT:   Head: Normocephalic and atraumatic.   Eyes: Conjunctivae and EOM are normal. Pupils are equal, round, and reactive to light.   Neck: Neck supple. No JVD present. No thyromegaly present.   Cardiovascular: Normal rate, regular rhythm, normal heart sounds and intact distal pulses. Exam reveals no gallop and no friction rub.   No murmur heard.  Pulmonary/Chest: Effort normal. No respiratory distress. She has no wheezes. She has rales. She exhibits no  tenderness.   Abdominal: Soft. Bowel sounds are normal. She exhibits no distension. There is no tenderness. There is no rebound and no guarding.   Musculoskeletal: Normal range of motion. She exhibits no edema, tenderness or deformity.   Lymphadenopathy:   She has no cervical adenopathy.   Neurological: She is alert. She displays normal reflexes. No cranial nerve deficit. She exhibits abnormal muscle tone. Coordination abnormal.   Skin: Skin is warm and dry. No rash noted.   Psychiatric: She has a normal mood and affect. Her behavior is normal.   Nursing note and vitals reviewed.  Results Review:  Lab Results (last 24 hours)     Procedure Component Value Units Date/Time    Blood Culture [[id number]]  (Normal) Collected:  06/16/17 0918    Specimen:  Blood from Arm, Left Updated:  06/21/17 1001     Blood Culture No growth at 5 days    POC Glucose Fingerstick [[id number]]  (Abnormal) Collected:  06/21/17 1059    Specimen:  Blood Updated:  06/21/17 1114     Glucose 184 (H) mg/dL       : [id number] [facility] ID: [id number]       Basic Metabolic Panel [[id number]]  (Abnormal) Collected:  06/21/17 1548    Specimen:  Blood Updated:  06/21/17 1628     Glucose 185 (H) mg/dL      BUN 29 (H) mg/dL      Creatinine 3.53 (H) mg/dL      Sodium 133 (L) mmol/L      Potassium 3.6 mmol/L      Chloride 98 mmol/L      CO2 23.0 (L) mmol/L      Calcium 8.0 (L) mg/dL      eGFR  African Amer 15 (L) mL/min/1.73      BUN/Creatinine Ratio 8.2     Anion Gap 12.0 mmol/L     Narrative:       The MDRD GFR formula is only valid for adults with stable renal function between ages 18 and 70.    CBC & Differential [[id number]] Collected:  06/21/17 1548    Specimen:  Blood Updated:  06/21/17 1636    Narrative:       The following orders were created for panel order CBC & Differential.  Procedure                               Abnormality         Status                     ---------                               -----------         ------                      Scan Slide[[id number]]                                       Final result               CBC Auto Differential[[id number]]        Abnormal            Final result                 Please view results for these tests on the individual orders.    CBC Auto Differential [[id number]]  (Abnormal) Collected:  06/21/17 1548    Specimen:  Blood Updated:  06/21/17 1636     WBC 4.59 (L) 10*3/mm3      RBC 2.54 (L) 10*6/mm3      Hemoglobin 7.3 (L) g/dL      Hematocrit 21.2 (L) %      MCV 83.5 fL      MCH 28.7 pg      MCHC 34.4 g/dL      RDW 20.1 (H) %      RDW-SD 60.4 (H) fl      Platelets 66 (L) 10*3/mm3      Neutrophil % 84.1 (H) %      Lymphocyte % 8.3 (L) %      Monocyte % 7.2 %      Eosinophil % 0.0 %      Basophil % 0.0 %      Immature Grans % 0.4 %      Neutrophils, Absolute 3.86 10*3/mm3      Lymphocytes, Absolute 0.38 (L) 10*3/mm3      Monocytes, Absolute 0.33 10*3/mm3      Eosinophils, Absolute 0.00 10*3/mm3      Basophils, Absolute 0.00 10*3/mm3      Immature Grans, Absolute 0.02 10*3/mm3      nRBC 0.7 (H) /100 WBC     Scan Slide [[id number]] Collected:  06/21/17 1548    Specimen:  Blood Updated:  06/21/17 1636     Hypochromia Mod/2+     Microcytes Slight/1+     Target Cells Slight/1+     WBC Morphology Normal     Platelet Estimate Decreased    POC Glucose Fingerstick [[id number]]  (Abnormal) Collected:  06/21/17 2245    Specimen:  Blood Updated:  06/21/17 2257     Glucose 162 (H) mg/dL       : [id number] [facility] ID: [id number]              Cultures:  Blood Culture   Date Value Ref Range Status   06/16/2017 No growth at 24 hours  Preliminary       Radiology Data:    Imaging Results (last 24 hours)     ** No results found for the last 24 hours. **          Allergies   Allergen Reactions   • Heparin    • Iron    • Latex    • Levaquin [Levofloxacin]    • Shrimp (Diagnostic)    • Vancomycin        Scheduled meds:     ceftriaxone 1 g Intravenous Q24H   hydrocortisone sodium succinate 50 mg Intravenous  Q6H   ipratropium-albuterol 3 mL Nebulization Q6H While Awake - RT   levothyroxine 50 mcg Oral Q AM   megestrol 400 mg Oral Daily   midodrine 5 mg Oral TID AC   IV Fluids 1000 mL + additives 50 mL/hr Intravenous Daily   pantoprazole 40 mg Oral Q AM       PRN meds:  dextrose  •  HYDROmorphone  •  ondansetron  •  sodium chloride    Assessment/Plan     Active Problems:    Chest pain in adult    Renal failure    Hypotension    Anemia    Hypokalemia      Plan:  Chest pain, atypical, echocardiogram/afib -Ejection fraction 61%, diastolic dysfunction, right ventricular dilatation, tricuspid valve regurgitation, right ventricle systolic pressure greater than 55. Off Cardizem.  Doing much better on steroids BP good off levophed eating continue same wean steroids.  Hemoptysis ×1 6/15. Hx feliciano syndrome.    Thrombopenia-6 packs of platelets.  No active bleeding.     Pneumonia/ Slight leukocytosis. Chest x-ray ? Retrocardiac opacity- on rocephin . Blood culture no growth in 24 hours. Duo neb.      Anemia- Status post 2 units of blood transfusion 6/14.      Chronic renal failure- nephrology is on board. Cont dialysis per nephrology.       Hyperlipidemia      Elevate TSH- free T4 is normal. Start synthroid.      Hypotension- resoloved      Hypokalemia-- resolved      Calcium 7.7, corrected calcium 9.5 - 6/13/2017.      Nutrition- start megace, patient has is eating [employer] now.      Deconditioning- start PT and OT      Discharge Planning: transfer to floor. ? Needs LTAC or NHP?  Adrenal insufficiency responding to steroids.  Arthur Kerr MD   06/22/17   7:54 AM                     Electronically signed by Arthur Kerr MD at 6/22/2017  7:59 AM      FANNIE Blanco at 6/22/2017  8:08 AM  Version 1 of 1    Attestation signed by Jez Romo MD at 6/22/2017  1:18 PM        Patient was examined, all the data was reviewed.  72 years old very pleasant woman who has end-stage renal disease secondary to diabetic  nephropathy and Nayely's.  Presenting this time with new onset of atrial fibrillation, volume overload, failure to thrive.  She has significantly improved.    Assessment:  1.  End-stage renal disease secondary to Nayely's /diabetic nephropathy on dialysis 3 times a week.  2.  Hyponatremia, improving  3.  Pneumonia  4.  Congestive heart failure with diastolic dysfunction.    Plan:  1.  Discontinue IV antibiotics  2.  Change IV steroids to by mouth  3.  Discontinue IV fluid  4.  Hemodialysis in the morning.  5.  She is okay to discharge to regular room.                               Nephrology ([facility]) Progress Note      Patient:  Jennifer Marks  YOB: 1945  Date of Service: 6/22/2017  MRN: [id number]   Acct: [id number]   Primary Care Physician: No Known Provider  Advance Directive: Conditional Code  Admit Date: 6/11/2017       Hospital Day: 11  Referring Provider: Pino Tang MD      Patient personally seen and examined.  Complete chart including Consults, Notes, Operative Reports, Labs, Cardiology, and Radiology studies reviewed as able.        Subjective:  Jennifer Marks is a 72 y.o. female  whom we were consulted for end stage renal disease management, hypokalemia. She is on hemodialysis Monday Wednesday Friday.  Admitted with chest pain. Has had decreased oral intake and diarrhea for several days.  On 6/13 had femoral line placed.   Was on Cardizem drip for tachydysrhythmia.  Had been hypotensive a needing Levophed drip intermittently; has now been weaned off Levophed.  Today is awake and alert, generalized weakness.  Discussed with nursing; having significant difficulty with vascular access for blood draws.    Allergies:  Heparin; Iron; Latex; Levaquin [levofloxacin]; Shrimp (diagnostic); and Vancomycin    Home Meds:  Prescriptions Prior to Admission   Medication Sig Dispense Refill Last Dose   • carvedilol (COREG) 12.5 MG tablet Take 12.5 mg by mouth Daily.       • losartan (COZAAR) 25 MG tablet Take 25 mg by mouth Daily.      • pantoprazole (PROTONIX) 40 MG EC tablet Take 40 mg by mouth Daily.      • potassium chloride (KLOR-CON) 20 MEQ packet Take 20 mEq by mouth Daily.      • predniSONE (DELTASONE) 1 MG tablet Take 1 mg by mouth Daily.      • PREDNISONE PO Take  by mouth.      • traZODone (DESYREL) 50 MG tablet Take 50 mg by mouth Every Night.          Medicines:  Current Facility-Administered Medications   Medication Dose Route Frequency Provider Last Rate Last Dose   • cefTRIAXone (ROCEPHIN) 1 g/100 mL 0.9% NS (MBP)  1 g Intravenous Q24H Carter Llamas MD   1 g at 06/21/17 0856   • dextrose (D50W) solution 50 mL  50 mL Intravenous Q1H PRN Bree Soares MD   50 mL at 06/17/17 0800   • hydrocortisone sodium succinate (Solu-CORTEF) injection 50 mg  50 mg Intravenous Q6H Arthur Kerr MD   50 mg at 06/22/17 0634   • HYDROmorphone (DILAUDID) injection 0.25 mg  0.25 mg Intravenous Q4H PRN Carter Llamas MD   0.25 mg at 06/22/17 0033   • ipratropium-albuterol (DUO-NEB) nebulizer solution 3 mL  3 mL Nebulization Q6H While Awake - RT Carter Llamas MD   3 mL at 06/22/17 0635   • levothyroxine (SYNTHROID, LEVOTHROID) tablet 50 mcg  50 mcg Oral Q AM Carter Llamas MD   50 mcg at 06/22/17 0634   • megestrol (MEGACE) 40 MG/ML suspension 400 mg  400 mg Oral Daily Carter Llamas MD   400 mg at 06/19/17 0818   • midodrine (PROAMATINE) tablet 5 mg  5 mg Oral TID AC FANNIE Blanco   5 mg at 06/22/17 0634   • multiple vitamin (M.V.I. Adult) 10 mL, thiamine (B-1) 100 mg, folic acid 1 mg in dextrose 5 % and sodium chloride 0.45 % 1,000 mL infusion  50 mL/hr Intravenous Daily Carter Llamas MD 50 mL/hr at 06/21/17 0856 50 mL/hr at 06/21/17 0856   • ondansetron (ZOFRAN) injection 4 mg  4 mg Intravenous Q6H PRN Pino Tang MD   4 mg at 06/15/17 0555   • pantoprazole (PROTONIX) EC tablet 40 mg  40 mg Oral Q AM Pino Tang MD   40 mg at 06/22/17 0634   • sodium chloride 0.9 %  "flush 1-10 mL  1-10 mL Intravenous PRN Pino Tang MD           Past Medical History:  Past Medical History:   Diagnosis Date   • A-fib    • Arthritis    • CHF (congestive heart failure)    • Hypertension    • Renal failure        Past Surgical History:  Past Surgical History:   Procedure Laterality Date   • ARTERIOVENOUS FISTULA LEG Left        Family History  History reviewed. No pertinent family history.    Social History  Social History     Social History   • Marital status: Single     Spouse name: N/A   • Number of children: N/A   • Years of education: N/A     Occupational History   • Not on file.     Social History Main Topics   • Smoking status: Former Smoker   • Smokeless tobacco: Not on file   • Alcohol use No   • Drug use: No   • Sexual activity: Defer     Other Topics Concern   • Not on file     Social History Narrative         Review of Systems:  History obtained from chart review and the patient  General ROS: Patient complains of malaise and No fever or chills  Respiratory ROS: No cough, shortness of breath, wheezing  Cardiovascular ROS: no chest pain or dyspnea on exertion  Gastrointestinal ROS: No abdominal pain, no nausea  Genito-Urinary ROS: No dysuria or hematuria  Neurological ROS: negative  Dermatological ROS: negative  14 point ROS reviewed with the patient and negative except as noted above and in the HPI unless unable to obtain.    Objective:  /61  Pulse 103  Temp 97.4 °F (36.3 °C) (Temporal Artery )   Resp 19  Ht 62\" (157.5 cm)  Wt 116 lb (52.6 kg)  SpO2 94%  BMI 21.22 kg/m2    Intake/Output Summary (Last 24 hours) at 06/22/17 0808  Last data filed at 06/22/17 0400   Gross per 24 hour   Intake             1044 ml   Output                0 ml   Net             1044 ml     General: awake/alert    Neck: supple, no JVD  Chest:  clear to auscultation bilaterally without respiratory distress  CVS: regular rate and rhythm  Abdominal: soft, nontender, normal bowel sounds  Extremities: " no cyanosis or edema  Skin: warm and dry without rash   Neuro: no focal motor deficits      Labs:    Results from last 7 days  Lab Units 06/21/17  1548 06/20/17  0421 06/19/17  0404   WBC 10*3/mm3 4.59* 4.03* 6.21   HEMOGLOBIN g/dL 7.3* 8.2* 8.1*   HEMATOCRIT % 21.2* 24.3* 23.9*   PLATELETS 10*3/mm3 66* 53* 75*           Results from last 7 days  Lab Units 06/21/17  1548 06/20/17  0439 06/19/17  0404 06/18/17  0346 06/17/17  0827   SODIUM mmol/L 133* 133* 136 137 137   POTASSIUM mmol/L 3.6 3.6 3.5 3.2* 3.5   CHLORIDE mmol/L 98 98 101 100 100   TOTAL CO2 mmol/L 23.0* 27.0 26.0 28.0 25.0   BUN mg/dL 29* 17 28* 23* 17   CREATININE mg/dL 3.53* 2.66* 3.93* 3.48* 3.00*   CALCIUM mg/dL 8.0* 7.8* 7.7* 7.8* 7.8*   BILIRUBIN mg/dL  --   --  1.8* 1.8* 2.7*   ALK PHOS U/L  --   --  90 92 89   ALT (SGPT) U/L  --   --  22 28 25   AST (SGOT) U/L  --   --  28 23 26   GLUCOSE mg/dL 185* 151* 72 109* 146*       Radiology:   Imaging Results (last 72 hours)     Procedure Component Value Units Date/Time    XR Chest 1 View [[id number]] Collected:  06/11/17 1803     Updated:  06/11/17 2054    Narrative:       EXAMINATION:   XR CHEST 1 VW-  6/11/2017 6:01 PM CDT     HISTORY: Chest pain     Prior exams 05/28/2016.     Single view of the chest is obtained. The pulmonary vascular margins are  slightly indistinct. This has the appearance of mild pulmonary vascular  congestion. Left diaphragm is indistinct. This may be infiltrate or  atelectasis left lower lobe.     Cardiac silhouette is enlarged and unchanged from May 28.       Impression:       1. Silhouetting of the left diaphragm consistent with infiltrate or  atelectasis left lower lobe.  2. Indistinct pulmonary vascular margins consistent with mild pulmonary  vascular congestion.  This report was finalized on 06/11/2017 20:51 by Dr. Herson Magaña MD.          Culture:         Assessment   1.  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  On maintenance  hemodialysis Monday Wednesday Friday   2.  Pneumonia  3.  Hyponatremia  4.  Anemia of chronic kidney disease  5.  Congestive heart failure with diastolic dysfunction  6.  Hyponatremia     Plan:  1.  OK from renal standpoint to transfer out of ICU  2.  Maintenance dialysis tomorrow AM    FANNIE Johnson  6/22/2017  8:08 AM       Electronically signed by Jez Romo MD at 6/22/2017  1:18 PM           Consult Notes (last 24 hours) (Notes from 6/22/2017  6:03 AM through 6/23/2017  6:03 AM)      FANNIE Hirsch at 6/22/2017  4:46 PM  Version 1 of 1         Jennifer Marks  [id number]  [id number]  406/1  Arthur Kerr MD  6/11/2017      HPI: Jennifer Marks is a 72 y.o. 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  She presented this admission with new onset atrial fibrillation, volume overload, and failure to thrive, and chest pain.  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  She has had multiple attempt for IV access including bilateral internal jugular vein catheterization by anesthesia and PICC lines.  Dr. Magdaleno did place a right femoral line, but will not draw blood currently and they have removed.  We have been consulted for access.  Julia Valle did speak with Dr. Romo about need for IV access.  He states is not needed now and will change her medications to oral.    Past Medical History:   Diagnosis Date   • A-fib    • Arthritis    • CHF (congestive heart failure)    • Hypertension    • Renal failure        Past Surgical History:   Procedure Laterality Date   • ARTERIOVENOUS FISTULA LEG Left        History reviewed. No pertinent family history.    Social History     Social History   • Marital status: Single     Spouse name: N/A   • Number of children: N/A   • Years of education: N/A     Occupational History   • Not on file.     Social History Main Topics   •  Smoking status: Former Smoker   • Smokeless tobacco: Not on file   • Alcohol use No   • Drug use: No   • Sexual activity: Defer     Other Topics Concern   • Not on file     Social History Narrative       Allergies   Allergen Reactions   • Heparin    • Iron    • Latex    • Levaquin [Levofloxacin]    • Shrimp (Diagnostic)    • Vancomycin        Hospital Medications (active)       Dose Frequency Start End    HYDROmorphone (DILAUDID) injection 0.25 mg 0.25 mg Every 4 Hours PRN 6/15/2017 6/23/2017    Sig - Route: Infuse 0.25 mg into a venous catheter Every 4 (Four) Hours As Needed for Severe Pain (7-10). - Intravenous    ipratropium-albuterol (DUO-NEB) nebulizer solution 3 mL 3 mL Every 6 Hours While Awake - RT 6/16/2017     Sig - Route: Take 3 mL by nebulization Every 6 (Six) Hours While Awake. - Nebulization    levothyroxine (SYNTHROID, LEVOTHROID) tablet 50 mcg 50 mcg Every Early Morning 6/14/2017     Sig - Route: Take 1 tablet by mouth Every Morning. - Oral    megestrol (MEGACE) 40 MG/ML suspension 400 mg 400 mg Daily 6/15/2017     Sig - Route: Take 10 mL by mouth Daily. - Oral    midodrine (PROAMATINE) tablet 5 mg 5 mg 3 Times Daily Before Meals 6/15/2017     Sig - Route: Take 2 tablets by mouth 3 (Three) Times a Day Before Meals. - Oral    ondansetron (ZOFRAN) injection 4 mg 4 mg Every 6 Hours PRN 6/11/2017     Sig - Route: Infuse 2 mL into a venous catheter Every 6 (Six) Hours As Needed for Nausea or Vomiting. - Intravenous    pantoprazole (PROTONIX) EC tablet 40 mg 40 mg Every Early Morning 6/12/2017     Sig - Route: Take 1 tablet by mouth Every Morning. - Oral    predniSONE (DELTASONE) tablet 40 mg 40 mg Daily With Breakfast 6/23/2017     Sig - Route: Take 2 tablets by mouth Daily With Breakfast. - Oral    cefTRIAXone (ROCEPHIN) 1 g/100 mL 0.9% NS (MBP) (Discontinued) 1 g Every 24 Hours 6/16/2017 6/22/2017    Sig - Route: Infuse 100 mL into a venous catheter Daily. - Intravenous    dextrose (D50W) solution 50 mL  (Discontinued) 50 mL Every 1 Hour PRN 6/12/2017 6/22/2017    Sig - Route: Infuse 50 mL into a venous catheter Every 1 (One) Hour As Needed for Low Blood Sugar (Blood sugar < 70). - Intravenous    diltiaZEM (CARDIZEM) 125mg/125 mL (1 mg/mL) infusion (Discontinued) 5-15 mg/hr Titrated 6/16/2017 6/22/2017    Sig - Route: Infuse 5-15 mg/hr into a venous catheter Dose Adjusted By Provider As Needed. - Intravenous    hydrocortisone sodium succinate (Solu-CORTEF) injection 50 mg (Discontinued) 50 mg Every 6 Hours 6/21/2017 6/22/2017    Sig - Route: Infuse 50 mg into a venous catheter Every 6 (Six) Hours. - Intravenous    multiple vitamin (M.V.I. Adult) 10 mL, thiamine (B-1) 100 mg, folic acid 1 mg in dextrose 5 % and sodium chloride 0.45 % 1,000 mL infusion (Discontinued) 50 mL/hr Daily 6/17/2017 6/22/2017    Sig - Route: Infuse 50 mL/hr into a venous catheter Daily. - Intravenous    norepinephrine (LEVOPHED) 8,000 mcg in sodium chloride 0.9 % 250 mL (32 mcg/mL) infusion (Discontinued) 0.02-0.3 mcg/kg/min × 51.3 kg Titrated 6/13/2017 6/22/2017    Sig - Route: Infuse 1.026-15.39 mcg/min into a venous catheter Dose Adjusted By Provider As Needed. - Intravenous    sodium chloride 0.9 % flush 1-10 mL (Discontinued) 1-10 mL As Needed 6/11/2017 6/22/2017    Sig - Route: Infuse 1-10 mL into a venous catheter As Needed for Line Care. - Intravenous          Review of Systems   Constitutional: Positive for activity change, appetite change and fatigue.   HENT: Negative.    Eyes: Negative.    Respiratory: Negative.    Cardiovascular: Negative.    Gastrointestinal: Negative.    Endocrine: Negative.    Genitourinary: Negative.    Musculoskeletal: Positive for arthralgias and gait problem.   Skin: Negative.    Allergic/Immunologic: Negative.    Neurological: Positive for weakness.   Hematological: Negative.    Psychiatric/Behavioral: Negative.        Physical Exam  Constitutional: She appears well-developed and well-nourished.   HENT:    Head: Normocephalic and atraumatic.   Eyes: Conjunctivae and EOM are normal. Pupils are equal, round, and reactive to light.   Neck: Neck supple. No JVD present. No thyromegaly present.   Cardiovascular: Normal rate, regular rhythm, normal heart sounds and intact distal pulses. Exam reveals no gallop and no friction rub.   No murmur heard.  Pulmonary/Chest: Effort normal. No respiratory distress. She has no wheezes. She has rales. She exhibits no tenderness.   Abdominal: Soft. Bowel sounds are normal. She exhibits no distension. There is no tenderness. There is no rebound and no guarding.   Musculoskeletal: Normal range of motion. She exhibits no edema, tenderness or deformity.   Lymphadenopathy:   She has no cervical adenopathy.   Neurological: She is alert. She displays normal reflexes. No cranial nerve deficit. She exhibits abnormal muscle tone. Coordination abnormal.   Skin: Skin is warm and dry. No rash noted.   Psychiatric: She has a normal mood and affect. Her behavior is normal.   Nursing note and vitals reviewed.  Results Review:  Laboratory Data:    Results from last 7 days  Lab Units 06/22/17  1155 06/22/17  0812 06/21/17  1548   WBC 10*3/mm3 4.15* 5.19 4.59*   HEMOGLOBIN g/dL 7.3* 8.2* 7.3*   HEMATOCRIT % 22.0* 24.6* 21.2*   PLATELETS 10*3/mm3 61* 76* 66*         Results from last 7 days  Lab Units 06/22/17  1155 06/21/17  1548 06/20/17  0439 06/19/17  0404 06/18/17  0346 06/17/17  0827   SODIUM mmol/L 138 133* 133* 136 137 137   POTASSIUM mmol/L 3.4* 3.6 3.6 3.5 3.2* 3.5   CHLORIDE mmol/L 99 98 98 101 100 100   TOTAL CO2 mmol/L 28.0 23.0* 27.0 26.0 28.0 25.0   BUN mg/dL 18 29* 17 28* 23* 17   CREATININE mg/dL 2.57* 3.53* 2.66* 3.93* 3.48* 3.00*   CALCIUM mg/dL 8.2* 8.0* 7.8* 7.7* 7.8* 7.8*   BILIRUBIN mg/dL  --   --   --  1.8* 1.8* 2.7*   ALK PHOS U/L  --   --   --  90 92 89   ALT (SGPT) U/L  --   --   --  22 28 25   AST (SGOT) U/L  --   --   --  28 23 26   GLUCOSE mg/dL 144* 185* 151* 72 109* 146*              Diagnostic Data:  Imaging Results (last 24 hours)     ** No results found for the last 24 hours. **          Impression:  Active Problems:    Chest pain in adult    Renal failure    Hypotension    Anemia    Hypokalemia  CHF with diastolic dysfunction    Plan: After thoroughly evaluating Jennifer Marks, I believe the best course of action is to remain conservative from a vascular standpoint.  Dr. Valle did have a discussion with Dr. Romo regarding the patient.  He was changing her medications to oral.  She does have IV access currently.  This was discussed with family and patient.  Thank you for allowing me to participate in the care of your patient.  Please do not hesitate to call with any questions or concerns.    FANNIE Hirsch            Electronically signed by FANNIE Hirsch at 6/22/2017  5:00 PM

## 2017-06-23 NOTE — PLAN OF CARE
Problem: Patient Care Overview (Adult)  Goal: Plan of Care Review  Outcome: Ongoing (interventions implemented as appropriate)    06/23/17 0826   Coping/Psychosocial Response Interventions   Plan Of Care Reviewed With patient   Patient Care Overview   Progress progress towards functional goals is fair   Outcome Evaluation   Outcome Summary/Follow up Plan Pt was wanting to get up upon entering her room. She was able to perform supine to sit with mod assist. Sitting EOB maintained balance with CGA while performing LE exercises. Transfered sit to stand with mod assist and HHA. Then transfered from the bed to the chair with mod assist, HHA.. Will continue to work with pt to increase strength and safety with mobility and progress pt with ambulation.

## 2017-06-23 NOTE — PLAN OF CARE
Problem: Patient Care Overview (Adult)  Goal: Plan of Care Review  Outcome: Ongoing (interventions implemented as appropriate)    06/23/17 1101   Patient Care Overview   Progress progress towards functional goals is fair   Outcome Evaluation   Outcome Summary/Follow up Plan OT re-eval/progress note completed. Pt. sitting slouched down in chair upon entering room. Pt. Max A for LB dressing. Pt. Max A to reposition in chair with draw sheet. Pt. refused to stand this date. Pt. cont to benefit from skilled OT will close monitoring for acheiving goals. If pt. not progressing she will need to be d/c from OT. 6/23/17 1100         Problem: Inpatient Occupational Therapy  Goal: Bed Mobility Goal LTG- OT  Outcome: Ongoing (interventions implemented as appropriate)    06/15/17 1015 06/23/17 1101   Bed Mobility OT LTG   Bed Mobility OT LTG, Date Established 06/15/17 --    Bed Mobility OT LTG, Time to Achieve by discharge --    Bed Mobility OT LTG, Activity Type all bed mobility --    Bed Mobility OT LTG, [place] Level moderate assist (50% patient effort) --    Bed Mobility OT LTG, Assist Device bed rails --    Bed Mobility OT LTG, Date Goal Reviewed --  06/23/17   Bed Mobility OT LTG, Outcome --  goal ongoing   Bed Mobility OT LTG, Reason Goal Not Met --  progress slower than expected       Goal: Strength Goal LTG- OT  Outcome: Ongoing (interventions implemented as appropriate)    06/15/17 1015 06/23/17 1101   Strength OT LTG   Strength Goal OT LTG, Date Established 06/15/17 --    Strength Goal OT LTG, Time to Achieve by discharge --    Strength Goal OT LTG, Measure to Achieve Pt. will complete BUE strengthening exercsies to increase BUE strength and decreased tone for completing ADLs.  --    Strength Goal OT LTG, Date Goal Reviewed --  06/23/17   Strength Goal OT LTG, Outcome --  goal ongoing   Strength Goal OT LTG, Reason Goal Not Met --  progress slower than expected       Goal: Grooming Goal LTG- OT  Outcome: Ongoing  (interventions implemented as appropriate)    06/15/17 1015 06/23/17 1101   Grooming OT LTG   Grooming Goal OT LTG, Date Established 06/15/17 --    Grooming Goal OT LTG, Time to Achieve by discharge --    Grooming Goal OT LTG, [place] Level moderate assist (50% patient effort) --    Grooming Goal OT LTG, Date Goal Reviewed --  06/23/17   Grooming Goal OT LTG, Outcome --  goal ongoing   Grooming Goal OT LTG, Reason Goal Not Met --  progress slower than expected

## 2017-06-23 NOTE — PROGRESS NOTES
[facility] Medicine Services  INPATIENT PROGRESS NOTE    Length of Stay: 12  Date of Admission: 6/11/2017  Primary Care Physician: No Known Provider    Subjective   Chief Complaint: Atrial fib, hypertension, weakness, kidney failure    Chest Pain         Patient eating well.   BP remains stable lost IV access medications switched to oral.  Says feels better today.  Doing OK on floor tolerating dialysis. Doing OK no complaints.  Review of Systems   Cardiovascular: Positive for chest pain.      Constitutional: Positive for activity change, appetite change and fatigue. Negative for chills and fever.   HENT: Negative for hearing loss, nosebleeds, tinnitus and trouble swallowing.   Eyes: Negative for visual disturbance.   Respiratory: Negative for cough, chest tightness, shortness of breath and wheezing.   Cardiovascular: Negative for chest pain, palpitations and leg swelling.   Gastrointestinal: Negative for abdominal distention, abdominal pain, blood in stool, constipation, diarrhea, nausea and vomiting.   Endocrine: Negative for cold intolerance, heat intolerance, polydipsia, polyphagia and polyuria.   Genitourinary: Negative for decreased urine volume, difficulty urinating, dysuria, flank pain, frequency and hematuria.   Musculoskeletal: Positive for arthralgias, gait problem and myalgias. Negative for joint swelling.   Skin: Negative for rash.   Allergic/Immunologic: Negative for immunocompromised state.   Neurological: Positive for weakness. Negative for dizziness, syncope, light-headedness and headaches.   Hematological: Negative for adenopathy. Does not bruise/bleed easily.   Psychiatric/Behavioral: Negative for confusion and sleep disturbance. The patient is not nervous/anxious.     All pertinent negatives and positives are as above. All other systems have been reviewed and are negative unless otherwise stated.     Objective    Temp:  [97.1 °F (36.2 °C)-98.8 °F (37.1 °C)]  98.5 °F (36.9 °C)  Heart Rate:  [101-110] 110  Resp:  [16-20] 16  BP: (114-134)/(54-97) 134/56    Intake/Output Summary (Last 24 hours) at 06/23/17 1551  Last data filed at 06/23/17 1151   Gross per 24 hour   Intake              250 ml   Output                0 ml   Net              250 ml     Physical Exam  Constitutional: She appears well-developed and well-nourished.   HENT:   Head: Normocephalic and atraumatic.   Eyes: Conjunctivae and EOM are normal. Pupils are equal, round, and reactive to light.   Neck: Neck supple. No JVD present. No thyromegaly present.   Cardiovascular: Normal rate, regular rhythm, normal heart sounds and intact distal pulses. Exam reveals no gallop and no friction rub.   No murmur heard.  Pulmonary/Chest: Effort normal. No respiratory distress. She has no wheezes. She has no rales. She exhibits no tenderness.   Abdominal: Soft. Bowel sounds are normal. She exhibits no distension. There is no tenderness. There is no rebound and no guarding.   Musculoskeletal: Normal range of motion. She exhibits no edema, tenderness or deformity.   Lymphadenopathy:   She has no cervical adenopathy.   Neurological: She is alert. She displays normal reflexes. No cranial nerve deficit. She exhibits abnormal muscle tone. Coordination abnormal.   Skin: Skin is warm and dry. No rash noted.   Psychiatric: She has a normal mood and affect. Her behavior is normal.   Nursing note and vitals reviewed.  Results Review:  Lab Results (last 24 hours)     Procedure Component Value Units Date/Time    POC Glucose Fingerstick [[id number]]  (Abnormal) Collected:  06/22/17 2003    Specimen:  Blood Updated:  06/22/17 2015     Glucose 178 (H) mg/dL       [id number] [id number]       Basic Metabolic Panel [[id number]]  (Abnormal) Collected:  06/23/17 0359    Specimen:  Blood Updated:  06/23/17 0431     Glucose 131 (H) mg/dL      BUN 25 (H) mg/dL      Creatinine 2.92 (H) mg/dL      Sodium 138 mmol/L       Potassium 3.4 (L) mmol/L      Chloride 101 mmol/L      CO2 24.0 mmol/L      Calcium 8.1 (L) mg/dL      eGFR  African Amer 19 (L) mL/min/1.73      BUN/Creatinine Ratio 8.6     Anion Gap 13.0 mmol/L     Narrative:       The MDRD GFR formula is only valid for adults with stable renal function between ages 18 and 70.    CBC & Differential [[id number]] Collected:  06/23/17 0359    Specimen:  Blood Updated:  06/23/17 0523    Narrative:       The following orders were created for panel order CBC & Differential.  Procedure                               Abnormality         Status                     ---------                               -----------         ------                     Scan Slide[[id number]]                                       Final result               CBC Auto Differential[[id number]]        Abnormal            Final result                 Please view results for these tests on the individual orders.    CBC Auto Differential [[id number]]  (Abnormal) Collected:  06/23/17 0359    Specimen:  Blood Updated:  06/23/17 0523     WBC 5.49 10*3/mm3      RBC 2.59 (L) 10*6/mm3      Hemoglobin 7.3 (L) g/dL      Hematocrit 22.1 (L) %      MCV 85.3 fL      MCH 28.2 pg      MCHC 33.0 g/dL      RDW 20.7 (H) %      RDW-SD 61.6 (H) fl      Platelets 58 (L) 10*3/mm3      Neutrophil % 77.7 %      Lymphocyte % 12.2 (L) %      Monocyte % 8.7 %      Eosinophil % 0.0 %      Basophil % 0.5 %      Immature Grans % 0.9 %      Neutrophils, Absolute 4.26 10*3/mm3      Lymphocytes, Absolute 0.67 (L) 10*3/mm3      Monocytes, Absolute 0.48 10*3/mm3      Eosinophils, Absolute 0.00 10*3/mm3      Basophils, Absolute 0.03 10*3/mm3      Immature Grans, Absolute 0.05 (H) 10*3/mm3      nRBC 0.0 /100 WBC     Scan Slide [[id number]] Collected:  06/23/17 0359    Specimen:  Blood Updated:  06/23/17 0523     Anisocytosis Slight/1+     Hypochromia Slight/1+     Poikilocytes Slight/1+     RBC Fragments Slight/1+     WBC Morphology Normal     Platelet  Estimate Decreased    POC Glucose Fingerstick [[id number]]  (Abnormal) Collected:  06/23/17 0844    Specimen:  Blood Updated:  06/23/17 0855     Glucose 164 (H) mg/dL       : [id number] Nima LaresaMeter ID: [id number]       POC Glucose Fingerstick [[id number]]  (Normal) Collected:  06/23/17 1155    Specimen:  Blood Updated:  06/23/17 1206     Glucose 120 mg/dL       : [id number] Nima LaresaMeter ID: [id number]              Cultures:  Blood Culture   Date Value Ref Range Status   06/16/2017 No growth at 24 hours  Preliminary       Radiology Data:    Imaging Results (last 24 hours)     ** No results found for the last 24 hours. **          Allergies   Allergen Reactions   • Heparin    • Iron    • Latex    • Levaquin [Levofloxacin]    • Shrimp (Diagnostic)    • Vancomycin        Scheduled meds:     ipratropium-albuterol 3 mL Nebulization Q6H While Awake - RT   levothyroxine 50 mcg Oral Q AM   megestrol 400 mg Oral Daily   midodrine 5 mg Oral TID AC   pantoprazole 40 mg Oral Q AM   predniSONE 40 mg Oral Daily With Breakfast       PRN meds:  ondansetron    Assessment/Plan     Active Problems:    Chest pain in adult    Renal failure    Hypotension    Anemia    Hypokalemia      Plan:  Chest pain, atypical, echocardiogram/afib -Ejection fraction 61%, diastolic dysfunction, right ventricular dilatation, tricuspid valve regurgitation, right ventricle systolic pressure greater than 55. .  Doing much better on steroids  eating continue same wean steroids now on oral doing well  Hemoptysis ×1 6/15. Hx feliciano syndrome.    Thrombocytopenia-Stable chronic     Pneumonia/ Slight leukocytosis. Continue oral antibiotics    Anemia- Status post 2 units of blood transfusion 6/14. Trending down transfuse as necessary      Chronic renal failure- nephrology is on board. Cont dialysis per nephrology.       Hyperlipidemia stable      Elevate TSH- free T4 is normal. Continue  synthroid.      Hypotension- resoloved      Hypokalemia--  replace per nephrology.      Calcium 7.7, corrected calcium 9.5 - 6/13/2017.      Nutrition- discontinue megace, patient is eating with steroids see if maintains appetite with prednisone alone.       Deconditioning- start PT and OT      Discharge Planning: LTAC refused by insurance plan for [facility].still awaiting [facility] bed offer.    Adrenal insufficiency responding to steroids.  Arthur Kerr MD   06/23/17   3:51 PM

## 2017-06-23 NOTE — PROGRESS NOTES
Continued Stay Note   Renea     Patient Name: Jennifer Marks  MRN: [id number]  Today's Date: 6/23/2017    Admit Date: 6/11/2017          Discharge Plan       06/23/17 1537    Case Management/Social Work Plan    Plan Awaiting bed offer or denial from [facility] in [place]. Will continue to follow.     Patient/Family In Agreement With Plan yes              Discharge Codes     None            NARDA Santo

## 2017-06-23 NOTE — PROGRESS NOTES
Nephrology ([facility]) Progress Note      Patient:  Jennifer Marks  YOB: 1945  Date of Service: 6/23/2017  MRN: [id number]   Acct: [id number]   Primary Care Physician: No Known Provider  Advance Directive: Conditional Code  Admit Date: 6/11/2017       Hospital Day: 12  Referring Provider: Pino Tang MD      Patient personally seen and examined.  Complete chart including Consults, Notes, Operative Reports, Labs, Cardiology, and Radiology studies reviewed as able.        Subjective:  Jennifer Marks is a 72 y.o. female  whom we were consulted for end stage renal disease management, hypokalemia. She is on hemodialysis Monday Wednesday Friday.  Admitted with chest pain. Has had decreased oral intake and diarrhea for several days.  On 6/13 had femoral line placed.   Was on Cardizem drip for tachydysrhythmia.  Had been hypotensive a needing Levophed drip intermittently; has now been weaned off Levophed.  Moved to medical floor yesterday.  Today is awake and alert, no new issues.      Allergies:  Heparin; Iron; Latex; Levaquin [levofloxacin]; Shrimp (diagnostic); and Vancomycin    Home Meds:  Prescriptions Prior to Admission   Medication Sig Dispense Refill Last Dose   • carvedilol (COREG) 12.5 MG tablet Take 12.5 mg by mouth Daily.      • losartan (COZAAR) 25 MG tablet Take 25 mg by mouth Daily.      • pantoprazole (PROTONIX) 40 MG EC tablet Take 40 mg by mouth Daily.      • potassium chloride (KLOR-CON) 20 MEQ packet Take 20 mEq by mouth Daily.      • predniSONE (DELTASONE) 1 MG tablet Take 1 mg by mouth Daily.      • PREDNISONE PO Take  by mouth.      • traZODone (DESYREL) 50 MG tablet Take 50 mg by mouth Every Night.          Medicines:  Current Facility-Administered Medications   Medication Dose Route Frequency Provider Last Rate Last Dose   • ipratropium-albuterol (DUO-NEB) nebulizer solution 3 mL  3 mL Nebulization Q6H While Awake - RT Carter Llamas MD   3 mL at  06/23/17 0626   • levothyroxine (SYNTHROID, LEVOTHROID) tablet 50 mcg  50 mcg Oral Q AM Carter Llamas MD   50 mcg at 06/23/17 0647   • megestrol (MEGACE) 40 MG/ML suspension 400 mg  400 mg Oral Daily Carter Llamas MD   400 mg at 06/19/17 0818   • midodrine (PROAMATINE) tablet 5 mg  5 mg Oral TID AC FANNIE Blanco   5 mg at 06/23/17 0925   • ondansetron (ZOFRAN) injection 4 mg  4 mg Intravenous Q6H PRN Pino Tang MD   4 mg at 06/15/17 0555   • pantoprazole (PROTONIX) EC tablet 40 mg  40 mg Oral Q AM Pino Tang MD   40 mg at 06/23/17 0647   • predniSONE (DELTASONE) tablet 40 mg  40 mg Oral Daily With Breakfast eJz Romo MD   40 mg at 06/23/17 0925       Past Medical History:  Past Medical History:   Diagnosis Date   • A-fib    • Arthritis    • CHF (congestive heart failure)    • Hypertension    • Renal failure        Past Surgical History:  Past Surgical History:   Procedure Laterality Date   • ARTERIOVENOUS FISTULA LEG Left        Family History  History reviewed. No pertinent family history.    Social History  Social History     Social History   • Marital status: Single     Spouse name: N/A   • Number of children: N/A   • Years of education: N/A     Occupational History   • Not on file.     Social History Main Topics   • Smoking status: Former Smoker   • Smokeless tobacco: Not on file   • Alcohol use No   • Drug use: No   • Sexual activity: Defer     Other Topics Concern   • Not on file     Social History Narrative         Review of Systems:  History obtained from chart review and the patient  General ROS: Patient complains of malaise and No fever or chills  Respiratory ROS: No cough, shortness of breath, wheezing  Cardiovascular ROS: no chest pain or dyspnea on exertion  Gastrointestinal ROS: No abdominal pain, no nausea  Genito-Urinary ROS: No dysuria or hematuria  Neurological ROS: negative  Dermatological ROS: negative  14 point ROS reviewed with the patient and negative except as noted  "above and in the HPI unless unable to obtain.    Objective:  /56  Pulse 110  Temp 98.5 °F (36.9 °C) (Temporal Artery )   Resp 16  Ht 62\" (157.5 cm)  Wt 119 lb 4 oz (54.1 kg)  SpO2 95%  BMI 21.81 kg/m2    Intake/Output Summary (Last 24 hours) at 06/23/17 1201  Last data filed at 06/23/17 1151   Gross per 24 hour   Intake              250 ml   Output                0 ml   Net              250 ml     General: awake/alert    Neck: supple, no JVD  Chest:  clear to auscultation bilaterally without respiratory distress  CVS: regular rate and rhythm  Abdominal: soft, nontender, normal bowel sounds  Extremities: no cyanosis or edema  Skin: warm and dry without rash   Neuro: no focal motor deficits      Labs:    Results from last 7 days  Lab Units 06/23/17  0359 06/22/17  1155 06/22/17  0812   WBC 10*3/mm3 5.49 4.15* 5.19   HEMOGLOBIN g/dL 7.3* 7.3* 8.2*   HEMATOCRIT % 22.1* 22.0* 24.6*   PLATELETS 10*3/mm3 58* 61* 76*           Results from last 7 days  Lab Units 06/23/17  0359 06/22/17  1155 06/21/17  1548  06/19/17  0404 06/18/17  0346 06/17/17  0827   SODIUM mmol/L 138 138 133*  < > 136 137 137   POTASSIUM mmol/L 3.4* 3.4* 3.6  < > 3.5 3.2* 3.5   CHLORIDE mmol/L 101 99 98  < > 101 100 100   TOTAL CO2 mmol/L 24.0 28.0 23.0*  < > 26.0 28.0 25.0   BUN mg/dL 25* 18 29*  < > 28* 23* 17   CREATININE mg/dL 2.92* 2.57* 3.53*  < > 3.93* 3.48* 3.00*   CALCIUM mg/dL 8.1* 8.2* 8.0*  < > 7.7* 7.8* 7.8*   BILIRUBIN mg/dL  --   --   --   --  1.8* 1.8* 2.7*   ALK PHOS U/L  --   --   --   --  90 92 89   ALT (SGPT) U/L  --   --   --   --  22 28 25   AST (SGOT) U/L  --   --   --   --  28 23 26   GLUCOSE mg/dL 131* 144* 185*  < > 72 109* 146*   < > = values in this interval not displayed.    Radiology:   Imaging Results (last 72 hours)     Procedure Component Value Units Date/Time    XR Chest 1 View [[id number]] Collected:  06/11/17 1803     Updated:  06/11/17 2054    Narrative:       EXAMINATION:   XR CHEST 1 VW-  6/11/2017 6:01 " PM CDT     HISTORY: Chest pain     Prior exams 05/28/2016.     Single view of the chest is obtained. The pulmonary vascular margins are  slightly indistinct. This has the appearance of mild pulmonary vascular  congestion. Left diaphragm is indistinct. This may be infiltrate or  atelectasis left lower lobe.     Cardiac silhouette is enlarged and unchanged from May 28.       Impression:       1. Silhouetting of the left diaphragm consistent with infiltrate or  atelectasis left lower lobe.  2. Indistinct pulmonary vascular margins consistent with mild pulmonary  vascular congestion.  This report was finalized on 06/11/2017 20:51 by Dr. Herson Magaña MD.          Culture:         Assessment   1.  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  On maintenance hemodialysis Monday Wednesday Friday   2.  Pneumonia  3.  Hyponatremia--resolved  4.  Anemia of chronic kidney disease  5.  Congestive heart failure with diastolic dysfunction    Plan:  1.  Maintenance dialysis today  2.  Continue antibiotics    Jaden Tovar, FANNIE  6/23/2017  12:01 PM

## 2017-06-23 NOTE — THERAPY RE-EVALUATION
Acute Care - Occupational Therapy Initial Evaluation  [facility]     Patient Name: Jennifer Marks  : 1945  MRN: [id number]  Today's Date: 2017  Onset of Illness/Injury or Date of Surgery Date: 17  Date of Referral to OT: 17  Referring Physician: Dr. Llamas    Admit Date: 2017       ICD-10-CM ICD-9-CM   1. Chest pain in adult R07.9 786.50   2. End stage renal disease N18.6 585.6   3. Hypotension, unspecified hypotension type I95.9 458.9   4. Chronic systolic (congestive) heart failure I50.22 428.22     428.0   5. Decreased activities of daily living (ADL) Z78.9 V49.89   6. Impaired mobility Z74.09 799.89     Patient Active Problem List   Diagnosis   • Chest pain in adult   • Renal failure   • Hypotension   • Anemia   • Hypokalemia     Past Medical History:   Diagnosis Date   • A-fib    • Arthritis    • CHF (congestive heart failure)    • Hypertension    • Renal failure      Past Surgical History:   Procedure Laterality Date   • ARTERIOVENOUS FISTULA LEG Left           OT ASSESSMENT FLOWSHEET (last 72 hours)      OT Evaluation       17 1020 17 0826 17 1420 17 1334 17 0901    Rehab Evaluation    Document Type re-evaluation;progress note   See MAR  -ND therapy note (daily note)  -GRADY therapy note (daily note)  -CJ therapy note (daily note)  -JUANY therapy note (daily note)  -JUANY    Subjective Information agree to therapy;complains of;pain;fatigue;weakness  -ND agree to therapy;complains of;weakness;fatigue  -GRADY agree to therapy;complains of;weakness;fatigue;pain;dizziness  -CJ agree to therapy;complains of;pain  -JUANY agree to therapy;complains of;pain   and being cold  -JUANY    Patient Effort, Rehab Treatment fair  -ND  adequate  -CJ  adequate  -JUANY    Symptoms Noted Comment Pt. asked multiple times about receiving dialysis this date.  -ND pt was anxious about knowing when she was going to dialysis  -GRADY       General Information    Patient Profile Review yes  -ND         Onset of Illness/Injury or Date of Surgery Date 06/11/17  -ND        General Observations Pt. slouched down in chair about to fall out of chair, lethargic, IV site  -ND        Precautions/Limitations fall precautions  -ND fall precautions  -GRADY fall precautions  -CJ fall precautions  -JUANY fall precautions  -JUANY    Plans/Goals Discussed With patient;agreed upon  -ND        Risks Reviewed patient:;LOB;nausea/vomiting;dizziness;increased discomfort;change in vital signs;increased drainage;lines disloged  -ND        Benefits Reviewed patient:;improve function;increase independence;increase strength;increase balance;decrease pain;decrease risk of DVT;improve skin integrity;increase knowledge  -ND        Barriers to Rehab medically complex;previous functional deficit;cognitive status;physical barrier;contractures  -ND        Clinical Impression    Date of Referral to OT 06/14/17  -ND        OT Diagnosis decreased adl  -ND        Impairments Found (describe specific impairments) aerobic capacity/endurance;arousal, attention, and cognition;ergonomics and body mechanics;gait, locomotion, and balance;integumentary integrity;joint integrity and mobility;motor function;posture;ROM  -ND        Patient/Family Goals Statement to know when her dialysis was going to happen today.   -ND        Criteria for Skilled Therapeutic Interventions Met yes;treatment indicated  -ND        Rehab Potential fair, will monitor progress closely  -ND        Therapy Frequency 3-5 times/wk  -ND        Predicted Duration of Therapy Intervention (days/wks) 10 days  -ND        Anticipated Discharge Disposition skilled nursing facility  -ND        Vital Signs    Pre Systolic BP Rehab    110  -JUANY 86  -JUANY    Pre Treatment Diastolic BP    80  -JUANY 74  -JUANY    Pretreatment Heart Rate (beats/min)    103  -JUANY 108  -JUANY    Posttreatment Heart Rate (beats/min)    101  -JUANY     Pain Assessment    Pain Assessment 0-10  -ND Morrison-Cameron FACES  -GRADY 0-10  -CJ 0-10  -JUANY  MorrisonSashaCameron FACES  -JUANY    Morrison-Cameron FACES Pain Rating 4  -ND 4  -GRADY 4  -CJ  4   with movement  -JUANY    Pain Score   4  -CJ 5  -JUANY     Pain Type Chronic pain  -ND Chronic pain  -GRADY Chronic pain  -CJ Chronic pain  -JUANY Chronic pain  -JUANY    Pain Location Generalized  -ND Generalized  -GRADY Hand   phalanges!  -CJ Knee  -JUANY Generalized  -JUANY    Pain Orientation   Right;Left  -CJ Left  -JUANY     Pain Descriptors Aching  -ND        Pain Frequency Constant/continuous  -ND        Pain Intervention(s) Medication (See MAR);Repositioned  -ND Repositioned  -GRADY  Repositioned  -JUANY Repositioned  -JUANY    Response to Interventions tolerated  -ND tolerated  -GRADY       Vision Assessment/Intervention    Visual Impairment WFL  -ND        Cognitive Assessment/Intervention    Current Cognitive/Communication Assessment impaired  -ND        Orientation Status oriented to;person;place  -ND        Follows Commands/Answers Questions 100% of the time;able to follow single-step instructions;needs cueing;needs increased time;needs repetition  -ND        Personal Safety decreased awareness, need for assist;decreased awareness, need for safety;decreased insight to deficits  -ND        Personal Safety Interventions fall prevention program maintained;gait belt;nonskid shoes/slippers when out of bed;supervised activity  -ND        ROM (Range of Motion)    General ROM upper extremity range of motion deficits identified  -ND  upper extremity range of motion deficits identified   During slow stretch, pt. c/o's pain with flex/ext to hands!  -CJ      General ROM Detail Trena shoulders impaired approx 50-75% impaired, trena digits deviated and contracted, trena elbows impaired approx 25%.   -ND        MMT (Manual Muscle Testing)    General MMT Assessment Detail functionally 3-/5  -ND        Bed Mobility, Assessment/Treatment    Bed Mobility, Scoot/Bridge, [place]     dependent (less than 25% patient effort)  -JUANY    Bed Mob, Supine to Sit, [place]  verbal cues  "required;moderate assist (50% patient effort)  -GRADY   maximum assist (25% patient effort)  -    Bed Mob, Sit to Supine, [place]  --   up in the chair  -GRADY   dependent (less than 25% patient effort)  -    Bed Mobility, Comment in chair  -ND  fowlers in bed!  -CJ  Pt. tends to lay with knees flexed. Instruct ed pt. to push legs out straight when notice they are \"curled up\". [place] pt. move her body throughout the day & not lay still.   -    Transfer Assessment/Treatment    Transfers, Bed-Chair [place]  verbal cues required;minimum assist (75% patient effort);moderate assist (50% patient effort)   HHA  -GRADY       Transfers, Sit-Stand [place]  verbal cues required;moderate assist (50% patient effort)  -GRADY       Transfers, Stand-Sit [place]  verbal cues required;minimum assist (75% patient effort)  -GRADY       Transfer, Comment Pt adamently refused to stand   -ND Pt took a few steps from the bed to the chair.  Brought pt a walker to use this afternoon.  -GRADY       Lower Body Dressing Assessment/Training    LB Dressing Assess/Train, Clothing Type doffing:;donning:;socks  -ND        LB Dressing Assess/Train, Position sitting  -ND        LB Dressing Assess/Train, [place] dependent (less than 25% patient effort)  -ND        LB Dressing Assess/Train, Impairments decreased flexibility;ROM decreased;pain  -ND        Motor Skills/Interventions    Additional Documentation Balance Skills Training (Group)  -ND    Balance Skills Training (Group)  -    Balance Skills Training    Sitting-Level of Assistance Close supervision  -ND    Contact guard;Minimum assistance  -    Sitting-Balance Support Right upper extremity supported;Left upper extremity supported;Feet supported  -ND    No upper extremity supported;Feet unsupported   worked on posture  -    Sitting-Balance Activities     --   xhibits forward flexed neck, worked on ext and rotation.   -    Sitting # of Minutes     18  -    Therapy " Exercises    Bilateral Lower Extremities  AAROM:;AROM:;10 reps;sitting  -GRADY  AAROM:;20 reps;supine;ankle pumps/circles;heel slides;hip abduction/adduction;hip ER;hip IR   no L ir/er  -JUANY AAROM:;sitting;ankle pumps/circles;hamstring stretch;hip flexion;LAQ   very slow gentle stretching  -JUANY    Sensory Assessment/Intervention    Sensory Impairment --   WFL per pt.  -ND        General Therapy Interventions    Planned Therapy Interventions activity intolerance;ADL retraining;balance training;bed mobility training;strengthening;transfer training;stretching;ROM (Range of Motion);home exercise program  -ND        Positioning and Restraints    Pre-Treatment Position sitting in chair/recliner  -ND in bed  -GRADY in bed  -CJ in bed  -JUANY in bed  -JUANY    Post Treatment Position chair  -ND chair  -GRADY bed  -CJ bed  -JUANY bed  -JUANY    In Bed   fowlers;call light within reach;encouraged to call for assist;side rails up x3;RUE elevated;LUE elevated  - notified nsg;fowlers;call light within reach;encouraged to call for assist  -JUANY fowlers;call light within reach;encouraged to call for assist;patient within staff view;with nsg;side rails up x3  -JUANY    In Chair reclined;call light within reach;encouraged to call for assist;LUE elevated;RUE elevated;legs elevated  -ND notified nsg;reclined;call light within reach;encouraged to call for assist  -GRADY         06/21/17 0833 06/20/17 1806 06/20/17 1414 06/20/17 1328       Rehab Evaluation    Document Type therapy note (daily note)  -  therapy note (daily note)  -JUANY therapy note (daily note)  -     Subjective Information agree to therapy;complains of;weakness;fatigue;pain  -  agree to therapy;complains of;fatigue;pain  -JUANY agree to therapy;complains of;weakness;fatigue;pain  -     Patient Effort, Rehab Treatment adequate  -CJ   adequate  -     General Information    Precautions/Limitations fall precautions  -  fall precautions  -JUANY fall precautions  -     Equipment Currently Used at  Home  none  -       Living Environment    Transportation Available  ambulance  -       Vital Signs    Pre Systolic BP Rehab   85  -      Pre Treatment Diastolic BP   73  -      Pretreatment Heart Rate (beats/min)   96  -      Pain Assessment    Pain Assessment 0-10  -  Morrison-Cameron FACES  - No/denies pain  -     Morrison-Cameron FACES Pain Rating 2  -  4   with all movement  - 4  -     Pain Score 2  -        Pain Type Chronic pain  -  Chronic pain  - Chronic pain  -     Pain Location Hand   phalanges  -  Generalized  - Hand   phalanges!  -     Pain Orientation Right;Left  -   Right;Left  -     Pain Intervention(s) Repositioned;Rest  -   Repositioned  -     ROM (Range of Motion)    General ROM    upper extremity range of motion deficits identified;hand range of motion deficits identified   ulnar deviation in b. hands/phalanges!  -     General ROM Detail    --   slow stretch to b.upper extremities to increase arom!   -     General UE Assessment    ROM --   prom to Bue's to increase arom with extremities, slow stretc  -        ROM Detail slow stretch initiated by this kimble/l for increasing pt's arom!  -        Bed Mobility, Assessment/Treatment    Bed Mob, Supine to Sit, [place]   dependent (less than 25% patient effort)  -      Bed Mob, Sit to Supine, [place]   dependent (less than 25% patient effort)  -      Bed Mobility, Comment   Agreed but asked to lay down as soon as sat up!!  - fowlers in bed!  -     Motor Skills/Interventions    Additional Documentation   Balance Skills Training (Group)  -      Balance Skills Training    Sitting-Level of Assistance   Minimum assistance  -      Sitting-Balance Activities   --   orient to midline  -      Sitting # of Minutes   5  -      Therapy Exercises    Bilateral Lower Extremities   --   a few weak AP's & very little LAQ  -      Bilateral Upper Extremity    --   ue exs attempted, but unable sec. arom and  !  -CJ     Positioning and Restraints    Pre-Treatment Position in bed  -CJ  in bed  -JUANY in bed  -CJ     Post Treatment Position bed  -CJ  bed  -JUANY bed  -CJ     In Bed fowlers;call light within reach;encouraged to call for assist;exit alarm on;patient within staff view;side rails up x3  -CJ  notified nsg;fowlers;call light within reach;with family/caregiver;side rails up x3  -JUANY fowlers;call light within reach;encouraged to call for assist;side rails up x2  -CJ       User Key  (r) = Recorded By, (t) = Taken By, (c) = Cosigned By    Initials Name Effective Dates    CJ Marcin Robbins, BOLAND/L 08/02/16 -     JUANY Tori Malave, PTA 08/02/16 -     GRADY Eng, PTA 12/08/16 -     ND Ailyn Gaston, OTR/L 10/21/16 -     JACQUELYN Maloney, RN 04/03/17 -            Occupational Therapy Education     Title: PT OT SLP Therapies (Active)     Topic: Occupational Therapy (Done)     Point: ADL training (Done)    Description: Instruct learner(s) on proper safety adaptation and remediation techniques during self care or transfers.   Instruct in proper use of assistive devices.    Learning Progress Summary    Learner Readiness Method Response Comment Documented by Status   Patient Acceptance E IVÁN,NR Pt. educated on role of OT, benefit of activity, progression with poc ND 06/23/17 1100 Done    Acceptance E IVÁN,NR Pt. educated on role of OT, safe bed mob, benefit of activity, and progression with poc ND 06/15/17 1015 Done               Point: Home exercise program (Done)    Description: Instruct learner(s) on appropriate technique for monitoring, assisting and/or progressing therapeutic exercises/activities.    Learning Progress Summary    Learner Readiness Method Response Comment Documented by Status   Patient Acceptance E IVÁN,NR Pt. educated on role of OT, benefit of activity, progression with poc ND 06/23/17 1100 Done    Acceptance MARI LE DU,NR Pt. has issues with slow stretch to Bue's, hands/elbows/phalanges hurt!   06/21/17 1457 Done    Acceptance EMARI DU,NR Pt. participated in slow stretch from this kimble/l!  06/21/17 1125 Done    Acceptance ED VIOLETA MINOR,NR Pt. unable to participate in arom exs with wts.!  06/20/17 1433 Done    Acceptance E VU,NR Pt. educated on role of OT, safe bed mob, benefit of activity, and progression with poc ND 06/15/17 1015 Done               Point: Body mechanics (Done)    Description: Instruct learner(s) on proper positioning and spine alignment during self-care, functional mobility activities and/or exercises.    Learning Progress Summary    Learner Readiness Method Response Comment Documented by Status   Patient Acceptance E VU,NR Pt. educated on role of OT, benefit of activity, progression with poc ND 06/23/17 1100 Done    Acceptance ED VIOLETA MINOR,NR Pt. has issues with slow stretch to Bue's, hands/elbows/phalanges hurt!  06/21/17 1457 Done    Acceptance ED VIOLETA MINOR,NR Pt. participated in slow stretch from this kimble/l!  06/21/17 1125 Done    Acceptance MARI LE DU,NR Pt. unable to participate in arom exs with wts.!  06/20/17 1433 Done    Acceptance E IVÁN,NR Pt. educated on role of OT, safe bed mob, benefit of activity, and progression with poc ND 06/15/17 1015 Done                      User Key     Initials Effective Dates Name Provider Type Discipline     08/02/16 -  KYA Metcalf/L Occupational Therapy Assistant OT    ND 10/21/16 -  Ailyn Gaston OTR/L Occupational Therapist OT                  OT Recommendation and Plan  Anticipated Discharge Disposition: skilled nursing facility  Planned Therapy Interventions: activity intolerance, ADL retraining, balance training, bed mobility training, strengthening, transfer training, stretching, ROM (Range of Motion), home exercise program  Therapy Frequency: 3-5 times/wk  Plan of Care Review  Plan Of Care Reviewed With: patient  Progress: progress towards functional goals is fair  Outcome Summary/Follow up Plan: OT re-eval/progress  note completed. Pt. sitting slouched down in chair upon entering room. Pt. Max A for LB dressing. Pt. Max A to reposition in chair with draw sheet. Pt. refused to stand this date. Pt. cont to benefit from skilled OT will close monitoring for acheiving goals. If pt. not progressing she will need to be d/c from OT. 6/23/17 1100          OT Goals       06/23/17 1101 06/15/17 1015       Bed Mobility OT LTG    Bed Mobility OT LTG, Date Established  06/15/17  -ND     Bed Mobility OT LTG, Time to Achieve  by discharge  -ND     Bed Mobility OT LTG, Activity Type  all bed mobility  -ND     Bed Mobility OT LTG, [place] Level  moderate assist (50% patient effort)  -ND     Bed Mobility OT LTG, Assist Device  bed rails  -ND     Bed Mobility OT LTG, Date Goal Reviewed 06/23/17  -ND      Bed Mobility OT LTG, Outcome goal ongoing  -ND      Bed Mobility OT LTG, Reason Goal Not Met progress slower than expected  -ND      Strength OT LTG    Strength Goal OT LTG, Date Established  06/15/17  -ND     Strength Goal OT LTG, Time to Achieve  by discharge  -ND     Strength Goal OT LTG, Measure to Achieve  Pt. will complete BUE strengthening exercsies to increase BUE strength and decreased tone for completing ADLs.   -ND     Strength Goal OT LTG, Date Goal Reviewed 06/23/17  -ND      Strength Goal OT LTG, Outcome goal ongoing  -ND      Strength Goal OT LTG, Reason Goal Not Met progress slower than expected  -ND      Grooming OT LTG    Grooming Goal OT LTG, Date Established  06/15/17  -ND     Grooming Goal OT LTG, Time to Achieve  by discharge  -ND     Grooming Goal OT LTG, [place] Level  moderate assist (50% patient effort)  -ND     Grooming Goal OT LTG, Date Goal Reviewed 06/23/17  -ND      Grooming Goal OT LTG, Outcome goal ongoing  -ND      Grooming Goal OT LTG, Reason Goal Not Met progress slower than expected  -ND        User Key  (r) = Recorded By, (t) = Taken By, (c) = Cosigned By    Initials Name Provider Type    RITA Robertson  MARI Gaston OTR/L Occupational Therapist                Outcome Measures       06/23/17 1100 06/23/17 0826 06/21/17 1420    How much help from another person do you currently need...    Turning from your back to your side while in flat bed without using bedrails?  2  -GRADY     Moving from lying on back to sitting on the side of a flat bed without bedrails?  2  -GRADY     Moving to and from a bed to a chair (including a wheelchair)?  2  -GRADY     Standing up from a chair using your arms (e.g., wheelchair, bedside chair)?  2  -GRADY     Climbing 3-5 steps with a railing?  1  -GRADY     To walk in hospital room?  1  -GRADY     AM-PAC 6 Clicks Score  10  -GRADY     How much help from another is currently needed...    Putting on and taking off regular lower body clothing? 1  -ND  1  -CJ    Bathing (including washing, rinsing, and drying) 1  -ND  1  -CJ    Toileting (which includes using toilet bed pan or urinal) 1  -ND  1  -CJ    Putting on and taking off regular upper body clothing 2  -ND  2  -CJ    Taking care of personal grooming (such as brushing teeth) 2  -ND  2  -CJ    Eating meals 2  -ND  2  -CJ    Score 9  -ND  9  -CJ    Functional Assessment    Outcome Measure Options AM-PAC 6 Clicks Daily Activity (OT)  -ND AM-PAC 6 Clicks Basic Mobility (PT)  -GRADY       06/21/17 0900 06/21/17 0833 06/20/17 1328    How much help from another person do you currently need...    Turning from your back to your side while in flat bed without using bedrails? 1  -JUANY      Moving from lying on back to sitting on the side of a flat bed without bedrails? 1  -JUANY      Moving to and from a bed to a chair (including a wheelchair)? 1  -JUANY      Standing up from a chair using your arms (e.g., wheelchair, bedside chair)? 1  -JUANY      Climbing 3-5 steps with a railing? 1  -JUANY      To walk in hospital room? 1  -JUANY      AM-PAC 6 Clicks Score 6  -JUANY      How much help from another is currently needed...    Putting on and taking off regular lower body clothing?  1  -CJ 1   -CJ    Bathing (including washing, rinsing, and drying)  1  -CJ 1  -CJ    Toileting (which includes using toilet bed pan or urinal)  1  -CJ 1  -CJ    Putting on and taking off regular upper body clothing  2  -CJ 2  -CJ    Taking care of personal grooming (such as brushing teeth)  2  -CJ 2  -CJ    Eating meals  2  -CJ 2  -CJ    Score  9  -CJ 9  -CJ    Functional Assessment    Outcome Measure Options  AM-PAC 6 Clicks Daily Activity (OT)  -CJ AM-PAC 6 Clicks Daily Activity (OT)  -CJ      User Key  (r) = Recorded By, (t) = Taken By, (c) = Cosigned By    Initials Name Provider Type    CJ Marcin Robbins, BOLAND/L Occupational Therapy Assistant    JUANY Malave PTA Physical Therapy Assistant    GRADY Eng PTA Physical Therapy Assistant    RITA Gaston OTR/L Occupational Therapist          Time Calculation:   OT Start Time: 1020  OT Stop Time: 1045  OT Time Calculation (min): 25 min    Therapy Charges for Today     Code Description Service Date Service Provider Modifiers Qty    12401250634 HC OT SELFCARE CURRENT 6/23/2017 Ailyn Gaston OTR/L GO, CL 1    48168003593 HC OT SELFCARE PROJECTED 6/23/2017 Ailyn Gaston OTR/L GO, CK 1    69300376618 HC OT RE-EVAL 2 6/23/2017 Ailyn Gaston OTR/L GO, KX 1          OT G-codes  OT Functional Scales Options: AM-PAC 6 Clicks Daily Activity (OT)  Functional Limitation: Self care  Self Care Current Status (): At least 60 percent but less than 80 percent impaired, limited or restricted  Self Care Goal Status (): At least 40 percent but less than 60 percent impaired, limited or restricted    Ailyn Gaston OTR/L  6/23/2017

## 2017-06-23 NOTE — PLAN OF CARE
Problem: Patient Care Overview (Adult)  Goal: Plan of Care Review  Outcome: Ongoing (interventions implemented as appropriate)    06/23/17 0332   Coping/Psychosocial Response Interventions   Plan Of Care Reviewed With patient   Outcome Evaluation   Outcome Summary/Follow up Plan Patient cried out for help multiple times. Demanded to get out of bed. stood her at side of bed with asst x 2. changed patient's bed approx 4 times. Mepilex to coccyx replaced. Fistula bilateral arms, failed. fistula, Left inner thigh, bruit and thrill present. Continue to monitor.        Goal: Adult Individualization and Mutuality  Outcome: Ongoing (interventions implemented as appropriate)  Goal: Discharge Needs Assessment  Outcome: Ongoing (interventions implemented as appropriate)    Problem: Fall Risk (Adult)  Goal: Identify Related Risk Factors and Signs and Symptoms  Outcome: Ongoing (interventions implemented as appropriate)  Goal: Absence of Falls  Outcome: Ongoing (interventions implemented as appropriate)  Goal: Absence of Falls  Outcome: Ongoing (interventions implemented as appropriate)    Problem: Fluid Volume Excess (Adult,Obstetrics,Pediatric)  Goal: Stable Weight  Outcome: Ongoing (interventions implemented as appropriate)  Goal: Balanced Intake/Output  Outcome: Ongoing (interventions implemented as appropriate)    Problem: Nutrition, Imbalanced: Inadequate Oral Intake (Adult)  Goal: Identify Related Risk Factors and Signs and Symptoms  Outcome: Ongoing (interventions implemented as appropriate)  Goal: Improved Oral Intake  Outcome: Ongoing (interventions implemented as appropriate)  Goal: Prevent Further Weight Loss  Outcome: Ongoing (interventions implemented as appropriate)    Problem: Skin Integrity Impairment, Risk/Actual (Adult)  Goal: Identify Related Risk Factors and Signs and Symptoms  Outcome: Ongoing (interventions implemented as appropriate)  Goal: Skin Integrity/Wound Healing  Outcome: Ongoing (interventions  implemented as appropriate)    Problem: Pressure Ulcer (Adult)  Goal: Signs and Symptoms of Listed Potential Problems Will be Absent or Manageable (Pressure Ulcer)  Outcome: Ongoing (interventions implemented as appropriate)

## 2017-06-23 NOTE — PROGRESS NOTES
[facility] Medicine Services  INPATIENT PROGRESS NOTE    Length of Stay: 12  Date of Admission: 6/11/2017  Primary Care Physician: No Known Provider    Subjective   Chief Complaint: Atrial fib, hypertension, weakness, kidney failure    Chest Pain         Patient eating well.   BP remains stable lost IV access medications switched to oral.  Says feels better today.  Doing OK on floor tolerating dialysis. Doing OK no complaints.  Review of Systems   Cardiovascular: Positive for chest pain.      Constitutional: Positive for activity change, appetite change and fatigue. Negative for chills and fever.   HENT: Negative for hearing loss, nosebleeds, tinnitus and trouble swallowing.   Eyes: Negative for visual disturbance.   Respiratory: Negative for cough, chest tightness, shortness of breath and wheezing.   Cardiovascular: Negative for chest pain, palpitations and leg swelling.   Gastrointestinal: Negative for abdominal distention, abdominal pain, blood in stool, constipation, diarrhea, nausea and vomiting.   Endocrine: Negative for cold intolerance, heat intolerance, polydipsia, polyphagia and polyuria.   Genitourinary: Negative for decreased urine volume, difficulty urinating, dysuria, flank pain, frequency and hematuria.   Musculoskeletal: Positive for arthralgias, gait problem and myalgias. Negative for joint swelling.   Skin: Negative for rash.   Allergic/Immunologic: Negative for immunocompromised state.   Neurological: Positive for weakness. Negative for dizziness, syncope, light-headedness and headaches.   Hematological: Negative for adenopathy. Does not bruise/bleed easily.   Psychiatric/Behavioral: Negative for confusion and sleep disturbance. The patient is not nervous/anxious.     All pertinent negatives and positives are as above. All other systems have been reviewed and are negative unless otherwise stated.     Objective    Temp:  [97.1 °F (36.2 °C)-98.8 °F (37.1 °C)]  98.8 °F (37.1 °C)  Heart Rate:  [] 106  Resp:  [18-22] 18  BP: (110-132)/(54-97) 132/54    Intake/Output Summary (Last 24 hours) at 06/23/17 1131  Last data filed at 06/23/17 0900   Gross per 24 hour   Intake              150 ml   Output                0 ml   Net              150 ml     Physical Exam  Constitutional: She appears well-developed and well-nourished.   HENT:   Head: Normocephalic and atraumatic.   Eyes: Conjunctivae and EOM are normal. Pupils are equal, round, and reactive to light.   Neck: Neck supple. No JVD present. No thyromegaly present.   Cardiovascular: Normal rate, regular rhythm, normal heart sounds and intact distal pulses. Exam reveals no gallop and no friction rub.   No murmur heard.  Pulmonary/Chest: Effort normal. No respiratory distress. She has no wheezes. She has no rales. She exhibits no tenderness.   Abdominal: Soft. Bowel sounds are normal. She exhibits no distension. There is no tenderness. There is no rebound and no guarding.   Musculoskeletal: Normal range of motion. She exhibits no edema, tenderness or deformity.   Lymphadenopathy:   She has no cervical adenopathy.   Neurological: She is alert. She displays normal reflexes. No cranial nerve deficit. She exhibits abnormal muscle tone. Coordination abnormal.   Skin: Skin is warm and dry. No rash noted.   Psychiatric: She has a normal mood and affect. Her behavior is normal.   Nursing note and vitals reviewed.  Results Review:  Lab Results (last 24 hours)     Procedure Component Value Units Date/Time    POC Glucose Fingerstick [[id number]]  (Abnormal) Collected:  06/22/17 1146    Specimen:  Blood Updated:  06/22/17 1213     Glucose 131 (H) mg/dL       : [id number] Eran Ying ID: [id number]       Basic Metabolic Panel [[id number]]  (Abnormal) Collected:  06/22/17 1155    Specimen:  Blood Updated:  06/22/17 1237     Glucose 144 (H) mg/dL      BUN 18 mg/dL      Creatinine 2.57 (H) mg/dL      Sodium 138 mmol/L       Potassium 3.4 (L) mmol/L      Chloride 99 mmol/L      CO2 28.0 mmol/L      Calcium 8.2 (L) mg/dL      eGFR  African Amer 22 (L) mL/min/1.73      BUN/Creatinine Ratio 7.0     Anion Gap 11.0 mmol/L     Narrative:       The MDRD GFR formula is only valid for adults with stable renal function between ages 18 and 70.    CBC Auto Differential [[id number]]  (Abnormal) Collected:  06/22/17 1155    Specimen:  Blood Updated:  06/22/17 1306     WBC 4.15 (L) 10*3/mm3      RBC 2.59 (L) 10*6/mm3      Hemoglobin 7.3 (L) g/dL      Hematocrit 22.0 (L) %      MCV 84.9 fL      MCH 28.2 pg      MCHC 33.2 g/dL      RDW 20.6 (H) %      RDW-SD 61.9 (H) fl      Platelets 61 (L) 10*3/mm3      Neutrophil % 79.3 (H) %      Lymphocyte % 11.1 (L) %      Monocyte % 8.4 %      Eosinophil % 0.0 %      Basophil % 0.2 %      Immature Grans % 1.0 %      Neutrophils, Absolute 3.29 10*3/mm3      Lymphocytes, Absolute 0.46 (L) 10*3/mm3      Monocytes, Absolute 0.35 10*3/mm3      Eosinophils, Absolute 0.00 10*3/mm3      Basophils, Absolute 0.01 10*3/mm3      Immature Grans, Absolute 0.04 (H) 10*3/mm3      nRBC 0.0 /100 WBC     CBC & Differential [[id number]] Collected:  06/22/17 1155    Specimen:  Blood Updated:  06/22/17 1306    Narrative:       The following orders were created for panel order CBC & Differential.  Procedure                               Abnormality         Status                     ---------                               -----------         ------                     Scan Slide[[id number]]                                       Final result               CBC Auto Differential[[id number]]        Abnormal            Final result                 Please view results for these tests on the individual orders.    Scan Slide [[id number]] Collected:  06/22/17 1155    Specimen:  Blood Updated:  06/22/17 1306     Hypochromia Slight/1+     Poikilocytes Slight/1+     WBC Morphology Normal     Platelet Estimate Decreased    POC Glucose Fingerstick  [[id number]]  (Abnormal) Collected:  06/22/17 2003    Specimen:  Blood Updated:  06/22/17 2015     Glucose 178 (H) mg/dL       : Genoveva Melo ID: [id number]       Basic Metabolic Panel [[id number]]  (Abnormal) Collected:  06/23/17 0359    Specimen:  Blood Updated:  06/23/17 0431     Glucose 131 (H) mg/dL      BUN 25 (H) mg/dL      Creatinine 2.92 (H) mg/dL      Sodium 138 mmol/L      Potassium 3.4 (L) mmol/L      Chloride 101 mmol/L      CO2 24.0 mmol/L      Calcium 8.1 (L) mg/dL      eGFR  African Amer 19 (L) mL/min/1.73      BUN/Creatinine Ratio 8.6     Anion Gap 13.0 mmol/L     Narrative:       The MDRD GFR formula is only valid for adults with stable renal function between ages 18 and 70.    CBC & Differential [[id number]] Collected:  06/23/17 0359    Specimen:  Blood Updated:  06/23/17 0523    Narrative:       The following orders were created for panel order CBC & Differential.  Procedure                               Abnormality         Status                     ---------                               -----------         ------                     Scan Slide[[id number]]                                       Final result               CBC Auto Differential[[id number]]        Abnormal            Final result                 Please view results for these tests on the individual orders.    CBC Auto Differential [[id number]]  (Abnormal) Collected:  06/23/17 0359    Specimen:  Blood Updated:  06/23/17 0523     WBC 5.49 10*3/mm3      RBC 2.59 (L) 10*6/mm3      Hemoglobin 7.3 (L) g/dL      Hematocrit 22.1 (L) %      MCV 85.3 fL      MCH 28.2 pg      MCHC 33.0 g/dL      RDW 20.7 (H) %      RDW-SD 61.6 (H) fl      Platelets 58 (L) 10*3/mm3      Neutrophil % 77.7 %      Lymphocyte % 12.2 (L) %      Monocyte % 8.7 %      Eosinophil % 0.0 %      Basophil % 0.5 %      Immature Grans % 0.9 %      Neutrophils, Absolute 4.26 10*3/mm3      Lymphocytes, Absolute 0.67 (L) 10*3/mm3      Monocytes, Absolute 0.48  10*3/mm3      Eosinophils, Absolute 0.00 10*3/mm3      Basophils, Absolute 0.03 10*3/mm3      Immature Grans, Absolute 0.05 (H) 10*3/mm3      nRBC 0.0 /100 WBC     Scan Slide [[id number]] Collected:  06/23/17 0359    Specimen:  Blood Updated:  06/23/17 0523     Anisocytosis Slight/1+     Hypochromia Slight/1+     Poikilocytes Slight/1+     RBC Fragments Slight/1+     WBC Morphology Normal     Platelet Estimate Decreased    POC Glucose Fingerstick [[id number]]  (Abnormal) Collected:  06/23/17 0844    Specimen:  Blood Updated:  06/23/17 0855     Glucose 164 (H) mg/dL       : [id number] Nima Guillenter ID: [id number]              Cultures:  Blood Culture   Date Value Ref Range Status   06/16/2017 No growth at 24 hours  Preliminary       Radiology Data:    Imaging Results (last 24 hours)     ** No results found for the last 24 hours. **          Allergies   Allergen Reactions   • Heparin    • Iron    • Latex    • Levaquin [Levofloxacin]    • Shrimp (Diagnostic)    • Vancomycin        Scheduled meds:     ipratropium-albuterol 3 mL Nebulization Q6H While Awake - RT   levothyroxine 50 mcg Oral Q AM   megestrol 400 mg Oral Daily   midodrine 5 mg Oral TID AC   pantoprazole 40 mg Oral Q AM   predniSONE 40 mg Oral Daily With Breakfast       PRN meds:  ondansetron    Assessment/Plan     Active Problems:    Chest pain in adult    Renal failure    Hypotension    Anemia    Hypokalemia      Plan:  Chest pain, atypical, echocardiogram/afib -Ejection fraction 61%, diastolic dysfunction, right ventricular dilatation, tricuspid valve regurgitation, right ventricle systolic pressure greater than 55. .  Doing much better on steroids  eating continue same wean steroids now on oral doing well  Hemoptysis ×1 6/15. Hx feliciano syndrome.    Thrombocytopenia-Stable chronic     Pneumonia/ Slight leukocytosis. Continue oral antibiotics    Anemia- Status post 2 units of blood transfusion 6/14. Trending down transfuse as necessary      Chronic  renal failure- nephrology is on board. Cont dialysis per nephrology.       Hyperlipidemia stable      Elevate TSH- free T4 is normal. Continue  synthroid.      Hypotension- resoloved      Hypokalemia-- replace per nephrology.      Calcium 7.7, corrected calcium 9.5 - 6/13/2017.      Nutrition- discontinue megace, patient is eating with steroids see if maintains appetite with prednisone alone.       Deconditioning- start PT and OT      Discharge Planning: LTAC refused by insurance plan for [facility].  Adrenal insufficiency responding to steroids.  Arthur Kerr MD   06/23/17   11:31 AM

## 2017-06-23 NOTE — PROGRESS NOTES
Continued Stay Note   Renea     Patient Name: Jennifer Marks  MRN: [id number]  Today's Date: 6/23/2017    Admit Date: 6/11/2017          Discharge Plan       06/23/17 0819    Case Management/Social Work Plan    Plan SNF    Patient/Family In Agreement With Plan yes    Additional Comments Patient's insurance has denied LTAC placement.  Referral to [facility] will have to be followed through.              Discharge Codes     None            ANUPAMA Zaragoza

## 2017-06-24 LAB
GLUCOSE BLDC GLUCOMTR-MCNC: 104 MG/DL (ref 70–130)
GLUCOSE BLDC GLUCOMTR-MCNC: 108 MG/DL (ref 70–130)
GLUCOSE BLDC GLUCOMTR-MCNC: 109 MG/DL (ref 70–130)
GLUCOSE BLDC GLUCOMTR-MCNC: 135 MG/DL (ref 70–130)

## 2017-06-24 PROCEDURE — 94760 N-INVAS EAR/PLS OXIMETRY 1: CPT

## 2017-06-24 PROCEDURE — 63710000001 PREDNISONE PER 1 MG: Performed by: INTERNAL MEDICINE

## 2017-06-24 PROCEDURE — 82962 GLUCOSE BLOOD TEST: CPT

## 2017-06-24 PROCEDURE — 97530 THERAPEUTIC ACTIVITIES: CPT

## 2017-06-24 PROCEDURE — 94799 UNLISTED PULMONARY SVC/PX: CPT

## 2017-06-24 RX ORDER — PREDNISONE 20 MG/1
20 TABLET ORAL
Status: DISCONTINUED | OUTPATIENT
Start: 2017-06-25 | End: 2017-06-26 | Stop reason: HOSPADM

## 2017-06-24 RX ADMIN — IPRATROPIUM BROMIDE AND ALBUTEROL SULFATE 3 ML: 2.5; .5 SOLUTION RESPIRATORY (INHALATION) at 06:30

## 2017-06-24 RX ADMIN — MIDODRINE HYDROCHLORIDE 5 MG: 2.5 TABLET ORAL at 09:43

## 2017-06-24 RX ADMIN — LEVOTHYROXINE SODIUM 50 MCG: 50 TABLET ORAL at 09:42

## 2017-06-24 RX ADMIN — IPRATROPIUM BROMIDE AND ALBUTEROL SULFATE 3 ML: 2.5; .5 SOLUTION RESPIRATORY (INHALATION) at 18:46

## 2017-06-24 RX ADMIN — PANTOPRAZOLE SODIUM 40 MG: 40 TABLET, DELAYED RELEASE ORAL at 09:42

## 2017-06-24 RX ADMIN — PREDNISONE 40 MG: 20 TABLET ORAL at 09:42

## 2017-06-24 RX ADMIN — IPRATROPIUM BROMIDE AND ALBUTEROL SULFATE 3 ML: 2.5; .5 SOLUTION RESPIRATORY (INHALATION) at 12:06

## 2017-06-24 RX ADMIN — MEGESTROL ACETATE 400 MG: 40 SUSPENSION ORAL at 09:42

## 2017-06-24 NOTE — PLAN OF CARE
Problem: Patient Care Overview (Adult)  Goal: Plan of Care Review  Outcome: Ongoing (interventions implemented as appropriate)    06/23/17 1101 06/23/17 1916 06/24/17 0346   Coping/Psychosocial Response Interventions   Plan Of Care Reviewed With --  patient --    Patient Care Overview   Progress progress towards functional goals is fair --  --    Outcome Evaluation   Outcome Summary/Follow up Plan --  --  VSS. Pt c/o anxiety and not being able to sleep. Pt has a mepilex applied to coccyx stage 2 pressure ulcer. Turning pt q2hr.        Goal: Adult Individualization and Mutuality  Outcome: Ongoing (interventions implemented as appropriate)    Problem: Fall Risk (Adult)  Goal: Identify Related Risk Factors and Signs and Symptoms  Outcome: Ongoing (interventions implemented as appropriate)  Goal: Absence of Falls  Outcome: Ongoing (interventions implemented as appropriate)    Problem: Skin Integrity Impairment, Risk/Actual (Adult)  Goal: Identify Related Risk Factors and Signs and Symptoms  Outcome: Ongoing (interventions implemented as appropriate)  Goal: Skin Integrity/Wound Healing  Outcome: Ongoing (interventions implemented as appropriate)    Problem: Pressure Ulcer (Adult)  Goal: Signs and Symptoms of Listed Potential Problems Will be Absent or Manageable (Pressure Ulcer)  Outcome: Ongoing (interventions implemented as appropriate)

## 2017-06-24 NOTE — THERAPY TREATMENT NOTE
Acute Care - Physical Therapy Treatment Note  [facility]     Patient Name: Jennifer Marks  : 1945  MRN: [id number]  Today's Date: 2017  Onset of Illness/Injury or Date of Surgery Date: 17  Date of Referral to PT: 17  Referring Physician: Dr. Llamas    Admit Date: 2017    Visit Dx:    ICD-10-CM ICD-9-CM   1. Chest pain in adult R07.9 786.50   2. End stage renal disease N18.6 585.6   3. Hypotension, unspecified hypotension type I95.9 458.9   4. Chronic systolic (congestive) heart failure I50.22 428.22     428.0   5. Decreased activities of daily living (ADL) Z78.9 V49.89   6. Impaired mobility Z74.09 799.89     Patient Active Problem List   Diagnosis   • Chest pain in adult   • Renal failure   • Hypotension   • Anemia   • Hypokalemia               Adult Rehabilitation Note       17 1127 17 1500 17 0826    Rehab Assessment/Intervention    Discipline physical therapy assistant  - physical therapy assistant  -GRADY physical therapy assistant  -    Document Type therapy note (daily note)  -  therapy note (daily note)  -    Subjective Information agree to therapy;complains of;pain   c/o being cold. Kept blankets on pt. during tx.  -  agree to therapy;complains of;weakness;fatigue  -GRADY    Treatment Not Performed  patient unavailable for treatment  -GRADY     Treatment Not Performed, Comment  dialysis  -GRADY     Symptoms Noted Comment   pt was anxious about knowing when she was going to dialysis  -GRADY    Precautions/Limitations fall precautions  -  fall precautions  -GRADY    Recorded by [MF] Josey Arroyo PTA [GRADY] Riccardo Eng PTA [GRADY] Riccardo Eng PTA    Pain Assessment    Pain Assessment Morrison-Baker FACES  -  Morrison-Baker FACES  -GRADY    Morrison-Baker FACES Pain Rating 4  -MF  4  -GRADY    Pain Type Chronic pain  -  Chronic pain  -GRADY    Pain Location Back  -  Generalized  -GRADY    Pain Descriptors Aching  -      Pain Frequency Intermittent  -      Pain  Intervention(s) Repositioned  -  Repositioned  -GRADY    Response to Interventions tolerated, no pain while  in bed  -  tolerated  -GRADY    Recorded by [] Josey Arroyo PTA  [GRADY] Riccardo Eng PTA    Bed Mobility, Assessment/Treatment    Bed Mobility, Assistive Device draw sheet  -      Bed Mobility, Scoot/Bridge, Carter dependent (less than 25% patient effort)   bed in trendelenburg  -      Bed Mob, Supine to Sit, Carter verbal cues required;maximum assist (25% patient effort)  -  verbal cues required;moderate assist (50% patient effort)  -GRADY    Bed Mob, Sit to Supine, Carter verbal cues required;moderate assist (50% patient effort);maximum assist (25% patient effort)  -  --   up in the chair  -GRADY    Recorded by [] Josey Arroyo PTA  [GRADY] Riccardo Eng PTA    Transfer Assessment/Treatment    Transfers, Bed-Chair Carter   verbal cues required;minimum assist (75% patient effort);moderate assist (50% patient effort)   HHA  -GRADY    Transfers, Sit-Stand Carter   verbal cues required;moderate assist (50% patient effort)  -GRADY    Transfers, Stand-Sit Carter   verbal cues required;minimum assist (75% patient effort)  -GRADY    Transfer, Comment   Pt took a few steps from the bed to the chair.  Brought pt a walker to use this afternoon.  -GRADY    Recorded by   [GRADY] Riccardo Eng PTA    Balance Skills Training    Sitting-Level of Assistance Contact guard  -      Sitting-Balance Support Left upper extremity supported;Right upper extremity supported  -      Sitting # of Minutes 10  -      Recorded by [] Josey Arroyo PTA      Therapy Exercises    Bilateral Lower Extremities AROM:;10 reps;sitting;ankle pumps/circles;LAQ   very minimal range, pt. refused for PTA to assist  -  AAROM:;AROM:;10 reps;sitting  -    Recorded by [] Josey Arroyo PTA  [GRADY] Riccardo Eng PTA    Positioning and Restraints    Pre-Treatment Position in bed  -  in  bed  -GRADY    Post Treatment Position bed  -  chair  -GRADY    In Bed side lying left;call light within reach;encouraged to call for assist;side rails up x2;pillow between legs  -MF      In Chair   notified nsg;reclined;call light within reach;encouraged to call for assist  -GRADY    Recorded by [] Josey Arroyo PTA  [GRADY] Riccardo BRAND Velasquez, [facility]      06/22/17 1421 06/21/17 1420 06/21/17 1334    Rehab Assessment/Intervention    Discipline physical therapy assistant  -LG occupational therapy assistant  -CJ physical therapy assistant  -JUANY    Document Type  therapy note (daily note)  - therapy note (daily note)  -JUANY    Subjective Information  agree to therapy;complains of;weakness;fatigue;pain;dizziness  - agree to therapy;complains of;pain  -JUANY    Patient Effort, Rehab Treatment  adequate  -     Treatment Not Performed patient unavailable for treatment  -      Treatment Not Performed, Comment nsg getting pt ready to transfer to floor, pt pulled her IV out  -      Precautions/Limitations  fall precautions  - fall precautions  -JUANY    Recorded by [LG] Arcenio Ribeiro PTA [CJ] KYA Metcalf/CATHIE [JUANY] Tori Malave PTA    Vital Signs    Pre Systolic BP Rehab   110  -JUANY    Pre Treatment Diastolic BP   80  -JUANY    Pretreatment Heart Rate (beats/min)   103  -JUANY    Posttreatment Heart Rate (beats/min)   101  -JUANY    Recorded by   [JUANY] Tori Malave PTA    Pain Assessment    Pain Assessment  0-10  - 0-10  -JUANY    Morrison-Cameron FACES Pain Rating  4  -     Pain Score  4  - 5  -JUANY    Pain Type  Chronic pain  - Chronic pain  -JUANY    Pain Location  Hand   phalanges!  - Knee  -JUANY    Pain Orientation  Right;Left  - Left  -JUANY    Pain Intervention(s)   Repositioned  -JUANY    Recorded by  [CJ] KYA Metcalf/CATHIE [JUANY] Tori Malave PTA    ROM (Range of Motion)    General ROM  upper extremity range of motion deficits identified   During slow stretch, pt. c/o's pain with flex/ext to hands!  -      Recorded by  [CJ] KYA Metcalf/L     Bed Mobility, Assessment/Treatment    Bed Mobility, Comment  fowlers in bed!  -CJ     Recorded by  [CJ] BEATRIZ Metcalf     Therapy Exercises    Bilateral Lower Extremities   AAROM:;20 reps;supine;ankle pumps/circles;heel slides;hip abduction/adduction;hip ER;hip IR   no L ir/er  -JUANY    Recorded by   [JUANY] Tori Malave PTA    Positioning and Restraints    Pre-Treatment Position  in bed  -CJ in bed  -JUANY    Post Treatment Position  bed  -CJ bed  -JUANY    In Bed  fowlers;call light within reach;encouraged to call for assist;side rails up x3;RUE elevated;LUE elevated  -CJ notified nsg;fowlers;call light within reach;encouraged to call for assist  -JUANY    Recorded by  [CJ] KYA Metcalf/CATHIE [JUANY] Tori Malave PTA      User Key  (r) = Recorded By, (t) = Taken By, (c) = Cosigned By    Initials Name Effective Dates    LG Arcenio Ribeiro, PTA 08/02/16 -     BEATRIZ Cavazos 08/02/16 -     JUANY Malave, PTA 08/02/16 -     GRADY Riccardo Eng, PTA 12/08/16 -      Josey Arroyo, PTA 08/02/16 -                 IP PT Goals       06/15/17 0925          Bed Mobility PT LTG    Bed Mobility PT LTG, Date Established 06/15/17  -YONIS      Bed Mobility PT LTG, Time to Achieve by discharge  -YONIS      Bed Mobility PT LTG, Activity Type roll left/roll right;supine to sit/sit to supine  -YONIS      Bed Mobility PT LTG, [place] Level minimum assist (75% patient effort)  -YONIS      Bed Mobility PT Goal  LTG, Assist Device bed rails  -YONIS      Strength Goal PT LTG    Strength Goal PT LTG, Date Established 06/15/17  -YONIS      Strength Goal PT LTG, Time to Achieve by discharge  -YONIS      Strength Goal PT LTG, Measure to Achieve AAROM, B UE/LE, 10-20 reps, min assist  -YONIS      Dynamic Sitting Balance PT LTG    Dynamic Sitting Balance PT LTG, Date Established 06/15/17  -YONIS      Dynamic Sitting Balance PT LTG, Time to Achieve by discharge  -YONIS      Dynamic  Sitting Balance PT LTG, [place] Level minimum assist (75% patient effort)  -YONIS      Dynamic Sitting Balance PT LTG, Assist Device UE Support;bed rails  -YONIS        User Key  (r) = Recorded By, (t) = Taken By, (c) = Cosigned By    Initials Name Provider Type    YONIS Yen, PT DPT Physical Therapist          Physical Therapy Education     Title: PT OT SLP Therapies (Active)     Topic: Physical Therapy (Active)     Point: Mobility training (Active)    Learning Progress Summary    Learner Readiness Method Response Comment Documented by Status   Patient Acceptance E,D NR Educated pt. on benefits of activity.  06/24/17 1143 Active    Acceptance E VU,NR progression with POC, safety with transfers and amb GRADY 06/23/17 0826 Done    Acceptance E,D NR,VU Instruct to move self in bed and importance of participating.  06/21/17 0934 Done    Acceptance E,D VU,NR Educated in benefits of activity and need to move to decrease contractures.  06/17/17 0956 Done    Acceptance E NR Educated pt. on progression of PT POC and benefits of activity  06/15/17 0925 Active               Point: Home exercise program (Done)    Learning Progress Summary    Learner Readiness Method Response Comment Documented by Status   Patient Acceptance E,D VU,NR Educated in benefits of activity and need to move to decrease contractures.  06/17/17 0956 Done                      User Key     Initials Effective Dates Name Provider Type Discipline     08/02/16 -  Arcenio Ribeiro, PTA Physical Therapy Assistant PT    YONIS 08/02/16 -  Sal Yen, PT DPT Physical Therapist PT    JUANY 08/02/16 -  Tori Malave PTA Physical Therapy Assistant PT     12/08/16 -  Riccardo Eng PTA Physical Therapy Assistant PT     08/02/16 -  Josey Arroyo PTA Physical Therapy Assistant PT                    PT Recommendation and Plan  Anticipated Discharge Disposition: skilled nursing facility  Planned Therapy Interventions: bed mobility training,  balance training, home exercise program, patient/family education, postural re-education, ROM (Range of Motion), strengthening  PT Frequency: daily, 2 times/day  Plan of Care Review  Plan Of Care Reviewed With: patient  Progress: unable to show any progress toward functional goals  Outcome Summary/Follow up Plan: Pt. agreed to therapy, but then required encouragement to actually participate. Pt. was max  assist for all bed mobility. Pt. sat EOB for about 10 min with encouragement. She then began to lay herself down. Pt. has very limited range in her LEs and would not allow PTA to assist with moving/stretching them. Pt. would benefit from further therapy for stretching, strengthening, and mobility if she allows it.           Outcome Measures       06/24/17 1127 06/23/17 1100 06/23/17 0826    How much help from another person do you currently need...    Turning from your back to your side while in flat bed without using bedrails? 2  -MF  2  -GRADY    Moving from lying on back to sitting on the side of a flat bed without bedrails? 2  -MF  2  -GRADY    Moving to and from a bed to a chair (including a wheelchair)? 1  -MF  2  -GRADY    Standing up from a chair using your arms (e.g., wheelchair, bedside chair)? 1  -MF  2  -GRADY    Climbing 3-5 steps with a railing? 1  -MF  1  -GRADY    To walk in hospital room? 1  -MF  1  -GRADY    AM-PAC 6 Clicks Score 8  -MF  10  -GRADY    How much help from another is currently needed...    Putting on and taking off regular lower body clothing?  1  -ND     Bathing (including washing, rinsing, and drying)  1  -ND     Toileting (which includes using toilet bed pan or urinal)  1  -ND     Putting on and taking off regular upper body clothing  2  -ND     Taking care of personal grooming (such as brushing teeth)  2  -ND     Eating meals  2  -ND     Score  9  -ND     Functional Assessment    Outcome Measure Options AM-PAC 6 Clicks Basic Mobility (PT)  -MF AM-PAC 6 Clicks Daily Activity (OT)  -ND AM-PAC 6 Clicks  Basic Mobility (PT)  -      06/21/17 1420          How much help from another is currently needed...    Putting on and taking off regular lower body clothing? 1  -CJ      Bathing (including washing, rinsing, and drying) 1  -CJ      Toileting (which includes using toilet bed pan or urinal) 1  -CJ      Putting on and taking off regular upper body clothing 2  -CJ      Taking care of personal grooming (such as brushing teeth) 2  -CJ      Eating meals 2  -CJ      Score 9  -CJ        User Key  (r) = Recorded By, (t) = Taken By, (c) = Cosigned By    Initials Name Provider Type     Marcin Robbins, BOLAND/L Occupational Therapy Assistant    GRADY Eng PTA Physical Therapy Assistant    MICHELLE Arroyo PTA Physical Therapy Assistant    RITA Gaston, OTR/L Occupational Therapist           Time Calculation:         PT Charges       06/24/17 1147          Time Calculation    Start Time 1127  -      Stop Time 1150  -      Time Calculation (min) 23 min  -      PT Non-Billable Time (min) 0 min  -      PT Received On 06/24/17  -      PT Goal Re-Cert Due Date 06/25/17  -      Time Calculation- PT    Total Timed Code Minutes- PT 23 minute(s)  -        User Key  (r) = Recorded By, (t) = Taken By, (c) = Cosigned By    Initials Name Provider Type    MICHELLE Arroyo PTA Physical Therapy Assistant          Therapy Charges for Today     Code Description Service Date Service Provider Modifiers Qty    [id number]  PT THERAPEUTIC ACT EA 15 MIN 6/24/2017 Josey Arroyo PTA GP, KX 2          PT G-Codes  Outcome Measure Options: AM-PAC 6 Clicks Basic Mobility (PT)  Score: 6  Functional Limitation: Changing and maintaining body position  Changing and Maintaining Body Position Current Status (): 100 percent impaired, limited or restricted  Changing and Maintaining Body Position Goal Status (): At least 60 percent but less than 80 percent impaired, limited or restricted    Josey QUINONES  Dina, HILLARY  6/24/2017

## 2017-06-24 NOTE — PLAN OF CARE
Problem: Patient Care Overview (Adult)  Goal: Plan of Care Review  Outcome: Ongoing (interventions implemented as appropriate)    06/24/17 1127   Coping/Psychosocial Response Interventions   Plan Of Care Reviewed With patient   Patient Care Overview   Progress unable to show any progress toward functional goals   Outcome Evaluation   Outcome Summary/Follow up Plan Pt. agreed to therapy, but then required encouragement to actually participate. Pt. was max assist for all bed mobility. Pt. sat EOB for about 10 min with encouragement. She then began to lay herself down. Pt. has very limited range in her LEs and would not allow PTA to assist with moving/stretching them. Pt. would benefit from further therapy for stretching, strengthening, and mobility if she allows it.

## 2017-06-24 NOTE — PROGRESS NOTES
[facility] Medicine Services  INPATIENT PROGRESS NOTE    Length of Stay: 13  Date of Admission: 6/11/2017  Primary Care Physician: Rios Ngo,     Subjective   Chief Complaint: Atrial fib, hypertension, weakness, kidney failure    Chest Pain         Patient eating well.   BP remains stable.Says feels better today.  Doing OK on floor tolerating dialysis. Doing OK no complaints.  Review of Systems   Cardiovascular: Positive for chest pain.      Constitutional: Positive for activity change, appetite change and fatigue. Negative for chills and fever.   HENT: Negative for hearing loss, nosebleeds, tinnitus and trouble swallowing.   Eyes: Negative for visual disturbance.   Respiratory: Negative for cough, chest tightness, shortness of breath and wheezing.   Cardiovascular: Negative for chest pain, palpitations and leg swelling.   Gastrointestinal: Negative for abdominal distention, abdominal pain, blood in stool, constipation, diarrhea, nausea and vomiting.   Endocrine: Negative for cold intolerance, heat intolerance, polydipsia, polyphagia and polyuria.   Genitourinary: Negative for decreased urine volume, difficulty urinating, dysuria, flank pain, frequency and hematuria.   Musculoskeletal: Positive for arthralgias, gait problem and myalgias. Negative for joint swelling.   Skin: Negative for rash.   Allergic/Immunologic: Negative for immunocompromised state.   Neurological: Positive for weakness. Negative for dizziness, syncope, light-headedness and headaches.   Hematological: Negative for adenopathy. Does not bruise/bleed easily.   Psychiatric/Behavioral: Negative for confusion and sleep disturbance. The patient is not nervous/anxious.     All pertinent negatives and positives are as above. All other systems have been reviewed and are negative unless otherwise stated.     Objective    Temp:  [97.8 °F (36.6 °C)-98.9 °F (37.2 °C)] 97.8 °F (36.6 °C)  Heart Rate:  [107-113]  110  Resp:  [16-18] 18  BP: (116-134)/(55-70) 116/70    Intake/Output Summary (Last 24 hours) at 06/24/17 0831  Last data filed at 06/23/17 1151   Gross per 24 hour   Intake              200 ml   Output                0 ml   Net              200 ml     Physical Exam  Constitutional: She appears well-developed and well-nourished.   HENT:   Head: Normocephalic and atraumatic.   Eyes: Conjunctivae and EOM are normal. Pupils are equal, round, and reactive to light.   Neck: Neck supple. No JVD present. No thyromegaly present.   Cardiovascular: Normal rate, regular rhythm, normal heart sounds and intact distal pulses. Exam reveals no gallop and no friction rub.   No murmur heard.  Pulmonary/Chest: Effort normal. No respiratory distress. She has no wheezes. She has no rales. She exhibits no tenderness.   Abdominal: Soft. Bowel sounds are normal. She exhibits no distension. There is no tenderness. There is no rebound and no guarding.   Musculoskeletal: Normal range of motion. She exhibits no edema, tenderness or deformity.   Lymphadenopathy:   She has no cervical adenopathy.   Neurological: She is alert. She displays normal reflexes. No cranial nerve deficit. She exhibits abnormal muscle tone. Coordination abnormal.   Skin: Skin is warm and dry. No rash noted.   Psychiatric: She has a normal mood and affect. Her behavior is normal.   Nursing note and vitals reviewed.  Results Review:  Lab Results (last 24 hours)     Procedure Component Value Units Date/Time    POC Glucose Fingerstick [[id number]]  (Abnormal) Collected:  06/23/17 0844    Specimen:  Blood Updated:  06/23/17 0855     Glucose 164 (H) mg/dL       : [id number] Nima Rent Jungleter ID: [id number]       POC Glucose Fingerstick [[id number]]  (Normal) Collected:  06/23/17 1155    Specimen:  Blood Updated:  06/23/17 1206     Glucose 120 mg/dL       : [id number] Nima Telegent SystemsaMeter ID: [id number]       POC Glucose Fingerstick [[id number]]  (Normal) Collected:   06/23/17 1558    Specimen:  Blood Updated:  06/23/17 1609     Glucose 98 mg/dL       : [id number] Nima LaresaMeter ID: [id number]       POC Glucose Fingerstick [[id number]]  (Normal) Collected:  06/24/17 0757    Specimen:  Blood Updated:  06/24/17 0809     Glucose 109 mg/dL       : [id number] Susannah RecioaMeter ID: [id number]              Cultures:  Blood Culture   Date Value Ref Range Status   06/16/2017 No growth at 24 hours  Preliminary       Radiology Data:    Imaging Results (last 24 hours)     ** No results found for the last 24 hours. **          Allergies   Allergen Reactions   • Heparin    • Iron    • Latex    • Levaquin [Levofloxacin]    • Shrimp (Diagnostic)    • Vancomycin        Scheduled meds:     ipratropium-albuterol 3 mL Nebulization Q6H While Awake - RT   levothyroxine 50 mcg Oral Q AM   megestrol 400 mg Oral Daily   midodrine 5 mg Oral TID AC   pantoprazole 40 mg Oral Q AM   predniSONE 40 mg Oral Daily With Breakfast       PRN meds:  ondansetron  •  traZODone    Assessment/Plan     Active Problems:    Chest pain in adult    Renal failure    Hypotension    Anemia    Hypokalemia      Plan:  Chest pain, atypical, echocardiogram/afib -Ejection fraction 61%, diastolic dysfunction, right ventricular dilatation, tricuspid valve regurgitation, right ventricle systolic pressure greater than 55. .  Doing much better on steroids  eating continue same wean steroids now on oral doing well  Hemoptysis ×1 6/15. Hx feliciano syndrome.    Thrombocytopenia-Stable chronic     Pneumonia/ Slight leukocytosis. Continue oral antibiotics    Anemia- Status post 2 units of blood transfusion 6/14. Trending down transfuse as necessary      Chronic renal failure- nephrology is on board. Cont dialysis per nephrology.       Hyperlipidemia stable      Elevate TSH- free T4 is normal. Continue  synthroid.      Hypotension- resoloved      Hypokalemia-- replace per nephrology.      Calcium 7.7, corrected calcium 9.5  - 6/13/2017.      Nutrition- discontinue megace, patient is eating with steroids see if maintains appetite with prednisone alone.       Deconditioning- start PT and OT      Discharge Planning: LTAC refused by insurance plan for [facility].still awaiting [facility] bed offer.    Adrenal insufficiency responding to steroids. No new plans at this time.  Arthur Kerr MD   06/24/17   8:31 AM

## 2017-06-24 NOTE — PROGRESS NOTES
Nephrology ([facility]) Progress Note      Patient:  Jennifer Marks  YOB: 1945  Date of Service: 6/24/2017  MRN: [id number]   Acct: [id number]   Primary Care Physician: Rios Ngo DO  Advance Directive: Full Code  Admit Date: 6/11/2017       Hospital Day: 13  Referring Provider: Pino Tang MD      Patient personally seen and examined.  Complete chart including Consults, Notes, Operative Reports, Labs, Cardiology, and Radiology studies reviewed as able.        Subjective:  Jennifer Marks is a 72 y.o. female  whom we were consulted for end stage renal disease management, hypokalemia.  Patient was initially admitted to CCU for the treatment of new onset of atrial fibrillation and pneumonia.  Patient was hypotensive related to shock caused by pneumonia.  Hospital course remarkable for treatment of A. fib with a Cardizem and chemical conversion to normal sinus rhythm.  She was also treated with IV antibiotics for pneumonia.  She continues to improve and discharge to regular floor.  This morning she feels great and wanted to go home.    Allergies:  Heparin; Iron; Latex; Levaquin [levofloxacin]; Shrimp (diagnostic); and Vancomycin    Home Meds:  Prescriptions Prior to Admission   Medication Sig Dispense Refill Last Dose   • carvedilol (COREG) 12.5 MG tablet Take 12.5 mg by mouth Daily.      • losartan (COZAAR) 25 MG tablet Take 25 mg by mouth Daily.      • pantoprazole (PROTONIX) 40 MG EC tablet Take 40 mg by mouth Daily.      • potassium chloride (KLOR-CON) 20 MEQ packet Take 20 mEq by mouth Daily.      • predniSONE (DELTASONE) 1 MG tablet Take 1 mg by mouth Daily.      • PREDNISONE PO Take  by mouth.      • traZODone (DESYREL) 50 MG tablet Take 50 mg by mouth Every Night.          Medicines:  Current Facility-Administered Medications   Medication Dose Route Frequency Provider Last Rate Last Dose   • ipratropium-albuterol (DUO-NEB) nebulizer solution 3 mL  3 mL  Nebulization Q6H While Awake - RT Carter Llamas MD   3 mL at 06/24/17 1206   • levothyroxine (SYNTHROID, LEVOTHROID) tablet 50 mcg  50 mcg Oral Q AM Carter Llamas MD   50 mcg at 06/24/17 0942   • megestrol (MEGACE) 40 MG/ML suspension 400 mg  400 mg Oral Daily Carter Llamas MD   400 mg at 06/24/17 0942   • midodrine (PROAMATINE) tablet 5 mg  5 mg Oral TID AC FANINE Blanco   5 mg at 06/24/17 0943   • ondansetron (ZOFRAN) injection 4 mg  4 mg Intravenous Q6H PRN Pino Tang MD   4 mg at 06/15/17 0555   • pantoprazole (PROTONIX) EC tablet 40 mg  40 mg Oral Q AM Pino Tang MD   40 mg at 06/24/17 0942   • predniSONE (DELTASONE) tablet 40 mg  40 mg Oral Daily With Breakfast Jez Romo MD   40 mg at 06/24/17 0942   • traZODone (DESYREL) tablet 50 mg  50 mg Oral Nightly PRN Dima Martinez MD   50 mg at 06/23/17 2227       Past Medical History:  Past Medical History:   Diagnosis Date   • A-fib    • Arthritis    • CHF (congestive heart failure)    • Hypertension    • Renal failure        Past Surgical History:  Past Surgical History:   Procedure Laterality Date   • ARTERIOVENOUS FISTULA LEG Left        Family History  History reviewed. No pertinent family history.    Social History  Social History     Social History   • Marital status: Single     Spouse name: N/A   • Number of children: N/A   • Years of education: N/A     Occupational History   • Not on file.     Social History Main Topics   • Smoking status: Former Smoker   • Smokeless tobacco: Not on file   • Alcohol use No   • Drug use: No   • Sexual activity: Defer     Other Topics Concern   • Not on file     Social History Narrative         Review of Systems:  History obtained from chart review and the patient  General ROS: Patient complains of malaise and No fever or chills  Respiratory ROS: No cough, shortness of breath, wheezing  Cardiovascular ROS: no chest pain or dyspnea on exertion  Gastrointestinal ROS: No abdominal pain, no  "nausea  Genito-Urinary ROS: No dysuria or hematuria  Neurological ROS: negative  Dermatological ROS: negative  14 point ROS reviewed with the patient and negative except as noted above and in the HPI unless unable to obtain.    Objective:  /70  Pulse 104  Temp 97.8 °F (36.6 °C) (Temporal Artery )   Resp 20  Ht 62\" (157.5 cm)  Wt 117 lb 10 oz (53.4 kg)  SpO2 94%  BMI 21.51 kg/m2    Intake/Output Summary (Last 24 hours) at 06/24/17 1438  Last data filed at 06/24/17 1300   Gross per 24 hour   Intake              480 ml   Output                0 ml   Net              480 ml     General: awake/alert    Neck: supple, no JVD  Chest:  clear to auscultation bilaterally without respiratory distress  CVS: regular rate and rhythm  Abdominal: soft, nontender, normal bowel sounds  Extremities: no cyanosis or edema  Skin: warm and dry without rash   Neuro: no focal motor deficits      Labs:    Results from last 7 days  Lab Units 06/23/17  0359 06/22/17  1155 06/22/17  0812   WBC 10*3/mm3 5.49 4.15* 5.19   HEMOGLOBIN g/dL 7.3* 7.3* 8.2*   HEMATOCRIT % 22.1* 22.0* 24.6*   PLATELETS 10*3/mm3 58* 61* 76*           Results from last 7 days  Lab Units 06/23/17  0359 06/22/17  1155 06/21/17  1548  06/19/17  0404 06/18/17  0346   SODIUM mmol/L 138 138 133*  < > 136 137   POTASSIUM mmol/L 3.4* 3.4* 3.6  < > 3.5 3.2*   CHLORIDE mmol/L 101 99 98  < > 101 100   TOTAL CO2 mmol/L 24.0 28.0 23.0*  < > 26.0 28.0   BUN mg/dL 25* 18 29*  < > 28* 23*   CREATININE mg/dL 2.92* 2.57* 3.53*  < > 3.93* 3.48*   CALCIUM mg/dL 8.1* 8.2* 8.0*  < > 7.7* 7.8*   BILIRUBIN mg/dL  --   --   --   --  1.8* 1.8*   ALK PHOS U/L  --   --   --   --  90 92   ALT (SGPT) U/L  --   --   --   --  22 28   AST (SGOT) U/L  --   --   --   --  28 23   GLUCOSE mg/dL 131* 144* 185*  < > 72 109*   < > = values in this interval not displayed.    Radiology:   Imaging Results (last 72 hours)     Procedure Component Value Units Date/Time    XR Chest 1 View [[id number]] " Collected:  06/11/17 1803     Updated:  06/11/17 2054    Narrative:       EXAMINATION:   XR CHEST 1 VW-  6/11/2017 6:01 PM CDT     HISTORY: Chest pain     Prior exams 05/28/2016.     Single view of the chest is obtained. The pulmonary vascular margins are  slightly indistinct. This has the appearance of mild pulmonary vascular  congestion. Left diaphragm is indistinct. This may be infiltrate or  atelectasis left lower lobe.     Cardiac silhouette is enlarged and unchanged from May 28.       Impression:       1. Silhouetting of the left diaphragm consistent with infiltrate or  atelectasis left lower lobe.  2. Indistinct pulmonary vascular margins consistent with mild pulmonary  vascular congestion.  This report was finalized on 06/11/2017 20:51 by Dr. Herson Magaña MD.          Culture:         Assessment   1.  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  On maintenance hemodialysis Monday Wednesday Friday   2.  Pneumonia, Resolving.  3.  Hyponatremia--resolved  4.  Anemia of chronic kidney disease  5.  Congestive heart failure with diastolic dysfunction  6.  Hypokalemia  7.  Atrial fibrillation, rate control and converted to NSR    Plan:  1.  Okay to discharge her today  2.  Low updated at outpatient hemodialysis clinic.    Jez Romo MD  6/24/2017  2:38 PM

## 2017-06-25 LAB
ABO GROUP BLD: NORMAL
BLD GP AB SCN SERPL QL: NEGATIVE
DEPRECATED RDW RBC AUTO: 68 FL (ref 40–54)
ERYTHROCYTE [DISTWIDTH] IN BLOOD BY AUTOMATED COUNT: 22.1 % (ref 12–15)
GLUCOSE BLDC GLUCOMTR-MCNC: 125 MG/DL (ref 70–130)
GLUCOSE BLDC GLUCOMTR-MCNC: 137 MG/DL (ref 70–130)
GLUCOSE BLDC GLUCOMTR-MCNC: 154 MG/DL (ref 70–130)
GLUCOSE BLDC GLUCOMTR-MCNC: 65 MG/DL (ref 70–130)
HCT VFR BLD AUTO: 22.7 % (ref 37–47)
HGB BLD-MCNC: 7.5 G/DL (ref 12–16)
MCH RBC QN AUTO: 28.6 PG (ref 28–32)
MCHC RBC AUTO-ENTMCNC: 33 G/DL (ref 33–36)
MCV RBC AUTO: 86.6 FL (ref 82–98)
PLATELET # BLD AUTO: 72 10*3/MM3 (ref 130–400)
RBC # BLD AUTO: 2.62 10*6/MM3 (ref 4.2–5.4)
RH BLD: NEGATIVE
WBC NRBC COR # BLD: 8.24 10*3/MM3 (ref 4.8–10.8)

## 2017-06-25 PROCEDURE — 86923 COMPATIBILITY TEST ELECTRIC: CPT

## 2017-06-25 PROCEDURE — 94799 UNLISTED PULMONARY SVC/PX: CPT

## 2017-06-25 PROCEDURE — 63710000001 PREDNISONE PER 1 MG: Performed by: INTERNAL MEDICINE

## 2017-06-25 PROCEDURE — 85027 COMPLETE CBC AUTOMATED: CPT | Performed by: INTERNAL MEDICINE

## 2017-06-25 PROCEDURE — 86901 BLOOD TYPING SEROLOGIC RH(D): CPT | Performed by: INTERNAL MEDICINE

## 2017-06-25 PROCEDURE — 94760 N-INVAS EAR/PLS OXIMETRY 1: CPT

## 2017-06-25 PROCEDURE — 86900 BLOOD TYPING SEROLOGIC ABO: CPT | Performed by: INTERNAL MEDICINE

## 2017-06-25 PROCEDURE — 82962 GLUCOSE BLOOD TEST: CPT

## 2017-06-25 PROCEDURE — 86850 RBC ANTIBODY SCREEN: CPT | Performed by: INTERNAL MEDICINE

## 2017-06-25 RX ORDER — LORAZEPAM 0.5 MG/1
0.5 TABLET ORAL EVERY 6 HOURS PRN
Status: DISCONTINUED | OUTPATIENT
Start: 2017-06-25 | End: 2017-06-26 | Stop reason: HOSPADM

## 2017-06-25 RX ADMIN — IPRATROPIUM BROMIDE AND ALBUTEROL SULFATE 3 ML: 2.5; .5 SOLUTION RESPIRATORY (INHALATION) at 18:49

## 2017-06-25 RX ADMIN — IPRATROPIUM BROMIDE AND ALBUTEROL SULFATE 3 ML: 2.5; .5 SOLUTION RESPIRATORY (INHALATION) at 06:33

## 2017-06-25 RX ADMIN — MIDODRINE HYDROCHLORIDE 5 MG: 2.5 TABLET ORAL at 09:25

## 2017-06-25 RX ADMIN — LEVOTHYROXINE SODIUM 50 MCG: 50 TABLET ORAL at 05:30

## 2017-06-25 RX ADMIN — PREDNISONE 20 MG: 20 TABLET ORAL at 09:25

## 2017-06-25 RX ADMIN — MEGESTROL ACETATE 400 MG: 40 SUSPENSION ORAL at 09:25

## 2017-06-25 RX ADMIN — LORAZEPAM 0.5 MG: 0.5 TABLET ORAL at 16:21

## 2017-06-25 RX ADMIN — IPRATROPIUM BROMIDE AND ALBUTEROL SULFATE 3 ML: 2.5; .5 SOLUTION RESPIRATORY (INHALATION) at 11:53

## 2017-06-25 RX ADMIN — PANTOPRAZOLE SODIUM 40 MG: 40 TABLET, DELAYED RELEASE ORAL at 05:30

## 2017-06-25 NOTE — PROGRESS NOTES
Nephrology ([facility]) Progress Note      Patient:  Jennifer Marks  YOB: 1945  Date of Service: 6/25/2017  MRN: [id number]   Acct: [id number]   Primary Care Physician: Rios Ngo DO  Advance Directive: Full Code  Admit Date: 6/11/2017       Hospital Day: 14  Referring Provider: Pino Tang MD      Patient personally seen and examined.  Complete chart including Consults, Notes, Operative Reports, Labs, Cardiology, and Radiology studies reviewed as able.        Subjective:  Jennifer Marks is a 72 y.o. female  whom we were consulted for end stage renal disease management, hypokalemia.  Patient was initially admitted to CCU for the treatment of new onset of atrial fibrillation and pneumonia.  Patient was hypotensive related to shock caused by pneumonia.  Hospital course remarkable for treatment of A. fib with a Cardizem and chemical conversion to normal sinus rhythm.  She was also treated with IV antibiotics for pneumonia.  She continues to improve and discharge to regular floor.  She continues to feel good and hoping to go home tomorrow after dialysis.    Allergies:  Heparin; Iron; Latex; Levaquin [levofloxacin]; Shrimp (diagnostic); and Vancomycin    Home Meds:  Prescriptions Prior to Admission   Medication Sig Dispense Refill Last Dose   • carvedilol (COREG) 12.5 MG tablet Take 12.5 mg by mouth Daily.      • losartan (COZAAR) 25 MG tablet Take 25 mg by mouth Daily.      • pantoprazole (PROTONIX) 40 MG EC tablet Take 40 mg by mouth Daily.      • potassium chloride (KLOR-CON) 20 MEQ packet Take 20 mEq by mouth Daily.      • predniSONE (DELTASONE) 1 MG tablet Take 1 mg by mouth Daily.      • PREDNISONE PO Take  by mouth.      • traZODone (DESYREL) 50 MG tablet Take 50 mg by mouth Every Night.          Medicines:  Current Facility-Administered Medications   Medication Dose Route Frequency Provider Last Rate Last Dose   • ipratropium-albuterol (DUO-NEB) nebulizer  solution 3 mL  3 mL Nebulization Q6H While Awake - RT Carter Llamas MD   3 mL at 06/25/17 1153   • levothyroxine (SYNTHROID, LEVOTHROID) tablet 50 mcg  50 mcg Oral Q AM Carter Llamas MD   50 mcg at 06/25/17 0530   • megestrol (MEGACE) 40 MG/ML suspension 400 mg  400 mg Oral Daily Carter Llamas MD   400 mg at 06/25/17 0925   • midodrine (PROAMATINE) tablet 5 mg  5 mg Oral TID AC FANNIE Blanco   5 mg at 06/25/17 0925   • ondansetron (ZOFRAN) injection 4 mg  4 mg Intravenous Q6H PRN Pino Tang MD   4 mg at 06/15/17 0555   • pantoprazole (PROTONIX) EC tablet 40 mg  40 mg Oral Q AM Pino Tang MD   40 mg at 06/25/17 0530   • predniSONE (DELTASONE) tablet 20 mg  20 mg Oral Daily With Breakfast Jez Romo MD   20 mg at 06/25/17 0925   • traZODone (DESYREL) tablet 50 mg  50 mg Oral Nightly PRN Dima Martinez MD   50 mg at 06/23/17 2227       Past Medical History:  Past Medical History:   Diagnosis Date   • A-fib    • Arthritis    • CHF (congestive heart failure)    • Hypertension    • Renal failure        Past Surgical History:  Past Surgical History:   Procedure Laterality Date   • ARTERIOVENOUS FISTULA LEG Left        Family History  History reviewed. No pertinent family history.    Social History  Social History     Social History   • Marital status: Single     Spouse name: N/A   • Number of children: N/A   • Years of education: N/A     Occupational History   • Not on file.     Social History Main Topics   • Smoking status: Former Smoker   • Smokeless tobacco: Not on file   • Alcohol use No   • Drug use: No   • Sexual activity: Defer     Other Topics Concern   • Not on file     Social History Narrative         Review of Systems:  History obtained from chart review and the patient  General ROS: Patient complains of malaise and No fever or chills  Respiratory ROS: No cough, shortness of breath, wheezing  Cardiovascular ROS: no chest pain or dyspnea on exertion  Gastrointestinal ROS: No abdominal  "pain, no nausea  Genito-Urinary ROS: No dysuria or hematuria  Neurological ROS: negative  Dermatological ROS: negative  14 point ROS reviewed with the patient and negative except as noted above and in the HPI unless unable to obtain.    Objective:  /62  Pulse 107  Temp 96.8 °F (36 °C) (Temporal Artery )   Resp 18  Ht 62\" (157.5 cm)  Wt 117 lb 10 oz (53.4 kg)  SpO2 96%  BMI 21.51 kg/m2    Intake/Output Summary (Last 24 hours) at 06/25/17 1417  Last data filed at 06/24/17 1700   Gross per 24 hour   Intake              240 ml   Output                0 ml   Net              240 ml     General: awake/alert    Neck: supple, no JVD  Chest:  clear to auscultation bilaterally without respiratory distress  CVS: regular rate and rhythm  Abdominal: soft, nontender, normal bowel sounds  Extremities: no cyanosis or edema  Skin: warm and dry without rash   Neuro: no focal motor deficits      Labs:    Results from last 7 days  Lab Units 06/23/17  0359 06/22/17  1155 06/22/17  0812   WBC 10*3/mm3 5.49 4.15* 5.19   HEMOGLOBIN g/dL 7.3* 7.3* 8.2*   HEMATOCRIT % 22.1* 22.0* 24.6*   PLATELETS 10*3/mm3 58* 61* 76*           Results from last 7 days  Lab Units 06/23/17  0359 06/22/17  1155 06/21/17  1548  06/19/17  0404   SODIUM mmol/L 138 138 133*  < > 136   POTASSIUM mmol/L 3.4* 3.4* 3.6  < > 3.5   CHLORIDE mmol/L 101 99 98  < > 101   TOTAL CO2 mmol/L 24.0 28.0 23.0*  < > 26.0   BUN mg/dL 25* 18 29*  < > 28*   CREATININE mg/dL 2.92* 2.57* 3.53*  < > 3.93*   CALCIUM mg/dL 8.1* 8.2* 8.0*  < > 7.7*   BILIRUBIN mg/dL  --   --   --   --  1.8*   ALK PHOS U/L  --   --   --   --  90   ALT (SGPT) U/L  --   --   --   --  22   AST (SGOT) U/L  --   --   --   --  28   GLUCOSE mg/dL 131* 144* 185*  < > 72   < > = values in this interval not displayed.    Radiology:   Imaging Results (last 72 hours)     Procedure Component Value Units Date/Time    XR Chest 1 View [[id number]] Collected:  06/11/17 1803     Updated:  06/11/17 2054    " Narrative:       EXAMINATION:   XR CHEST 1 VW-  6/11/2017 6:01 PM CDT     HISTORY: Chest pain     Prior exams 05/28/2016.     Single view of the chest is obtained. The pulmonary vascular margins are  slightly indistinct. This has the appearance of mild pulmonary vascular  congestion. Left diaphragm is indistinct. This may be infiltrate or  atelectasis left lower lobe.     Cardiac silhouette is enlarged and unchanged from May 28.       Impression:       1. Silhouetting of the left diaphragm consistent with infiltrate or  atelectasis left lower lobe.  2. Indistinct pulmonary vascular margins consistent with mild pulmonary  vascular congestion.  This report was finalized on 06/11/2017 20:51 by Dr. Herson Magaña MD.          Culture:         Assessment   1.  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  On maintenance hemodialysis Monday Wednesday Friday   2.  Pneumonia, Resolving.  3.  Hyponatremia--resolved  4.  Anemia of chronic kidney disease  5.  Congestive heart failure with diastolic dysfunction  6.  Hypokalemia  7.  Atrial fibrillation, rate control and converted to NSR    Plan:  1.  Routine hemodialysis treatment tomorrow.  2.  Transfuse one pack RBCs with hemodialysis in the morning.    Jez Romo MD  6/25/2017  2:17 PM

## 2017-06-25 NOTE — PLAN OF CARE
Problem: Patient Care Overview (Adult)  Goal: Plan of Care Review  Outcome: Ongoing (interventions implemented as appropriate)    06/25/17 0446   Coping/Psychosocial Response Interventions   Plan Of Care Reviewed With patient   Patient Care Overview   Progress no change   Outcome Evaluation   Outcome Summary/Follow up Plan VSS, 02 sats WNL on RA, pt self turning mepilex to coccyx, 3 incontinent BM's this shift, c/o joint tenderness. Sinus-sinus arrhythmia 67 to 114 with 5.97 seconds of PAT, BBB and PAC's on tele       Goal: Adult Individualization and Mutuality  Outcome: Ongoing (interventions implemented as appropriate)    Problem: Fall Risk (Adult)  Goal: Identify Related Risk Factors and Signs and Symptoms  Outcome: Ongoing (interventions implemented as appropriate)  Goal: Absence of Falls  Outcome: Ongoing (interventions implemented as appropriate)    Problem: Fluid Volume Excess (Adult,Obstetrics,Pediatric)  Goal: Stable Weight  Outcome: Ongoing (interventions implemented as appropriate)  Goal: Balanced Intake/Output  Outcome: Ongoing (interventions implemented as appropriate)    Problem: Nutrition, Imbalanced: Inadequate Oral Intake (Adult)  Goal: Identify Related Risk Factors and Signs and Symptoms  Outcome: Ongoing (interventions implemented as appropriate)  Goal: Improved Oral Intake  Outcome: Ongoing (interventions implemented as appropriate)  Goal: Prevent Further Weight Loss  Outcome: Ongoing (interventions implemented as appropriate)    Problem: Skin Integrity Impairment, Risk/Actual (Adult)  Goal: Identify Related Risk Factors and Signs and Symptoms  Outcome: Ongoing (interventions implemented as appropriate)  Goal: Skin Integrity/Wound Healing  Outcome: Ongoing (interventions implemented as appropriate)    Problem: Pressure Ulcer (Adult)  Goal: Signs and Symptoms of Listed Potential Problems Will be Absent or Manageable (Pressure Ulcer)  Outcome: Ongoing (interventions implemented as  appropriate)

## 2017-06-25 NOTE — PROGRESS NOTES
[facility]  INPATIENT PROGRESS NOTE    Length of Stay: 14  Date of Admission: 6/11/2017  Primary Care Physician: Rios Ngo DO    Subjective   Chief Complaint: Atrial fib, hypertension, weakness, kidney failure    Chest Pain         Patient eating well.   BP remains stable.Says feels better today.  Doing OK on floor tolerating dialysis. Doing OK no complaints.  Review of Systems   Cardiovascular: Positive for chest pain.      Constitutional: Positive for activity change, appetite change and fatigue. Negative for chills and fever.   HENT: Negative for hearing loss, nosebleeds, tinnitus and trouble swallowing.   Eyes: Negative for visual disturbance.   Respiratory: Negative for cough, chest tightness, shortness of breath and wheezing.   Cardiovascular: Negative for chest pain, palpitations and leg swelling.   Gastrointestinal: Negative for abdominal distention, abdominal pain, blood in stool, constipation, diarrhea, nausea and vomiting.   Endocrine: Negative for cold intolerance, heat intolerance, polydipsia, polyphagia and polyuria.   Genitourinary: Negative for decreased urine volume, difficulty urinating, dysuria, flank pain, frequency and hematuria.   Musculoskeletal: Positive for arthralgias, gait problem and myalgias. Negative for joint swelling.   Skin: Negative for rash.   Allergic/Immunologic: Negative for immunocompromised state.   Neurological: Positive for weakness. Negative for dizziness, syncope, light-headedness and headaches.   Hematological: Negative for adenopathy. Does not bruise/bleed easily.   Psychiatric/Behavioral: Negative for confusion and sleep disturbance. The patient is not nervous/anxious.     All pertinent negatives and positives are as above. All other systems have been reviewed and are negative unless otherwise stated.     Objective    Temp:  [96.8 °F (36 °C)-98.4 °F (36.9 °C)] 96.8 °F (36 °C)  Heart Rate:  [100-108]  107  Resp:  [18-20] 18  BP: (113-123)/(62-75) 113/62    Intake/Output Summary (Last 24 hours) at 06/25/17 1436  Last data filed at 06/24/17 1700   Gross per 24 hour   Intake              240 ml   Output                0 ml   Net              240 ml     Physical Exam  Constitutional: She appears well-developed and well-nourished.   HENT:   Head: Normocephalic and atraumatic.   Eyes: Conjunctivae and EOM are normal. Pupils are equal, round, and reactive to light.   Neck: Neck supple. No JVD present. No thyromegaly present.   Cardiovascular: Normal rate, regular rhythm, normal heart sounds and intact distal pulses. Exam reveals no gallop and no friction rub.   No murmur heard.  Pulmonary/Chest: Effort normal. No respiratory distress. She has no wheezes. She has no rales. She exhibits no tenderness.   Abdominal: Soft. Bowel sounds are normal. She exhibits no distension. There is no tenderness. There is no rebound and no guarding.   Musculoskeletal: Normal range of motion. She exhibits no edema, tenderness or deformity.   Lymphadenopathy:   She has no cervical adenopathy.   Neurological: She is alert. She displays normal reflexes. No cranial nerve deficit. She exhibits abnormal muscle tone. Coordination abnormal.   Skin: Skin is warm and dry. No rash noted.   Psychiatric: She has a normal mood and affect. Her behavior is normal.   Nursing note and vitals reviewed.  Results Review:  Lab Results (last 24 hours)     Procedure Component Value Units Date/Time    POC Glucose Fingerstick [[id number]]  (Normal) Collected:  06/24/17 1548    Specimen:  Blood Updated:  06/24/17 1621     Glucose 104 mg/dL       [id number]       POC Glucose Fingerstick [[id number]]  (Normal) Collected:  06/24/17 2021    Specimen:  Blood Updated:  06/24/17 2037     Glucose 108 mg/dL       : [id number] Lummus TerryMeter ID: [id number]       POC Glucose Fingerstick [[id number]]  (Abnormal) Collected:   06/25/17 0811    Specimen:  Blood Updated:  06/25/17 0822     Glucose 65 (L) mg/dL       : [id number] Ad (Jayson) BethanyMeter ID: [id number]       POC Glucose Fingerstick [[id number]]  (Normal) Collected:  06/25/17 1141    Specimen:  Blood Updated:  06/25/17 1154     Glucose 125 mg/dL       : [id number] Susannah Bakerter ID: [id number]              Cultures:  Blood Culture   Date Value Ref Range Status   06/16/2017 No growth at 24 hours  Preliminary       Radiology Data:    Imaging Results (last 24 hours)     ** No results found for the last 24 hours. **          Allergies   Allergen Reactions   • Heparin    • Iron    • Latex    • Levaquin [Levofloxacin]    • Shrimp (Diagnostic)    • Vancomycin        Scheduled meds:     ipratropium-albuterol 3 mL Nebulization Q6H While Awake - RT   levothyroxine 50 mcg Oral Q AM   megestrol 400 mg Oral Daily   midodrine 5 mg Oral TID AC   pantoprazole 40 mg Oral Q AM   predniSONE 20 mg Oral Daily With Breakfast       PRN meds:  ondansetron  •  traZODone    Assessment/Plan     Active Problems:    Chest pain in adult    Renal failure    Hypotension    Anemia    Hypokalemia      Plan:  Chest pain, atypical, echocardiogram/afib -Ejection fraction 61%, diastolic dysfunction, right ventricular dilatation, tricuspid valve regurgitation, right ventricle systolic pressure greater than 55. .  Doing much better on steroids  eating continue same wean steroids now on oral doing well  Hemoptysis ×1 6/15. Hx feliciano syndrome.    Thrombocytopenia-Stable chronic     Pneumonia/ Slight leukocytosis. Continue oral antibiotics    Anemia- Status post 2 units of blood transfusion 6/14. Trending down transfuse as necessary      Chronic renal failure- nephrology is on board. Cont dialysis per nephrology.       Hyperlipidemia stable      Elevate TSH- free T4 is normal. Continue  synthroid.      Hypotension- resoloved      Hypokalemia-- replace per nephrology.      Calcium 7.7,  corrected calcium 9.5 - 6/13/2017.      Nutrition- discontinue megace, patient is eating with steroids see if maintains appetite with prednisone alone.       Deconditioning- start PT and OT      Discharge Planning: LTAC refused by insurance plan for [facility].still awaiting [facility] bed offer or possibly home tomorrow if SW can arrange and PT thinks she will be safe.  Adrenal insufficiency responding to steroids. No new plans at this time.  Arthur Kerr MD   06/25/17   2:36 PM

## 2017-06-26 VITALS
HEIGHT: 62 IN | OXYGEN SATURATION: 94 % | WEIGHT: 118.3 LBS | SYSTOLIC BLOOD PRESSURE: 104 MMHG | BODY MASS INDEX: 21.77 KG/M2 | TEMPERATURE: 97.9 F | HEART RATE: 104 BPM | RESPIRATION RATE: 16 BRPM | DIASTOLIC BLOOD PRESSURE: 61 MMHG

## 2017-06-26 LAB
GLUCOSE BLDC GLUCOMTR-MCNC: 47 MG/DL (ref 70–130)
GLUCOSE BLDC GLUCOMTR-MCNC: 58 MG/DL (ref 70–130)

## 2017-06-26 PROCEDURE — 63710000001 PREDNISONE PER 1 MG: Performed by: INTERNAL MEDICINE

## 2017-06-26 PROCEDURE — 94799 UNLISTED PULMONARY SVC/PX: CPT

## 2017-06-26 PROCEDURE — 82962 GLUCOSE BLOOD TEST: CPT

## 2017-06-26 PROCEDURE — 97535 SELF CARE MNGMENT TRAINING: CPT

## 2017-06-26 PROCEDURE — 97110 THERAPEUTIC EXERCISES: CPT

## 2017-06-26 RX ORDER — LIDOCAINE AND PRILOCAINE 25; 25 MG/G; MG/G
CREAM TOPICAL ONCE
Status: DISCONTINUED | OUTPATIENT
Start: 2017-06-26 | End: 2017-06-26 | Stop reason: HOSPADM

## 2017-06-26 RX ORDER — MIDODRINE HYDROCHLORIDE 5 MG/1
5 TABLET ORAL
Qty: 90 TABLET | Refills: 2 | Status: SHIPPED | OUTPATIENT
Start: 2017-06-26

## 2017-06-26 RX ORDER — LIDOCAINE AND PRILOCAINE 25; 25 MG/G; MG/G
CREAM TOPICAL ONCE
Qty: 30 G | Refills: 0 | Status: SHIPPED | OUTPATIENT
Start: 2017-06-26 | End: 2017-06-26

## 2017-06-26 RX ORDER — MEGESTROL ACETATE 40 MG/ML
400 SUSPENSION ORAL DAILY
Qty: 480 ML | Refills: 1 | Status: SHIPPED | OUTPATIENT
Start: 2017-06-26

## 2017-06-26 RX ORDER — LEVOTHYROXINE SODIUM 0.05 MG/1
50 TABLET ORAL DAILY
Qty: 30 TABLET | Refills: 2 | Status: SHIPPED | OUTPATIENT
Start: 2017-06-26

## 2017-06-26 RX ORDER — PREDNISONE 20 MG/1
10 TABLET ORAL DAILY
Qty: 3 TABLET | Refills: 0 | Status: ON HOLD | OUTPATIENT
Start: 2017-06-26 | End: 2017-08-14

## 2017-06-26 RX ADMIN — PANTOPRAZOLE SODIUM 40 MG: 40 TABLET, DELAYED RELEASE ORAL at 05:57

## 2017-06-26 RX ADMIN — MIDODRINE HYDROCHLORIDE 5 MG: 2.5 TABLET ORAL at 12:46

## 2017-06-26 RX ADMIN — IPRATROPIUM BROMIDE AND ALBUTEROL SULFATE 3 ML: 2.5; .5 SOLUTION RESPIRATORY (INHALATION) at 07:21

## 2017-06-26 RX ADMIN — LEVOTHYROXINE SODIUM 50 MCG: 50 TABLET ORAL at 05:57

## 2017-06-26 RX ADMIN — MEGESTROL ACETATE 400 MG: 40 SUSPENSION ORAL at 12:46

## 2017-06-26 RX ADMIN — PREDNISONE 20 MG: 20 TABLET ORAL at 12:45

## 2017-06-26 NOTE — PLAN OF CARE
Problem: Patient Care Overview (Adult)  Goal: Plan of Care Review  Outcome: Ongoing (interventions implemented as appropriate)    06/26/17 1109   Coping/Psychosocial Response Interventions   Plan Of Care Reviewed With patient   Patient Care Overview   Progress improving   Outcome Evaluation   Outcome Summary/Follow up Plan Appetite is slightly improved from last follow up. Has consumed 40% of the last 7 meals. Receives magic cup with lunch and dinner. Has had no sig weight loss in the hospital. Cont to follow for nutrition needs. PO intake encouraged. Receives megace, but has refused several days.         Problem: Nutrition, Imbalanced: Inadequate Oral Intake (Adult)  Goal: Identify Related Risk Factors and Signs and Symptoms  Outcome: Outcome(s) achieved Date Met:  06/26/17  Goal: Improved Oral Intake  Outcome: Ongoing (interventions implemented as appropriate)  Goal: Prevent Further Weight Loss  Outcome: Ongoing (interventions implemented as appropriate)

## 2017-06-26 NOTE — DISCHARGE SUMMARY
[facility]  DISCHARGE SUMMARY       Date of Admission: 6/11/2017  Date of Discharge:  6/26/2017  Primary Care Physician: Rios Ngo DO    Presenting Problem/History of Present Illness:  Chest pain in adult [R07.9]  Chest pain in adult [R07.9]     Final Discharge Diagnoses:  Hospital Problem List     Chest pain in adult    Renal failure    Hypotension    Anemia    Hypokalemia          Consults: Gen. surgery, nephrology, cardiology, vascular surgery    Pertinent Test Results:   IMPRESSION: chest xray  1. Stable pulmonary vascular congestion with questionable interstitial  edema.  2. Left retrocardiac opacity may represent atelectasis, infection, or a  combination of these.  3. Suspect trace right pleural fluid.    IMPRESSION- flat and upright abdomen x-ray  Nonspecific bowel gas pattern without dilated bowel loops to indicate  Obstruction.    IMPRESSION-CT scan abdomen pelvic function  1. Large hiatal hernia. Cardiomegaly. Small pleural effusions.  2. 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 Multiple calcified uterine  fibroids.  3. Numerous small low-density renal masses seen most most of these are  probably cysts. Consider follow-up ultrasound. Stent identified in the    Interpretation Summary echocardiogram      · Left ventricular systolic function is normal. Estimated ejection fraction is 61-65%.  · Left ventricular diastolic dysfunction (grade II) consistent with pseudonormalization.  · Right ventricular cavity is moderate-to-severely dilated.  · Moderately reduced right ventricular systolic function noted.  · There is biatrial enlargement.  · Mild aortic valve stenosis is present.  · Mild pulmonic valve regurgitation is present.  · Moderate to severe tricuspid valve regurgitation is present.  · Estimated right ventricular systolic pressure from tricuspid regurgitation is markedly  elevated (>55 mmHg).       Chief Complaint on Day of Discharge: none    History of Present Illness on Day of Discharge:   Ms. Marks is a 72-year-old  female who presents to [facility] due to uncertain reasons. Ms. Marks is not able to provide much meaningful history. There are no family members present. I spoke with her niece who relates that Ms. Marks has been having significant difficulties with fatigue, malaise, lethargy, generalized weakness, diffuse arthralgias, myalgias, poor appetite, as well as recurring chest and abdominal pain. Her symptoms worsened acutely approximately 24 hours ago. Ms. Marks has a history of end-stage renal disease. She undergoes dialysis on Monday, Wednesday and Friday. There is speculation that she did not complete dialysis on Friday due to feeling ill. Presently Ms. Marks is resting comfortably and is in no distress. It is very difficult to obtain any information due to her status.     Hospital Course:  The patient is a 72 y.o. female who presented to [facility] with failure to thrive and encephalopathy.  Patient also stated complain of the current chest pain and  abdomen pain.  Patient is a chronic dialysis patient with a history of 3 fistulas placement in different location.  Patient has 90 well.  Attempt to put a Dobbhoff tube, patient's refused.  Patient also has a new episode atrial fib with RVR.  Cardizem drip was started.  Patient's also hypotension, Levophed drip was also started.   During course evaluation, .  Patient platelets count  was low.  Patient underwent transfusion of platelets.  Patient was also underwent transfusion of 2 units of blood.  Patient's also started.  Rocephin antibiotic was started due to possible pneumonia.  Once patient's more stabilized, Levophed and Cardizem drip was DC.  Patient was sent sent to the medical floor for further management.  PT and OT also work with the patient did not course hospital stay.  " Patient is now normotensive and eating.  Patient refuses nursing home placement.  Vitals signs stable, afebrile.  Patient follow his primary care doctor 1 week.  Patient's to stay with dialysis Monday, once in Friday.    Condition on Discharge:  stable    Physical Exam on Discharge:  /61  Pulse 104  Temp 97.9 °F (36.6 °C) (Temporal Artery )   Resp 16  Ht 62\" (157.5 cm)  Wt 118 lb 4.8 oz (53.7 kg)  SpO2 94%  BMI 21.64 kg/m2  Physical Exam   Constitutional: She is oriented to person, place, and time. She appears well-developed.   HENT:   Head: Normocephalic and atraumatic.   Eyes: Conjunctivae and EOM are normal. Pupils are equal, round, and reactive to light.   Neck: Neck supple. No JVD present. No thyromegaly present.   Cardiovascular: Normal heart sounds and intact distal pulses.  Exam reveals no gallop and no friction rub.    No murmur heard.  Irregular irregular, rate controlled.   Pulmonary/Chest: Effort normal and breath sounds normal. No respiratory distress. She has no wheezes. She has no rales. She exhibits no tenderness.   Abdominal: Soft. Bowel sounds are normal. She exhibits no distension. There is no tenderness. There is no rebound and no guarding.   Musculoskeletal: Normal range of motion. She exhibits no edema, tenderness or deformity.   Lymphadenopathy:     She has no cervical adenopathy.   Neurological: She is alert and oriented to person, place, and time. She displays normal reflexes. No cranial nerve deficit. She exhibits abnormal muscle tone. Coordination abnormal.   Skin: Skin is warm and dry. No rash noted.   Psychiatric: She has a normal mood and affect. Her behavior is normal.       Discharge Disposition:  Home or Self Care    Discharge Medications:   Jennifer Marks   Home Medication Instructions JAYLEN:[id number]    Printed on:06/26/17 1313   Medication Information                      levothyroxine (SYNTHROID, LEVOTHROID) 50 MCG tablet  Take 1 tablet by mouth Daily.           "   lidocaine-prilocaine (EMLA) 2.5-2.5 % cream  Apply  topically 1 (One) Time for 1 dose.             megestrol (MEGACE) 40 MG/ML suspension  Take 10 mL by mouth Daily.             midodrine (PROAMATINE) 5 MG tablet  Take 1 tablet by mouth 3 (Three) Times a Day Before Meals.             pantoprazole (PROTONIX) 40 MG EC tablet  Take 40 mg by mouth Daily.             predniSONE (DELTASONE) 20 MG tablet  Take 0.5 tablets by mouth Daily.             traZODone (DESYREL) 50 MG tablet  Take 50 mg by mouth Every Night.                 Discharge Diet:   Diet Instructions     Advance Diet As Tolerated                     Activity at Discharge:   Activity Instructions     Activity as Tolerated                     Discharge Care Plan/Instructions:     Follow-up Appointments:   Follow-up with PCP in 1 week.  Patient keep appointment for dialysis Monday, Wednesday, Friday.    Carter Llamas MD  06/26/17  1:13 PM    Time: Greater than 30 minutes

## 2017-06-26 NOTE — PAYOR COMM NOTE
"Jennifer Zhao (72 y.o. Female)     Date of Birth Social Security Number Address Home Phone MRN    1945  [address] [place] [address] [phone] [id number]    Jehovah's witness Marital Status          Yarsani Single       Admission Date Admission Type Admitting Provider Attending Provider Department, Room/Bed    6/11/17 Emergency Carter Llamas MD Truong, Khai C, MD [facility] 4B, 406/1    Discharge Date Discharge Disposition Discharge Destination         Home or Self Care             Attending Provider: Carter Llamas MD     Allergies:  Heparin, Iron, Latex, Levaquin [Levofloxacin], Shrimp (Diagnostic), Vancomycin    Isolation:  None   Infection:  None   Code Status:  FULL    Ht:  62\" (157.5 cm)   Wt:  118 lb 4.8 oz (53.7 kg)    Admission Cmt:  None   Principal Problem:  None                Active Insurance as of 6/11/2017     Primary Coverage     Payor Plan Insurance Group Employer/Plan Group    MEDICARE MEDICARE B ONLY      Payor Plan Address Payor Plan Phone Number Effective From Effective To    [address] [address] [phone] 5/1/2008     [place] [address]       Subscriber Name Subscriber Birth Date Member ID       JENNIFER ZHAO 1945 [id number]           Secondary Coverage     Payor Plan Insurance Group Employer/Plan Group    [facility] [place]      Payor Plan Address Payor Plan Phone Number Effective From Effective To    [address] [address]  1/1/2014     [place] [address]       Subscriber Name Subscriber Birth Date Member ID       JENNIFER ZHAO 1945 [id number]                 Emergency Contacts      (Rel.) Home Phone Work Phone Mobile Phone    Norma Amado (Relative) [phone] -- --    Mirza Peguero (Relative) [phone] -- --    Cooper Juárez (Significant Other) [phone] -- [phone]           Discharge Summaries  Date of Service: 6/26/2017 1:13 PM  Carter Llamas MD   Medicine      []Hide copied " text       [facility] Medicine Services  DISCHARGE SUMMARY         Date of Admission: 6/11/2017  Date of Discharge: 6/26/2017  Primary Care Physician: Rios Ngo DO     Presenting Problem/History of Present Illness:  Chest pain in adult [R07.9]  Chest pain in adult [R07.9]      Final Discharge Diagnoses:      Hospital Problem List      Chest pain in adult     Renal failure     Hypotension     Anemia     Hypokalemia             Consults: Gen. surgery, nephrology, cardiology, vascular surgery     Pertinent Test Results:   IMPRESSION: chest xray  1. Stable pulmonary vascular congestion with questionable interstitial  edema.  2. Left retrocardiac opacity may represent atelectasis, infection, or a  combination of these.  3. Suspect trace right pleural fluid.     IMPRESSION- flat and upright abdomen x-ray  Nonspecific bowel gas pattern without dilated bowel loops to indicate  Obstruction.     IMPRESSION-CT scan abdomen pelvic function  1. Large hiatal hernia. Cardiomegaly. Small pleural effusions.  2. 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 Multiple calcified uterine  fibroids.  3. Numerous small low-density renal masses seen most most of these are  probably cysts. Consider follow-up ultrasound. Stent identified in the         Interpretation Summary echocardiogram       · Left ventricular systolic function is normal. Estimated ejection fraction is 61-65%.  · Left ventricular diastolic dysfunction (grade II) consistent with pseudonormalization.  · Right ventricular cavity is moderate-to-severely dilated.  · Moderately reduced right ventricular systolic function noted.  · There is biatrial enlargement.  · Mild aortic valve stenosis is present.  · Mild pulmonic valve regurgitation is present.  · Moderate to severe tricuspid valve regurgitation is present.  · Estimated right ventricular systolic pressure from  tricuspid regurgitation is markedly elevated (>55 mmHg).         Chief Complaint on Day of Discharge: none     History of Present Illness on Day of Discharge:   Ms. Marks is a 72-year-old  female who presents to [facility] due to uncertain reasons. Ms. Marks is not able to provide much meaningful history. There are no family members present. I spoke with her niece who relates that Ms. Marks has been having significant difficulties with fatigue, malaise, lethargy, generalized weakness, diffuse arthralgias, myalgias, poor appetite, as well as recurring chest and abdominal pain. Her symptoms worsened acutely approximately 24 hours ago. Ms. Marks has a history of end-stage renal disease. She undergoes dialysis on Monday, Wednesday and Friday. There is speculation that she did not complete dialysis on Friday due to feeling ill. Presently Ms. Marks is resting comfortably and is in no distress. It is very difficult to obtain any information due to her status.      Hospital Course:  The patient is a 72 y.o. female who presented to [facility] with failure to thrive and encephalopathy. Patient also stated complain of the current chest pain and  abdomen pain. Patient is a chronic dialysis patient with a history of 3 fistulas placement in different location. Patient has 90 well. Attempt to put a Dobbhoff tube, patient's refused. Patient also has a new episode atrial fib with RVR. Cardizem drip was started. Patient's also hypotension, Levophed drip was also started.  During course evaluation, . Patient platelets count was low.  Patient underwent transfusion of platelets. Patient was also underwent transfusion of 2 units of blood. Patient's also started.  Rocephin antibiotic was started due to possible pneumonia. Once patient's more stabilized, Levophed and Cardizem drip was DC. Patient was sent sent to the medical floor for further management. PT and OT also work with the patient did  "not course hospital stay. Patient is now normotensive and eating.  Patient refuses nursing home placement. Vitals signs stable, afebrile. Patient follow his primary care doctor 1 week. Patient's to stay with dialysis Monday, once in Friday.     Condition on Discharge: stable     Physical Exam on Discharge:  /61  Pulse 104  Temp 97.9 °F (36.6 °C) (Temporal Artery )  Resp 16  Ht 62\" (157.5 cm)  Wt 118 lb 4.8 oz (53.7 kg)  SpO2 94%  BMI 21.64 kg/m2  Physical Exam   Constitutional: She is oriented to person, place, and time. She appears well-developed.   HENT:   Head: Normocephalic and atraumatic.   Eyes: Conjunctivae and EOM are normal. Pupils are equal, round, and reactive to light.   Neck: Neck supple. No JVD present. No thyromegaly present.   Cardiovascular: Normal heart sounds and intact distal pulses. Exam reveals no gallop and no friction rub.   No murmur heard.  Irregular irregular, rate controlled.   Pulmonary/Chest: Effort normal and breath sounds normal. No respiratory distress. She has no wheezes. She has no rales. She exhibits no tenderness.   Abdominal: Soft. Bowel sounds are normal. She exhibits no distension. There is no tenderness. There is no rebound and no guarding.   Musculoskeletal: Normal range of motion. She exhibits no edema, tenderness or deformity.   Lymphadenopathy:   She has no cervical adenopathy.   Neurological: She is alert and oriented to person, place, and time. She displays normal reflexes. No cranial nerve deficit. She exhibits abnormal muscle tone. Coordination abnormal.   Skin: Skin is warm and dry. No rash noted.   Psychiatric: She has a normal mood and affect. Her behavior is normal.         Discharge Disposition:  Home or Self Care     Discharge Medications:                Jennifer Marks   Home Medication Instructions JAYLEN:[id number]     Printed on:06/26/17 1313   Medication Information                                       levothyroxine (SYNTHROID, LEVOTHROID) 50 " MCG tablet  Take 1 tablet by mouth Daily.                   lidocaine-prilocaine (EMLA) 2.5-2.5 % cream  Apply topically 1 (One) Time for 1 dose.                   megestrol (MEGACE) 40 MG/ML suspension  Take 10 mL by mouth Daily.                   midodrine (PROAMATINE) 5 MG tablet  Take 1 tablet by mouth 3 (Three) Times a Day Before Meals.                   pantoprazole (PROTONIX) 40 MG EC tablet  Take 40 mg by mouth Daily.                   predniSONE (DELTASONE) 20 MG tablet  Take 0.5 tablets by mouth Daily.                   traZODone (DESYREL) 50 MG tablet  Take 50 mg by mouth Every Night.                         Discharge Diet:       Diet Instructions      Advance Diet As Tolerated                          Activity at Discharge:       Activity Instructions      Activity as Tolerated                          Discharge Care Plan/Instructions:      Follow-up Appointments:   Follow-up with PCP in 1 week. Patient keep appointment for dialysis Monday, Wednesday, Friday.     Carter Llamas MD  17  1:13 PM     Time: Greater than 30 minutes                 ED to Hosp-Admission (Current) on 2017              Routing History              Detailed Report           [address] [address]  Phone: [phone]  Fax:         Patient:     Jennifer Marks MRN: [id number]   [address] [place] [address] : 1945  SSN:    Phone: [phone] Sex: F      INSURANCE PAYOR PLAN GROUP # SUBSCRIBER ID   Primary:  Secondary: MEDICARE [employer] [place] [id number]  [id number]   Admitting Diagnosis: Chest pain in adult [R07.9]  Order Date: 2017               Discharge patient    (Order ID: [id number])   Diagnosis:       Priority: Routine Expected Date:   Expiration Date:        Interval:  Count:          Specimen Type:   Specimen Source:   Specimen Taken Date:   Specimen Taken Time:                         Authorizing  Provider:Carter Llamas MD  Authorizing Provider's NPI: [id number]  Order Entered By: Carter Llamas MD 6/26/2017 1:13 PM     Electronically signed by: Carter Llamas MD 6/26/2017 1:13 PM               Discharge Order     Start     Ordered    06/26/17 1309  Discharge patient  Once     Expected Discharge Date:  06/26/17    Discharge Disposition:  Home or Self Care        06/26/17 1313

## 2017-06-26 NOTE — PROGRESS NOTES
SW has received a call from PT's daughter who stated that [facility] is not in network with PT's payer source. PT will not be receiving services through [facility]. PT's daughter also advised that [facility] called and said they do not have a bath aid available. They no longer want to use [facility]. PT's daughter has selected [facility] from a list of providers. A referral has been sent to [facility]. NARDA Santo

## 2017-06-26 NOTE — PLAN OF CARE
Problem: Patient Care Overview (Adult)  Goal: Plan of Care Review  Outcome: Ongoing (interventions implemented as appropriate)    06/26/17 0515   Coping/Psychosocial Response Interventions   Plan Of Care Reviewed With patient   Patient Care Overview   Progress no change   Outcome Evaluation   Outcome Summary/Follow up Plan VSS, no c/o pain, pt agitated this shift refusing tele this a.m. And anxiety medication. Blood ordered with hemodialysis this a.m.       Goal: Adult Individualization and Mutuality  Outcome: Ongoing (interventions implemented as appropriate)    Problem: Fall Risk (Adult)  Goal: Identify Related Risk Factors and Signs and Symptoms  Outcome: Ongoing (interventions implemented as appropriate)  Goal: Absence of Falls  Outcome: Ongoing (interventions implemented as appropriate)  Goal: Absence of Falls  Outcome: Ongoing (interventions implemented as appropriate)    Problem: Fluid Volume Excess (Adult,Obstetrics,Pediatric)  Goal: Stable Weight  Outcome: Ongoing (interventions implemented as appropriate)  Goal: Balanced Intake/Output  Outcome: Ongoing (interventions implemented as appropriate)    Problem: Skin Integrity Impairment, Risk/Actual (Adult)  Goal: Identify Related Risk Factors and Signs and Symptoms  Outcome: Ongoing (interventions implemented as appropriate)  Goal: Skin Integrity/Wound Healing  Outcome: Ongoing (interventions implemented as appropriate)    Problem: Pressure Ulcer (Adult)  Goal: Signs and Symptoms of Listed Potential Problems Will be Absent or Manageable (Pressure Ulcer)  Outcome: Ongoing (interventions implemented as appropriate)

## 2017-06-26 NOTE — PROGRESS NOTES
Nephrology ([facility]) Progress Note      Patient:  Jennifer Marks  YOB: 1945  Date of Service: 6/26/2017  MRN: [id number]   Acct: [id number]   Primary Care Physician: Rios Ngo DO  Advance Directive: Full Code  Admit Date: 6/11/2017       Hospital Day: 15  Referring Provider: Pino Tang MD      Patient personally seen and examined.  Complete chart including Consults, Notes, Operative Reports, Labs, Cardiology, and Radiology studies reviewed as able.        Subjective:  Jennifer Marks is a 72 y.o. female  whom we were consulted for ESRD.  Admitted with PNA/AFib.  Hoping for d/c soon, very anxious about d/c planning.  No issues with HD.    Dialysis   Pt was seen on RRT  Modality: Hemodialysis  Access: Arterial Venous Fistula  Location: left upper  QB: 400  QD: 600  UF: 1500 goal      Allergies:  Heparin; Iron; Latex; Levaquin [levofloxacin]; Shrimp (diagnostic); and Vancomycin    Home Meds:  Prescriptions Prior to Admission   Medication Sig Dispense Refill Last Dose   • carvedilol (COREG) 12.5 MG tablet Take 12.5 mg by mouth Daily.      • losartan (COZAAR) 25 MG tablet Take 25 mg by mouth Daily.      • pantoprazole (PROTONIX) 40 MG EC tablet Take 40 mg by mouth Daily.      • potassium chloride (KLOR-CON) 20 MEQ packet Take 20 mEq by mouth Daily.      • predniSONE (DELTASONE) 1 MG tablet Take 1 mg by mouth Daily.      • PREDNISONE PO Take  by mouth.      • traZODone (DESYREL) 50 MG tablet Take 50 mg by mouth Every Night.          Medicines:  Current Facility-Administered Medications   Medication Dose Route Frequency Provider Last Rate Last Dose   • ipratropium-albuterol (DUO-NEB) nebulizer solution 3 mL  3 mL Nebulization Q6H While Awake - RT Carter Llamas MD   3 mL at 06/26/17 0721   • levothyroxine (SYNTHROID, LEVOTHROID) tablet 50 mcg  50 mcg Oral Q AM Carter Llamas MD   50 mcg at 06/26/17 0579   • lidocaine-prilocaine (EMLA) 2.5-2.5 % cream   Topical  Once Jez Romo MD       • LORazepam (ATIVAN) tablet 0.5 mg  0.5 mg Oral Q6H PRN Arthur Kerr MD   0.5 mg at 06/25/17 1621   • megestrol (MEGACE) 40 MG/ML suspension 400 mg  400 mg Oral Daily Carter Llamas MD   400 mg at 06/25/17 0925   • midodrine (PROAMATINE) tablet 5 mg  5 mg Oral TID AC FANNIE Blanco   5 mg at 06/25/17 0925   • ondansetron (ZOFRAN) injection 4 mg  4 mg Intravenous Q6H PRN Pino Tang MD   4 mg at 06/15/17 0555   • pantoprazole (PROTONIX) EC tablet 40 mg  40 mg Oral Q AM Pino Tang MD   40 mg at 06/26/17 0557   • predniSONE (DELTASONE) tablet 20 mg  20 mg Oral Daily With Breakfast Jez Romo MD   20 mg at 06/25/17 0925   • traZODone (DESYREL) tablet 50 mg  50 mg Oral Nightly PRN Dima Martinez MD   50 mg at 06/23/17 2227       Past Medical History:  Past Medical History:   Diagnosis Date   • A-fib    • Arthritis    • CHF (congestive heart failure)    • Hypertension    • Renal failure        Past Surgical History:  Past Surgical History:   Procedure Laterality Date   • ARTERIOVENOUS FISTULA LEG Left        Family History  History reviewed. No pertinent family history.    Social History  Social History     Social History   • Marital status: Single     Spouse name: N/A   • Number of children: N/A   • Years of education: N/A     Occupational History   • Not on file.     Social History Main Topics   • Smoking status: Former Smoker   • Smokeless tobacco: Not on file   • Alcohol use No   • Drug use: No   • Sexual activity: Defer     Other Topics Concern   • Not on file     Social History Narrative         Review of Systems:  History obtained from chart review and the patient  General ROS: No fever or chills  Respiratory ROS: No cough, shortness of breath, wheezing  Cardiovascular ROS: no chest pain or dyspnea on exertion  Gastrointestinal ROS: No abdominal pain or melena  Genito-Urinary ROS: No dysuria or hematuria  14 point ROS reviewed with the patient and negative  "except as noted above and in the HPI unless unable to obtain.    Objective:  /61  Pulse 104  Temp 97.9 °F (36.6 °C) (Temporal Artery )   Resp 16  Ht 62\" (157.5 cm)  Wt 118 lb 4.8 oz (53.7 kg)  SpO2 94%  BMI 21.64 kg/m2    Intake/Output Summary (Last 24 hours) at 06/26/17 1055  Last data filed at 06/26/17 0610   Gross per 24 hour   Intake              330 ml   Output                0 ml   Net              330 ml     General: awake/alert   Chest:  clear to auscultation bilaterally without respiratory distress  CVS: regular rate and rhythm  Abdominal: soft, nontender, normal bowel sounds  Extremities: no cyanosis or edema  Skin: warm and dry without rash      Labs:    Results from last 7 days  Lab Units 06/25/17  1504 06/23/17  0359 06/22/17  1155   WBC 10*3/mm3 8.24 5.49 4.15*   HEMOGLOBIN g/dL 7.5* 7.3* 7.3*   HEMATOCRIT % 22.7* 22.1* 22.0*   PLATELETS 10*3/mm3 72* 58* 61*           Results from last 7 days  Lab Units 06/23/17  0359 06/22/17  1155 06/21/17  1548   SODIUM mmol/L 138 138 133*   POTASSIUM mmol/L 3.4* 3.4* 3.6   CHLORIDE mmol/L 101 99 98   TOTAL CO2 mmol/L 24.0 28.0 23.0*   BUN mg/dL 25* 18 29*   CREATININE mg/dL 2.92* 2.57* 3.53*   CALCIUM mg/dL 8.1* 8.2* 8.0*   GLUCOSE mg/dL 131* 144* 185*       Radiology:   Imaging Results (last 72 hours)     ** No results found for the last 72 hours. **          Culture:         Assessment   ESRD - DM Nephropathy/Nayely's  hyponatremia  Chronic diastolic CHF  PNA  Anemia CKD  AFib now in NSR    Plan:  HD MWF  reviewed d/c plans in computer with pt.    labs      Maximo Nieves MD  6/26/2017  10:55 AM      "

## 2017-06-26 NOTE — PAYOR COMM NOTE
"[id number]  6/26 CLINICAL UPDATE  STILL WAITING SNF BED OFFER  UR NURSE       Jennifer Zhao (72 y.o. Female)     Date of Birth Social Security Number Address Home Phone MRN    1945  [address] [place] [address] [phone] [id number]    Quaker Marital Status          Protestant Single       Admission Date Admission Type Admitting Provider Attending Provider Department, Room/Bed    6/11/17 Emergency Carter Llamas MD Truong, Khai C, MD [facility] 4B, 406/1    Discharge Date Discharge Disposition Discharge Destination                      Attending Provider: Carter Llamas MD     Allergies:  Heparin, Iron, Latex, Levaquin [Levofloxacin], Shrimp (Diagnostic), Vancomycin    Isolation:  None   Infection:  None   Code Status:  FULL    Ht:  62\" (157.5 cm)   Wt:  118 lb 4.8 oz (53.7 kg)    Admission Cmt:  None   Principal Problem:  None                Active Insurance as of 6/11/2017     Primary Coverage     Payor Plan Insurance Group Employer/Plan Group    MEDICARE MEDICARE B ONLY      Payor Plan Address Payor Plan Phone Number Effective From Effective To    [address] [address] [phone] 5/1/2008     [place] [address]       Subscriber Name Subscriber Birth Date Member ID       JENNIFER ZHAO 1945 [id number]           Secondary Coverage     Payor Plan Insurance Group Employer/Plan Group    [place]      Payor Plan Address Payor Plan Phone Number Effective From Effective To    [address] [address]  1/1/2014     [place] [address]       Subscriber Name Subscriber Birth Date Member ID       JENNIFER ZHAO 1945 [id number]                 Emergency Contacts      (Rel.) Home Phone Work Phone Mobile Phone    Norma Amado (Relative) [phone] -- --    Mirza Peguero (Relative) [phone] -- --            Vital Signs (last 72 hrs)       06/22 0700  -  06/23 0659 06/23 0700  -  06/24 0659 06/24 0700  -  06/25 " 0659 06/25 0700  -  06/26 0609   Most Recent    Temp (°F) 97.1 -  97.8    98.4 -  98.9    97.8 -  98.4    96.8 -  97.9     97.9 (36.6)    Heart Rate 93 -  110    106 -  113    100 -  110    104 -  107     104    Resp 18 -  22    16 -  18    18 -  20    16 -  18     16    /64 -  134/71    120/62 -  134/56    116/70 -  123/75    104/61 -  113/62     104/61    SpO2 (%) 94 -  99    92 -  95    92 -  96    94 -  96     94          Intake & Output (last 3 days)       06/23 0701 - 06/24 0700 06/24 0701 - 06/25 0700 06/25 0701 - 06/26 0700    P.O. 250 720 120    Total Intake(mL/kg) 250 (4.7) 720 (13.5) 120 (2.2)    Urine (mL/kg/hr) 0 (0)  0 (0)    Stool   0 (0)    Total Output 0   0    Net +250 +720 +120           Unmeasured Urine Occurrence   1 x    Unmeasured Stool Occurrence 3 x 1 x 2 x        Lines, Drains & Airways    Active LDAs     Name:   Placement date:   Placement time:   Site:   Days:    Hemodialysis AV Access Device                 Hemodialysis AV Access Device                 Hemodialysis AV Access Device                             [facility] Medications (active)       Dose Frequency Start End    ipratropium-albuterol (DUO-NEB) nebulizer solution 3 mL 3 mL Every 6 Hours While Awake - RT 6/16/2017     Sig - Route: Take 3 mL by nebulization Every 6 (Six) Hours While Awake. - Nebulization    levothyroxine (SYNTHROID, LEVOTHROID) tablet 50 mcg 50 mcg Every Early Morning 6/14/2017     Sig - Route: Take 1 tablet by mouth Every Morning. - Oral    LORazepam (ATIVAN) tablet 0.5 mg 0.5 mg Every 6 Hours PRN 6/25/2017 7/5/2017    Sig - Route: Take 1 tablet by mouth Every 6 (Six) Hours As Needed for Anxiety. - Oral    megestrol (MEGACE) 40 MG/ML suspension 400 mg 400 mg Daily 6/15/2017     Sig - Route: Take 10 mL by mouth Daily. - Oral    midodrine (PROAMATINE) tablet 5 mg 5 mg 3 Times Daily Before Meals 6/15/2017     Sig - Route: Take 2 tablets by mouth 3 (Three) Times a Day Before Meals. - Oral    ondansetron  (ZOFRAN) injection 4 mg 4 mg Every 6 Hours PRN 6/11/2017     Sig - Route: Infuse 2 mL into a venous catheter Every 6 (Six) Hours As Needed for Nausea or Vomiting. - Intravenous    pantoprazole (PROTONIX) EC tablet 40 mg 40 mg Every Early Morning 6/12/2017     Sig - Route: Take 1 tablet by mouth Every Morning. - Oral    predniSONE (DELTASONE) tablet 20 mg 20 mg Daily With Breakfast 6/25/2017     Sig - Route: Take 1 tablet by mouth Daily With Breakfast. - Oral    traZODone (DESYREL) tablet 50 mg 50 mg Nightly PRN 6/23/2017     Sig - Route: Take 1 tablet by mouth At Night As Needed for Sleep. - Oral            Lab Results (last 24 hours)     Procedure Component Value Units Date/Time    POC Glucose Fingerstick [[id number]]  (Abnormal) Collected:  06/25/17 0811    Specimen:  Blood Updated:  06/25/17 0822     Glucose 65 (L) mg/dL       : [id number] Duong (Martin) BethanyMeter ID: [id number]       POC Glucose Fingerstick [[id number]]  (Normal) Collected:  06/25/17 1141    Specimen:  Blood Updated:  06/25/17 1154     Glucose 125 mg/dL       : [id number] [employer] BrendaMeter ID: [id number]       CBC (No Diff) [[id number]]  (Abnormal) Collected:  06/25/17 1504    Specimen:  Blood Updated:  06/25/17 1541     WBC 8.24 10*3/mm3      RBC 2.62 (L) 10*6/mm3      Hemoglobin 7.5 (L) g/dL      Hematocrit 22.7 (L) %      MCV 86.6 fL      MCH 28.6 pg      MCHC 33.0 g/dL      RDW 22.1 (H) %      RDW-SD 68.0 (H) fl      Platelets 72 (L) 10*3/mm3     POC Glucose Fingerstick [[id number]]  (Abnormal) Collected:  06/25/17 1529    Specimen:  Blood Updated:  06/25/17 1611     Glucose 154 (H) mg/dL       : [id number] [employer]da[employer]eter ID: [id number]       POC Glucose Fingerstick [[id number]]  (Abnormal) Collected:  06/25/17 2023    Specimen:  Blood Updated:  06/25/17 2034     Glucose 137 (H) mg/dL       : [id number] Pedro Parsons AMeter ID: [id number]           Imaging Results (last 24 hours)     ** No results found for  the last 24 hours. **        Orders (last 24 hrs)     Start     Ordered    06/26/17 0800  Hemodialysis inpatient  Once     Comments:  UF 2000 cc    06/25/17 1422    06/25/17 2035  POC Glucose Fingerstick  Once      06/25/17 2034    06/25/17 1612  POC Glucose Fingerstick  Once      06/25/17 1611    06/25/17 1601  LORazepam (ATIVAN) tablet 0.5 mg  Every 6 Hours PRN      06/25/17 1601    06/25/17 1504  CBC (No Diff)  STAT      06/25/17 1503    06/25/17 1423  Type & Screen  Once      06/25/17 1422    06/25/17 1421  Verify Informed Consent for Blood Product Administration  Once      06/25/17 1422    06/25/17 1421  Prepare RBC, 1 Units  Blood - Once      06/25/17 1422    06/25/17 1154  POC Glucose Fingerstick  Once      06/25/17 1153    06/25/17 0823  POC Glucose Fingerstick  Once      06/25/17 0822    06/25/17 0800  predniSONE (DELTASONE) tablet 20 mg  Daily With Breakfast      06/24/17 1441    06/23/17 2206  traZODone (DESYREL) tablet 50 mg  Nightly PRN      06/23/17 2207    06/21/17 1200  Dietary Nutrition Supplements Magic Cup  Daily With Lunch & Dinner     Comments:  Alternate flavors    06/21/17 1112    06/16/17 0830  ipratropium-albuterol (DUO-NEB) nebulizer solution 3 mL  Every 6 Hours While Awake - RT      06/16/17 0745    06/15/17 0900  megestrol (MEGACE) 40 MG/ML suspension 400 mg  Daily      06/15/17 0827    06/15/17 0845  midodrine (PROAMATINE) tablet 5 mg  3 Times Daily Before Meals      06/15/17 0804    06/14/17 0845  levothyroxine (SYNTHROID, LEVOTHROID) tablet 50 mcg  Every Early Morning      06/14/17 0801    06/13/17 1700  POC Glucose Fingerstick  4 Times Daily Before Meals & at Bedtime      06/13/17 1639    06/12/17 0600  pantoprazole (PROTONIX) EC tablet 40 mg  Every Early Morning      06/11/17 2302    06/11/17 1858  ondansetron (ZOFRAN) injection 4 mg  Every 6 Hours PRN      06/11/17 1859    Unscheduled  Oral Care  As Needed      06/17/17 0959    Unscheduled  Transfuse RBC, 1 Units Infuse Each Unit  Over: 4H  Transfusion      06/25/17 1422    --  carvedilol (COREG) 12.5 MG tablet  Daily      06/11/17 1750    --  potassium chloride (KLOR-CON) 20 MEQ packet  Daily      06/11/17 1750    --  traZODone (DESYREL) 50 MG tablet  Nightly      06/11/17 1750    --  predniSONE (DELTASONE) 1 MG tablet  Daily      06/11/17 1750    --  PREDNISONE PO      06/11/17 1750    --  losartan (COZAAR) 25 MG tablet  Daily      06/11/17 1750    --  pantoprazole (PROTONIX) 40 MG EC tablet  Daily      06/11/17 1750    --  SCANNED EKG      06/11/17 0000    --  SCANNED - TELEMETRY        06/11/17 0000    --  SCANNED - TELEMETRY        06/11/17 0000    --  SCANNED - TELEMETRY        06/11/17 0000    --  SCANNED EKG      06/11/17 0000    --  SCANNED - TELEMETRY        06/11/17 0000    --  SCANNED - TELEMETRY        06/11/17 0000    --  SCANNED - TELEMETRY        06/11/17 0000    --  SCANNED - TELEMETRY        06/11/17 0000    --  SCANNED - LABS      06/11/17 0000    --  SCANNED - TELEMETRY        06/11/17 0000    --  SCANNED - TELEMETRY        06/11/17 0000    --  SCANNED - TELEMETRY        06/11/17 0000    --  SCANNED - TELEMETRY        06/11/17 0000          Ventilator/Non-Invasive Ventilation Settings     None           Physician Progress Notes (last 72 hours) (Notes from 6/23/2017  6:09 AM through 6/26/2017  6:09 AM)      Arthur Kerr MD at 6/23/2017 11:30 AM  Version 1 of 1             [facility] Medicine Services  INPATIENT PROGRESS NOTE    Length of Stay: 12  Date of Admission: 6/11/2017  Primary Care Physician: No Known Provider    Subjective   Chief Complaint: Atrial fib, hypertension, weakness, kidney failure    Chest Pain         Patient eating well.   BP remains stable lost IV access medications switched to oral.  Says feels better today.  Doing OK on floor tolerating dialysis. Doing OK no complaints.  Review of Systems   Cardiovascular: Positive for chest pain.      Constitutional: Positive  for activity change, appetite change and fatigue. Negative for chills and fever.   HENT: Negative for hearing loss, nosebleeds, tinnitus and trouble swallowing.   Eyes: Negative for visual disturbance.   Respiratory: Negative for cough, chest tightness, shortness of breath and wheezing.   Cardiovascular: Negative for chest pain, palpitations and leg swelling.   Gastrointestinal: Negative for abdominal distention, abdominal pain, blood in stool, constipation, diarrhea, nausea and vomiting.   Endocrine: Negative for cold intolerance, heat intolerance, polydipsia, polyphagia and polyuria.   Genitourinary: Negative for decreased urine volume, difficulty urinating, dysuria, flank pain, frequency and hematuria.   Musculoskeletal: Positive for arthralgias, gait problem and myalgias. Negative for joint swelling.   Skin: Negative for rash.   Allergic/Immunologic: Negative for immunocompromised state.   Neurological: Positive for weakness. Negative for dizziness, syncope, light-headedness and headaches.   Hematological: Negative for adenopathy. Does not bruise/bleed easily.   Psychiatric/Behavioral: Negative for confusion and sleep disturbance. The patient is not nervous/anxious.     All pertinent negatives and positives are as above. All other systems have been reviewed and are negative unless otherwise stated.     Objective    Temp:  [97.1 °F (36.2 °C)-98.8 °F (37.1 °C)] 98.8 °F (37.1 °C)  Heart Rate:  [] 106  Resp:  [18-22] 18  BP: (110-132)/(54-97) 132/54    Intake/Output Summary (Last 24 hours) at 06/23/17 1131  Last data filed at 06/23/17 0900   Gross per 24 hour   Intake              150 ml   Output                0 ml   Net              150 ml     Physical Exam  Constitutional: She appears well-developed and well-nourished.   HENT:   Head: Normocephalic and atraumatic.   Eyes: Conjunctivae and EOM are normal. Pupils are equal, round, and reactive to light.   Neck: Neck supple. No JVD present. No thyromegaly  present.   Cardiovascular: Normal rate, regular rhythm, normal heart sounds and intact distal pulses. Exam reveals no gallop and no friction rub.   No murmur heard.  Pulmonary/Chest: Effort normal. No respiratory distress. She has no wheezes. She has no rales. She exhibits no tenderness.   Abdominal: Soft. Bowel sounds are normal. She exhibits no distension. There is no tenderness. There is no rebound and no guarding.   Musculoskeletal: Normal range of motion. She exhibits no edema, tenderness or deformity.   Lymphadenopathy:   She has no cervical adenopathy.   Neurological: She is alert. She displays normal reflexes. No cranial nerve deficit. She exhibits abnormal muscle tone. Coordination abnormal.   Skin: Skin is warm and dry. No rash noted.   Psychiatric: She has a normal mood and affect. Her behavior is normal.   Nursing note and vitals reviewed.  Results Review:  Lab Results (last 24 hours)     Procedure Component Value Units Date/Time    POC Glucose Fingerstick [[id number]]  (Abnormal) Collected:  06/22/17 1146    Specimen:  Blood Updated:  06/22/17 1213     Glucose 131 (H) mg/dL       : [id number] Eran Ying ID: [id number]       Basic Metabolic Panel [[id number]]  (Abnormal) Collected:  06/22/17 1155    Specimen:  Blood Updated:  06/22/17 1237     Glucose 144 (H) mg/dL      BUN 18 mg/dL      Creatinine 2.57 (H) mg/dL      Sodium 138 mmol/L      Potassium 3.4 (L) mmol/L      Chloride 99 mmol/L      CO2 28.0 mmol/L      Calcium 8.2 (L) mg/dL      eGFR  African Amer 22 (L) mL/min/1.73      BUN/Creatinine Ratio 7.0     Anion Gap 11.0 mmol/L     Narrative:       The MDRD GFR formula is only valid for adults with stable renal function between ages 18 and 70.    CBC Auto Differential [[id number]]  (Abnormal) Collected:  06/22/17 1155    Specimen:  Blood Updated:  06/22/17 1306     WBC 4.15 (L) 10*3/mm3      RBC 2.59 (L) 10*6/mm3      Hemoglobin 7.3 (L) g/dL      Hematocrit 22.0 (L) %      MCV 84.9 fL       MCH 28.2 pg      MCHC 33.2 g/dL      RDW 20.6 (H) %      RDW-SD 61.9 (H) fl      Platelets 61 (L) 10*3/mm3      Neutrophil % 79.3 (H) %      Lymphocyte % 11.1 (L) %      Monocyte % 8.4 %      Eosinophil % 0.0 %      Basophil % 0.2 %      Immature Grans % 1.0 %      Neutrophils, Absolute 3.29 10*3/mm3      Lymphocytes, Absolute 0.46 (L) 10*3/mm3      Monocytes, Absolute 0.35 10*3/mm3      Eosinophils, Absolute 0.00 10*3/mm3      Basophils, Absolute 0.01 10*3/mm3      Immature Grans, Absolute 0.04 (H) 10*3/mm3      nRBC 0.0 /100 WBC     CBC & Differential [[id number]] Collected:  06/22/17 1155    Specimen:  Blood Updated:  06/22/17 1306    Narrative:       The following orders were created for panel order CBC & Differential.  Procedure                               Abnormality         Status                     ---------                               -----------         ------                     Scan Slide[[id number]]                                       Final result               CBC Auto Differential[[id number]]        Abnormal            Final result                 Please view results for these tests on the individual orders.    Scan Slide [[id number]] Collected:  06/22/17 1155    Specimen:  Blood Updated:  06/22/17 1306     Hypochromia Slight/1+     Poikilocytes Slight/1+     WBC Morphology Normal     Platelet Estimate Decreased    POC Glucose Fingerstick [[id number]]  (Abnormal) Collected:  06/22/17 2003    Specimen:  Blood Updated:  06/22/17 2015     Glucose 178 (H) mg/dL       : [id number] Prakash [facility] ID: [id number]       Basic Metabolic Panel [[id number]]  (Abnormal) Collected:  06/23/17 0359    Specimen:  Blood Updated:  06/23/17 0431     Glucose 131 (H) mg/dL      BUN 25 (H) mg/dL      Creatinine 2.92 (H) mg/dL      Sodium 138 mmol/L      Potassium 3.4 (L) mmol/L      Chloride 101 mmol/L      CO2 24.0 mmol/L      Calcium 8.1 (L) mg/dL      eGFR  African Amer 19 (L) mL/min/1.73      BUN/Creatinine  Ratio 8.6     Anion Gap 13.0 mmol/L     Narrative:       The MDRD GFR formula is only valid for adults with stable renal function between ages 18 and 70.    CBC & Differential [[id number]] Collected:  06/23/17 0359    Specimen:  Blood Updated:  06/23/17 0523    Narrative:       The following orders were created for panel order CBC & Differential.  Procedure                               Abnormality         Status                     ---------                               -----------         ------                     Scan Slide[[id number]]                                       Final result               CBC Auto Differential[[id number]]        Abnormal            Final result                 Please view results for these tests on the individual orders.    CBC Auto Differential [[id number]]  (Abnormal) Collected:  06/23/17 0359    Specimen:  Blood Updated:  06/23/17 0523     WBC 5.49 10*3/mm3      RBC 2.59 (L) 10*6/mm3      Hemoglobin 7.3 (L) g/dL      Hematocrit 22.1 (L) %      MCV 85.3 fL      MCH 28.2 pg      MCHC 33.0 g/dL      RDW 20.7 (H) %      RDW-SD 61.6 (H) fl      Platelets 58 (L) 10*3/mm3      Neutrophil % 77.7 %      Lymphocyte % 12.2 (L) %      Monocyte % 8.7 %      Eosinophil % 0.0 %      Basophil % 0.5 %      Immature Grans % 0.9 %      Neutrophils, Absolute 4.26 10*3/mm3      Lymphocytes, Absolute 0.67 (L) 10*3/mm3      Monocytes, Absolute 0.48 10*3/mm3      Eosinophils, Absolute 0.00 10*3/mm3      Basophils, Absolute 0.03 10*3/mm3      Immature Grans, Absolute 0.05 (H) 10*3/mm3      nRBC 0.0 /100 WBC     Scan Slide [[id number]] Collected:  06/23/17 0359    Specimen:  Blood Updated:  06/23/17 0523     Anisocytosis Slight/1+     Hypochromia Slight/1+     Poikilocytes Slight/1+     RBC Fragments Slight/1+     WBC Morphology Normal     Platelet Estimate Decreased    POC Glucose Fingerstick [[id number]]  (Abnormal) Collected:  06/23/17 0844    Specimen:  Blood Updated:  06/23/17 0855     Glucose 164 (H) mg/dL        : [id number] Nima Goldstein ID: [id number]              Cultures:  Blood Culture   Date Value Ref Range Status   06/16/2017 No growth at 24 hours  Preliminary       Radiology Data:    Imaging Results (last 24 hours)     ** No results found for the last 24 hours. **          Allergies   Allergen Reactions   • Heparin    • Iron    • Latex    • Levaquin [Levofloxacin]    • Shrimp (Diagnostic)    • Vancomycin        Scheduled meds:     ipratropium-albuterol 3 mL Nebulization Q6H While Awake - RT   levothyroxine 50 mcg Oral Q AM   megestrol 400 mg Oral Daily   midodrine 5 mg Oral TID AC   pantoprazole 40 mg Oral Q AM   predniSONE 40 mg Oral Daily With Breakfast       PRN meds:  ondansetron    Assessment/Plan     Active Problems:    Chest pain in adult    Renal failure    Hypotension    Anemia    Hypokalemia      Plan:  Chest pain, atypical, echocardiogram/afib -Ejection fraction 61%, diastolic dysfunction, right ventricular dilatation, tricuspid valve regurgitation, right ventricle systolic pressure greater than 55. .  Doing much better on steroids  eating continue same wean steroids now on oral doing well  Hemoptysis ×1 6/15. Hx feliciano syndrome.    Thrombocytopenia-Stable chronic     Pneumonia/ Slight leukocytosis. Continue oral antibiotics    Anemia- Status post 2 units of blood transfusion 6/14. Trending down transfuse as necessary      Chronic renal failure- nephrology is on board. Cont dialysis per nephrology.       Hyperlipidemia stable      Elevate TSH- free T4 is normal. Continue  synthroid.      Hypotension- resoloved      Hypokalemia-- replace per nephrology.      Calcium 7.7, corrected calcium 9.5 - 6/13/2017.      Nutrition- discontinue megace, patient is eating with steroids see if maintains appetite with prednisone alone.       Deconditioning- start PT and OT      Discharge Planning: LTAC refused by insurance plan for [facility].  Adrenal insufficiency responding to steroids.  Arthur Kerr MD    06/23/17   11:31 AM                     Electronically signed by Arthur Kerr MD at 6/23/2017 11:40 AM      FANNIE Blanco at 6/23/2017 12:01 PM  Version 1 of 1    Attestation signed by Jez Romo MD at 6/23/2017  3:28 PM        Patient was examined, all the data was reviewed.  72 years old woman who has end-stage renal disease secondary to Nayely's/diabetic nephropathy.  Presenting this time with weakness, lack of energy, new onset of atrial fibrillation.  She was found to have septic shock secondary to pneumonia.  She was admitted initially to ICU, not move to regular floor.    Assessment:  1.  End-stage renal disease secondary to Wegener's/diabetic nephropathy.  Seen on dialysis today.  2.  Pneumonia causing sepsis, resolving  3.  Anemia of chronic kidney disease  4.  Hyponatremia    Plan:  1.  Patient is tolerating hemodialysis very well  Hemodialysis 3-1/2 hour  Access left upper thigh fistula  Ultrafiltration 2000 mL  2K bath  2.  Continue by mouth antibiotics  3.  By mouth steroids  4.  Possible discharge to home today.                               Nephrology ([facility]) Progress Note      Patient:  Jennifer Marks  YOB: 1945  Date of Service: 6/23/2017  MRN: [id number]   Acct: [id number]   Primary Care Physician: No Known Provider  Advance Directive: Conditional Code  Admit Date: 6/11/2017       Hospital Day: 12  Referring Provider: Pino Tang MD      Patient personally seen and examined.  Complete chart including Consults, Notes, Operative Reports, Labs, Cardiology, and Radiology studies reviewed as able.        Subjective:  Jennifer Marks is a 72 y.o. female  whom we were consulted for end stage renal disease management, hypokalemia. She is on hemodialysis Monday Wednesday Friday.  Admitted with chest pain. Has had decreased oral intake and diarrhea for several days.  On 6/13 had femoral line placed.   Was on Cardizem drip for  tachydysrhythmia.  Had been hypotensive a needing Levophed drip intermittently; has now been weaned off Levophed.  Moved to medical floor yesterday.  Today is awake and alert, no new issues.      Allergies:  Heparin; Iron; Latex; Levaquin [levofloxacin]; Shrimp (diagnostic); and Vancomycin    Home Meds:  Prescriptions Prior to Admission   Medication Sig Dispense Refill Last Dose   • carvedilol (COREG) 12.5 MG tablet Take 12.5 mg by mouth Daily.      • losartan (COZAAR) 25 MG tablet Take 25 mg by mouth Daily.      • pantoprazole (PROTONIX) 40 MG EC tablet Take 40 mg by mouth Daily.      • potassium chloride (KLOR-CON) 20 MEQ packet Take 20 mEq by mouth Daily.      • predniSONE (DELTASONE) 1 MG tablet Take 1 mg by mouth Daily.      • PREDNISONE PO Take  by mouth.      • traZODone (DESYREL) 50 MG tablet Take 50 mg by mouth Every Night.          Medicines:  Current Facility-Administered Medications   Medication Dose Route Frequency Provider Last Rate Last Dose   • ipratropium-albuterol (DUO-NEB) nebulizer solution 3 mL  3 mL Nebulization Q6H While Awake - RT Carter Llamas MD   3 mL at 06/23/17 0626   • levothyroxine (SYNTHROID, LEVOTHROID) tablet 50 mcg  50 mcg Oral Q AM Carter Llaams MD   50 mcg at 06/23/17 0647   • megestrol (MEGACE) 40 MG/ML suspension 400 mg  400 mg Oral Daily Carter Llamas MD   400 mg at 06/19/17 0818   • midodrine (PROAMATINE) tablet 5 mg  5 mg Oral TID AC FANNIE Blanco   5 mg at 06/23/17 0925   • ondansetron (ZOFRAN) injection 4 mg  4 mg Intravenous Q6H PRN Pino Tang MD   4 mg at 06/15/17 0555   • pantoprazole (PROTONIX) EC tablet 40 mg  40 mg Oral Q AM Pino Tang MD   40 mg at 06/23/17 0647   • predniSONE (DELTASONE) tablet 40 mg  40 mg Oral Daily With Breakfast Jez Romo MD   40 mg at 06/23/17 0925       Past Medical History:  Past Medical History:   Diagnosis Date   • A-fib    • Arthritis    • CHF (congestive heart failure)    • Hypertension    • Renal failure   "      Past Surgical History:  Past Surgical History:   Procedure Laterality Date   • ARTERIOVENOUS FISTULA LEG Left        Family History  History reviewed. No pertinent family history.    Social History  Social History     Social History   • Marital status: Single     Spouse name: N/A   • Number of children: N/A   • Years of education: N/A     Occupational History   • Not on file.     Social History Main Topics   • Smoking status: Former Smoker   • Smokeless tobacco: Not on file   • Alcohol use No   • Drug use: No   • Sexual activity: Defer     Other Topics Concern   • Not on file     Social History Narrative         Review of Systems:  History obtained from chart review and the patient  General ROS: Patient complains of malaise and No fever or chills  Respiratory ROS: No cough, shortness of breath, wheezing  Cardiovascular ROS: no chest pain or dyspnea on exertion  Gastrointestinal ROS: No abdominal pain, no nausea  Genito-Urinary ROS: No dysuria or hematuria  Neurological ROS: negative  Dermatological ROS: negative  14 point ROS reviewed with the patient and negative except as noted above and in the HPI unless unable to obtain.    Objective:  /56  Pulse 110  Temp 98.5 °F (36.9 °C) (Temporal Artery )   Resp 16  Ht 62\" (157.5 cm)  Wt 119 lb 4 oz (54.1 kg)  SpO2 95%  BMI 21.81 kg/m2    Intake/Output Summary (Last 24 hours) at 06/23/17 1201  Last data filed at 06/23/17 1151   Gross per 24 hour   Intake              250 ml   Output                0 ml   Net              250 ml     General: awake/alert    Neck: supple, no JVD  Chest:  clear to auscultation bilaterally without respiratory distress  CVS: regular rate and rhythm  Abdominal: soft, nontender, normal bowel sounds  Extremities: no cyanosis or edema  Skin: warm and dry without rash   Neuro: no focal motor deficits      Labs:    Results from last 7 days  Lab Units 06/23/17  0359 06/22/17  1155 06/22/17  0812   WBC 10*3/mm3 5.49 4.15* 5.19 "   HEMOGLOBIN g/dL 7.3* 7.3* 8.2*   HEMATOCRIT % 22.1* 22.0* 24.6*   PLATELETS 10*3/mm3 58* 61* 76*           Results from last 7 days  Lab Units 06/23/17  0359 06/22/17  1155 06/21/17  1548  06/19/17  0404 06/18/17  0346 06/17/17  0827   SODIUM mmol/L 138 138 133*  < > 136 137 137   POTASSIUM mmol/L 3.4* 3.4* 3.6  < > 3.5 3.2* 3.5   CHLORIDE mmol/L 101 99 98  < > 101 100 100   TOTAL CO2 mmol/L 24.0 28.0 23.0*  < > 26.0 28.0 25.0   BUN mg/dL 25* 18 29*  < > 28* 23* 17   CREATININE mg/dL 2.92* 2.57* 3.53*  < > 3.93* 3.48* 3.00*   CALCIUM mg/dL 8.1* 8.2* 8.0*  < > 7.7* 7.8* 7.8*   BILIRUBIN mg/dL  --   --   --   --  1.8* 1.8* 2.7*   ALK PHOS U/L  --   --   --   --  90 92 89   ALT (SGPT) U/L  --   --   --   --  22 28 25   AST (SGOT) U/L  --   --   --   --  28 23 26   GLUCOSE mg/dL 131* 144* 185*  < > 72 109* 146*   < > = values in this interval not displayed.    Radiology:   Imaging Results (last 72 hours)     Procedure Component Value Units Date/Time    XR Chest 1 View [[id number]] Collected:  06/11/17 1803     Updated:  06/11/17 2054    Narrative:       EXAMINATION:   XR CHEST 1 VW-  6/11/2017 6:01 PM CDT     HISTORY: Chest pain     Prior exams 05/28/2016.     Single view of the chest is obtained. The pulmonary vascular margins are  slightly indistinct. This has the appearance of mild pulmonary vascular  congestion. Left diaphragm is indistinct. This may be infiltrate or  atelectasis left lower lobe.     Cardiac silhouette is enlarged and unchanged from May 28.       Impression:       1. Silhouetting of the left diaphragm consistent with infiltrate or  atelectasis left lower lobe.  2. Indistinct pulmonary vascular margins consistent with mild pulmonary  vascular congestion.  This report was finalized on 06/11/2017 20:51 by Dr. Herson Magaña MD.          Culture:         Assessment   1.  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  On maintenance hemodialysis Monday Wednesday Friday   2.   Pneumonia  3.  Hyponatremia--resolved  4.  Anemia of chronic kidney disease  5.  Congestive heart failure with diastolic dysfunction    Plan:  1.  Maintenance dialysis today  2.  Continue antibiotics    Jaden CHAU Tovar, APRN  6/23/2017  12:01 PM       Electronically signed by Jez Romo MD at 6/23/2017  3:28 PM      Arthur Kerr MD at 6/23/2017  3:51 PM  Version 1 of 1             [facility] Medicine Services  INPATIENT PROGRESS NOTE    Length of Stay: 12  Date of Admission: 6/11/2017  Primary Care Physician: No Known Provider    Subjective   Chief Complaint: Atrial fib, hypertension, weakness, kidney failure    Chest Pain         Patient eating well.   BP remains stable lost IV access medications switched to oral.  Says feels better today.  Doing OK on floor tolerating dialysis. Doing OK no complaints.  Review of Systems   Cardiovascular: Positive for chest pain.      Constitutional: Positive for activity change, appetite change and fatigue. Negative for chills and fever.   HENT: Negative for hearing loss, nosebleeds, tinnitus and trouble swallowing.   Eyes: Negative for visual disturbance.   Respiratory: Negative for cough, chest tightness, shortness of breath and wheezing.   Cardiovascular: Negative for chest pain, palpitations and leg swelling.   Gastrointestinal: Negative for abdominal distention, abdominal pain, blood in stool, constipation, diarrhea, nausea and vomiting.   Endocrine: Negative for cold intolerance, heat intolerance, polydipsia, polyphagia and polyuria.   Genitourinary: Negative for decreased urine volume, difficulty urinating, dysuria, flank pain, frequency and hematuria.   Musculoskeletal: Positive for arthralgias, gait problem and myalgias. Negative for joint swelling.   Skin: Negative for rash.   Allergic/Immunologic: Negative for immunocompromised state.   Neurological: Positive for weakness. Negative for dizziness, syncope, light-headedness and  headaches.   Hematological: Negative for adenopathy. Does not bruise/bleed easily.   Psychiatric/Behavioral: Negative for confusion and sleep disturbance. The patient is not nervous/anxious.     All pertinent negatives and positives are as above. All other systems have been reviewed and are negative unless otherwise stated.     Objective    Temp:  [97.1 °F (36.2 °C)-98.8 °F (37.1 °C)] 98.5 °F (36.9 °C)  Heart Rate:  [101-110] 110  Resp:  [16-20] 16  BP: (114-134)/(54-97) 134/56    Intake/Output Summary (Last 24 hours) at 06/23/17 1551  Last data filed at 06/23/17 1151   Gross per 24 hour   Intake              250 ml   Output                0 ml   Net              250 ml     Physical Exam  Constitutional: She appears well-developed and well-nourished.   HENT:   Head: Normocephalic and atraumatic.   Eyes: Conjunctivae and EOM are normal. Pupils are equal, round, and reactive to light.   Neck: Neck supple. No JVD present. No thyromegaly present.   Cardiovascular: Normal rate, regular rhythm, normal heart sounds and intact distal pulses. Exam reveals no gallop and no friction rub.   No murmur heard.  Pulmonary/Chest: Effort normal. No respiratory distress. She has no wheezes. She has no rales. She exhibits no tenderness.   Abdominal: Soft. Bowel sounds are normal. She exhibits no distension. There is no tenderness. There is no rebound and no guarding.   Musculoskeletal: Normal range of motion. She exhibits no edema, tenderness or deformity.   Lymphadenopathy:   She has no cervical adenopathy.   Neurological: She is alert. She displays normal reflexes. No cranial nerve deficit. She exhibits abnormal muscle tone. Coordination abnormal.   Skin: Skin is warm and dry. No rash noted.   Psychiatric: She has a normal mood and affect. Her behavior is normal.   Nursing note and vitals reviewed.  Results Review:  Lab Results (last 24 hours)     Procedure Component Value Units Date/Time    POC Glucose Fingerstick [[id number]]   (Abnormal) Collected:  06/22/17 2003    Specimen:  Blood Updated:  06/22/17 2015     Glucose 178 (H) mg/dL       : Genoveva Melo ID: [id number]       Basic Metabolic Panel [[id number]]  (Abnormal) Collected:  06/23/17 0359    Specimen:  Blood Updated:  06/23/17 0431     Glucose 131 (H) mg/dL      BUN 25 (H) mg/dL      Creatinine 2.92 (H) mg/dL      Sodium 138 mmol/L      Potassium 3.4 (L) mmol/L      Chloride 101 mmol/L      CO2 24.0 mmol/L      Calcium 8.1 (L) mg/dL      eGFR  African Amer 19 (L) mL/min/1.73      BUN/Creatinine Ratio 8.6     Anion Gap 13.0 mmol/L     Narrative:       The MDRD GFR formula is only valid for adults with stable renal function between ages 18 and 70.    CBC & Differential [[id number]] Collected:  06/23/17 0359    Specimen:  Blood Updated:  06/23/17 0523    Narrative:       The following orders were created for panel order CBC & Differential.  Procedure                               Abnormality         Status                     ---------                               -----------         ------                     Scan Slide[[id number]]                                       Final result               CBC Auto Differential[[id number]]        Abnormal            Final result                 Please view results for these tests on the individual orders.    CBC Auto Differential [[id number]]  (Abnormal) Collected:  06/23/17 0359    Specimen:  Blood Updated:  06/23/17 0523     WBC 5.49 10*3/mm3      RBC 2.59 (L) 10*6/mm3      Hemoglobin 7.3 (L) g/dL      Hematocrit 22.1 (L) %      MCV 85.3 fL      MCH 28.2 pg      MCHC 33.0 g/dL      RDW 20.7 (H) %      RDW-SD 61.6 (H) fl      Platelets 58 (L) 10*3/mm3      Neutrophil % 77.7 %      Lymphocyte % 12.2 (L) %      Monocyte % 8.7 %      Eosinophil % 0.0 %      Basophil % 0.5 %      Immature Grans % 0.9 %      Neutrophils, Absolute 4.26 10*3/mm3      Lymphocytes, Absolute 0.67 (L) 10*3/mm3      Monocytes, Absolute 0.48 10*3/mm3       Eosinophils, Absolute 0.00 10*3/mm3      Basophils, Absolute 0.03 10*3/mm3      Immature Grans, Absolute 0.05 (H) 10*3/mm3      nRBC 0.0 /100 WBC     Scan Slide [[id number]] Collected:  06/23/17 0359    Specimen:  Blood Updated:  06/23/17 0523     Anisocytosis Slight/1+     Hypochromia Slight/1+     Poikilocytes Slight/1+     RBC Fragments Slight/1+     WBC Morphology Normal     Platelet Estimate Decreased    POC Glucose Fingerstick [[id number]]  (Abnormal) Collected:  06/23/17 0844    Specimen:  Blood Updated:  06/23/17 0855     Glucose 164 (H) mg/dL       : [id number] [employer] ID: [id number]       POC Glucose Fingerstick [[id number]]  (Normal) Collected:  06/23/17 1155    Specimen:  Blood Updated:  06/23/17 1206     Glucose 120 mg/dL       : [id number] [employer] ID: [id number]              Cultures:  Blood Culture   Date Value Ref Range Status   06/16/2017 No growth at 24 hours  Preliminary       Radiology Data:    Imaging Results (last 24 hours)     ** No results found for the last 24 hours. **          Allergies   Allergen Reactions   • Heparin    • Iron    • Latex    • Levaquin [Levofloxacin]    • Shrimp (Diagnostic)    • Vancomycin        Scheduled meds:     ipratropium-albuterol 3 mL Nebulization Q6H While Awake - RT   levothyroxine 50 mcg Oral Q AM   megestrol 400 mg Oral Daily   midodrine 5 mg Oral TID AC   pantoprazole 40 mg Oral Q AM   predniSONE 40 mg Oral Daily With Breakfast       PRN meds:  ondansetron    Assessment/Plan     Active Problems:    Chest pain in adult    Renal failure    Hypotension    Anemia    Hypokalemia      Plan:  Chest pain, atypical, echocardiogram/afib -Ejection fraction 61%, diastolic dysfunction, right ventricular dilatation, tricuspid valve regurgitation, right ventricle systolic pressure greater than 55. .  Doing much better on steroids  eating continue same wean steroids now on oral doing well  Hemoptysis ×1 6/15. Hetal feliciano  syndrome.    Thrombocytopenia-Stable chronic     Pneumonia/ Slight leukocytosis. Continue oral antibiotics    Anemia- Status post 2 units of blood transfusion 6/14. Trending down transfuse as necessary      Chronic renal failure- nephrology is on board. Cont dialysis per nephrology.       Hyperlipidemia stable      Elevate TSH- free T4 is normal. Continue  synthroid.      Hypotension- resoloved      Hypokalemia-- replace per nephrology.      Calcium 7.7, corrected calcium 9.5 - 6/13/2017.      Nutrition- discontinue megace, patient is eating with steroids see if maintains appetite with prednisone alone.       Deconditioning- start PT and OT      Discharge Planning: LTAC refused by insurance plan for [facility].still awaiting [facility] bed offer.    Adrenal insufficiency responding to steroids.  Arthur Kerr MD   06/23/17   3:51 PM                     Electronically signed by Arthur Kerr MD at 6/23/2017  3:52 PM      Arthur Kerr MD at 6/24/2017  8:31 AM  Version 1 of 1             [facility] Medicine Services  INPATIENT PROGRESS NOTE    Length of Stay: 13  Date of Admission: 6/11/2017  Primary Care Physician: Rios Ngo DO    Subjective   Chief Complaint: Atrial fib, hypertension, weakness, kidney failure    Chest Pain         Patient eating well.   BP remains stable.Says feels better today.  Doing OK on floor tolerating dialysis. Doing OK no complaints.  Review of Systems   Cardiovascular: Positive for chest pain.      Constitutional: Positive for activity change, appetite change and fatigue. Negative for chills and fever.   HENT: Negative for hearing loss, nosebleeds, tinnitus and trouble swallowing.   Eyes: Negative for visual disturbance.   Respiratory: Negative for cough, chest tightness, shortness of breath and wheezing.   Cardiovascular: Negative for chest pain, palpitations and leg swelling.   Gastrointestinal: Negative for abdominal distention, abdominal  pain, blood in stool, constipation, diarrhea, nausea and vomiting.   Endocrine: Negative for cold intolerance, heat intolerance, polydipsia, polyphagia and polyuria.   Genitourinary: Negative for decreased urine volume, difficulty urinating, dysuria, flank pain, frequency and hematuria.   Musculoskeletal: Positive for arthralgias, gait problem and myalgias. Negative for joint swelling.   Skin: Negative for rash.   Allergic/Immunologic: Negative for immunocompromised state.   Neurological: Positive for weakness. Negative for dizziness, syncope, light-headedness and headaches.   Hematological: Negative for adenopathy. Does not bruise/bleed easily.   Psychiatric/Behavioral: Negative for confusion and sleep disturbance. The patient is not nervous/anxious.     All pertinent negatives and positives are as above. All other systems have been reviewed and are negative unless otherwise stated.     Objective    Temp:  [97.8 °F (36.6 °C)-98.9 °F (37.2 °C)] 97.8 °F (36.6 °C)  Heart Rate:  [107-113] 110  Resp:  [16-18] 18  BP: (116-134)/(55-70) 116/70    Intake/Output Summary (Last 24 hours) at 06/24/17 0831  Last data filed at 06/23/17 1151   Gross per 24 hour   Intake              200 ml   Output                0 ml   Net              200 ml     Physical Exam  Constitutional: She appears well-developed and well-nourished.   HENT:   Head: Normocephalic and atraumatic.   Eyes: Conjunctivae and EOM are normal. Pupils are equal, round, and reactive to light.   Neck: Neck supple. No JVD present. No thyromegaly present.   Cardiovascular: Normal rate, regular rhythm, normal heart sounds and intact distal pulses. Exam reveals no gallop and no friction rub.   No murmur heard.  Pulmonary/Chest: Effort normal. No respiratory distress. She has no wheezes. She has no rales. She exhibits no tenderness.   Abdominal: Soft. Bowel sounds are normal. She exhibits no distension. There is no tenderness. There is no rebound and no guarding.    Musculoskeletal: Normal range of motion. She exhibits no edema, tenderness or deformity.   Lymphadenopathy:   She has no cervical adenopathy.   Neurological: She is alert. She displays normal reflexes. No cranial nerve deficit. She exhibits abnormal muscle tone. Coordination abnormal.   Skin: Skin is warm and dry. No rash noted.   Psychiatric: She has a normal mood and affect. Her behavior is normal.   Nursing note and vitals reviewed.  Results Review:  Lab Results (last 24 hours)     Procedure Component Value Units Date/Time    POC Glucose Fingerstick [[id number]]  (Abnormal) Collected:  06/23/17 0844    Specimen:  Blood Updated:  06/23/17 0855     Glucose 164 (H) mg/dL       : [id number] [employer] ID: [id number]       POC Glucose Fingerstick [[id number]]  (Normal) Collected:  06/23/17 1155    Specimen:  Blood Updated:  06/23/17 1206     Glucose 120 mg/dL       : [id number] [employer] ID: [id number]       POC Glucose Fingerstick [[id number]]  (Normal) Collected:  06/23/17 1558    Specimen:  Blood Updated:  06/23/17 1609     Glucose 98 mg/dL       : [id number] [employer] ID: [id number]       POC Glucose Fingerstick [[id number]]  (Normal) Collected:  06/24/17 0757    Specimen:  Blood Updated:  06/24/17 0809     Glucose 109 mg/dL       : [id number] [employer] ID: [id number]              Cultures:  Blood Culture   Date Value Ref Range Status   06/16/2017 No growth at 24 hours  Preliminary       Radiology Data:    Imaging Results (last 24 hours)     ** No results found for the last 24 hours. **          Allergies   Allergen Reactions   • Heparin    • Iron    • Latex    • Levaquin [Levofloxacin]    • Shrimp (Diagnostic)    • Vancomycin        Scheduled meds:     ipratropium-albuterol 3 mL Nebulization Q6H While Awake - RT   levothyroxine 50 mcg Oral Q AM   megestrol 400 mg Oral Daily   midodrine 5 mg Oral TID AC   pantoprazole 40 mg Oral Q AM   predniSONE 40 mg  Oral Daily With Breakfast       PRN meds:  ondansetron  •  traZODone    Assessment/Plan     Active Problems:    Chest pain in adult    Renal failure    Hypotension    Anemia    Hypokalemia      Plan:  Chest pain, atypical, echocardiogram/afib -Ejection fraction 61%, diastolic dysfunction, right ventricular dilatation, tricuspid valve regurgitation, right ventricle systolic pressure greater than 55. .  Doing much better on steroids  eating continue same wean steroids now on oral doing well  Hemoptysis ×1 6/15. Hx feliciano syndrome.    Thrombocytopenia-Stable chronic     Pneumonia/ Slight leukocytosis. Continue oral antibiotics    Anemia- Status post 2 units of blood transfusion 6/14. Trending down transfuse as necessary      Chronic renal failure- nephrology is on board. Cont dialysis per nephrology.       Hyperlipidemia stable      Elevate TSH- free T4 is normal. Continue  synthroid.      Hypotension- resoloved      Hypokalemia-- replace per nephrology.      Calcium 7.7, corrected calcium 9.5 - 6/13/2017.      Nutrition- discontinue megace, patient is eating with steroids see if maintains appetite with prednisone alone.       Deconditioning- start PT and OT      Discharge Planning: LTAC refused by insurance plan for [facility].still awaiting [facility] bed offer.    Adrenal insufficiency responding to steroids. No new plans at this time.  Arthur Kerr MD   06/24/17   8:31 AM                     Electronically signed by Arthur Kerr MD at 6/24/2017  8:33 AM      Jez Romo MD at 6/24/2017  2:37 PM  Version 1 of 1         Nephrology ([facility]) Progress Note      Patient:  Jennifer Marks  YOB: 1945  Date of Service: 6/24/2017  MRN: [id number]   Acct: [id number]   Primary Care Physician: Rios Ngo DO  Advance Directive: Full Code  Admit Date: 6/11/2017       Hospital Day: 13  Referring Provider: Pino Tang MD      Patient personally seen and examined.  Complete  chart including Consults, Notes, Operative Reports, Labs, Cardiology, and Radiology studies reviewed as able.        Subjective:  Jennifer Marks is a 72 y.o. female  whom we were consulted for end stage renal disease management, hypokalemia.  Patient was initially admitted to CCU for the treatment of new onset of atrial fibrillation and pneumonia.  Patient was hypotensive related to shock caused by pneumonia.  Hospital course remarkable for treatment of A. fib with a Cardizem and chemical conversion to normal sinus rhythm.  She was also treated with IV antibiotics for pneumonia.  She continues to improve and discharge to regular floor.  This morning she feels great and wanted to go home.    Allergies:  Heparin; Iron; Latex; Levaquin [levofloxacin]; Shrimp (diagnostic); and Vancomycin    Home Meds:  Prescriptions Prior to Admission   Medication Sig Dispense Refill Last Dose   • carvedilol (COREG) 12.5 MG tablet Take 12.5 mg by mouth Daily.      • losartan (COZAAR) 25 MG tablet Take 25 mg by mouth Daily.      • pantoprazole (PROTONIX) 40 MG EC tablet Take 40 mg by mouth Daily.      • potassium chloride (KLOR-CON) 20 MEQ packet Take 20 mEq by mouth Daily.      • predniSONE (DELTASONE) 1 MG tablet Take 1 mg by mouth Daily.      • PREDNISONE PO Take  by mouth.      • traZODone (DESYREL) 50 MG tablet Take 50 mg by mouth Every Night.          Medicines:  Current Facility-Administered Medications   Medication Dose Route Frequency Provider Last Rate Last Dose   • ipratropium-albuterol (DUO-NEB) nebulizer solution 3 mL  3 mL Nebulization Q6H While Awake - RT Carter Llamas MD   3 mL at 06/24/17 1206   • levothyroxine (SYNTHROID, LEVOTHROID) tablet 50 mcg  50 mcg Oral Q AM Carter Llamas MD   50 mcg at 06/24/17 0942   • megestrol (MEGACE) 40 MG/ML suspension 400 mg  400 mg Oral Daily Carter Llamas MD   400 mg at 06/24/17 0942   • midodrine (PROAMATINE) tablet 5 mg  5 mg Oral TID AC FANNIE Blanco   5 mg at  "06/24/17 0943   • ondansetron (ZOFRAN) injection 4 mg  4 mg Intravenous Q6H PRN Pino Tang MD   4 mg at 06/15/17 0555   • pantoprazole (PROTONIX) EC tablet 40 mg  40 mg Oral Q AM Pino Tang MD   40 mg at 06/24/17 0942   • predniSONE (DELTASONE) tablet 40 mg  40 mg Oral Daily With Breakfast Jez Romo MD   40 mg at 06/24/17 0942   • traZODone (DESYREL) tablet 50 mg  50 mg Oral Nightly PRN Dima Martinez MD   50 mg at 06/23/17 2227       Past Medical History:  Past Medical History:   Diagnosis Date   • A-fib    • Arthritis    • CHF (congestive heart failure)    • Hypertension    • Renal failure        Past Surgical History:  Past Surgical History:   Procedure Laterality Date   • ARTERIOVENOUS FISTULA LEG Left        Family History  History reviewed. No pertinent family history.    Social History  Social History     Social History   • Marital status: Single     Spouse name: N/A   • Number of children: N/A   • Years of education: N/A     Occupational History   • Not on file.     Social History Main Topics   • Smoking status: Former Smoker   • Smokeless tobacco: Not on file   • Alcohol use No   • Drug use: No   • Sexual activity: Defer     Other Topics Concern   • Not on file     Social History Narrative         Review of Systems:  History obtained from chart review and the patient  General ROS: Patient complains of malaise and No fever or chills  Respiratory ROS: No cough, shortness of breath, wheezing  Cardiovascular ROS: no chest pain or dyspnea on exertion  Gastrointestinal ROS: No abdominal pain, no nausea  Genito-Urinary ROS: No dysuria or hematuria  Neurological ROS: negative  Dermatological ROS: negative  14 point ROS reviewed with the patient and negative except as noted above and in the HPI unless unable to obtain.    Objective:  /70  Pulse 104  Temp 97.8 °F (36.6 °C) (Temporal Artery )   Resp 20  Ht 62\" (157.5 cm)  Wt 117 lb 10 oz (53.4 kg)  SpO2 94%  BMI 21.51 " kg/m2    Intake/Output Summary (Last 24 hours) at 06/24/17 1438  Last data filed at 06/24/17 1300   Gross per 24 hour   Intake              480 ml   Output                0 ml   Net              480 ml     General: awake/alert    Neck: supple, no JVD  Chest:  clear to auscultation bilaterally without respiratory distress  CVS: regular rate and rhythm  Abdominal: soft, nontender, normal bowel sounds  Extremities: no cyanosis or edema  Skin: warm and dry without rash   Neuro: no focal motor deficits      Labs:    Results from last 7 days  Lab Units 06/23/17  0359 06/22/17  1155 06/22/17  0812   WBC 10*3/mm3 5.49 4.15* 5.19   HEMOGLOBIN g/dL 7.3* 7.3* 8.2*   HEMATOCRIT % 22.1* 22.0* 24.6*   PLATELETS 10*3/mm3 58* 61* 76*           Results from last 7 days  Lab Units 06/23/17  0359 06/22/17  1155 06/21/17  1548  06/19/17  0404 06/18/17  0346   SODIUM mmol/L 138 138 133*  < > 136 137   POTASSIUM mmol/L 3.4* 3.4* 3.6  < > 3.5 3.2*   CHLORIDE mmol/L 101 99 98  < > 101 100   TOTAL CO2 mmol/L 24.0 28.0 23.0*  < > 26.0 28.0   BUN mg/dL 25* 18 29*  < > 28* 23*   CREATININE mg/dL 2.92* 2.57* 3.53*  < > 3.93* 3.48*   CALCIUM mg/dL 8.1* 8.2* 8.0*  < > 7.7* 7.8*   BILIRUBIN mg/dL  --   --   --   --  1.8* 1.8*   ALK PHOS U/L  --   --   --   --  90 92   ALT (SGPT) U/L  --   --   --   --  22 28   AST (SGOT) U/L  --   --   --   --  28 23   GLUCOSE mg/dL 131* 144* 185*  < > 72 109*   < > = values in this interval not displayed.    Radiology:   Imaging Results (last 72 hours)     Procedure Component Value Units Date/Time    XR Chest 1 View [[id number]] Collected:  06/11/17 1803     Updated:  06/11/17 2054    Narrative:       EXAMINATION:   XR CHEST 1 VW-  6/11/2017 6:01 PM CDT     HISTORY: Chest pain     Prior exams 05/28/2016.     Single view of the chest is obtained. The pulmonary vascular margins are  slightly indistinct. This has the appearance of mild pulmonary vascular  congestion. Left diaphragm is indistinct. This may be  infiltrate or  atelectasis left lower lobe.     Cardiac silhouette is enlarged and unchanged from May 28.       Impression:       1. Silhouetting of the left diaphragm consistent with infiltrate or  atelectasis left lower lobe.  2. Indistinct pulmonary vascular margins consistent with mild pulmonary  vascular congestion.  This report was finalized on 06/11/2017 20:51 by Dr. Herson Magaña MD.          Culture:         Assessment   1.  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  On maintenance hemodialysis Monday Wednesday Friday   2.  Pneumonia, Resolving.  3.  Hyponatremia--resolved  4.  Anemia of chronic kidney disease  5.  Congestive heart failure with diastolic dysfunction  6.  Hypokalemia  7.  Atrial fibrillation, rate control and converted to NSR    Plan:  1.  Okay to discharge her today  2.  Low updated at outpatient hemodialysis clinic.    Jez Romo MD  6/24/2017  2:38 PM       Electronically signed by Jez Romo MD at 6/24/2017  2:40 PM      Jez Romo MD at 6/25/2017  2:17 PM  Version 1 of 1         Nephrology ([facility]) Progress Note      Patient:  Jennifer Marks  YOB: 1945  Date of Service: 6/25/2017  MRN: [id number]   Acct: [id number]   Primary Care Physician: Rios Ngo DO  Advance Directive: Full Code  Admit Date: 6/11/2017       Hospital Day: 14  Referring Provider: Pino Tang MD      Patient personally seen and examined.  Complete chart including Consults, Notes, Operative Reports, Labs, Cardiology, and Radiology studies reviewed as able.        Subjective:  Jennifer Marks is a 72 y.o. female  whom we were consulted for end stage renal disease management, hypokalemia.  Patient was initially admitted to CCU for the treatment of new onset of atrial fibrillation and pneumonia.  Patient was hypotensive related to shock caused by pneumonia.  Hospital course remarkable for treatment of A. fib with a Cardizem  and chemical conversion to normal sinus rhythm.  She was also treated with IV antibiotics for pneumonia.  She continues to improve and discharge to regular floor.  She continues to feel good and hoping to go home tomorrow after dialysis.    Allergies:  Heparin; Iron; Latex; Levaquin [levofloxacin]; Shrimp (diagnostic); and Vancomycin    Home Meds:  Prescriptions Prior to Admission   Medication Sig Dispense Refill Last Dose   • carvedilol (COREG) 12.5 MG tablet Take 12.5 mg by mouth Daily.      • losartan (COZAAR) 25 MG tablet Take 25 mg by mouth Daily.      • pantoprazole (PROTONIX) 40 MG EC tablet Take 40 mg by mouth Daily.      • potassium chloride (KLOR-CON) 20 MEQ packet Take 20 mEq by mouth Daily.      • predniSONE (DELTASONE) 1 MG tablet Take 1 mg by mouth Daily.      • PREDNISONE PO Take  by mouth.      • traZODone (DESYREL) 50 MG tablet Take 50 mg by mouth Every Night.          Medicines:  Current Facility-Administered Medications   Medication Dose Route Frequency Provider Last Rate Last Dose   • ipratropium-albuterol (DUO-NEB) nebulizer solution 3 mL  3 mL Nebulization Q6H While Awake - RT Carter Llamas MD   3 mL at 06/25/17 1153   • levothyroxine (SYNTHROID, LEVOTHROID) tablet 50 mcg  50 mcg Oral Q AM Carter Llamas MD   50 mcg at 06/25/17 0530   • megestrol (MEGACE) 40 MG/ML suspension 400 mg  400 mg Oral Daily Carter Llamas MD   400 mg at 06/25/17 0925   • midodrine (PROAMATINE) tablet 5 mg  5 mg Oral TID AC FANNIE Blanco   5 mg at 06/25/17 0925   • ondansetron (ZOFRAN) injection 4 mg  4 mg Intravenous Q6H PRN Pino Tang MD   4 mg at 06/15/17 0555   • pantoprazole (PROTONIX) EC tablet 40 mg  40 mg Oral Q AM Pino Tang MD   40 mg at 06/25/17 0530   • predniSONE (DELTASONE) tablet 20 mg  20 mg Oral Daily With Breakfast Jez Romo MD   20 mg at 06/25/17 0925   • traZODone (DESYREL) tablet 50 mg  50 mg Oral Nightly PRN Dima Martinez MD   50 mg at 06/23/17 222       Past Medical  "History:  Past Medical History:   Diagnosis Date   • A-fib    • Arthritis    • CHF (congestive heart failure)    • Hypertension    • Renal failure        Past Surgical History:  Past Surgical History:   Procedure Laterality Date   • ARTERIOVENOUS FISTULA LEG Left        Family History  History reviewed. No pertinent family history.    Social History  Social History     Social History   • Marital status: Single     Spouse name: N/A   • Number of children: N/A   • Years of education: N/A     Occupational History   • Not on file.     Social History Main Topics   • Smoking status: Former Smoker   • Smokeless tobacco: Not on file   • Alcohol use No   • Drug use: No   • Sexual activity: Defer     Other Topics Concern   • Not on file     Social History Narrative         Review of Systems:  History obtained from chart review and the patient  General ROS: Patient complains of malaise and No fever or chills  Respiratory ROS: No cough, shortness of breath, wheezing  Cardiovascular ROS: no chest pain or dyspnea on exertion  Gastrointestinal ROS: No abdominal pain, no nausea  Genito-Urinary ROS: No dysuria or hematuria  Neurological ROS: negative  Dermatological ROS: negative  14 point ROS reviewed with the patient and negative except as noted above and in the HPI unless unable to obtain.    Objective:  /62  Pulse 107  Temp 96.8 °F (36 °C) (Temporal Artery )   Resp 18  Ht 62\" (157.5 cm)  Wt 117 lb 10 oz (53.4 kg)  SpO2 96%  BMI 21.51 kg/m2    Intake/Output Summary (Last 24 hours) at 06/25/17 1417  Last data filed at 06/24/17 1700   Gross per 24 hour   Intake              240 ml   Output                0 ml   Net              240 ml     General: awake/alert    Neck: supple, no JVD  Chest:  clear to auscultation bilaterally without respiratory distress  CVS: regular rate and rhythm  Abdominal: soft, nontender, normal bowel sounds  Extremities: no cyanosis or edema  Skin: warm and dry without rash   Neuro: no focal " motor deficits      Labs:    Results from last 7 days  Lab Units 06/23/17  0359 06/22/17  1155 06/22/17  0812   WBC 10*3/mm3 5.49 4.15* 5.19   HEMOGLOBIN g/dL 7.3* 7.3* 8.2*   HEMATOCRIT % 22.1* 22.0* 24.6*   PLATELETS 10*3/mm3 58* 61* 76*           Results from last 7 days  Lab Units 06/23/17  0359 06/22/17  1155 06/21/17  1548  06/19/17  0404   SODIUM mmol/L 138 138 133*  < > 136   POTASSIUM mmol/L 3.4* 3.4* 3.6  < > 3.5   CHLORIDE mmol/L 101 99 98  < > 101   TOTAL CO2 mmol/L 24.0 28.0 23.0*  < > 26.0   BUN mg/dL 25* 18 29*  < > 28*   CREATININE mg/dL 2.92* 2.57* 3.53*  < > 3.93*   CALCIUM mg/dL 8.1* 8.2* 8.0*  < > 7.7*   BILIRUBIN mg/dL  --   --   --   --  1.8*   ALK PHOS U/L  --   --   --   --  90   ALT (SGPT) U/L  --   --   --   --  22   AST (SGOT) U/L  --   --   --   --  28   GLUCOSE mg/dL 131* 144* 185*  < > 72   < > = values in this interval not displayed.    Radiology:   Imaging Results (last 72 hours)     Procedure Component Value Units Date/Time    XR Chest 1 View [[id number]] Collected:  06/11/17 1803     Updated:  06/11/17 2054    Narrative:       EXAMINATION:   XR CHEST 1 VW-  6/11/2017 6:01 PM CDT     HISTORY: Chest pain     Prior exams 05/28/2016.     Single view of the chest is obtained. The pulmonary vascular margins are  slightly indistinct. This has the appearance of mild pulmonary vascular  congestion. Left diaphragm is indistinct. This may be infiltrate or  atelectasis left lower lobe.     Cardiac silhouette is enlarged and unchanged from May 28.       Impression:       1. Silhouetting of the left diaphragm consistent with infiltrate or  atelectasis left lower lobe.  2. Indistinct pulmonary vascular margins consistent with mild pulmonary  vascular congestion.  This report was finalized on 06/11/2017 20:51 by Dr. Herson Magaña MD.          Culture:         Assessment   1.  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  On maintenance hemodialysis Monday Wednesday Friday    2.  Pneumonia, Resolving.  3.  Hyponatremia--resolved  4.  Anemia of chronic kidney disease  5.  Congestive heart failure with diastolic dysfunction  6.  Hypokalemia  7.  Atrial fibrillation, rate control and converted to NSR    Plan:  1.  Routine hemodialysis treatment tomorrow.  2.  Transfuse one pack RBCs with hemodialysis in the morning.    Jez Romo MD  6/25/2017  2:17 PM       Electronically signed by Jez Romo MD at 6/25/2017  2:18 PM      Arthur Kerr MD at 6/25/2017  2:36 PM  Version 1 of 1             [facility] Medicine Services  INPATIENT PROGRESS NOTE    Length of Stay: 14  Date of Admission: 6/11/2017  Primary Care Physician: Rios Ngo DO    Subjective   Chief Complaint: Atrial fib, hypertension, weakness, kidney failure    Chest Pain         Patient eating well.   BP remains stable.Says feels better today.  Doing OK on floor tolerating dialysis. Doing OK no complaints.  Review of Systems   Cardiovascular: Positive for chest pain.      Constitutional: Positive for activity change, appetite change and fatigue. Negative for chills and fever.   HENT: Negative for hearing loss, nosebleeds, tinnitus and trouble swallowing.   Eyes: Negative for visual disturbance.   Respiratory: Negative for cough, chest tightness, shortness of breath and wheezing.   Cardiovascular: Negative for chest pain, palpitations and leg swelling.   Gastrointestinal: Negative for abdominal distention, abdominal pain, blood in stool, constipation, diarrhea, nausea and vomiting.   Endocrine: Negative for cold intolerance, heat intolerance, polydipsia, polyphagia and polyuria.   Genitourinary: Negative for decreased urine volume, difficulty urinating, dysuria, flank pain, frequency and hematuria.   Musculoskeletal: Positive for arthralgias, gait problem and myalgias. Negative for joint swelling.   Skin: Negative for rash.   Allergic/Immunologic: Negative for immunocompromised  state.   Neurological: Positive for weakness. Negative for dizziness, syncope, light-headedness and headaches.   Hematological: Negative for adenopathy. Does not bruise/bleed easily.   Psychiatric/Behavioral: Negative for confusion and sleep disturbance. The patient is not nervous/anxious.     All pertinent negatives and positives are as above. All other systems have been reviewed and are negative unless otherwise stated.     Objective    Temp:  [96.8 °F (36 °C)-98.4 °F (36.9 °C)] 96.8 °F (36 °C)  Heart Rate:  [100-108] 107  Resp:  [18-20] 18  BP: (113-123)/(62-75) 113/62    Intake/Output Summary (Last 24 hours) at 06/25/17 1436  Last data filed at 06/24/17 1700   Gross per 24 hour   Intake              240 ml   Output                0 ml   Net              240 ml     Physical Exam  Constitutional: She appears well-developed and well-nourished.   HENT:   Head: Normocephalic and atraumatic.   Eyes: Conjunctivae and EOM are normal. Pupils are equal, round, and reactive to light.   Neck: Neck supple. No JVD present. No thyromegaly present.   Cardiovascular: Normal rate, regular rhythm, normal heart sounds and intact distal pulses. Exam reveals no gallop and no friction rub.   No murmur heard.  Pulmonary/Chest: Effort normal. No respiratory distress. She has no wheezes. She has no rales. She exhibits no tenderness.   Abdominal: Soft. Bowel sounds are normal. She exhibits no distension. There is no tenderness. There is no rebound and no guarding.   Musculoskeletal: Normal range of motion. She exhibits no edema, tenderness or deformity.   Lymphadenopathy:   She has no cervical adenopathy.   Neurological: She is alert. She displays normal reflexes. No cranial nerve deficit. She exhibits abnormal muscle tone. Coordination abnormal.   Skin: Skin is warm and dry. No rash noted.   Psychiatric: She has a normal mood and affect. Her behavior is normal.   Nursing note and vitals reviewed.  Results Review:  Lab Results (last 24  hours)     Procedure Component Value Units Date/Time    POC Glucose Fingerstick [[id number]]  (Normal) Collected:  06/24/17 1548    Specimen:  Blood Updated:  06/24/17 1621     Glucose 104 mg/dL       : [id number] Susannah BrendaMeter ID: [id number]       POC Glucose Fingerstick [[id number]]  (Normal) Collected:  06/24/17 2021    Specimen:  Blood Updated:  06/24/17 2037     Glucose 108 mg/dL       : [id number] Lummus TerryMeter ID: [id number]       POC Glucose Fingerstick [[id number]]  (Abnormal) Collected:  06/25/17 0811    Specimen:  Blood Updated:  06/25/17 0822     Glucose 65 (L) mg/dL       : [id number] Ad (Jayson) BethanyMeter ID: [id number]       POC Glucose Fingerstick [[id number]]  (Normal) Collected:  06/25/17 1141    Specimen:  Blood Updated:  06/25/17 1154     Glucose 125 mg/dL       : [id number] Orck[employer] Communicationson BrendaMeter ID: [id number]              Cultures:  Blood Culture   Date Value Ref Range Status   06/16/2017 No growth at 24 hours  Preliminary       Radiology Data:    Imaging Results (last 24 hours)     ** No results found for the last 24 hours. **          Allergies   Allergen Reactions   • Heparin    • Iron    • Latex    • Levaquin [Levofloxacin]    • Shrimp (Diagnostic)    • Vancomycin        Scheduled meds:     ipratropium-albuterol 3 mL Nebulization Q6H While Awake - RT   levothyroxine 50 mcg Oral Q AM   megestrol 400 mg Oral Daily   midodrine 5 mg Oral TID AC   pantoprazole 40 mg Oral Q AM   predniSONE 20 mg Oral Daily With Breakfast       PRN meds:  ondansetron  •  traZODone    Assessment/Plan     Active Problems:    Chest pain in adult    Renal failure    Hypotension    Anemia    Hypokalemia      Plan:  Chest pain, atypical, echocardiogram/afib -Ejection fraction 61%, diastolic dysfunction, right ventricular dilatation, tricuspid valve regurgitation, right ventricle systolic pressure greater than 55. .  Doing much better on steroids  eating continue same wean  steroids now on oral doing well  Hemoptysis ×1 6/15. Hx feliciano syndrome.    Thrombocytopenia-Stable chronic     Pneumonia/ Slight leukocytosis. Continue oral antibiotics    Anemia- Status post 2 units of blood transfusion 6/14. Trending down transfuse as necessary      Chronic renal failure- nephrology is on board. Cont dialysis per nephrology.       Hyperlipidemia stable      Elevate TSH- free T4 is normal. Continue  synthroid.      Hypotension- resoloved      Hypokalemia-- replace per nephrology.      Calcium 7.7, corrected calcium 9.5 - 6/13/2017.      Nutrition- discontinue megace, patient is eating with steroids see if maintains appetite with prednisone alone.       Deconditioning- start PT and OT      Discharge Planning: LTAC refused by insurance plan for [facility].still awaiting [facility] bed offer or possibly home tomorrow if SW can arrange and PT thinks she will be safe.  Adrenal insufficiency responding to steroids. No new plans at this time.  Arthur Kerr MD   06/25/17   2:36 PM      Jez Romo MD Physician Signed Nephrology Progress Notes Date of Service: 6/25/2017  2:17 PM      Expand All Collapse All    []Hide copied text  Nephrology ([facility]) Progress Note        Patient: Jennifer Marks  YOB: 1945  Date of Service: 6/25/2017  MRN: [id number]   Acct: [id number]   Primary Care Physician: Rios Ngo DO  Advance Directive: Full Code  Admit Date: 6/11/2017     Hospital Day: 14  Referring Provider: Pino Tang MD        Patient personally seen and examined. Complete chart including Consults, Notes, Operative Reports, Labs, Cardiology, and Radiology studies reviewed as able.           Subjective:  Jennifer Marks is a 72 y.o. female whom we were consulted for end stage renal disease management, hypokalemia. Patient was initially admitted to CCU for the treatment of new onset of atrial fibrillation and pneumonia. Patient was hypotensive related to shock  caused by pneumonia. Hospital course remarkable for treatment of A. fib with a Cardizem and chemical conversion to normal sinus rhythm. She was also treated with IV antibiotics for pneumonia. She continues to improve and discharge to regular floor. She continues to feel good and hoping to go home tomorrow after dialysis.     Allergies:  Heparin; Iron; Latex; Levaquin [levofloxacin]; Shrimp (diagnostic); and Vancomycin     Home Meds:   Prescriptions Prior to Admission            Prescriptions Prior to Admission   Medication Sig Dispense Refill Last Dose   • carvedilol (COREG) 12.5 MG tablet Take 12.5 mg by mouth Daily.         • losartan (COZAAR) 25 MG tablet Take 25 mg by mouth Daily.         • pantoprazole (PROTONIX) 40 MG EC tablet Take 40 mg by mouth Daily.         • potassium chloride (KLOR-CON) 20 MEQ packet Take 20 mEq by mouth Daily.         • predniSONE (DELTASONE) 1 MG tablet Take 1 mg by mouth Daily.         • PREDNISONE PO Take by mouth.         • traZODone (DESYREL) 50 MG tablet Take 50 mg by mouth Every Night.                  Medicines:   Current Medications              Current Facility-Administered Medications   Medication Dose Route Frequency Provider Last Rate Last Dose   • ipratropium-albuterol (DUO-NEB) nebulizer solution 3 mL 3 mL Nebulization Q6H While Awake - RT Carter Llamas MD    3 mL at 06/25/17 1153   • levothyroxine (SYNTHROID, LEVOTHROID) tablet 50 mcg 50 mcg Oral Q AM Carter Llamas MD    50 mcg at 06/25/17 0530   • megestrol (MEGACE) 40 MG/ML suspension 400 mg 400 mg Oral Daily Carter Llamas MD    400 mg at 06/25/17 0925   • midodrine (PROAMATINE) tablet 5 mg 5 mg Oral TID AC FANNIE Blanco    5 mg at 06/25/17 0925   • ondansetron (ZOFRAN) injection 4 mg 4 mg Intravenous Q6H PRN Pino Tang MD    4 mg at 06/15/17 0555   • pantoprazole (PROTONIX) EC tablet 40 mg 40 mg Oral Q AM Pino Tang MD    40 mg at 06/25/17 0530   • predniSONE (DELTASONE) tablet 20 mg 20 mg Oral  "Daily With Breakfast Jez Romo MD    20 mg at 06/25/17 0914   • traZODone (DESYREL) tablet 50 mg 50 mg Oral Nightly PRN Dima Martinez MD    50 mg at 06/23/17 222            Past Medical History:   Medical History         Past Medical History:   Diagnosis Date   • A-fib     • Arthritis     • CHF (congestive heart failure)     • Hypertension     • Renal failure              Past Surgical History:   Surgical History          Past Surgical History:   Procedure Laterality Date   • ARTERIOVENOUS FISTULA LEG Left              Family History  History reviewed. No pertinent family history.     Social History   Social History    Social History            Social History   • Marital status: Single       Spouse name: N/A   • Number of children: N/A   • Years of education: N/A          Occupational History   • Not on file.           Social History Main Topics   • Smoking status: Former Smoker   • Smokeless tobacco: Not on file   • Alcohol use No   • Drug use: No   • Sexual activity: Defer           Other Topics Concern   • Not on file      Social History Narrative               Review of Systems:  History obtained from chart review and the patient  General ROS: Patient complains of malaise and No fever or chills  Respiratory ROS: No cough, shortness of breath, wheezing  Cardiovascular ROS: no chest pain or dyspnea on exertion  Gastrointestinal ROS: No abdominal pain, no nausea  Genito-Urinary ROS: No dysuria or hematuria  Neurological ROS: negative  Dermatological ROS: negative  14 point ROS reviewed with the patient and negative except as noted above and in the HPI unless unable to obtain.     Objective:  /62  Pulse 107  Temp 96.8 °F (36 °C) (Temporal Artery )  Resp 18  Ht 62\" (157.5 cm)  Wt 117 lb 10 oz (53.4 kg)  SpO2 96%  BMI 21.51 kg/m2     Intake/Output Summary (Last 24 hours) at 06/25/17 1417  Last data filed at 06/24/17 1700    Gross per 24 hour   Intake 240 ml   Output 0 ml   Net 240 ml "      General: awake/alert    Neck: supple, no JVD  Chest:  clear to auscultation bilaterally without respiratory distress  CVS: regular rate and rhythm  Abdominal: soft, nontender, normal bowel sounds  Extremities: no cyanosis or edema  Skin: warm and dry without rash   Neuro: no focal motor deficits        Labs:     Results from last 7 days  Lab Units 06/23/17  0359 06/22/17  1155 06/22/17  0812   WBC 10*3/mm3 5.49 4.15* 5.19   HEMOGLOBIN g/dL 7.3* 7.3* 8.2*   HEMATOCRIT % 22.1* 22.0* 24.6*   PLATELETS 10*3/mm3 58* 61* 76*               Results from last 7 days  Lab Units 06/23/17  0359 06/22/17  1155 06/21/17  1548   06/19/17  0404   SODIUM mmol/L 138 138 133* < > 136   POTASSIUM mmol/L 3.4* 3.4* 3.6 < > 3.5   CHLORIDE mmol/L 101 99 98 < > 101   TOTAL CO2 mmol/L 24.0 28.0 23.0* < > 26.0   BUN mg/dL 25* 18 29* < > 28*   CREATININE mg/dL 2.92* 2.57* 3.53* < > 3.93*   CALCIUM mg/dL 8.1* 8.2* 8.0* < > 7.7*   BILIRUBIN mg/dL --  --  --  --  1.8*   ALK PHOS U/L --  --  --  --  90   ALT (SGPT) U/L --  --  --  --  22   AST (SGOT) U/L --  --  --  --  28   GLUCOSE mg/dL 131* 144* 185* < > 72   < > = values in this interval not displayed.     Radiology:   Imaging Results (last 72 hours)     Procedure Component Value Units Date/Time     XR Chest 1 View [[id number]] Collected: 06/11/17 1803       Updated: 06/11/17 2054     Narrative:       EXAMINATION: XR CHEST 1 VW- 6/11/2017 6:01 PM CDT      HISTORY: Chest pain      Prior exams 05/28/2016.      Single view of the chest is obtained. The pulmonary vascular margins are  slightly indistinct. This has the appearance of mild pulmonary vascular  congestion. Left diaphragm is indistinct. This may be infiltrate or  atelectasis left lower lobe.      Cardiac silhouette is enlarged and unchanged from May 28.         Impression:       1. Silhouetting of the left diaphragm consistent with infiltrate or  atelectasis left lower lobe.  2. Indistinct pulmonary vascular margins consistent with  mild pulmonary  vascular congestion.  This report was finalized on 06/11/2017 20:51 by Dr. Herson Magaña MD.            Culture:            Assessment   1. 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 On maintenance hemodialysis Monday Wednesday Friday   2. Pneumonia, Resolving.  3. Hyponatremia--resolved  4. Anemia of chronic kidney disease  5. Congestive heart failure with diastolic dysfunction  6. Hypokalemia  7. Atrial fibrillation, rate control and converted to NSR     Plan:  1. Routine hemodialysis treatment tomorrow.  2. Transfuse one pack RBCs with hemodialysis in the morning.     Jez Romo MD  6/25/2017  2:17 PM

## 2017-06-26 NOTE — PROGRESS NOTES
Continued Stay Note   [place]     Patient Name: Jennifer Marks  MRN: [id number]  Today's Date: 6/26/2017    Admit Date: 6/11/2017          Discharge Plan       06/26/17 1039    Case Management/Social Work Plan    Plan SW spoke with PT's paramour and PT's daughter. They are no longer wanting SNF placement and prefer to take PT home with HH and services through [facility]. SW has made a referral to Swathi Diop with [facility]. SW will arrange HH through [facility], as this is the family's request. PT is also requesting a Hospital Bed for home use upon dc. PT wants to use [facility] in [place] if DME orders are written. SW will continue to follow.     Patient/Family In Agreement With Plan yes              Discharge Codes     None            NARDA Santo

## 2017-06-26 NOTE — DISCHARGE PLACEMENT REQUEST
"Jennifer Zhao (72 y.o. Female)     Date of Birth Social Security Number Address Home Phone MRN    1945  [address] [place] [address] [phone] [id number]    Jew Marital Status          Yarsanism Single       Admission Date Admission Type Admitting Provider Attending Provider Department, Room/Bed    6/11/17 Emergency Carter Llamas MD Truong, Khai C, MD [facility] 4B, 406/1    Discharge Date Discharge Disposition Discharge Destination         Home or Self Care             Attending Provider: Carter Llamas MD     Allergies:  Heparin, Iron, Latex, Levaquin [Levofloxacin], Shrimp (Diagnostic), Vancomycin    Isolation:  None   Infection:  None   Code Status:  FULL    Ht:  62\" (157.5 cm)   Wt:  118 lb 4.8 oz (53.7 kg)    Admission Cmt:  None   Principal Problem:  None                Active Insurance as of 6/11/2017     Primary Coverage     Payor Plan Insurance Group Employer/Plan Group    MEDICARE MEDICARE B ONLY      Payor Plan Address Payor Plan Phone Number Effective From Effective To    [address] [address] [phone] 5/1/2008     [place] [address]       Subscriber Name Subscriber Birth Date Member ID       JENNIFER ZHAO 1945 [id number]           Secondary Coverage     Payor Plan Insurance Group Employer/Plan Group    [place] [facility] [place] [place] [facility] [place] [place]      Payor Plan Address Payor Plan Phone Number Effective From Effective To    [address] [address]  1/1/2014     [place] [address]       Subscriber Name Subscriber Birth Date Member ID       JENNIFER ZHAO 1945 [id number]                 Emergency Contacts      (Rel.) Home Phone Work Phone Mobile Phone    Norma Amado (Relative) [phone] -- --    Mirza Peguero (Relative) [phone] -- --    Cooper Juárez (Significant Other) [phone] -- [phone]               History & Physical      H&P signed by Pino Tang MD at 6/12/2017 10:22 PM              cc:      TIME: 10:49 p.m. "     PRIMARY CARE PHYSICIAN: Unknown.    HISTORY OF PRESENT ILLNESS: Ms. Marks is a 72-year-old  female who presents to [facility] due to uncertain reasons. Ms. Marks is not able to provide much meaningful history. There are no family members present. I spoke with her niece who relates that Ms. Marks has been having significant difficulties with fatigue, malaise, lethargy, generalized weakness, diffuse arthralgias, myalgias, poor appetite, as well as recurring chest and abdominal pain. Her symptoms worsened acutely approximately 24 hours ago. Ms. Marks has a history of end-stage renal disease. She undergoes dialysis on Monday, Wednesday and Friday. There is speculation that she did not complete dialysis on Friday due to feeling ill. Presently Ms. Marks is resting comfortably and is in no distress. It is very difficult to obtain any information due to her status.     In the emergency department, laboratory studies demonstrated a creatinine of 16.3. I have consulted the Nephrology service for dialysis.     REVIEW OF SYSTEMS: Cannot be performed due to the patient's status.     PAST MEDICAL HISTORY:  1. Hypertension.   2. Dyslipidemia.   3. Diabetes mellitus type 2.   3. Hypothyroidism.   4. Chronic congestive heart failure due to systolic and diastolic dysfunction.   5. Aortic stenosis.   6. Pulmonary hypertension.   7. Nonischemic cardiomyopathy with estimated ejection fraction of 30% to 35%.   8. Peripheral arterial disease.   9. Wegener's granulomatosis.   10. Rheumatoid arthritis.   11. End-stage renal disease necessitating hemodialysis.   12. Chronic anemia.   13. Deep vein thrombosis.   14. Syncope.   15. Hearing impairment.     PAST SURGICAL HISTORY:  1. Status post appendectomy.   2. Status post cholecystectomy.   3. Status post bilateral tubal ligation.   4. Status post hernia repair.   5. Status post cataract resection.   6. Status post bilateral upper extremity AV fistula  placement.   7. Status post left lower extremity AV fistula placement.   8. Status post cardiac catheterization in 2014, which demonstrated no coronary artery disease.     ALLERGIES (PER MEDICAL RECORDS):  1. HEPARIN.  2. IRON.  3. LATEX.  4. LEVAQUIN.  5. SHRIMP.  6. VANCOMYCIN.     HOME MEDICATIONS:  1. Coreg 12.5 mg p.o. b.i.d.   2. Losartan 25 mg p.o. daily.   3. Protonix 40 mg p.o. daily.   4. Potassium chloride 20 mEq p.o. daily.   5. Prednisone 1 mg p.o. daily.   6. Trazodone 50 mg p.o. at bedtime.     Note, the above medications doses and schedules cannot be confirmed.     SOCIAL HISTORY: Significant for being a resident of [place], [place]. She lives with a male friend. She is . She hads3 sons and 2 daughters although 1 son is now  due to uncertain reasons. The patient is retired. She has a high school education. She has no history of tobacco, alcohol or drug use. She is Tenriism. She has no recent history of travel outside this region.     She is a FULL CODE.     She designates her daughter, Rosanna Marks, to serve as a surrogate for healthcare matters should such become necessary. Her daughter’s telephone number is [phone].    FAMILY HISTORY: Cannot be obtained.     PHYSICAL EXAMINATION:  VITAL SIGNS: Temperature is 97.8, pulse is 88, respirations 17, and unlabored, blood pressure is 81/45, O2 saturation is 100%, breathing supplemental oxygen, weight 113 pounds.     GENERAL: This is a 72-year-old  female appearing her documented age. She is resting comfortably in bed. She is in no apparent distress. She is not able to provide any sort of meaningful history. She seems to be hearing impaired.     HEAD AND NECK: Essentially unremarkable except as noted. I see no signs of acute trauma. Eyes, nose, and throat appear grossly unremarkable. Neck is supple. She has no cervical or clavicular adenopathy. She has no definite carotid bruits. There are no masses of the head  or neck. Neck veins do not appear pathologically distended.     CARDIAC: Reveals S1 and S2 with a regular rate and rhythm. She has had no definite murmurs, rubs, or gallops.     LUNGS: Reveals bilateral breath sounds are clear to auscultation throughout. She has no rales, wheezes, or rhonchi.     ABDOMEN: Reveals bowel sounds to be present. Her abdomen is nondistended and soft. She exhibits diffuse tenderness. She relates this is a chronic problem.     EXTREMITIES: No significant lower extremity edema, erythema or calf tenderness.     NEUROLOGIC: Reveals the patient to be awake and alert. Cranial nerves II-XII are intact. She exhibits no definite focal, motor or sensory deficits. She seems able to move all extremities without difficulty and at will. Her gait was not tested. She appears weak.     PSYCHIATRIC: Deferred due to patient's status.     DIAGNOSTIC DATA: Laboratory studies from [facility] demonstrated a sodium of 136, potassium 2.7, chloride 98, CO2 of 17, BUN 67, creatinine 17.9, glucose 71, total calcium 8.1.     CBC demonstrates a white blood cell count of 8.6, hemoglobin 10.0, hematocrit 29.6, platelet count 157,000.     Magnesium is 2.4.     Troponin level is 3.09.     EKG at [facility] demonstrates sinus rhythm of 68 beats per minute. ST segment elevations are noted in the inferior leads. T wave inversions are noted throughout the anterior precordial leads.     IMPRESSION:  1. Acute MI?.  2. Hypertension.   3. Dyslipidemia.   4. Diabetes mellitus type 2.   5. Hypothyroidism.   6. End-stage renal disease necessitating hemodialysis.   7. Chronic congestive heart failure due to systolic and diastolic dysfunction.   8. Nonischemic cardiomyopathy.   9. Ejection fraction of 30% to 35%.   10. Pulmonary hypertension.   11. Anemia.   12. Wegener's granulomatosis.     PLAN: At this time, Ms. Marks will be admitted to [facility] for further evaluation and treatment.  Her admitting diagnoses are as noted. Her condition at this time is judged to be guarded. She will be housed in the CCU.     I have asked the nursing staff to obtain vital signs per protocol. She will be confined to bedrest. Her allergies are as noted above. I have asked the nursing staff to monitor input and output. Daily weights will be obtained. She will be maintained on a cardiac diet. IV fluids will be saline locked. Oxygen will be used as needed to maintain her O2 saturation greater than 92%. She is a FULL CODE. Fall precautions are to be instituted. I will consult the Cardiology service. The patient has already been seen by Dr. Nelson.    INITIAL ADMITTING MEDICATIONS:  1. Aspirin 81 mg p.o. daily.   2. Protonix 40 mg p.o. daily.   3. Potassium chloride 20 mEq p.o. b.i.d.     Note, the patient will not be given beta blockers at this time due to her low blood pressure. Also note that she cannot be given heparin or heparin products due to reported allergy.     Additional p.r.n. medications will include DuoNeb and Zofran.     I will obtain follow up laboratory studies.     I will schedule a cardiac echocardiogram for in the morning.     I will continue to follow Ms. Marks closely through the night pending return of the hospitalist team in the morning. The nursing staff may call should they have any questions or concerns. Please refer to the medical record for additional information, orders and/or comments.          Pino Tang M.D.  SANTINO/[id number]  D:  06/12/2017 00:04:01(Eastern Time)  T:  06/12/2017 00:46:25(Eastern Time)  Voice ID:  [id number]/Document ID:  [id number]       Electronically signed by Pino Tang MD at 6/12/2017 10:22 PM           Discharge Summary      Carter Llamas MD at 6/26/2017  1:13 PM              [facility] Medicine Services  DISCHARGE SUMMARY       Date of Admission: 6/11/2017  Date of Discharge:  6/26/2017  Primary Care Physician: Rios Ngo,  DO    Presenting Problem/History of Present Illness:  Chest pain in adult [R07.9]  Chest pain in adult [R07.9]     Final Discharge Diagnoses:  Hospital Problem List     Chest pain in adult    Renal failure    Hypotension    Anemia    Hypokalemia          Consults: Gen. surgery, nephrology, cardiology, vascular surgery    Pertinent Test Results:   IMPRESSION: chest xray  1. Stable pulmonary vascular congestion with questionable interstitial  edema.  2. Left retrocardiac opacity may represent atelectasis, infection, or a  combination of these.  3. Suspect trace right pleural fluid.    IMPRESSION- flat and upright abdomen x-ray  Nonspecific bowel gas pattern without dilated bowel loops to indicate  Obstruction.    IMPRESSION-CT scan abdomen pelvic function  1. Large hiatal hernia. Cardiomegaly. Small pleural effusions.  2. 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 Multiple calcified uterine  fibroids.  3. Numerous small low-density renal masses seen most most of these are  probably cysts. Consider follow-up ultrasound. Stent identified in the    Interpretation Summary echocardiogram      · Left ventricular systolic function is normal. Estimated ejection fraction is 61-65%.  · Left ventricular diastolic dysfunction (grade II) consistent with pseudonormalization.  · Right ventricular cavity is moderate-to-severely dilated.  · Moderately reduced right ventricular systolic function noted.  · There is biatrial enlargement.  · Mild aortic valve stenosis is present.  · Mild pulmonic valve regurgitation is present.  · Moderate to severe tricuspid valve regurgitation is present.  · Estimated right ventricular systolic pressure from tricuspid regurgitation is markedly elevated (>55 mmHg).       Chief Complaint on Day of Discharge: none    History of Present Illness on Day of Discharge:   Ms. Marks is a 72-year-old  female who presents to [facility] [place] [facility] due to uncertain reasons. Ms. Marks is not able to provide much meaningful history. There are no family members present. I spoke with her niece who relates that Ms. Marks has been having significant difficulties with fatigue, malaise, lethargy, generalized weakness, diffuse arthralgias, myalgias, poor appetite, as well as recurring chest and abdominal pain. Her symptoms worsened acutely approximately 24 hours ago. Ms. Marks has a history of end-stage renal disease. She undergoes dialysis on Monday, Wednesday and Friday. There is speculation that she did not complete dialysis on Friday due to feeling ill. Presently Ms. Marks is resting comfortably and is in no distress. It is very difficult to obtain any information due to her status.     Hospital Course:  The patient is a 72 y.o. female who presented to [facility] with failure to thrive and encephalopathy.  Patient also stated complain of the current chest pain and  abdomen pain.  Patient is a chronic dialysis patient with a history of 3 fistulas placement in different location.  Patient has 90 well.  Attempt to put a Dobbhoff tube, patient's refused.  Patient also has a new episode atrial fib with RVR.  Cardizem drip was started.  Patient's also hypotension, Levophed drip was also started.   During course evaluation, .  Patient platelets count  was low.  Patient underwent transfusion of platelets.  Patient was also underwent transfusion of 2 units of blood.  Patient's also started.  Rocephin antibiotic was started due to possible pneumonia.  Once patient's more stabilized, Levophed and Cardizem drip was DC.  Patient was sent sent to the medical floor for further management.  PT and OT also work with the patient did not course hospital stay.  Patient is now normotensive and eating.  Patient refuses nursing home placement.  Vitals signs stable, afebrile.  Patient follow his primary care doctor 1 week.  Patient's to stay with dialysis  "Monday, once in Friday.    Condition on Discharge:  stable    Physical Exam on Discharge:  /61  Pulse 104  Temp 97.9 °F (36.6 °C) (Temporal Artery )   Resp 16  Ht 62\" (157.5 cm)  Wt 118 lb 4.8 oz (53.7 kg)  SpO2 94%  BMI 21.64 kg/m2  Physical Exam   Constitutional: She is oriented to person, place, and time. She appears well-developed.   HENT:   Head: Normocephalic and atraumatic.   Eyes: Conjunctivae and EOM are normal. Pupils are equal, round, and reactive to light.   Neck: Neck supple. No JVD present. No thyromegaly present.   Cardiovascular: Normal heart sounds and intact distal pulses.  Exam reveals no gallop and no friction rub.    No murmur heard.  Irregular irregular, rate controlled.   Pulmonary/Chest: Effort normal and breath sounds normal. No respiratory distress. She has no wheezes. She has no rales. She exhibits no tenderness.   Abdominal: Soft. Bowel sounds are normal. She exhibits no distension. There is no tenderness. There is no rebound and no guarding.   Musculoskeletal: Normal range of motion. She exhibits no edema, tenderness or deformity.   Lymphadenopathy:     She has no cervical adenopathy.   Neurological: She is alert and oriented to person, place, and time. She displays normal reflexes. No cranial nerve deficit. She exhibits abnormal muscle tone. Coordination abnormal.   Skin: Skin is warm and dry. No rash noted.   Psychiatric: She has a normal mood and affect. Her behavior is normal.       Discharge Disposition:  Home or Self Care    Discharge Medications:   Jennifer Marks   Home Medication Instructions JAYLEN:[id number]    Printed on:06/26/17 1313   Medication Information                      levothyroxine (SYNTHROID, LEVOTHROID) 50 MCG tablet  Take 1 tablet by mouth Daily.             lidocaine-prilocaine (EMLA) 2.5-2.5 % cream  Apply  topically 1 (One) Time for 1 dose.             megestrol (MEGACE) 40 MG/ML suspension  Take 10 mL by mouth Daily.             midodrine " (PROAMATINE) 5 MG tablet  Take 1 tablet by mouth 3 (Three) Times a Day Before Meals.             pantoprazole (PROTONIX) 40 MG EC tablet  Take 40 mg by mouth Daily.             predniSONE (DELTASONE) 20 MG tablet  Take 0.5 tablets by mouth Daily.             traZODone (DESYREL) 50 MG tablet  Take 50 mg by mouth Every Night.                 Discharge Diet:   Diet Instructions     Advance Diet As Tolerated                     Activity at Discharge:   Activity Instructions     Activity as Tolerated                     Discharge Care Plan/Instructions:     Follow-up Appointments:   Follow-up with PCP in 1 week.  Patient keep appointment for dialysis Monday, Wednesday, Friday.    Carter Llamas MD  06/26/17  1:13 PM    Time: Greater than 30 minutes         Electronically signed by Carter Llamas MD at 6/26/2017  1:29 PM        Referral to Home Health [[id number]] (Order [id number])   Outpatient Referral    Date: 6/26/2017   Department: [address]   Ordering/Authorizing: Carter Llamas MD           Order History  Outpatient       Date/Time Action Taken User Additional Information       06/26/17 1313 Sign Carter Llamas MD            Order Details        Frequency Duration Priority Order Class       None None Routine Internal Referral           Start Date/Time        Start Date       06/26/17           Order Questions        Question Answer Comment       Face to Face Visit Date 6/26/2017        Follow-up Provider for Plan of Care? I treated the patient in an acute care facility and will not continue treatment after discharge.        Follow-up Provider NOLAN YEUNG        Reason/Clinical Findings weakness, abnl gait        Describe mobility limitations that make leaving home difficult weakness        Nursing/Therapeutic Services Requested Skilled Nursing         Physical Therapy         Medical / Social Work        Skilled nursing orders: Medication education         CHF management         COPD  management        PT orders: Gait Training         Transfer training         Strengthening        Weight Bearing Status As Tolerated        Social work orders: Long range planning        Frequency: 1 Week 1            Source Order Set / Preference List        Order Set        IP GEN EXPRESS DISCHARGE [[id number]]           Clinical Indications           ICD-10-CM ICD-9-CM       End stage renal disease     N18.6 585.6       Hypotension, unspecified hypotension type     I95.9 458.9       Chronic systolic (congestive) heart failure     I50.22 428.22  428.0       Decreased activities of daily living (ADL)     Z78.9 V49.89       Impaired mobility     Z74.09 799.89           Reprint Order Requisition        AMB REFERRAL TO HOME HEALTH (Order #[id number]) on 6/26/17         Encounter-Level Cardiology Documents:        There are no encounter-level cardiology documents.         Encounter        View Encounter         Order Provider Info            Office phone Pager E-mail       Ordering User Carter Llamas [phone] -- --       Authorizing Provider Carter Llamas [phone] -- --       Attending Provider Carter Llamas [phone] -- --           Linked Charges        No charges linked to this procedure         Order Transmittal Tracking        AMB REFERRAL TO HOME HEALTH (Order #[id number]) on 6/26/17         Authorized by: Carter Llamas MD (NPI: [id number])

## 2017-06-26 NOTE — PLAN OF CARE
Problem: Patient Care Overview (Adult)  Goal: Plan of Care Review  Outcome: Ongoing (interventions implemented as appropriate)    06/26/17 1141   Coping/Psychosocial Response Interventions   Plan Of Care Reviewed With patient   Patient Care Overview   Progress progress toward functional goals as expected   Outcome Evaluation   Outcome Summary/Follow up Plan Pt. progressing as expected, ae presented to facilitate self feeding, and ue prom to increase arom with adl tasks!

## 2017-06-27 NOTE — PLAN OF CARE
Problem: Inpatient Physical Therapy  Goal: Bed Mobility Goal LTG- PT  Outcome: Unable to achieve outcome(s) by discharge Date Met:  06/27/17    06/15/17 0925 06/27/17 0810   Bed Mobility PT LTG   Bed Mobility PT LTG, Date Established 06/15/17 --    Bed Mobility PT LTG, Time to Achieve by discharge --    Bed Mobility PT LTG, Activity Type roll left/roll right;supine to sit/sit to supine --    Bed Mobility PT LTG, [place] Level minimum assist (75% patient effort) --    Bed Mobility PT Goal LTG, Assist Device bed rails --    Bed Mobility PT LTG, Date Goal Reviewed --  06/27/17   Bed Mobility PT LTG, Outcome --  goal not met   Bed Mobility PT LTG, Reason Goal Not Met --  discharged from facility       Goal: Strength Goal LTG- PT  Outcome: Unable to achieve outcome(s) by discharge Date Met:  06/27/17    06/15/17 0925 06/27/17 0810   Strength Goal PT LTG   Strength Goal PT LTG, Date Established 06/15/17 --    Strength Goal PT LTG, Time to Achieve by discharge --    Strength Goal PT LTG, Measure to Achieve AAROM, B UE/LE, 10-20 reps, min assist --    Strength Goal PT LTG, Date Goal Reviewed --  06/27/17   Strength Goal PT LTG, Outcome --  goal not met   Strength Goal PT LTG, Reason Goal Not Met --  discharged from facility       Goal: Dynamic Sitting Balance Goal LTG- PT  Outcome: Unable to achieve outcome(s) by discharge Date Met:  06/27/17    06/15/17 0925 06/27/17 0810   Dynamic Sitting Balance PT LTG   Dynamic Sitting Balance PT LTG, Date Established 06/15/17 --    Dynamic Sitting Balance PT LTG, Time to Achieve by discharge --    Dynamic Sitting Balance PT LTG, [place] Level minimum assist (75% patient effort) --    Dynamic Sitting Balance PT LTG, Assist Device UE Support;bed rails --    Dynamic Sitting Balance PT LTG, Date Goal Reviewed --  06/27/17   Dynamic Sitting Balance PT LTG, Outcome --  goal not met   Dynamic Sitting Balance PT LTG, Reason Goal Not Met --  discharged from facility

## 2017-06-27 NOTE — THERAPY DISCHARGE NOTE
Acute Care - Occupational Therapy Initial Eval/Discharge  [facility]     Patient Name: Jennifer Marks  : 1945  MRN: [id number]  Today's Date: 2017  Onset of Illness/Injury or Date of Surgery Date: 17  Date of Referral to OT: 17  Referring Physician: Dr. Llamas      Admit Date: 2017       ICD-10-CM ICD-9-CM   1. Chest pain in adult R07.9 786.50   2. End stage renal disease N18.6 585.6   3. Hypotension, unspecified hypotension type I95.9 458.9   4. Chronic systolic (congestive) heart failure I50.22 428.22     428.0   5. Decreased activities of daily living (ADL) Z78.9 V49.89   6. Impaired mobility Z74.09 799.89     Patient Active Problem List   Diagnosis   • Chest pain in adult   • Renal failure   • Hypotension   • Anemia   • Hypokalemia     Past Medical History:   Diagnosis Date   • A-fib    • Arthritis    • CHF (congestive heart failure)    • Hypertension    • Renal failure      Past Surgical History:   Procedure Laterality Date   • ARTERIOVENOUS FISTULA LEG Left           OT ASSESSMENT FLOWSHEET (last 72 hours)      OT Evaluation       17 1158 17 0730 17 0800          Rehab Evaluation    Document Type  therapy note (daily note)  -       Subjective Information  agree to therapy;complains of;weakness;fatigue;pain;dizziness  -       General Information    Precautions/Limitations  fall precautions  -       Clinical Impression    Anticipated Discharge Disposition home with home health  -ND        Pain Assessment    Pain Assessment  0-10  -CJ       Morrison-Cameron FACES Pain Rating  2  -CJ       Pain Score  4  -CJ       Pain Type  Chronic pain  -CJ       Pain Location  Hand  -CJ       Pain Orientation  Right;Left   phalanges  -       Pain Descriptors  Aching  -       Pain Frequency  Intermittent  -       Pain Intervention(s)  Repositioned  -       ROM (Range of Motion)    General ROM  upper extremity range of motion deficits identified   hands/phalanges  -        Muscle Tone Assessment    LUE Muscle Tone Assessment   moderately decreased tone  -BE      RUE Muscle Tone Assessment   moderately decreased tone  -BE      Bilateral Upper Extremities Muscle Tone Assessment   moderately decreased tone  -BE      LLE Muscle Tone Assessment   severely decreased tone  -BE      RLE Muscle Tone Assessment   severely decreased tone  -BE      Bilateral Lower Extremities Muscle Tone Assessment   severely decreased tone  -BE      Bed Mobility, Assessment/Treatment    Bed Mobility, Comment  fowlers in bed!  -       ADL Assessment/Intervention    Additional Documentation  Self-Feeding Assessment/Training (Group)  -       Self-Feeding Assessment/Training    Self-Feeding Assess/Train, Assist Device  large handled utensils   sponge with built up handle!  -       Self-Feeding Assess/Train, Impairments  --   ulnar drift and contractures in phalanges!  -       Positioning and Restraints    Pre-Treatment Position  in bed  -       Post Treatment Position  bed  -CJ       In Bed  fowlers;call light within reach;encouraged to call for assist;side rails up x2  -         User Key  (r) = Recorded By, (t) = Taken By, (c) = Cosigned By    Initials Name Effective Dates     Marcin Robbins, BOLAND/L 08/02/16 -     BE Asia Duong RN 08/02/16 -     RITA Gaston OTR/L 10/21/16 -           Occupational Therapy Education     Title: PT OT SLP Therapies (Done)     Topic: Occupational Therapy (Resolved)     Point: ADL training (Resolved)    Description: Instruct learner(s) on proper safety adaptation and remediation techniques during self care or transfers.   Instruct in proper use of assistive devices.    Learning Progress Summary    Learner Readiness Method Response Comment Documented by Status   Patient Acceptance E,VIOLETA PEREZ,NR Ae. equipment given to pt. to promote self-feeding, slow stretch with hands phalanges and Bue's!  06/26/17 1140 Done    Acceptance DOT REDDING Pt. educated on role  of OT, benefit of activity, progression with [facility] 06/23/17 1100 Done    Acceptance E IVÁNNR Pt. educated on role of OT, safe bed mob, benefit of activity, and progression with [facility] 06/15/17 1015 Done               Point: Home exercise program (Resolved)    Description: Instruct learner(s) on appropriate technique for monitoring, assisting and/or progressing therapeutic exercises/activities.    Learning Progress Summary    Learner Readiness Method Response Comment Documented by Status   Patient Acceptance MARI LE DU,NR Ae. equipment given to pt. to promote self-feeding, slow stretch with hands phalanges and Bue's!  06/26/17 1140 Done    Acceptance E VU,NR Pt. educated on role of OT, benefit of activity, progression with [facility] 06/23/17 1100 Done    Acceptance MARI LE DU,NR Pt. has issues with slow stretch to Bue's, hands/elbows/phalanges hurt!  06/21/17 1457 Done    Acceptance MARI LE DU,NR Pt. participated in slow stretch from this kimble/l!  06/21/17 1125 Done    Acceptance MARI LE DU,NR Pt. unable to participate in arom exs with wts.!  06/20/17 1433 Done    Acceptance E IVÁN,NR Pt. educated on role of OT, safe bed mob, benefit of activity, and progression with [facility] 06/15/17 1015 Done               Point: Body mechanics (Resolved)    Description: Instruct learner(s) on proper positioning and spine alignment during self-care, functional mobility activities and/or exercises.    Learning Progress Summary    Learner Readiness Method Response Comment Documented by Status   Patient Acceptance MARI LE DU,NR Ae. equipment given to pt. to promote self-feeding, slow stretch with hands phalanges and Bue's!  06/26/17 1140 Done    Acceptance E IVÁN,NR Pt. educated on role of OT, benefit of activity, progression with [facility] 06/23/17 1100 Done    Acceptance MARI LE DU,NR Pt. has issues with slow stretch to Bue's, hands/elbows/phalanges hurt!  06/21/17 1457 Done    Acceptance MARI LE DU,NR Pt. participated in slow stretch from  this kimble/l!  06/21/17 1125 Done    Acceptance E,D VIOLETA MINOR,NR Pt. unable to participate in arom exs with wts.!  06/20/17 1433 Done    Acceptance E IVÁN,DOT Pt. educated on role of OT, safe bed mob, benefit of activity, and progression with poc ND 06/15/17 1015 Done                      User Key     Initials Effective Dates Name Provider Type Discipline     08/02/16 -  Marcin Robbins, KIMBLE/L Occupational Therapy Assistant OT    ND 10/21/16 -  Ailyn Gaston, OTR/L Occupational Therapist OT                OT Recommendation and Plan  Anticipated Discharge Disposition: home with home health  Planned Therapy Interventions: activity intolerance, ADL retraining, balance training, bed mobility training, strengthening, transfer training, stretching, ROM (Range of Motion), home exercise program  Therapy Frequency: 3-5 times/wk  Plan of Care Review  Plan Of Care Reviewed With: patient  Progress: progress towards functional goals is fair  Outcome Summary/Follow up Plan: OT re-eval/progress note completed. Pt. sitting slouched down in chair upon entering room. Pt. Max A for LB dressing. Pt. Max A to reposition in chair with draw sheet. Pt. refused to stand this date. Pt. cont to benefit from skilled OT will close monitoring for acheiving goals. If pt. not progressing she will need to be d/c from OT. 6/23/17 1100           OT Goals       06/27/17 1157 06/23/17 1101 06/15/17 1015    Bed Mobility OT LTG    Bed Mobility OT LTG, Date Established   06/15/17  -ND    Bed Mobility OT LTG, Time to Achieve   by discharge  -ND    Bed Mobility OT LTG, Activity Type   all bed mobility  -ND    Bed Mobility OT LTG, [place] Level   moderate assist (50% patient effort)  -ND    Bed Mobility OT LTG, Assist Device   bed rails  -ND    Bed Mobility OT LTG, Date Goal Reviewed 06/27/17  -ND 06/23/17  -ND     Bed Mobility OT LTG, Outcome goal not met  -ND goal ongoing  -ND     Bed Mobility OT LTG, Reason Goal Not Met discharged from facility   -ND progress slower than expected  -ND     Strength OT LTG    Strength Goal OT LTG, Date Established   06/15/17  -ND    Strength Goal OT LTG, Time to Achieve   by discharge  -ND    Strength Goal OT LTG, Measure to Achieve   Pt. will complete BUE strengthening exercsies to increase BUE strength and decreased tone for completing ADLs.   -ND    Strength Goal OT LTG, Date Goal Reviewed 06/27/17  -ND 06/23/17  -ND     Strength Goal OT LTG, Outcome goal not met  -ND goal ongoing  -ND     Strength Goal OT LTG, Reason Goal Not Met discharged from facility  -ND progress slower than expected  -ND     Grooming OT LTG    Grooming Goal OT LTG, Date Established   06/15/17  -ND    Grooming Goal OT LTG, Time to Achieve   by discharge  -ND    Grooming Goal OT LTG, [place] Level   moderate assist (50% patient effort)  -ND    Grooming Goal OT LTG, Date Goal Reviewed 06/27/17  -ND 06/23/17  -ND     Grooming Goal OT LTG, Outcome goal not met  -ND goal ongoing  -ND     Grooming Goal OT LTG, Reason Goal Not Met discharged from facility  -ND progress slower than expected  -ND       User Key  (r) = Recorded By, (t) = Taken By, (c) = Cosigned By    Initials Name Provider Type    ZUHAIR Loza/L Occupational Therapist                Outcome Measures       06/26/17 0730          How much help from another is currently needed...    Putting on and taking off regular lower body clothing? 1  -CJ      Bathing (including washing, rinsing, and drying) 1  -CJ      Toileting (which includes using toilet bed pan or urinal) 1  -CJ      Putting on and taking off regular upper body clothing 2  -CJ      Taking care of personal grooming (such as brushing teeth) 2  -CJ      Eating meals 2  -CJ      Score 9  -CJ      Functional Assessment    Outcome Measure Options AM-PAC 6 Clicks Daily Activity (OT)  -CJ        User Key  (r) = Recorded By, (t) = Taken By, (c) = Cosigned By    Initials Name Provider Type    KYA Cavazos/CATHIE  Occupational Therapy Assistant          Time Calculation:           OT G-codes  OT Functional Scales Options: AM-PAC 6 Clicks Daily Activity (OT)  Functional Limitation: Self care  Self Care Current Status (): At least 60 percent but less than 80 percent impaired, limited or restricted  Self Care Goal Status (): At least 40 percent but less than 60 percent impaired, limited or restricted    OT Discharge Summary  Anticipated Discharge Disposition: home with home health  Reason for Discharge: Discharge from facility  Outcomes Achieved: Refer to plan of care for updates on goals achieved  Discharge Destination: Home with home health    Ailyn Gaston OTR/CATHIE  6/27/2017

## 2017-06-27 NOTE — PLAN OF CARE
Problem: Inpatient Occupational Therapy  Goal: Bed Mobility Goal LTG- OT  Outcome: Unable to achieve outcome(s) by discharge Date Met:  06/27/17    06/15/17 1015 06/27/17 1157   Bed Mobility OT LTG   Bed Mobility OT LTG, Date Established 06/15/17 --    Bed Mobility OT LTG, Time to Achieve by discharge --    Bed Mobility OT LTG, Activity Type all bed mobility --    Bed Mobility OT LTG, [place] Level moderate assist (50% patient effort) --    Bed Mobility OT LTG, Assist Device bed rails --    Bed Mobility OT LTG, Date Goal Reviewed --  06/27/17   Bed Mobility OT LTG, Outcome --  goal not met   Bed Mobility OT LTG, Reason Goal Not Met --  discharged from facility       Goal: Strength Goal LTG- OT  Outcome: Unable to achieve outcome(s) by discharge Date Met:  06/27/17    06/15/17 1015 06/27/17 1157   Strength OT LTG   Strength Goal OT LTG, Date Established 06/15/17 --    Strength Goal OT LTG, Time to Achieve by discharge --    Strength Goal OT LTG, Measure to Achieve Pt. will complete BUE strengthening exercsies to increase BUE strength and decreased tone for completing ADLs.  --    Strength Goal OT LTG, Date Goal Reviewed --  06/27/17   Strength Goal OT LTG, Outcome --  goal not met   Strength Goal OT LTG, Reason Goal Not Met --  discharged from facility       Goal: Grooming Goal LTG- OT  Outcome: Unable to achieve outcome(s) by discharge Date Met:  06/27/17    06/15/17 1015 06/27/17 1157   Grooming OT LTG   Grooming Goal OT LTG, Date Established 06/15/17 --    Grooming Goal OT LTG, Time to Achieve by discharge --    Grooming Goal OT LTG, [place] Level moderate assist (50% patient effort) --    Grooming Goal OT LTG, Date Goal Reviewed --  06/27/17   Grooming Goal OT LTG, Outcome --  goal not met   Grooming Goal OT LTG, Reason Goal Not Met --  discharged from facility

## 2017-06-27 NOTE — THERAPY DISCHARGE NOTE
Acute Care - Physical Therapy Discharge Summary  [facility]       Patient Name: Jennifer Marks  : 1945  MRN: [id number]    Today's Date: 2017  Onset of Illness/Injury or Date of Surgery Date: 17    Date of Referral to PT: 17  Referring Physician: Dr. Llamas      Admit Date: 2017      PT Recommendation and Plan    Visit Dx:    ICD-10-CM ICD-9-CM   1. Chest pain in adult R07.9 786.50   2. End stage renal disease N18.6 585.6   3. Hypotension, unspecified hypotension type I95.9 458.9   4. Chronic systolic (congestive) heart failure I50.22 428.22     428.0   5. Decreased activities of daily living (ADL) Z78.9 V49.89   6. Impaired mobility Z74.09 799.89             Outcome Measures       17 0730 17 1127       How much help from another person do you currently need...    Turning from your back to your side while in flat bed without using bedrails?  2  -MF     Moving from lying on back to sitting on the side of a flat bed without bedrails?  2  -MF     Moving to and from a bed to a chair (including a wheelchair)?  1  -MF     Standing up from a chair using your arms (e.g., wheelchair, bedside chair)?  1  -MF     Climbing 3-5 steps with a railing?  1  -MF     To walk in hospital room?  1  -MF     AM-PAC 6 Clicks Score  8  -MF     How much help from another is currently needed...    Putting on and taking off regular lower body clothing? 1  -CJ      Bathing (including washing, rinsing, and drying) 1  -CJ      Toileting (which includes using toilet bed pan or urinal) 1  -CJ      Putting on and taking off regular upper body clothing 2  -CJ      Taking care of personal grooming (such as brushing teeth) 2  -CJ      Eating meals 2  -CJ      Score 9  -CJ      Functional Assessment    Outcome Measure Options AM-PAC 6 Clicks Daily Activity (OT)  -CJ AM-PAC 6 Clicks Basic Mobility (PT)  -MF       User Key  (r) = Recorded By, (t) = Taken By, (c) = Cosigned By    Initials Name Provider Type    CJ  Marcin Robbins, KYA/L Occupational Therapy Assistant    MICHELLE Arroyo, PTA Physical Therapy Assistant                      IP PT Goals       06/27/17 0810 06/15/17 0925       Bed Mobility PT LTG    Bed Mobility PT LTG, Date Established  06/15/17  -     Bed Mobility PT LTG, Time to Achieve  by discharge  -     Bed Mobility PT LTG, Activity Type  roll left/roll right;supine to sit/sit to supine  -     Bed Mobility PT LTG, [place] Level  minimum assist (75% patient effort)  -     Bed Mobility PT Goal  LTG, Assist Device  bed rails  -     Bed Mobility PT LTG, Date Goal Reviewed 06/27/17  -      Bed Mobility PT LTG, Outcome goal not met  -      Bed Mobility PT LTG, Reason Goal Not Met discharged from [facility]  -      Strength Goal PT LTG    Strength Goal PT LTG, Date Established  06/15/17  -     Strength Goal PT LTG, Time to Achieve  by discharge  -     Strength Goal PT LTG, Measure to Achieve  AAROM, B UE/LE, 10-20 reps, min assist  -     Strength Goal PT LTG, Date Goal Reviewed 06/27/17  -      Strength Goal PT LTG, Outcome goal not met  -      Strength Goal PT LTG, Reason Goal Not Met discharged from [facility]  -      Dynamic Sitting Balance PT LTG    Dynamic Sitting Balance PT LTG, Date Established  06/15/17  -     Dynamic Sitting Balance PT LTG, Time to Achieve  by discharge  -     Dynamic Sitting Balance PT LTG, [place] Level  minimum assist (75% patient effort)  -     Dynamic Sitting Balance PT LTG, Assist Device  UE Support;bed rails  -     Dynamic Sitting Balance PT LTG, Date Goal Reviewed 06/27/17  -      Dynamic Sitting Balance PT LTG, Outcome goal not met  -      Dynamic Sitting Balance PT LTG, Reason Goal Not Met discharged from [facility]  -        User Key  (r) = Recorded By, (t) = Taken By, (c) = Cosigned By    Initials Name Provider Type    YONIS Yen, PT DPT Physical Therapist    MICHELLE Arroyo, PTA Physical Therapy Assistant               PT Discharge Summary  Anticipated Discharge Disposition: home with home health  Reason for Discharge: Discharge from facility  Outcomes Achieved: Unable to make functional progress toward goals at this time  Discharge Destination: Home with home health      Josey Arroyo, HILLARY   6/27/2017

## 2017-06-29 LAB
ABO + RH BLD: NORMAL
BH BB BLOOD EXPIRATION DATE: NORMAL
BH BB BLOOD TYPE BARCODE: 9500
BH BB DISPENSE STATUS: NORMAL
BH BB PRODUCT CODE: NORMAL
BH BB UNIT NUMBER: NORMAL
UNIT  ABO: NORMAL
UNIT  RH: NORMAL

## 2017-07-01 ENCOUNTER — HOSPITAL ENCOUNTER (EMERGENCY)
Facility: HOSPITAL | Age: 72
Discharge: LEFT BEFORE DISCHARGE | End: 2017-07-01
Attending: EMERGENCY MEDICINE

## 2017-07-01 VITALS
TEMPERATURE: 99 F | HEIGHT: 62 IN | RESPIRATION RATE: 15 BRPM | WEIGHT: 118 LBS | DIASTOLIC BLOOD PRESSURE: 47 MMHG | OXYGEN SATURATION: 99 % | HEART RATE: 94 BPM | SYSTOLIC BLOOD PRESSURE: 115 MMHG | BODY MASS INDEX: 21.71 KG/M2

## 2017-07-01 PROCEDURE — 99211 OFF/OP EST MAY X REQ PHY/QHP: CPT

## 2017-07-01 NOTE — ED NOTES
PATIENT FAMILY DRESSING PATIENT AND WHEELCHAIR HAS BEEN PROVIDED. PATIENTS FAMILY STATES THAT PATIENT IS NONCOMPLIANT WITH HER MEDICATIONS AND CARE AT HOME. FAMILY STATES THAT PATIENT SENDS HOME HEALTH NURSES AWAY AND WILL NOT ALLOW TREATMENT. FAMILY STATES THAT THEY ARE CONCERNED THAT WHEN PT COMES IN FOR DIALYSIS THAT NURSES WILL THINK PATIENT IS NOT TAKEN CARE OF AT HOME AND SEND HER TO A NURSING FACILITY. PATIENT IS ALERT AND ORIENTED AND MAKES HER OWN HEALTHCARE DECISIONS CURRENTLY.      Dahlia Avila RN  07/01/17 [id number]

## 2017-07-01 NOTE — ED NOTES
Patient states that she does not wan to be see by the physician. MD and nurse jacinto aware.     Porsha Lam RN  07/01/17 [id number]

## 2017-07-01 NOTE — ED NOTES
PATIENT REQUESTED BEDSIDE COMMODE, BUT STATES SHE NEEDS TO USE THE TELEPHONE FIRST. PT PROVIDED WITH PHONE TO USE.      Dahlia Avila RN  07/01/17 4213

## 2017-07-01 NOTE — ED NOTES
Patients daughter on the phone at this time. Patient gave permission to call her daughter and inform her that patient does not wish to be seen and needs a ride home. Patients daughter states that the patient has a history of dementia, and that we can not let her go due to her history. Patients daughter made aware that there is no documented history of dementia, and due to the patient beinng alert and oriented, we could not keep her against her wishes. Patients daughter states that she is coming to get the patient.      Porsha Lam RN  07/01/17 [phone]

## 2017-07-01 NOTE — ED NOTES
PATIENT ASSISTED WITH PHONE TO CALL HER DAUGHTER. PATIENT PROVIDED PHONE NUMBER. PT IS ALERT AND ORIENTED. PATIENT HAS REMOVED HER BLOOD PRESSURE CUFF AND O2 SENSOR. PT STATES SHE DOES NOT WANT TREATMENT AND DOES NOT WANT HER VITALS TAKEN. PATIENT STATES HER FAMILY TOLD HER THEY WOULD BE HERE, AND SHE IS UPSET THAT THEY ARE NOT PRESENT. PT STATES SHE WANTS TO GO HOME. PATIENT EATING. PT CALLING OUT TO NURSES STATION REQUESTING THAT HER TV BE TURNED ON. PT STATES FAMILY HAS NOT BEEN ALLOWING HER TO EAT MUCH AT HOME. PATIENT WAS RECENTLY SEEN HERE AND ADMITTED FOR SYNCOPE AND CHF, AND WAS INSTRUCTED TO ADVANCE HER DIET AS TOLERATED UPON DISCHARGE.      Dahlia Avila RN  07/01/17 [phone]

## 2017-07-01 NOTE — ED NOTES
"PATIENT HAS MULTIPLE FAMILY MEMBERS AT BEDSIDE. FAMILY REPORTS THAT EMS WAS CALLED FOR LIGHT VAGINAL BLEEDING. PT STATES SHE \"SCRUBBED\" HER VAGINA WITH A WASHCLOTH UNTIL SHE NOTICED A SMALL AMOUNT OF BLOOD. PATIENT HAS SMALL AMOUNT OF DARK OLD BLOOD ON BRIEF AT THIS TIME. PATIENT WAS ASSISTED WITH BEDSIDE COMMODE BY RN X 2, WITH TWO GRANDDAUGHTERS AT BEDSIDE AS WELL. PATIENT HAD NO BLOOD NOTED IN STOOL. PATIENT HAD NO BLOOD ON WIPE WHEN SHE CLEANED HERSELF AFTER URINATION. PATIENT HAD MUCOID LIGHT BROWN STOOL. PROVIDER AND CHARGE NURSE AWARE. PATIENT'S FAMILY STATES THEY WOULD LIKE TO TAKE PATIENT HOME SINCE NO VAGINAL BLEEDING IS PRESENT. PATIENT STATES SHE DOES NOT WANT TO STAY FOR ANY KIND OF TREATMENT AND STATES SHE WOULD LIKE TO GO HOME AT THIS TIME.      Dahlia Avila RN  07/01/17 1375    "

## 2017-07-07 ENCOUNTER — HOSPITAL ENCOUNTER (OUTPATIENT)
Age: 72
Setting detail: SPECIMEN
Discharge: HOME | End: 2017-07-07
Payer: COMMERCIAL

## 2017-07-07 LAB
HEMOGLOBIN: 6.6 G/DL (ref 12–16)
POTASSIUM SERPL-SCNC: 2.7 MMOL/L (ref 3.5–5)

## 2017-08-13 ENCOUNTER — APPOINTMENT (OUTPATIENT)
Dept: GENERAL RADIOLOGY | Facility: HOSPITAL | Age: 72
End: 2017-08-13

## 2017-08-13 ENCOUNTER — HOSPITAL ENCOUNTER (INPATIENT)
Facility: HOSPITAL | Age: 72
LOS: 4 days | Discharge: LEFT BEFORE DISCHARGE | End: 2017-08-17
Attending: INTERNAL MEDICINE | Admitting: INTERNAL MEDICINE

## 2017-08-13 DIAGNOSIS — N18.9 CHRONIC KIDNEY FAILURE, UNSPECIFIED STAGE: Primary | ICD-10-CM

## 2017-08-13 DIAGNOSIS — Z78.9 DECREASED ACTIVITIES OF DAILY LIVING (ADL): ICD-10-CM

## 2017-08-13 DIAGNOSIS — Z74.09 IMPAIRED FUNCTIONAL MOBILITY, BALANCE, GAIT, AND ENDURANCE: ICD-10-CM

## 2017-08-13 PROBLEM — Z99.2 ESRD (END STAGE RENAL DISEASE) ON DIALYSIS (HCC): Status: ACTIVE | Noted: 2017-08-13

## 2017-08-13 PROBLEM — N18.6 ESRD (END STAGE RENAL DISEASE) ON DIALYSIS (HCC): Status: ACTIVE | Noted: 2017-08-13

## 2017-08-13 LAB
ALBUMIN SERPL-MCNC: 2.9 G/DL (ref 3.5–5)
ALBUMIN/GLOB SERPL: 0.8 G/DL (ref 1.1–2.5)
ALP SERPL-CCNC: 132 U/L (ref 24–120)
ALT SERPL W P-5'-P-CCNC: 30 U/L (ref 0–54)
AMYLASE SERPL-CCNC: 100 U/L (ref 30–110)
ANION GAP SERPL CALCULATED.3IONS-SCNC: 18 MMOL/L (ref 4–13)
AST SERPL-CCNC: 27 U/L (ref 7–45)
BASOPHILS # BLD AUTO: 0.02 10*3/MM3 (ref 0–0.2)
BASOPHILS NFR BLD AUTO: 0.3 % (ref 0–2)
BILIRUB SERPL-MCNC: 1 MG/DL (ref 0.1–1)
BUN BLD-MCNC: 76 MG/DL (ref 5–21)
BUN/CREAT SERPL: 5.3 (ref 7–25)
CALCIUM SPEC-SCNC: 8.5 MG/DL (ref 8.4–10.4)
CHLORIDE SERPL-SCNC: 105 MMOL/L (ref 98–110)
CK MB SERPL-CCNC: 2.86 NG/ML (ref 0–5)
CK SERPL-CCNC: 57 U/L (ref 0–203)
CO2 SERPL-SCNC: 18 MMOL/L (ref 24–31)
CREAT BLD-MCNC: 14.24 MG/DL (ref 0.5–1.4)
D DIMER PPP FEU-MCNC: 1.2 MG/L (FEU) (ref 0–0.5)
D-LACTATE SERPL-SCNC: 2.1 MMOL/L (ref 0.5–2)
DEPRECATED RDW RBC AUTO: 52.8 FL (ref 40–54)
EOSINOPHIL # BLD AUTO: 0.06 10*3/MM3 (ref 0–0.7)
EOSINOPHIL NFR BLD AUTO: 0.9 % (ref 0–4)
ERYTHROCYTE [DISTWIDTH] IN BLOOD BY AUTOMATED COUNT: 16.3 % (ref 12–15)
GFR SERPL CREATININE-BSD FRML MDRD: 3 ML/MIN/1.73
GLOBULIN UR ELPH-MCNC: 3.8 GM/DL
GLUCOSE BLD-MCNC: 79 MG/DL (ref 70–100)
HCT VFR BLD AUTO: 25 % (ref 37–47)
HGB BLD-MCNC: 8.1 G/DL (ref 12–16)
HOLD SPECIMEN: NORMAL
HOLD SPECIMEN: NORMAL
IMM GRANULOCYTES # BLD: 0.02 10*3/MM3 (ref 0–0.03)
IMM GRANULOCYTES NFR BLD: 0.3 % (ref 0–5)
LIPASE SERPL-CCNC: 32 U/L (ref 23–203)
LYMPHOCYTES # BLD AUTO: 1.23 10*3/MM3 (ref 0.72–4.86)
LYMPHOCYTES NFR BLD AUTO: 17.9 % (ref 15–45)
MAGNESIUM SERPL-MCNC: 2.3 MG/DL (ref 1.4–2.2)
MCH RBC QN AUTO: 28.9 PG (ref 28–32)
MCHC RBC AUTO-ENTMCNC: 32.4 G/DL (ref 33–36)
MCV RBC AUTO: 89.3 FL (ref 82–98)
MONOCYTES # BLD AUTO: 0.27 10*3/MM3 (ref 0.19–1.3)
MONOCYTES NFR BLD AUTO: 3.9 % (ref 4–12)
MYOGLOBIN SERPL-MCNC: 167.1 NG/ML (ref 0–110)
NEUTROPHILS # BLD AUTO: 5.27 10*3/MM3 (ref 1.87–8.4)
NEUTROPHILS NFR BLD AUTO: 76.7 % (ref 39–78)
NT-PROBNP SERPL-MCNC: ABNORMAL PG/ML (ref 0–900)
PLATELET # BLD AUTO: 283 10*3/MM3 (ref 130–400)
PMV BLD AUTO: 9.8 FL (ref 6–12)
POTASSIUM BLD-SCNC: 2.4 MMOL/L (ref 3.5–5.3)
POTASSIUM BLD-SCNC: 2.9 MMOL/L (ref 3.5–5.3)
PROT SERPL-MCNC: 6.7 G/DL (ref 6.3–8.7)
RBC # BLD AUTO: 2.8 10*6/MM3 (ref 4.2–5.4)
SODIUM BLD-SCNC: 141 MMOL/L (ref 135–145)
TROPONIN I SERPL-MCNC: 0.02 NG/ML (ref 0–0.03)
TROPONIN I SERPL-MCNC: 0.07 NG/ML (ref 0–0.03)
WBC NRBC COR # BLD: 6.87 10*3/MM3 (ref 4.8–10.8)
WHOLE BLOOD HOLD SPECIMEN: NORMAL
WHOLE BLOOD HOLD SPECIMEN: NORMAL

## 2017-08-13 PROCEDURE — 84484 ASSAY OF TROPONIN QUANT: CPT | Performed by: PHYSICIAN ASSISTANT

## 2017-08-13 PROCEDURE — 83605 ASSAY OF LACTIC ACID: CPT | Performed by: PHYSICIAN ASSISTANT

## 2017-08-13 PROCEDURE — 80053 COMPREHEN METABOLIC PANEL: CPT | Performed by: PHYSICIAN ASSISTANT

## 2017-08-13 PROCEDURE — 25010000002 ONDANSETRON PER 1 MG: Performed by: PHYSICIAN ASSISTANT

## 2017-08-13 PROCEDURE — 84484 ASSAY OF TROPONIN QUANT: CPT | Performed by: INTERNAL MEDICINE

## 2017-08-13 PROCEDURE — 83690 ASSAY OF LIPASE: CPT | Performed by: INTERNAL MEDICINE

## 2017-08-13 PROCEDURE — 85379 FIBRIN DEGRADATION QUANT: CPT | Performed by: PHYSICIAN ASSISTANT

## 2017-08-13 PROCEDURE — 94799 UNLISTED PULMONARY SVC/PX: CPT

## 2017-08-13 PROCEDURE — 83874 ASSAY OF MYOGLOBIN: CPT | Performed by: PHYSICIAN ASSISTANT

## 2017-08-13 PROCEDURE — 93005 ELECTROCARDIOGRAM TRACING: CPT

## 2017-08-13 PROCEDURE — 82550 ASSAY OF CK (CPK): CPT | Performed by: PHYSICIAN ASSISTANT

## 2017-08-13 PROCEDURE — 93005 ELECTROCARDIOGRAM TRACING: CPT | Performed by: INTERNAL MEDICINE

## 2017-08-13 PROCEDURE — 99285 EMERGENCY DEPT VISIT HI MDM: CPT

## 2017-08-13 PROCEDURE — 85025 COMPLETE CBC W/AUTO DIFF WBC: CPT | Performed by: PHYSICIAN ASSISTANT

## 2017-08-13 PROCEDURE — 83735 ASSAY OF MAGNESIUM: CPT | Performed by: PHYSICIAN ASSISTANT

## 2017-08-13 PROCEDURE — 36415 COLL VENOUS BLD VENIPUNCTURE: CPT | Performed by: PHYSICIAN ASSISTANT

## 2017-08-13 PROCEDURE — 82150 ASSAY OF AMYLASE: CPT | Performed by: INTERNAL MEDICINE

## 2017-08-13 PROCEDURE — 84132 ASSAY OF SERUM POTASSIUM: CPT | Performed by: PHYSICIAN ASSISTANT

## 2017-08-13 PROCEDURE — 83880 ASSAY OF NATRIURETIC PEPTIDE: CPT | Performed by: PHYSICIAN ASSISTANT

## 2017-08-13 PROCEDURE — 82553 CREATINE MB FRACTION: CPT | Performed by: PHYSICIAN ASSISTANT

## 2017-08-13 PROCEDURE — 93010 ELECTROCARDIOGRAM REPORT: CPT | Performed by: INTERNAL MEDICINE

## 2017-08-13 PROCEDURE — 71020 HC CHEST PA AND LATERAL: CPT

## 2017-08-13 RX ORDER — PREDNISONE 10 MG/1
10 TABLET ORAL
Status: DISCONTINUED | OUTPATIENT
Start: 2017-08-14 | End: 2017-08-17 | Stop reason: HOSPADM

## 2017-08-13 RX ORDER — IPRATROPIUM BROMIDE AND ALBUTEROL SULFATE 2.5; .5 MG/3ML; MG/3ML
3 SOLUTION RESPIRATORY (INHALATION) EVERY 4 HOURS PRN
Status: DISCONTINUED | OUTPATIENT
Start: 2017-08-13 | End: 2017-08-17 | Stop reason: HOSPADM

## 2017-08-13 RX ORDER — ONDANSETRON 2 MG/ML
4 INJECTION INTRAMUSCULAR; INTRAVENOUS EVERY 6 HOURS PRN
Status: DISCONTINUED | OUTPATIENT
Start: 2017-08-13 | End: 2017-08-17 | Stop reason: HOSPADM

## 2017-08-13 RX ORDER — DEXTROSE MONOHYDRATE 25 G/50ML
25 INJECTION, SOLUTION INTRAVENOUS
Status: DISCONTINUED | OUTPATIENT
Start: 2017-08-13 | End: 2017-08-17 | Stop reason: HOSPADM

## 2017-08-13 RX ORDER — ACETAMINOPHEN 325 MG/1
650 TABLET ORAL EVERY 6 HOURS PRN
Status: DISCONTINUED | OUTPATIENT
Start: 2017-08-13 | End: 2017-08-17 | Stop reason: HOSPADM

## 2017-08-13 RX ORDER — PANTOPRAZOLE SODIUM 40 MG/1
40 TABLET, DELAYED RELEASE ORAL DAILY
Status: DISCONTINUED | OUTPATIENT
Start: 2017-08-14 | End: 2017-08-17 | Stop reason: HOSPADM

## 2017-08-13 RX ORDER — LEVOTHYROXINE SODIUM 0.05 MG/1
50 TABLET ORAL DAILY
Status: DISCONTINUED | OUTPATIENT
Start: 2017-08-14 | End: 2017-08-17 | Stop reason: HOSPADM

## 2017-08-13 RX ORDER — POTASSIUM CHLORIDE 750 MG/1
20 CAPSULE, EXTENDED RELEASE ORAL ONCE
Status: DISCONTINUED | OUTPATIENT
Start: 2017-08-13 | End: 2017-08-13

## 2017-08-13 RX ORDER — NICOTINE POLACRILEX 4 MG
15 LOZENGE BUCCAL
Status: DISCONTINUED | OUTPATIENT
Start: 2017-08-13 | End: 2017-08-17 | Stop reason: HOSPADM

## 2017-08-13 RX ORDER — ONDANSETRON 2 MG/ML
4 INJECTION INTRAMUSCULAR; INTRAVENOUS ONCE
Status: COMPLETED | OUTPATIENT
Start: 2017-08-13 | End: 2017-08-13

## 2017-08-13 RX ORDER — ASPIRIN 81 MG/1
81 TABLET ORAL DAILY
Status: DISCONTINUED | OUTPATIENT
Start: 2017-08-14 | End: 2017-08-17 | Stop reason: HOSPADM

## 2017-08-13 RX ORDER — SODIUM CHLORIDE 0.9 % (FLUSH) 0.9 %
1-10 SYRINGE (ML) INJECTION AS NEEDED
Status: DISCONTINUED | OUTPATIENT
Start: 2017-08-13 | End: 2017-08-17 | Stop reason: HOSPADM

## 2017-08-13 RX ADMIN — ONDANSETRON 4 MG: 2 INJECTION INTRAMUSCULAR; INTRAVENOUS at 19:28

## 2017-08-14 LAB
D-LACTATE SERPL-SCNC: 1.4 MMOL/L (ref 0.5–2)
GLUCOSE BLDC GLUCOMTR-MCNC: 82 MG/DL (ref 70–130)
GLUCOSE BLDC GLUCOMTR-MCNC: 88 MG/DL (ref 70–130)
GLUCOSE BLDC GLUCOMTR-MCNC: 96 MG/DL (ref 70–130)
GLUCOSE BLDC GLUCOMTR-MCNC: 99 MG/DL (ref 70–130)
TROPONIN I SERPL-MCNC: 0.08 NG/ML (ref 0–0.03)

## 2017-08-14 PROCEDURE — 82962 GLUCOSE BLOOD TEST: CPT

## 2017-08-14 PROCEDURE — 63510000001 EPOETIN ALFA PER 1000 UNITS: Performed by: INTERNAL MEDICINE

## 2017-08-14 PROCEDURE — 5A1D60Z PERFORMANCE OF URINARY FILTRATION, MULTIPLE: ICD-10-PCS | Performed by: INTERNAL MEDICINE

## 2017-08-14 PROCEDURE — 84484 ASSAY OF TROPONIN QUANT: CPT | Performed by: INTERNAL MEDICINE

## 2017-08-14 PROCEDURE — 87340 HEPATITIS B SURFACE AG IA: CPT | Performed by: INTERNAL MEDICINE

## 2017-08-14 PROCEDURE — 25010000002 ONDANSETRON PER 1 MG: Performed by: INTERNAL MEDICINE

## 2017-08-14 PROCEDURE — 25010000003 POTASSIUM CHLORIDE 10 MEQ/100ML SOLUTION: Performed by: INTERNAL MEDICINE

## 2017-08-14 PROCEDURE — 83605 ASSAY OF LACTIC ACID: CPT | Performed by: NURSE PRACTITIONER

## 2017-08-14 RX ORDER — ACETAMINOPHEN 500 MG
1000 TABLET ORAL EVERY 4 HOURS PRN
Status: DISCONTINUED | OUTPATIENT
Start: 2017-08-14 | End: 2017-08-17 | Stop reason: HOSPADM

## 2017-08-14 RX ORDER — POTASSIUM CHLORIDE 7.45 MG/ML
10 INJECTION INTRAVENOUS
Status: COMPLETED | OUTPATIENT
Start: 2017-08-14 | End: 2017-08-14

## 2017-08-14 RX ORDER — DIPHENHYDRAMINE HCL 25 MG
25 CAPSULE ORAL EVERY 4 HOURS PRN
Status: DISCONTINUED | OUTPATIENT
Start: 2017-08-14 | End: 2017-08-17 | Stop reason: HOSPADM

## 2017-08-14 RX ORDER — POTASSIUM CHLORIDE 750 MG/1
40 CAPSULE, EXTENDED RELEASE ORAL ONCE
Status: DISCONTINUED | OUTPATIENT
Start: 2017-08-14 | End: 2017-08-14

## 2017-08-14 RX ORDER — SODIUM CHLORIDE 234 MG/ML
10 INJECTION, SOLUTION INTRAVENOUS
Status: DISCONTINUED | OUTPATIENT
Start: 2017-08-14 | End: 2017-08-17 | Stop reason: HOSPADM

## 2017-08-14 RX ORDER — ALBUMIN (HUMAN) 12.5 G/50ML
12.5 SOLUTION INTRAVENOUS AS NEEDED
Status: ACTIVE | OUTPATIENT
Start: 2017-08-14 | End: 2017-08-15

## 2017-08-14 RX ORDER — ONDANSETRON 2 MG/ML
4 INJECTION INTRAMUSCULAR; INTRAVENOUS
Status: DISCONTINUED | OUTPATIENT
Start: 2017-08-14 | End: 2017-08-17 | Stop reason: HOSPADM

## 2017-08-14 RX ORDER — POTASSIUM CHLORIDE 20MEQ/15ML
20 LIQUID (ML) ORAL
Status: DISCONTINUED | OUTPATIENT
Start: 2017-08-14 | End: 2017-08-14

## 2017-08-14 RX ORDER — DIPHENHYDRAMINE HYDROCHLORIDE 50 MG/ML
25 INJECTION INTRAMUSCULAR; INTRAVENOUS EVERY 4 HOURS PRN
Status: DISCONTINUED | OUTPATIENT
Start: 2017-08-14 | End: 2017-08-17 | Stop reason: HOSPADM

## 2017-08-14 RX ADMIN — POTASSIUM CHLORIDE 10 MEQ: 7.46 INJECTION, SOLUTION INTRAVENOUS at 08:15

## 2017-08-14 RX ADMIN — ASPIRIN 81 MG: 81 TABLET ORAL at 08:15

## 2017-08-14 RX ADMIN — POTASSIUM CHLORIDE 10 MEQ: 7.46 INJECTION, SOLUTION INTRAVENOUS at 05:27

## 2017-08-14 RX ADMIN — POTASSIUM CHLORIDE 10 MEQ: 7.46 INJECTION, SOLUTION INTRAVENOUS at 03:07

## 2017-08-14 RX ADMIN — ONDANSETRON 4 MG: 2 INJECTION INTRAMUSCULAR; INTRAVENOUS at 15:35

## 2017-08-14 RX ADMIN — ERYTHROPOIETIN 10000 UNITS: 10000 INJECTION, SOLUTION INTRAVENOUS; SUBCUTANEOUS at 18:50

## 2017-08-14 RX ADMIN — POTASSIUM CHLORIDE 10 MEQ: 7.46 INJECTION, SOLUTION INTRAVENOUS at 09:23

## 2017-08-14 NOTE — ED NOTES
Pt is alert and oriented when this nurse went into pt room to draw lactic and blood cultures she also refuses to let this nurse cath her for a UA. Jaden TUCKER notified of pt's refusal of treatment.      Chanda Miles RN  08/13/17 [phone]

## 2017-08-14 NOTE — H&P
"    [facility] Medicine Services  HISTORY AND PHYSICAL    Date of Admission: 8/13/2017    Primary Care Physician: No Known Provider    Subjective     Chief Complaint:  \"I feel weak and short of air.\"    History of Present Illness:  The patient is a 72-year-old -American female who presents with a multiplicity of complaints including fatigue, weakness, nausea, vomiting, poor appetite, shortness of breath, recurring chest pain, and abdominal pain.  Onset of her symptoms is uncertain.  My impression is that this is a recurring problem.  Mrs. Marks has a history of end-stage renal disease necessitating hemodialysis.  She relates that she has missed at least 2 sessions of her hemodialysis recently.  She has a history of noncompliance with dialysis.  She will require admission to the hospital for evaluation and treatment.    Review of Systems   Constitutional: Positive for activity change and fatigue. Negative for chills, diaphoresis and fever.   HENT: Negative for congestion, rhinorrhea and sore throat.    Eyes: Negative for pain, discharge and redness.   Respiratory: Positive for chest tightness and shortness of breath. Negative for cough, wheezing and stridor.    Cardiovascular: Positive for chest pain. Negative for palpitations and leg swelling.   Gastrointestinal: Positive for abdominal pain, diarrhea, nausea and vomiting. Negative for abdominal distention, anal bleeding and constipation.   Endocrine: Negative for polydipsia and polyphagia.   Genitourinary: Negative for dysuria, flank pain and pelvic pain.   Musculoskeletal: Negative for arthralgias, joint swelling and myalgias.   Skin: Negative for pallor and rash.   Allergic/Immunologic: Positive for immunocompromised state.   Neurological: Negative for seizures, syncope, speech difficulty, numbness and headaches.   Hematological: Does not bruise/bleed easily.   Psychiatric/Behavioral: Negative for agitation, behavioral " problems, confusion and hallucinations. The patient is not nervous/anxious.        Past Medical History:    Past Medical History:   Diagnosis Date   • A-fib    • Anemia, chronic renal failure    • Arthritis    • CAD (coronary artery disease)    • Chronic combined systolic and diastolic CHF (congestive heart failure)    • Chronic diarrhea    • DVT (deep venous thrombosis)    • ESRD (end stage renal disease) on dialysis    • History of noncompliance with medical treatment    • HLD (hyperlipidemia)    • Hypertension    • Hypothyroidism    • PAD (peripheral artery disease)    • Pulmonary HTN    • Renal failure    • Syncope    • Wegener's granulomatosis        Past Surgical History:    Past Surgical History:   Procedure Laterality Date   • APPENDECTOMY     • ARTERIOVENOUS FISTULA LEG Left    • CATARACT EXTRACTION, BILATERAL     • CHOLECYSTECTOMY     • HERNIA REPAIR     • TUBAL ABDOMINAL LIGATION         Social History:  She is a resident of [facility].  She lives with a male friend.  She is .  She had three sons and two daughters.  One son is  due to uncertain reasons.  She is retired.  She has a high school education.  She has no history of tobacco alcohol or drug use.  She is Nondenominational.  She has no recent history of travel outside this region.    She is a full code and does not designate anyone to serve as a surrogate for healthcare matters at this time.    Family History:  Her family history cannot be adequately obtained at this time due to her status.    Allergies:  Allergies   Allergen Reactions   • Heparin    • Iron    • Latex    • Levaquin [Levofloxacin]    • Shrimp (Diagnostic)    • Vancomycin        Medications:  Prior to Admission medications    Medication Sig Start Date End Date Taking? Authorizing Provider   levothyroxine (SYNTHROID, LEVOTHROID) 50 MCG tablet Take 1 tablet by mouth Daily. 17   Carter Llamas MD   megestrol (MEGACE) 40 MG/ML suspension Take 10 mL by mouth Daily.  "6/26/17   Carter Llamas MD   midodrine (PROAMATINE) 5 MG tablet Take 1 tablet by mouth 3 (Three) Times a Day Before Meals. 6/26/17   Carter Llamas MD   pantoprazole (PROTONIX) 40 MG EC tablet Take 40 mg by mouth Daily.    Historical Provider, MD   predniSONE (DELTASONE) 20 MG tablet Take 0.5 tablets by mouth Daily. 6/26/17   Carter Llamas MD   traZODone (DESYREL) 50 MG tablet Take 50 mg by mouth Every Night.    Historical Provider, MD       Objective     Vital Signs: /53 (BP Location: Left arm, Patient Position: Lying)  Pulse 73  Temp 98.5 °F (36.9 °C) (Temporal Artery )   Resp 18  Ht 62\" (157.5 cm)  Wt 132 lb 3.2 oz (60 kg)  SpO2 96%  BMI 24.18 kg/m2    Physical Exam   Constitutional: She is oriented to person, place, and time. No distress.   She appears weak and chronically ill.   HENT:   Head: Normocephalic and atraumatic.   Nose: Nose normal.   Eyes: Right eye exhibits no discharge. Left eye exhibits no discharge. No scleral icterus.   Cardiovascular: Normal rate, regular rhythm and normal heart sounds.  Exam reveals no gallop and no friction rub.    No murmur heard.  Pulmonary/Chest: Effort normal and breath sounds normal. No stridor. No respiratory distress. She has no wheezes. She has no rales.   Abdominal: Soft. Bowel sounds are normal. She exhibits no distension. There is no tenderness. There is no guarding.   Musculoskeletal: She exhibits no edema, tenderness or deformity.   Neurological: She is alert and oriented to person, place, and time. No cranial nerve deficit. She exhibits normal muscle tone. Coordination normal.   Skin: Skin is warm and dry. No rash noted. She is not diaphoretic. No erythema. No pallor.   Psychiatric: She has a normal mood and affect. Her behavior is normal. Judgment and thought content normal.     Results Reviewed:    Lab Results (last 24 hours)     Procedure Component Value Units Date/Time    Comprehensive Metabolic Panel [[id number]]  (Abnormal) Collected:  " 08/13/17 1940    Specimen:  Blood Updated:  08/13/17 2000     Glucose 79 mg/dL      BUN 76 (H) mg/dL       Specimen hemolyzed. Results may be affected.        Creatinine 14.24 (H) mg/dL      Sodium 141 mmol/L      Potassium 2.9 (C) mmol/L       Specimen hemolyzed.  Results may be affected.        Chloride 105 mmol/L      CO2 18.0 (L) mmol/L      Calcium 8.5 mg/dL      Total Protein 6.7 g/dL      Albumin 2.90 (L) g/dL      ALT (SGPT) 30 U/L       Specimen hemolyzed.  Results may be affected.        AST (SGOT) 27 U/L       Specimen hemolyzed.  Results may be affected.        Alkaline Phosphatase 132 (H) U/L       Specimen hemolyzed. Results may be affected.        Total Bilirubin 1.0 mg/dL      eGFR  African Amer 3 (L) mL/min/1.73      Globulin 3.8 gm/dL      A/G Ratio 0.8 (L) g/dL      BUN/Creatinine Ratio 5.3 (L)     Anion Gap 18.0 (H) mmol/L     Narrative:       The MDRD GFR formula is only valid for adults with stable renal function between ages 18 and 70.    [place] Draw [[id number]] Collected:  08/13/17 1840    Specimen:  Blood Updated:  08/13/17 2001    Narrative:       The following orders were created for panel order [place] [facility].  Procedure                               Abnormality         Status                     ---------                               -----------         ------                     Light Blue Top[[id number]]                                   Final result               Green Top (Gel)[[id number]]                                  Final result               Lavender Top[[id number]]                                     Final result               Red Top[[id number]]                                          Final result                 Please view results for these tests on the individual orders.    Light Blue Top [[id number]] Collected:  08/13/17 1840    Specimen:  Blood Updated:  08/13/17 2001     Extra Tube hold for add-on      Auto resulted       Green Top (Gel) [[id number]] Collected:  08/13/17  1840    Specimen:  Blood Updated:  08/13/17 2001     Extra Tube Hold for add-ons.      Auto resulted.       Lavender Top [[id number]] Collected:  08/13/17 1840    Specimen:  Blood Updated:  08/13/17 2001     Extra Tube hold for add-on      Auto resulted       Red Top [[id number]] Collected:  08/13/17 1840    Specimen:  Blood Updated:  08/13/17 2001     Extra Tube Hold for add-ons.      Auto resulted.       Troponin [[id number]]  (Abnormal) Collected:  08/13/17 1940    Specimen:  Blood Updated:  08/13/17 2003     Troponin I 0.073 (H) ng/mL     Myoglobin, Serum [[id number]]  (Abnormal) Collected:  08/13/17 1940    Specimen:  Blood Updated:  08/13/17 2003     Myoglobin 167.1 (H) ng/mL     Magnesium [[id number]]  (Abnormal) Collected:  08/13/17 1940    Specimen:  Blood Updated:  08/13/17 2006     Magnesium 2.3 (H) mg/dL       Specimen hemolyzed.  Results may be affected.       CK-MB [[id number]]  (Normal) Collected:  08/13/17 1940    Specimen:  Blood Updated:  08/13/17 2006     CKMB 2.86 ng/mL     Narrative:       CKMB Index not indicated    CK [[id number]]  (Normal) Collected:  08/13/17 1940    Specimen:  Blood Updated:  08/13/17 2007     Creatine Kinase 57 U/L     CBC & Differential [[id number]] Collected:  08/13/17 1840    Specimen:  Blood Updated:  08/13/17 2015    Narrative:       The following orders were created for panel order CBC & Differential.  Procedure                               Abnormality         Status                     ---------                               -----------         ------                     CBC Auto Differential[[id number]]        Abnormal            Final result                 Please view results for these tests on the individual orders.    CBC Auto Differential [[id number]]  (Abnormal) Collected:  08/13/17 1840    Specimen:  Blood Updated:  08/13/17 2015     WBC 6.87 10*3/mm3      RBC 2.80 (L) 10*6/mm3      Hemoglobin 8.1 (L) g/dL      Hematocrit 25.0 (L) %      MCV 89.3 fL      MCH 28.9 pg       MCHC 32.4 (L) g/dL      RDW 16.3 (H) %      RDW-SD 52.8 fl      MPV 9.8 fL      Platelets 283 10*3/mm3      Neutrophil % 76.7 %      Lymphocyte % 17.9 %      Monocyte % 3.9 (L) %      Eosinophil % 0.9 %      Basophil % 0.3 %      Immature Grans % 0.3 %      Neutrophils, Absolute 5.27 10*3/mm3      Lymphocytes, Absolute 1.23 10*3/mm3      Monocytes, Absolute 0.27 10*3/mm3      Eosinophils, Absolute 0.06 10*3/mm3      Basophils, Absolute 0.02 10*3/mm3      Immature Grans, Absolute 0.02 10*3/mm3     D-dimer, Quantitative [[id number]]  (Abnormal) Collected:  08/13/17 1840    Specimen:  Blood Updated:  08/13/17 2019     D-Dimer, Quantitative 1.20 (H) mg/L (FEU)     Narrative:       Reference Range is 0-0.50 mg/L FEU. However, results <0.50 mg/L FEU tends to rule out DVT or PE. Results >0.50 mg/L FEU are not useful in predicting absence or presence of DVT or PE.    BNP [[id number]]  (Abnormal) Collected:  08/13/17 1940    Specimen:  Blood Updated:  08/13/17 2027     proBNP 150500.0 (H) pg/mL     Potassium [[id number]]  (Abnormal) Collected:  08/13/17 2202    Specimen:  Blood Updated:  08/13/17 2224     Potassium 2.4 (C) mmol/L     Lactic Acid, Plasma [[id number]]  (Abnormal) Collected:  08/13/17 2250    Specimen:  Blood Updated:  08/13/17 2325     Lactate 2.1 (C) mmol/L     Troponin [[id number]]  (Normal) Collected:  08/13/17 2250    Specimen:  Blood Updated:  08/13/17 2337     Troponin I 0.024 ng/mL     Amylase [[id number]]  (Normal) Collected:  08/13/17 2250    Specimen:  Blood Updated:  08/13/17 2349     Amylase 100 U/L     Lipase [[id number]]  (Normal) Collected:  08/13/17 2250    Specimen:  Blood Updated:  08/13/17 2349     Lipase 32 U/L           Imaging Results (last 24 hours)     Procedure Component Value Units Date/Time    XR Chest 2 View [[id number]] Collected:  08/13/17 1913     Updated:  08/13/17 1917    Narrative:       EXAMINATION:   XR CHEST 2 VW-  8/13/2017 7:13 PM CDT     HISTORY: Chest pain     PA and  lateral views the chest obtained. Bilateral interstitial  infiltrates are present. This is superimposed on chronic parenchymal  change. Most likely this is early pulmonary vascular congestion  superimposed on chronic change. Cardiac silhouettes moderately enlarged.  Atelectasis left lower lobe is noted.     IMPRESSION bilateral interstitial infiltrates most likely representing  mild pulmonary vascular congestion superimposed on chronic change.  2. Atelectasis left lower lobe  This report was finalized on 08/13/2017 19:14 by Dr. Herson Magaña MD.          Assessment / Plan     Impression:  Volume overload due to noncompliance with HD  ESRD / HD  HTN  HLD  DM II?  Hypothyroidism  CAD  Chronic CHF due to SD/DD (Echo 06/12/2017 shows EF 61-65% with severe Tricuspid regurge)  Chronic atrial fibrillation  Pulmonary HTN  Wegener's granulomatosis  Chronic prednisone therapy  Chronic anemia due to CKD  Hypokalemia    Plan:    The patient will be admitted for further evaluation and treatment.  Her admitting diagnoses are as noted.  Her condition at this time is judged to be stable.  She will be placed on telemetry.    I have asked nursing staff to obtain vital signs per protocol.  She will be confined to bedrest.  Her allergies are as noted above.  I have asked the nursing staff to monitor input and output.  Daily weights are to be obtained.  She will be maintained on a renal diet.  IV fluids will be saline locked.  Oxygen will be used as needed to maintain her O2 saturations greater than 92%.  She is a full code.  Fall precautions are to be instituted.  I will consult the nephrology service.    Initial admission medications:  Humalog sliding scale  Synthroid 0.05 mg by mouth daily  Protonix 40 mg by mouth daily  Tylenol 650 mg by mouth every 6 hours when necessary for fever and/or discomfort  Duo-nebs 1 unit every 4 hours when necessary for shortness of breath  Zofran 4 mg IV every 6 hours when necessary for nausea and  vomiting.    I will order potassium replacement as required.    I will obtain follow-up laboratory studies in the morning.    Amylase and lipase are pending.    I will obtain serial troponin levels.    I will continue to follow the patient closely pending the return of the hospitalist team in the morning.  The nursing staff may call should they have any questions or concerns.  Please refer to the medical record for additional information, orders, and/or comments.    (Note that this document was prepared with the use of a voice recognition program.)    Pino Tang MD   08/13/17   11:52 PM

## 2017-08-14 NOTE — ED PROVIDER NOTES
Subjective   History of Present Illness  72-year-old female presents with chief complaint of chest pain that radiates to her back, nausea, vomiting, shortness of breath, increased cough.  Patient reports she has COPD and chronic kidney failure.  She reports her dialysis regimen is every Monday Wednesday Friday and she has not contacted her last 2 dialysis appointments.  She had not been dialyzed in 6 days.  Review of Systems   All other systems reviewed and are negative.      Past Medical History:   Diagnosis Date   • A-fib    • Anemia, chronic renal failure    • Arthritis    • CAD (coronary artery disease)    • Chronic combined systolic and diastolic CHF (congestive heart failure)    • Chronic diarrhea    • DVT (deep venous thrombosis)    • ESRD (end stage renal disease) on dialysis    • History of noncompliance with medical treatment    • HLD (hyperlipidemia)    • Hypertension    • Hypothyroidism    • PAD (peripheral artery disease)    • Pulmonary HTN    • Renal failure    • Syncope    • Wegener's granulomatosis        Allergies   Allergen Reactions   • Heparin    • Iron    • Latex    • Levaquin [Levofloxacin]    • Shrimp (Diagnostic)    • Vancomycin        Past Surgical History:   Procedure Laterality Date   • APPENDECTOMY     • ARTERIOVENOUS FISTULA LEG Left    • CATARACT EXTRACTION, BILATERAL     • CHOLECYSTECTOMY     • HERNIA REPAIR     • TUBAL ABDOMINAL LIGATION         History reviewed. No pertinent family history.    Social History     Social History   • Marital status: Single     Spouse name: N/A   • Number of children: N/A   • Years of education: N/A     Social History Main Topics   • Smoking status: Former Smoker   • Smokeless tobacco: None   • Alcohol use No   • Drug use: No   • Sexual activity: Defer     Other Topics Concern   • None     Social History Narrative   • None           Objective   Physical Exam   Constitutional: She is oriented to person, place, and time. She appears well-developed. She  appears distressed.   HENT:   Head: Normocephalic.   Neck: Normal range of motion.   Cardiovascular:   tachycardic   Pulmonary/Chest: Effort normal.   Abdominal: Soft.   Neurological: She is alert and oriented to person, place, and time.   Skin: Skin is warm.   Psychiatric: She has a normal mood and affect. Her behavior is normal.       Procedures         ED Course  ED Course                  MDM  Number of Diagnoses or Management Options  Diagnosis management comments: Patient reports improvement in symptoms without treatment.  She says she does not have chest pain or is short of breath.  However, she has not been dialyzed since she needs to be dialyzed tomorrow.  She is also hypokalemic.  Patient needs to be admitted to the hospital, spoke with .  He is accepting the patient.       Amount and/or Complexity of Data Reviewed  Clinical lab tests: reviewed and ordered  Tests in the radiology section of CPT®: ordered and reviewed  Tests in the medicine section of CPT®: ordered and reviewed  Decide to obtain previous medical records or to obtain history from someone other than the patient: yes    Risk of Complications, Morbidity, and/or Mortality  Presenting problems: moderate  Diagnostic procedures: moderate  Management options: moderate    Patient Progress  Patient progress: improved      Final diagnoses:   Chronic kidney failure, unspecified stage            Jaden Gao PA-C  08/14/17 0123

## 2017-08-14 NOTE — PLAN OF CARE
Problem: Patient Care Overview (Adult)  Goal: Plan of Care Review  Outcome: Ongoing (interventions implemented as appropriate)    08/14/17 0441   Coping/Psychosocial Response Interventions   Plan Of Care Reviewed With patient   Patient Care Overview   Progress no change   Outcome Evaluation   Outcome Summary/Follow up Plan Patient new to floor this shift. A & O x4 . Patient C/O midsternal CP that radiates to back. MD. on floor notified, orders received. EKG and Markers ordered. Patient Potassium 2.4 orders for replacement therapy given. Patient has hemodialysis fistula in LT. Upper leg with thrill and bruit present. Patient has missed several dialysis treatments but unsure of how many missed. Normal sinus on Telemetry. Safety maintained.

## 2017-08-14 NOTE — PROGRESS NOTES
"Patient:  Jennifer Marks  YOB: 1945  Date of Service: 8/14/2017  MRN: [id number]   Acct: [id number]   Primary Care Physician: No Known Provider  Advance Directive: Full Code  Admit Date: 8/13/2017       Hospital Day: 1  Referring Provider: Pino Tang MD      Pt was seen on RRT  Modality: Hemodialysis  Access: Arterial Venous Fistula  Location: left lower  QB: 400  QD:600  UF: 1.3 liters      Review of Systems:  History obtained from chart review and the patient  General ROS: No fever or chills  Respiratory ROS: No cough,+ shortness of breath, -wheezing  Cardiovascular ROS: no chest pain but dyspnea on exertion  Gastrointestinal ROS: + abdominal pain, no melena  Genito-Urinary ROS: No dysuria or hematuria  Musculoskeletal: negative  Skin: negative    Objective:  BP 96/48 (BP Location: Left arm, Patient Position: Lying)  Pulse 90  Temp 98.2 °F (36.8 °C) (Temporal Artery )   Resp 16  Ht 62\" (157.5 cm)  Wt 132 lb 3.2 oz (60 kg)  SpO2 96%  BMI 24.18 kg/m2    Intake/Output Summary (Last 24 hours) at 08/14/17 1519  Last data filed at 08/14/17 0815   Gross per 24 hour   Intake              100 ml   Output                0 ml   Net              100 ml       Physical examination:  General: awake/alert   Chest:  clear to auscultation bilaterally without respiratory distress  CVS: regular rate and rhythm  Abdominal: soft, mildly tender in epigastric area  Extremities: 2+ leg edema  Skin: warm and dry without rash  Neuro: No focal motor deficits    Labs:  Lab Results (last 24 hours)     Procedure Component Value Units Date/Time    Comprehensive Metabolic Panel [[id number]]  (Abnormal) Collected:  08/13/17 1940    Specimen:  Blood Updated:  08/13/17 2000     Glucose 79 mg/dL      BUN 76 (H) mg/dL       Specimen hemolyzed. Results may be affected.        Creatinine 14.24 (H) mg/dL      Sodium 141 mmol/L      Potassium 2.9 (C) mmol/L       Specimen hemolyzed.  Results may be affected.        Chloride 105 " mmol/L      CO2 18.0 (L) mmol/L      Calcium 8.5 mg/dL      Total Protein 6.7 g/dL      Albumin 2.90 (L) g/dL      ALT (SGPT) 30 U/L       Specimen hemolyzed.  Results may be affected.        AST (SGOT) 27 U/L       Specimen hemolyzed.  Results may be affected.        Alkaline Phosphatase 132 (H) U/L       Specimen hemolyzed. Results may be affected.        Total Bilirubin 1.0 mg/dL      eGFR  African Amer 3 (L) mL/min/1.73      Globulin 3.8 gm/dL      A/G Ratio 0.8 (L) g/dL      BUN/Creatinine Ratio 5.3 (L)     Anion Gap 18.0 (H) mmol/L     Narrative:       The MDRD GFR formula is only valid for adults with stable renal function between ages 18 and 70.    [place] Draw [[id number]] Collected:  08/13/17 1840    Specimen:  Blood Updated:  08/13/17 2001    Narrative:       The following orders were created for panel order [place] Draw.  Procedure                               Abnormality         Status                     ---------                               -----------         ------                     Light Blue Top[[id number]]                                   Final result               Green Top (Gel)[[id number]]                                  Final result               Lavender Top[[id number]]                                     Final result               Red Top[[id number]]                                          Final result                 Please view results for these tests on the individual orders.    Light Blue Top [[id number]] Collected:  08/13/17 1840    Specimen:  Blood Updated:  08/13/17 2001     Extra Tube hold for add-on      Auto resulted       Green Top (Gel) [[id number]] Collected:  08/13/17 1840    Specimen:  Blood Updated:  08/13/17 2001     Extra Tube Hold for add-ons.      Auto resulted.       Lavender Top [[id number]] Collected:  08/13/17 1840    Specimen:  Blood Updated:  08/13/17 2001     Extra Tube hold for add-on      Auto resulted       Red Top [[id number]] Collected:  08/13/17 1840     Specimen:  Blood Updated:  08/13/17 2001     Extra Tube Hold for add-ons.      Auto resulted.       Troponin [[id number]]  (Abnormal) Collected:  08/13/17 1940    Specimen:  Blood Updated:  08/13/17 2003     Troponin I 0.073 (H) ng/mL     Myoglobin, Serum [[id number]]  (Abnormal) Collected:  08/13/17 1940    Specimen:  Blood Updated:  08/13/17 2003     Myoglobin 167.1 (H) ng/mL     Magnesium [[id number]]  (Abnormal) Collected:  08/13/17 1940    Specimen:  Blood Updated:  08/13/17 2006     Magnesium 2.3 (H) mg/dL       Specimen hemolyzed.  Results may be affected.       CK-MB [[id number]]  (Normal) Collected:  08/13/17 1940    Specimen:  Blood Updated:  08/13/17 2006     CKMB 2.86 ng/mL     Narrative:       CKMB Index not indicated    CK [[id number]]  (Normal) Collected:  08/13/17 1940    Specimen:  Blood Updated:  08/13/17 2007     Creatine Kinase 57 U/L     CBC & Differential [[id number]] Collected:  08/13/17 1840    Specimen:  Blood Updated:  08/13/17 2015    Narrative:       The following orders were created for panel order CBC & Differential.  Procedure                               Abnormality         Status                     ---------                               -----------         ------                     CBC Auto Differential[[id number]]        Abnormal            Final result                 Please view results for these tests on the individual orders.    CBC Auto Differential [[id number]]  (Abnormal) Collected:  08/13/17 1840    Specimen:  Blood Updated:  08/13/17 2015     WBC 6.87 10*3/mm3      RBC 2.80 (L) 10*6/mm3      Hemoglobin 8.1 (L) g/dL      Hematocrit 25.0 (L) %      MCV 89.3 fL      MCH 28.9 pg      MCHC 32.4 (L) g/dL      RDW 16.3 (H) %      RDW-SD 52.8 fl      MPV 9.8 fL      Platelets 283 10*3/mm3      Neutrophil % 76.7 %      Lymphocyte % 17.9 %      Monocyte % 3.9 (L) %      Eosinophil % 0.9 %      Basophil % 0.3 %      Immature Grans % 0.3 %      Neutrophils, Absolute 5.27 10*3/mm3       Lymphocytes, Absolute 1.23 10*3/mm3      Monocytes, Absolute 0.27 10*3/mm3      Eosinophils, Absolute 0.06 10*3/mm3      Basophils, Absolute 0.02 10*3/mm3      Immature Grans, Absolute 0.02 10*3/mm3     D-dimer, Quantitative [[id number]]  (Abnormal) Collected:  08/13/17 1840    Specimen:  Blood Updated:  08/13/17 2019     D-Dimer, Quantitative 1.20 (H) mg/L (FEU)     Narrative:       Reference Range is 0-0.50 mg/L FEU. However, results <0.50 mg/L FEU tends to rule out DVT or PE. Results >0.50 mg/L FEU are not useful in predicting absence or presence of DVT or PE.    BNP [[id number]]  (Abnormal) Collected:  08/13/17 1940    Specimen:  Blood Updated:  08/13/17 2027     proBNP 418073.0 (H) pg/mL     Potassium [[id number]]  (Abnormal) Collected:  08/13/17 2202    Specimen:  Blood Updated:  08/13/17 2224     Potassium 2.4 (C) mmol/L     Lactic Acid, Plasma [[id number]]  (Abnormal) Collected:  08/13/17 2250    Specimen:  Blood Updated:  08/13/17 2325     Lactate 2.1 (C) mmol/L     Troponin [[id number]]  (Normal) Collected:  08/13/17 2250    Specimen:  Blood Updated:  08/13/17 2337     Troponin I 0.024 ng/mL     Amylase [[id number]]  (Normal) Collected:  08/13/17 2250    Specimen:  Blood Updated:  08/13/17 2349     Amylase 100 U/L     Lipase [[id number]]  (Normal) Collected:  08/13/17 2250    Specimen:  Blood Updated:  08/13/17 2349     Lipase 32 U/L     POC Glucose Fingerstick [[id number]]  (Normal) Collected:  08/14/17 0818    Specimen:  Blood Updated:  08/14/17 0829     Glucose 82 mg/dL       : [id number] Rundles SabrinaMeter ID: [id number]       POC Glucose Fingerstick [[id number]]  (Normal) Collected:  08/14/17 1213    Specimen:  Blood Updated:  08/14/17 1225     Glucose 96 mg/dL       : [id number] Sun SabrinaMeter ID: [id number]       Troponin [[id number]]  (Abnormal) Collected:  08/14/17 1405    Specimen:  Blood Updated:  08/14/17 1434     Troponin I 0.083 (H) ng/mL           Radiology:   Imaging Results (last 24  hours)     Procedure Component Value Units Date/Time    XR Chest 2 View [[id number]] Collected:  08/13/17 1913     Updated:  08/13/17 1917    Narrative:       EXAMINATION:   XR CHEST 2 VW-  8/13/2017 7:13 PM CDT     HISTORY: Chest pain     PA and lateral views the chest obtained. Bilateral interstitial  infiltrates are present. This is superimposed on chronic parenchymal  change. Most likely this is early pulmonary vascular congestion  superimposed on chronic change. Cardiac silhouettes moderately enlarged.  Atelectasis left lower lobe is noted.     IMPRESSION bilateral interstitial infiltrates most likely representing  mild pulmonary vascular congestion superimposed on chronic change.  2. Atelectasis left lower lobe  This report was finalized on 08/13/2017 19:14 by Dr. Herson Magaña MD.              Assessment     -ESRD  -Type 2 diabetes mellitus  -nausea and vomiting.   -Hypoakalemia    -Anermia of ckd.     Plan:    Follow up labs. Will lower UF goal to decrease hypotension. Will add Epogen.     Dg Ball MD  8/14/2017  3:19 PM

## 2017-08-14 NOTE — CONSULTS
Nephrology ([facility]) Consult Note      Patient:  Jennifer Marks  YOB: 1945  Date of Service: 8/14/2017  MRN: [id number]   Acct: [id number]   Primary Care Physician: No Known Provider  Advance Directive: Full Code  Admit Date: 8/13/2017       Hospital Day: 1  Referring Provider: Pino Tang MD      Patient personally seen and examined.  Complete chart including Consults, Notes, Operative Reports, Labs, Cardiology, and Radiology studies reviewed as able.        Subjective:  Jennifer Marks is a 72 y.o. female  whom we were consulted for end stage renal disease.  On hemodialysis Monday Wednesday Friday.  Has missed last couple of treatments due to not feeling well.  Long history of noncompliance with dialysis.  Admitted with abdominal pain, nausea/vomiting, and hypokalemia.  Denies pain at time of exam; denies nausea.  Given potassium replacement IV but has so far refused any follow up labs.  Planning for dialysis today.    Allergies:  Heparin; Iron; Latex; Levaquin [levofloxacin]; Shrimp (diagnostic); and Vancomycin    Home Meds:  Prescriptions Prior to Admission   Medication Sig Dispense Refill Last Dose   • levothyroxine (SYNTHROID, LEVOTHROID) 50 MCG tablet Take 1 tablet by mouth Daily. 30 tablet 2    • megestrol (MEGACE) 40 MG/ML suspension Take 10 mL by mouth Daily. 480 mL 1    • midodrine (PROAMATINE) 5 MG tablet Take 1 tablet by mouth 3 (Three) Times a Day Before Meals. 90 tablet 2    • pantoprazole (PROTONIX) 40 MG EC tablet Take 40 mg by mouth Daily.      • predniSONE (DELTASONE) 20 MG tablet Take 0.5 tablets by mouth Daily. 3 tablet 0    • traZODone (DESYREL) 50 MG tablet Take 50 mg by mouth Every Night.          Medicines:  Current Facility-Administered Medications   Medication Dose Route Frequency Provider Last Rate Last Dose   • acetaminophen (TYLENOL) tablet 650 mg  650 mg Oral Q6H PRN Pino Tang MD       • aspirin EC tablet 81 mg  81 mg Oral Daily Pino  JOHANNE Tang MD   81 mg at 08/14/17 0815   • dextrose (D50W) solution 25 g  25 g Intravenous Q15 Min PRN Pino Tang MD       • dextrose (GLUTOSE) oral gel 15 g  15 g Oral Q15 Min PRN Pino Tang MD       • glucagon (human recombinant) (GLUCAGEN DIAGNOSTIC) injection 1 mg  1 mg Subcutaneous Q15 Min PRN Pino Tang MD       • insulin lispro (humaLOG) injection 2-7 Units  2-7 Units Subcutaneous 4x Daily With Meals & Nightly Pino Tang MD       • ipratropium-albuterol (DUO-NEB) nebulizer solution 3 mL  3 mL Nebulization Q4H PRN Pino Tang MD       • levothyroxine (SYNTHROID, LEVOTHROID) tablet 50 mcg  50 mcg Oral Daily Pino Tang MD       • ondansetron (ZOFRAN) injection 4 mg  4 mg Intravenous Q6H PRN Pino Tang MD       • pantoprazole (PROTONIX) EC tablet 40 mg  40 mg Oral Daily Pino Tang MD       • potassium chloride 10 mEq in 100 mL IVPB  10 mEq Intravenous Q1H Pino Tang  mL/hr at 08/14/17 0923 10 mEq at 08/14/17 0923   • predniSONE (DELTASONE) tablet 10 mg  10 mg Oral Daily With Breakfast Pino Tang MD       • sodium chloride 0.9 % flush 1-10 mL  1-10 mL Intravenous PRN Pino Tang MD           Past Medical History:  Past Medical History:   Diagnosis Date   • A-fib    • Anemia, chronic renal failure    • Arthritis    • CAD (coronary artery disease)    • Chronic combined systolic and diastolic CHF (congestive heart failure)    • Chronic diarrhea    • DVT (deep venous thrombosis)    • ESRD (end stage renal disease) on dialysis    • History of noncompliance with medical treatment    • HLD (hyperlipidemia)    • Hypertension    • Hypothyroidism    • PAD (peripheral artery disease)    • Pulmonary HTN    • Renal failure    • Syncope    • Wegener's granulomatosis        Past Surgical History:  Past Surgical History:   Procedure Laterality Date   • APPENDECTOMY     • ARTERIOVENOUS FISTULA LEG Left    • CATARACT EXTRACTION, BILATERAL     • CHOLECYSTECTOMY     • HERNIA REPAIR     • TUBAL  "ABDOMINAL LIGATION         Family History  History reviewed. No pertinent family history.    Social History  Social History     Social History   • Marital status: Single     Spouse name: N/A   • Number of children: N/A   • Years of education: N/A     Occupational History   • Not on file.     Social History Main Topics   • Smoking status: Former Smoker   • Smokeless tobacco: Not on file   • Alcohol use No   • Drug use: No   • Sexual activity: Defer     Other Topics Concern   • Not on file     Social History Narrative   • No narrative on file         Review of Systems:  History obtained from chart review and the patient  General ROS: No fever or chills  Respiratory ROS: No cough, shortness of breath, wheezing  Cardiovascular ROS: no chest pain or dyspnea on exertion  Gastrointestinal ROS: positive for - abdominal pain and nausea/vomiting  Genito-Urinary ROS: No dysuria or hematuria  Neurological ROS: negative  14 point ROS reviewed with the patient and negative except as noted above and in the HPI unless unable to obtain.    Objective:  BP 96/48 (BP Location: Left arm, Patient Position: Lying)  Pulse 90  Temp 98.2 °F (36.8 °C) (Temporal Artery )   Resp 16  Ht 62\" (157.5 cm)  Wt 132 lb 3.2 oz (60 kg)  SpO2 96%  BMI 24.18 kg/m2    Intake/Output Summary (Last 24 hours) at 08/14/17 1003  Last data filed at 08/14/17 0815   Gross per 24 hour   Intake              100 ml   Output                0 ml   Net              100 ml     General: awake/alert    Neck: supple, no JVD  Chest:  clear to auscultation bilaterally without respiratory distress  CVS: regular rate and rhythm  Abdominal: soft, nontender, normal bowel sounds  Extremities: no cyanosis or edema  Skin: warm and dry without rash   Neuro: no focal motor deficits      Labs:    Results from last 7 days  Lab Units 08/13/17  1840   WBC 10*3/mm3 6.87   HEMOGLOBIN g/dL 8.1*   HEMATOCRIT % 25.0*   PLATELETS 10*3/mm3 283           Results from last 7 days  Lab Units " 08/13/17 2202 08/13/17 1940   SODIUM mmol/L  --  141   POTASSIUM mmol/L 2.4* 2.9*   CHLORIDE mmol/L  --  105   CO2 mmol/L  --  18.0*   BUN mg/dL  --  76*   CREATININE mg/dL  --  14.24*   CALCIUM mg/dL  --  8.5   BILIRUBIN mg/dL  --  1.0   ALK PHOS U/L  --  132*   ALT (SGPT) U/L  --  30   AST (SGOT) U/L  --  27   GLUCOSE mg/dL  --  79       Radiology:   Imaging Results (last 72 hours)     Procedure Component Value Units Date/Time    XR Chest 2 View [[id number]] Collected:  08/13/17 1913     Updated:  08/13/17 1917    Narrative:       EXAMINATION:   XR CHEST 2 VW-  8/13/2017 7:13 PM CDT     HISTORY: Chest pain     PA and lateral views the chest obtained. Bilateral interstitial  infiltrates are present. This is superimposed on chronic parenchymal  change. Most likely this is early pulmonary vascular congestion  superimposed on chronic change. Cardiac silhouettes moderately enlarged.  Atelectasis left lower lobe is noted.     IMPRESSION bilateral interstitial infiltrates most likely representing  mild pulmonary vascular congestion superimposed on chronic change.  2. Atelectasis left lower lobe  This report was finalized on 08/13/2017 19:14 by Dr. Herson Magaña MD.          Culture:         Assessment   1.  End stage renal disease on hemodialysis  2.  Hypokalemia  3.  Abdominal pain with nausea/vomiting  4.  Chronic diastolic heart failure  5.  Wegener's granulomatosis  6.  Essential hypertension  7.  Anemia     Plan:  1.  Dialysis today  2.  Obtain labs and replace potassium as necessary during dialysis  3.  Maintenance dialysis Monday Wednesday Friday  4.  Further assessment and plan pending Dr Ball's evaluation of patient      Thank you for the consult, we appreciate the opportunity to provide care to your patients.  Feel free to contact me if I can be of any further assistance.      Jaden Tovar, FANNIE  8/14/2017  10:03 AM

## 2017-08-14 NOTE — PROGRESS NOTES
[facility]  INPATIENT PROGRESS NOTE    Length of Stay: 1  Date of Admission: 8/13/2017  Primary Care Physician: No Known Provider    Subjective   Chief Complaint: follow up chest pain  HPI   Pt. States she feel much better. Still with all over abdominal pain. State she had nausea/vomiting and did not go to dialysis because she was so sick. Currently without chest pain. States she has back pain but once I dislodged her oxygen from her back, she states it felt better. Does not urinate. No additional complaints. Has a dry hacking cough. Does not produce sputum. Does not produce urine. Pt refused blood draws this am.    Review of Systems   All pertinent negatives and positives are as above. All other systems have been reviewed and are negative unless otherwise stated.     Objective    Temp:  [98.5 °F (36.9 °C)-98.8 °F (37.1 °C)] 98.5 °F (36.9 °C)  Heart Rate:  [] 73  Resp:  [18-19] 18  BP: (100-129)/(52-53) 100/53  Physical Exam   Constitutional: She is oriented to person, place, and time. She appears well-developed and well-nourished.   HENT:   Head: Normocephalic and atraumatic.   Eyes: Conjunctivae and EOM are normal. Pupils are equal, round, and reactive to light.   Neck: Neck supple. No JVD present. No thyromegaly present.   Cardiovascular: Normal rate, regular rhythm and intact distal pulses.  Exam reveals no gallop and no friction rub.    Murmur heard.  Sinus 90-97   Pulmonary/Chest: Effort normal and breath sounds normal. No respiratory distress. She has no wheezes. She has no rales. She exhibits no tenderness.   Diminished bilaterally.    Abdominal: Soft. Bowel sounds are normal. She exhibits no distension. There is no tenderness. There is no rebound and no guarding.   Musculoskeletal: Normal range of motion. She exhibits no edema, tenderness or deformity.   Lymphadenopathy:     She has no cervical adenopathy.   Neurological: She is alert and oriented  to person, place, and time. She displays normal reflexes. No cranial nerve deficit. She exhibits normal muscle tone.   Skin: Skin is warm and dry. No rash noted.   Psychiatric: She has a normal mood and affect. Her behavior is normal. Judgment and thought content normal.     Results Review:  I have reviewed the labs, radiology results, and diagnostic studies.    Laboratory Data:     Results from last 7 days  Lab Units 08/13/17  1840   WBC 10*3/mm3 6.87   HEMOGLOBIN g/dL 8.1*   HEMATOCRIT % 25.0*   PLATELETS 10*3/mm3 283       Results from last 7 days  Lab Units 08/13/17  2202 08/13/17  1940   SODIUM mmol/L  --  141   POTASSIUM mmol/L 2.4* 2.9*   CHLORIDE mmol/L  --  105   CO2 mmol/L  --  18.0*   BUN mg/dL  --  76*   CREATININE mg/dL  --  14.24*   CALCIUM mg/dL  --  8.5   BILIRUBIN mg/dL  --  1.0   ALK PHOS U/L  --  132*   ALT (SGPT) U/L  --  30   AST (SGOT) U/L  --  27   GLUCOSE mg/dL  --  79     Radiology Data:   Imaging Results (last 24 hours)     Procedure Component Value Units Date/Time    XR Chest 2 View [[id number]] Collected:  08/13/17 1913     Updated:  08/13/17 1917    Narrative:       EXAMINATION:   XR CHEST 2 VW-  8/13/2017 7:13 PM CDT     HISTORY: Chest pain     PA and lateral views the chest obtained. Bilateral interstitial  infiltrates are present. This is superimposed on chronic parenchymal  change. Most likely this is early pulmonary vascular congestion  superimposed on chronic change. Cardiac silhouettes moderately enlarged.  Atelectasis left lower lobe is noted.     IMPRESSION bilateral interstitial infiltrates most likely representing  mild pulmonary vascular congestion superimposed on chronic change.  2. Atelectasis left lower lobe  This report was finalized on 08/13/2017 19:14 by Dr. Herson Magaña MD.        I have reviewed the patient current medications.     Assessment/Plan   Assessment:   1. Volume overload secondary to non-compliance with hemodialysis  2. Chest pain with mildly elevated  "troponin-trending down. No acute ST segment changes  3. Abdominal pain with nausea/vomiting/chronic diarrhea  4. Hypokalemia  5. End stage renal disease on maintenance hemodialysis [id number]  6. Hypotension-midodrine  7. 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 Paroxysmal atrial fibrillation-no anticoagulation  9. Wegener's Granulomatosis-chronic prednisone therapy  10. Chronic anemia  11. Mild lactic acidosis  12. Hypothyroidism-on home synthroid  13. Anorexia-Megace at home.     Plan:  1. Nephrology to see to have dialysis today.   2. Will draw labs in dialysis as pt has refused   3. Incentive spirometry.  4. Will check GI panel given her diarrhea  5. Will consult physical and occupational therapy.   6. Await am labs. Lactic acid is pending as well.     Discharge Planning: I expect patient to be discharged to home with home health in ? days.    FANNIE Weiss   08/14/17   6:56 AM     I personally evaluated and examined the patient in conjunction with FANNEI Mishra and agree with the assessment, treatment plan, and disposition of the patient as recorded by her. My history, exam, and further recommendations are: Patient reports that she has not had dialysis in approximately one week.  She normally gets dialysis on a Monday, Wednesday, Friday regimen, and was not feeling well last week and skip her sessions on Wednesday and Friday.  She reports being hospitalized at [facility] not too long ago for \"a couple of days\" but cannot recall the exact reason why she was hospitalized.  She currently denies having any shortness of breath.  She reports some mild midepigastric pain, but denies any dyspepsia or reflux symptoms.  She refused labs earlier this morning.  Nephrology consult did and appreciate their assistance.  Rate limiting step in her care moving forward will be compliance with " therapy.        Bora Luciano MD  08/14/17  9:31 AM

## 2017-08-14 NOTE — PROGRESS NOTES
Discharge Planning Assessment  [facility]     Patient Name: Jennifer Marks  MRN: [id number]  Today's Date: 8/14/2017    Admit Date: 8/13/2017          Discharge Needs Assessment       08/14/17 1324    Living Environment    Lives With child(fe), adult;other (see comments)   Daughter and grandchildren stay with patient in her home.     Living Arrangements house    Home Accessibility no concerns    Transportation Available family or friend will provide;car;public transportation    Living Environment    Provides Primary Care For no one    Primary Care Provided By child(fe) (specify);homecare agency (specify)   Patient is current with [facility] [phone] / [phone]    Quality Of Family Relationships supportive;helpful;involved    Able to Return to Prior Living Arrangements yes    Discharge Needs Assessment    Concerns To Be Addressed adjustment to diagnosis/illness concerns;discharge planning concerns    Readmission Within The Last 30 Days no previous admission in last 30 days    Outpatient/Agency/Support Group Needs skilled nursing facility (specify);outpatient hemodialysis (specify)   Patient is current at [facility] (outpatient HD)    Equipment Currently Used at Home walker, rolling;wheelchair;walker, standard    Equipment Needed After Discharge none    Discharge Facility/Level Of Care Needs nursing facility, skilled    Discharge Planning Comments NATHAN spoke to patient's daughter, Rupa, re discharge planning. Patient has been living in her own home and family has been staying with her. Patient is current with [facility] (NATHAN called and spoke to Kevin to confirm patient is current with [facility]). Rupa states that she spoke to her mother this AM and she would like to pursue placement at [facility]. [facility] was recommended to patient by her PCP Dr. Ambrocio in [place], [place]. NATHAN asked if patient/daughter would consider a back up option in the  event that [facility] cannot accept. Patient's daughter states that she would agree to referral to [facility] if [facility] cannot accept. SW made referrals to both of these facilities. Amira with [facility] and Adenike with [facility] to evaluate today. SW will follow for decision. Also: to assist with placement, SW requested PT and OT evaluations from Dr. Luciano.             Discharge Plan     None        Discharge Placement     No information found                Demographic Summary     None            Functional Status     None            Psychosocial     None            Abuse/Neglect     None            Legal     None            Substance Abuse     None            Patient Forms     None          ESPERANZA JiangW

## 2017-08-14 NOTE — PLAN OF CARE
Problem: Pressure Ulcer Risk (New Scale) (Adult,Obstetrics,Pediatric)  Goal: Skin Integrity  Outcome: Ongoing (interventions implemented as appropriate)    Problem: Hemodialysis (Adult)  Goal: Signs and Symptoms of Listed Potential Problems Will be Absent or Manageable (Hemodialysis)  Outcome: Ongoing (interventions implemented as appropriate)    Problem: Patient Care Overview (Adult)  Goal: Plan of Care Review  Outcome: Ongoing (interventions implemented as appropriate)    08/14/17 1652   Coping/Psychosocial Response Interventions   Plan Of Care Reviewed With patient   Patient Care Overview   Progress no change   Outcome Evaluation   Outcome Summary/Follow up Plan No C/O pain, patient had dialysis on 8/14/17, will continue to monitor.

## 2017-08-15 ENCOUNTER — APPOINTMENT (OUTPATIENT)
Dept: GENERAL RADIOLOGY | Facility: HOSPITAL | Age: 72
End: 2017-08-15

## 2017-08-15 LAB
GLUCOSE BLDC GLUCOMTR-MCNC: 64 MG/DL (ref 70–130)
GLUCOSE BLDC GLUCOMTR-MCNC: 70 MG/DL (ref 70–130)
GLUCOSE BLDC GLUCOMTR-MCNC: 73 MG/DL (ref 70–130)
GLUCOSE BLDC GLUCOMTR-MCNC: 82 MG/DL (ref 70–130)
HBV SURFACE AG SERPL QL IA: NEGATIVE
TROPONIN I SERPL-MCNC: 0.12 NG/ML (ref 0–0.03)

## 2017-08-15 PROCEDURE — 97166 OT EVAL MOD COMPLEX 45 MIN: CPT

## 2017-08-15 PROCEDURE — 82962 GLUCOSE BLOOD TEST: CPT

## 2017-08-15 PROCEDURE — G8978 MOBILITY CURRENT STATUS: HCPCS

## 2017-08-15 PROCEDURE — 63710000001 PREDNISONE PER 5 MG: Performed by: INTERNAL MEDICINE

## 2017-08-15 PROCEDURE — 25010000002 ONDANSETRON PER 1 MG: Performed by: INTERNAL MEDICINE

## 2017-08-15 PROCEDURE — 97162 PT EVAL MOD COMPLEX 30 MIN: CPT

## 2017-08-15 PROCEDURE — G8988 SELF CARE GOAL STATUS: HCPCS

## 2017-08-15 PROCEDURE — 84484 ASSAY OF TROPONIN QUANT: CPT | Performed by: NURSE PRACTITIONER

## 2017-08-15 PROCEDURE — G8979 MOBILITY GOAL STATUS: HCPCS

## 2017-08-15 PROCEDURE — G8987 SELF CARE CURRENT STATUS: HCPCS

## 2017-08-15 RX ADMIN — PANTOPRAZOLE SODIUM 40 MG: 40 TABLET, DELAYED RELEASE ORAL at 09:21

## 2017-08-15 RX ADMIN — ASPIRIN 81 MG: 81 TABLET ORAL at 09:21

## 2017-08-15 RX ADMIN — LEVOTHYROXINE SODIUM 50 MCG: 50 TABLET ORAL at 09:21

## 2017-08-15 RX ADMIN — PREDNISONE 10 MG: 10 TABLET ORAL at 09:21

## 2017-08-15 NOTE — PLAN OF CARE
Problem: Pressure Ulcer Risk (New Scale) (Adult,Obstetrics,Pediatric)  Goal: Skin Integrity  Outcome: Ongoing (interventions implemented as appropriate)    Problem: Hemodialysis (Adult)  Goal: Signs and Symptoms of Listed Potential Problems Will be Absent or Manageable (Hemodialysis)  Outcome: Ongoing (interventions implemented as appropriate)    Problem: Patient Care Overview (Adult)  Goal: Plan of Care Review  Outcome: Ongoing (interventions implemented as appropriate)    08/15/17 0654   Coping/Psychosocial Response Interventions   Plan Of Care Reviewed With patient   Patient Care Overview   Progress no change   Outcome Evaluation   Outcome Summary/Follow up Plan Pt with possible dialysis today. VSS. Calls when ready to reposition

## 2017-08-15 NOTE — THERAPY EVALUATION
Acute Care - Occupational Therapy Initial Evaluation  [facility]     Patient Name: Jennifer Marks  : 1945  MRN: [id number]  Today's Date: 8/15/2017  Onset of Illness/Injury or Date of Surgery Date: (P) 17  Date of Referral to OT: 17  Referring Physician: MAXIMINO) FANNIE Minor    Admit Date: 2017       ICD-10-CM ICD-9-CM   1. Chronic kidney failure, unspecified stage N18.9 585.9   2. Decreased activities of daily living (ADL) Z78.9 V49.89     Patient Active Problem List   Diagnosis   • Chest pain in adult   • Renal failure   • Hypotension   • Anemia   • Hypokalemia   • ESRD (end stage renal disease) on dialysis     Past Medical History:   Diagnosis Date   • A-fib    • Anemia, chronic renal failure    • Arthritis    • CAD (coronary artery disease)    • Chronic combined systolic and diastolic CHF (congestive heart failure)    • Chronic diarrhea    • DVT (deep venous thrombosis)    • ESRD (end stage renal disease) on dialysis    • History of noncompliance with medical treatment    • HLD (hyperlipidemia)    • Hypertension    • Hypothyroidism    • PAD (peripheral artery disease)    • Pulmonary HTN    • Renal failure    • Syncope    • Wegener's granulomatosis      Past Surgical History:   Procedure Laterality Date   • APPENDECTOMY     • ARTERIOVENOUS FISTULA LEG Left    • CATARACT EXTRACTION, BILATERAL     • CHOLECYSTECTOMY     • HERNIA REPAIR     • TUBAL ABDOMINAL LIGATION            OT ASSESSMENT FLOWSHEET (last 72 hours)      OT Evaluation       08/15/17 1306 08/15/17 0804 08/15/17 0740 17 1400 17 1324    Rehab Evaluation    Document Type (P)  evaluation   see MAR  -MS evaluation   see MAR  -PB (r) MS (t) PB (c) evaluation   See MAR  -ND      Subjective Information (P)  agree to therapy;complains of;weakness;fatigue;swelling  -MS agree to therapy;complains of;weakness;fatigue;pain;dyspnea  -PB (r) MS (t) PB (c) agree to therapy;complains of;weakness;fatigue  -ND      Evaluation  Not Performed  patient/family declined evaluation  -PB (r) MS (t) PB (c) --  -ND patient unavailable for evaluation   pt is in dialysis  -PB (r) MS (t) PB (c)     Evaluation Not Performed, Comment  Pt deferred at this time stating she is too weak, tired and painful. Stated did not want therapy right now even after being educated on the benefits of increasing activity. PT will check back later.  -PB (r) MS (t) PB (c) --  -ND      Patient Effort, Rehab Treatment (P)  fair  -MS        Symptoms Noted Comment (P)  Increased pain L leg during movement  -MS  34 min spent checking on pt. in am trying to participate in therapy and chart review time  -ND      General Information    Patient Profile Review (P)  yes  -MS yes  -PB (r) MS (t) PB (c) yes  -ND      Onset of Illness/Injury or Date of Surgery Date (P)  08/13/17  -MS 08/13/17  -PB (r) MS (t) PB (c) 08/13/17  -ND      Referring Physician (P)  FANNIE Minor  -MS FANNIE Minor  -PB (r) MS (t) PB (c) Laura Woods, APRN  -ND      General Observations (P)  Fowlers, alert, telemetry  -MS L sidelying, lethargic  -PB (r) MS (t) PB (c) Fowlers, alert, IV   -ND      Pertinent History Of Current Problem (P)  Admitted chest pain radiating down back, n/v, SOB, Increased cough, ESRD with dialysis MWF, Dx: volume overload secondary to non compliance with dialysis, chest pain, abdominal pain, hypokalemia, ESRD, hypotension, CHF, AFIB, wegeners granulomatosis, anemia, lactic acidosis, hypothyroid, anorexia. H&H 8.1/25 XR chest- trena interstitial infiltrates/  -MS Admitted chest pain radiating down back, n/v, SOB, Increased cough, ESRD with dialysis MWF, Dx: volume overload secondary to non compliance with dialysis, chest pain, abdominal pain, hypokalemia, ESRD, hypotension, CHF, AFIB, wegeners granulomatosis, anemia, lactic acidosis, hypothyroid, anorexia. H&H 8.1/25 XR chest- trena interstitial infiltrates/  -PB (r) MS (t) PB (c) Admitted chest pain radiating  down back, n/v, SOB, Increased cough, ESRD with dialysis MWF, Dx: volume overload secondary to non compliance with dialysis, chest pain, abdominal pain, hypokalemia, ESRD, hypotension, CHF, AFIB, wegeners granulomatosis, anemia, lactic acidosis, hypothyroid, anorexia. H&H 8.1/25 XR chest- trena interstitial infiltrates/  -ND      Precautions/Limitations (P)  fall precautions  -MS  fall precautions  -ND      Prior Level of Function (P)  min assist:;mod assist:;all household mobility;ADL's;home management  -MS min assist:;mod assist:;all household mobility;ADL's;home management  -PB (r) MS (t) PB (c) min assist:;mod assist:;all household mobility;ADL's  -ND      Equipment Currently Used at Home (P)  walker, rolling;commode;grab bar  -MS walker, rolling;commode;grab bar  -PB (r) MS (t) PB (c) walker, rolling;wheelchair  -ND  walker, rolling;wheelchair;walker, standard  -    Plans/Goals Discussed With (P)  patient;agreed upon  -MS  patient;agreed upon  -ND      Risks Reviewed (P)  patient:;LOB;nausea/vomiting;dizziness;increased discomfort  -MS  patient:;LOB;nausea/vomiting;dizziness;increased discomfort;change in vital signs;increased drainage;lines disloged  -ND      Benefits Reviewed (P)  patient:;improve function;increase independence;increase strength;increase balance;increase knowledge  -MS  patient:;improve function;increase independence;increase strength;decrease pain;increase balance;decrease risk of DVT;improve skin integrity;increase knowledge  -ND      Barriers to Rehab (P)  medically complex;previous functional deficit  -MS  medically complex;previous functional deficit;physical barrier  -ND      Living Environment    Lives With (P)  alone  -MS alone  -PB (r) MS (t) PB (c) child(fe), adult   daughter and grandaugther stay with pt.   -ND  child(fe), adult;other (see comments)   Daughter and grandchildren stay with patient in her home.   -KP    Living Arrangements (P)  apartment  -MS apartment  -PB (r) MS  "(t) PB (c) house  -ND  house  -KP    Home Accessibility (P)  tub/shower is not walk in  -MS tub/shower is not walk in  -PB (r) MS (t) PB (c) tub/shower is not walk in  -ND  no concerns  -    Stair Railings at Home (P)  none  -MS        Transportation Available     family or friend will provide;car;public transportation  -    Living Environment Comment (P)  Daughter comes and helps \"every now and then\"  -MS Daughter comes and helps \"every now and then\"  -PB (r) MS (t) PB (c) Per pt daughter comes and assist \"every now and then\"  -ND      Clinical Impression    Date of Referral to OT   08/14/17  -ND      OT Diagnosis   decreased adl  -ND      Prognosis   FAIR  -ND      Impairments Found (describe specific impairments)   aerobic capacity/endurance;ergonomics and body mechanics;gait, locomotion, and balance;integumentary integrity;joint integrity and mobility;motor function;muscle performance;posture;ROM  -ND      Patient/Family Goals Statement   go home  -ND      Criteria for Skilled Therapeutic Interventions Met   yes;treatment indicated  -ND      Rehab Potential   fair, will monitor progress closely  -ND      Therapy Frequency   3-5 times/wk  -ND      Predicted Duration of Therapy Intervention (days/wks)   10 days  -ND      Anticipated Discharge Disposition   skilled nursing facility  -ND      Pain Assessment    Pain Assessment (P)  Morrison-Cameron FACES  -MS  No/denies pain  -ND      Morrison-Cameron FACES Pain Rating (P)  6  -MS        Pain Type (P)  Acute pain;Chronic pain  -MS        Pain Location (P)  Leg  -MS        Pain Orientation (P)  Left  -MS        Pain Frequency (P)  Intermittent   With movement  -MS        Pain Intervention(s) (P)  Repositioned;Ambulation/increased activity  -MS        Response to Interventions (P)  tolerated  -MS        Vision Assessment/Intervention    Visual Impairment   WFL  -ND      Cognitive Assessment/Intervention    Current Cognitive/Communication Assessment (P)  functional  -MS  " functional  -ND      Orientation Status (P)  oriented x 4;required verbal cueing (specifiy in comments);time  -MS  oriented x 4  -ND      Follows Commands/Answers Questions (P)  100% of the time;able to follow multi-step instructions  -MS  100% of the time;able to follow single-step instructions;needs cueing;needs increased time  -ND      Personal Safety (P)  decreased awareness, need for assist;decreased awareness, need for safety;decreased insight to deficits  -MS  decreased awareness, need for safety;decreased insight to deficits;decreased awareness, need for assist  -ND      Personal Safety Interventions (P)  nonskid shoes/slippers when out of bed;supervised activity  -MS  fall prevention program maintained;muscle strengthening facilitated;nonskid shoes/slippers when out of bed;supervised activity  -ND      ROM (Range of Motion)    General ROM   upper extremity range of motion deficits identified  -ND      General ROM Detail (P)  See OT eval for UEs; Decreased B PF  -MS  Vadim shoulders impaired aprox 25%, elbows impaired approx 25%, 75% impaired digits  -ND      MMT (Manual Muscle Testing)    General MMT Assessment Detail (P)  Functionally 3/5  -MS  Functionally 3/5  -ND      Bed Mobility, Assessment/Treatment    Bed Mobility, Assistive Device (P)  bed rails;head of bed elevated  -MS  bed rails;head of bed elevated  -ND      Bed Mobility, Roll Left, [place] (P)  verbal cues required;minimum assist (75% patient effort)  -MS  verbal cues required;nonverbal cues required (demo/gesture);minimum assist (75% patient effort)  -ND      Bed Mobility, Roll Right, [place] (P)  verbal cues required;minimum assist (75% patient effort)  -MS  verbal cues required;nonverbal cues required (demo/gesture);minimum assist (75% patient effort)  -ND      Bed Mobility, Scoot/Bridge, [place] (P)  verbal cues required;moderate assist (50% patient effort)  -MS  verbal cues required;nonverbal cues required  (demo/gesture);moderate assist (50% patient effort)  -ND      Bed Mob, Supine to Sit, [place] (P)  verbal cues required;nonverbal cues required (demo/gesture);maximum assist (25% patient effort)  -MS  verbal cues required;nonverbal cues required (demo/gesture);maximum assist (25% patient effort)  -ND      Bed Mob, Sit to Supine, [place] (P)  verbal cues required;minimum assist (75% patient effort)   David at LEs  -MS  verbal cues required;nonverbal cues required (demo/gesture);minimum assist (75% patient effort)  -ND      Bed Mobility, Safety Issues (P)  decreased use of arms for pushing/pulling;decreased use of legs for bridging/pushing  -MS  decreased use of arms for pushing/pulling;decreased use of legs for bridging/pushing;impaired trunk control for bed mobility  -ND      Bed Mobility, Impairments (P)  strength decreased;pain  -MS  strength decreased;impaired balance;postural control impaired  -ND      Bed Mobility, Comment (P)  Pt demonstrates decreased motivation for mobility  -MS        Transfer Assessment/Treatment    Transfer, Comment (P)  Futher mobility deferred at this time. Pt sat up for 30-60 seconds before laying back down  -MS  Pt. deferred at this time  -ND      Lower Body Dressing Assessment/Training    LB Dressing Assess/Train, Clothing Type   doffing:;donning:;socks  -ND      LB Dressing Assess/Train, Position   edge of bed  -ND      LB Dressing Assess/Train, [place]   maximum assist (25% patient effort)  -ND      LB Dressing Assess/Train, Impairments   decreased flexibility;ROM decreased;impaired balance;strength decreased  -ND      Motor Skills/Interventions    Additional Documentation (P)  Balance Skills Training (Group)  -MS  Balance Skills Training (Group)  -ND      Balance Skills Training    Sitting-Level of Assistance (P)  Close supervision;Contact guard  -MS  Close supervision;Contact guard  -ND      Sitting-Balance Support (P)  Right upper extremity supported;Left upper  extremity supported  -MS  Right upper extremity supported;Left upper extremity supported;Feet supported  -ND      Sensory Assessment/Intervention    Sensory Impairment (P)  --   WFL per pt  -MS  --   WFL per pt.   -ND      Edema Management    Edema Amount (P)  minimal   LLE  -MS        General Therapy Interventions    Planned Therapy Interventions   activity intolerance;ADL retraining;balance training;bed mobility training;energy conservation;home exercise program;ROM (Range of Motion);strengthening;transfer training  -ND      Positioning and Restraints    Pre-Treatment Position (P)  in bed  -MS  in bed  -ND      Post Treatment Position (P)  bed  -MS  bed  -ND      In Bed (P)  notified nsg;fowlers;call light within reach;encouraged to call for assist;with nsg;side rails up x2  -MS  fowlers;call light within reach;encouraged to call for assist;with family/caregiver;side rails up x2  -ND        08/14/17 1312 08/13/17 2349 08/13/17 2345          Rehab Evaluation    Evaluation Not Performed patient unavailable for evaluation   Pt is in dialysis.  -MM        General Information    Equipment Currently Used at Home   walker, rolling  -RR      Living Environment    Lives With  alone  -RR       Living Arrangements  house  -RR       Home Accessibility  no concerns  -RR       Stair Railings at Home  none  -RR       Type of Financial/Environmental Concern  none  -RR       Transportation Available  car;family or friend will provide  -RR       Functional Level Prior    Ambulation   1-->assistive equipment  -RR      Transferring   0-->independent  -RR      Toileting   2-->assistive person  -RR      Bathing   2-->assistive person  -RR      Dressing   2-->assistive person  -RR      Eating   0-->independent  -RR      Communication   0-->understands/communicates without difficulty  -RR      Swallowing   0-->swallows foods/liquids without difficulty  -RR        User Key  (r) = Recorded By, (t) = Taken By, (c) = Cosigned By    Initials  Name Effective Dates    RR Lakisha CARRERA Ady, LPN 08/02/16 -     PB Yonas Morrison, PT DPT 08/02/16 -     KP Nataly Jack, BSW 09/15/16 -     MM Regan Pardo, OTR/L 10/21/16 -     ND Ailyn Gaston, OTR/L 10/21/16 -     MS Gena DEBI Garcia, PT Student 07/19/17 -            Occupational Therapy Education     Title: PT OT SLP Therapies (Active)     Topic: Occupational Therapy (Done)     Point: ADL training (Done)    Description: Instruct learner(s) on proper safety adaptation and remediation techniques during self care or transfers.   Instruct in proper use of assistive devices.    Learning Progress Summary    Learner Readiness Method Response Comment Documented by Status   Patient Acceptance E VU,NR Pt. educated on role of OT, benefit of activity, progression with poc ND 08/15/17 1339 Done               Point: Home exercise program (Done)    Description: Instruct learner(s) on appropriate technique for monitoring, assisting and/or progressing therapeutic exercises/activities.    Learning Progress Summary    Learner Readiness Method Response Comment Documented by Status   Patient Acceptance E VU,NR Pt. educated on role of OT, benefit of activity, progression with poc ND 08/15/17 1339 Done               Point: Body mechanics (Done)    Description: Instruct learner(s) on proper positioning and spine alignment during self-care, functional mobility activities and/or exercises.    Learning Progress Summary    Learner Readiness Method Response Comment Documented by Status   Patient Acceptance E VU,NR Pt. educated on role of OT, benefit of activity, progression with poc ND 08/15/17 1339 Done                      User Key     Initials Effective Dates Name Provider Type Discipline    ND 10/21/16 -  Ailyn Gaston, OTR/L Occupational Therapist OT                  OT Recommendation and Plan  Anticipated Discharge Disposition: skilled nursing facility  Planned Therapy Interventions: activity intolerance, ADL retraining,  balance training, bed mobility training, energy conservation, home exercise program, ROM (Range of Motion), strengthening, transfer training  Therapy Frequency: 3-5 times/wk  Plan of Care Review  Plan Of Care Reviewed With: patient  Progress: progress toward functional goals as expected  Outcome Summary/Follow up Plan: OT eval completed. Pt. not very motivated to participate in therapy. Eval was attempted in am this date and pt. deferred however, agreed in afternoon. Pt. Mod/Max for sit <>sup. Pt. Min A to roll L and R in bed. Pt. max A for LB dressing. Pt. cont to benefit from skilled OT d/t decreased balance, decreased strength, decreased act tolerance, decreased ind in ADLs. Anticipated dc is SNF. 8/15/17 1327          OT Goals       08/15/17 1343          Bed Mobility OT LTG    Bed Mobility OT LTG, Date Established 08/15/17  -ND      Bed Mobility OT LTG, Time to Achieve by discharge  -ND      Bed Mobility OT LTG, Activity Type all bed mobility  -ND      Bed Mobility OT LTG, [place] Level supervision required  -ND      Transfer Training OT LTG    Transfer Training OT LTG, Date Established 08/15/17  -ND      Transfer Training OT LTG, Time to Achieve by discharge  -ND      Transfer Training OT LTG, Activity Type bed to chair /chair to bed;sit to stand/stand to sit;toilet  -ND      Transfer Training OT LTG, [place] Level moderate assist (50% patient effort)  -ND      Transfer Training OT LTG, Assist Device walker, rolling;commode, bedside  -ND      Strength OT LTG    Strength Goal OT LTG, Date Established 08/15/17  -ND      Strength Goal OT LTG, Time to Achieve by discharge  -ND      Strength Goal OT LTG, Measure to Achieve Pt. will complete BUE strengthening exercises to increase BUE strength to 5/5 for ADls.   -ND      ADL OT LTG    ADL OT LTG, Date Established 08/15/17  -ND      ADL OT LTG, Time to Achieve by discharge  -ND      ADL OT LTG, Activity Type ADL skills  -ND      ADL OT LTG, [place]  Level mod assist  -ND      ADL OT LTG, Additional Goal AE PRN  -ND        User Key  (r) = Recorded By, (t) = Taken By, (c) = Cosigned By    Initials Name Provider Type    RITA Gaston OTR/L Occupational Therapist                Outcome Measures       08/15/17 1352 08/15/17 1306       How much help from another person do you currently need...    Turning from your back to your side while in flat bed without using bedrails?  (P)  3  -MS     Moving from lying on back to sitting on the side of a flat bed without bedrails?  (P)  2  -MS     Moving to and from a bed to a chair (including a wheelchair)?  (P)  2  -MS     Standing up from a chair using your arms (e.g., wheelchair, bedside chair)?  (P)  2  -MS     Climbing 3-5 steps with a railing?  (P)  1  -MS     To walk in hospital room?  (P)  1  -MS     AM-PAC 6 Clicks Score  (P)  11  -ND (r) MS (t)     How much help from another is currently needed...    Putting on and taking off regular lower body clothing? 2  -ND      Bathing (including washing, rinsing, and drying) 2  -ND      Toileting (which includes using toilet bed pan or urinal) 2  -ND      Putting on and taking off regular upper body clothing 2  -ND      Taking care of personal grooming (such as brushing teeth) 2  -ND      Eating meals 2  -ND      Score 12  -ND      Functional Assessment    Outcome Measure Options AM-PAC 6 Clicks Daily Activity (OT)  -ND (P)  AM-PAC 6 Clicks Basic Mobility (PT)  -MS       User Key  (r) = Recorded By, (t) = Taken By, (c) = Cosigned By    Initials Name Provider Type    RITA Gaston OTR/L Occupational Therapist    MS Gena Garcia, PT Student PT Student          Time Calculation:   OT Start Time: 1300 (30 MIN SPENT WITH pt. in am and chart review)  OT Stop Time: 1330  OT Time Calculation (min): 30 min    Therapy Charges for Today     Code Description Service Date Service Provider Modifiers Qty    [id number]  OT SELFCARE CURRENT 8/15/2017 Ailyn Gaston OTR/CATHIE  GO, CL 1    52645871453 HC OT SELFCARE PROJECTED 8/15/2017 Ailyn Gaston OTR/L GO, CK 1    [id number] HC OT EVAL MOD COMPLEXITY 4 8/15/2017 ZUHAIR Aquino/L GO, KX 1          OT G-codes  OT Functional Scales Options: AM-PAC 6 Clicks Daily Activity (OT)  Functional Limitation: Self care  Self Care Current Status (): At least 60 percent but less than 80 percent impaired, limited or restricted  Self Care Goal Status (): At least 40 percent but less than 60 percent impaired, limited or restricted    ZUHAIR Foley/CATHIE  8/15/2017

## 2017-08-15 NOTE — THERAPY EVALUATION
Acute Care - Physical Therapy Initial Evaluation  [facility]     Patient Name: Jennifer Marks  : 1945  MRN: [id number]  Today's Date: 8/15/2017   Onset of Illness/Injury or Date of Surgery Date: 17  Date of Referral to PT: 17  Referring Physician: FANNIE Minor      Admit Date: 2017     Visit Dx:    ICD-10-CM ICD-9-CM   1. Chronic kidney failure, unspecified stage N18.9 585.9   2. Decreased activities of daily living (ADL) Z78.9 V49.89   3. Impaired functional mobility, balance, gait, and endurance Z74.09 V49.89     Patient Active Problem List   Diagnosis   • Chest pain in adult   • Renal failure   • Hypotension   • Anemia   • Hypokalemia   • ESRD (end stage renal disease) on dialysis     Past Medical History:   Diagnosis Date   • A-fib    • Anemia, chronic renal failure    • Arthritis    • CAD (coronary artery disease)    • Chronic combined systolic and diastolic CHF (congestive heart failure)    • Chronic diarrhea    • DVT (deep venous thrombosis)    • ESRD (end stage renal disease) on dialysis    • History of noncompliance with medical treatment    • HLD (hyperlipidemia)    • Hypertension    • Hypothyroidism    • PAD (peripheral artery disease)    • Pulmonary HTN    • Renal failure    • Syncope    • Wegener's granulomatosis      Past Surgical History:   Procedure Laterality Date   • APPENDECTOMY     • ARTERIOVENOUS FISTULA LEG Left    • CATARACT EXTRACTION, BILATERAL     • CHOLECYSTECTOMY     • HERNIA REPAIR     • TUBAL ABDOMINAL LIGATION            PT ASSESSMENT (last 72 hours)      PT Evaluation       08/15/17 1306 08/15/17 0804    Rehab Evaluation    Document Type evaluation   see MAR  -PB (r) MS (t) PB (c) evaluation   see MAR  -PB (r) MS (t) PB (c)    Subjective Information agree to therapy;complains of;weakness;fatigue;swelling  -PB (r) MS (t) PB (c) agree to therapy;complains of;weakness;fatigue;pain;dyspnea  -PB (r) MS (t) PB (c)    Evaluation Not Performed   patient/family declined evaluation  -PB (r) MS (t) PB (c)    Evaluation Not Performed, Comment  Pt deferred at this time stating she is too weak, tired and painful. Stated did not want therapy right now even after being educated on the benefits of increasing activity. PT will check back later.  -PB (r) MS (t) PB (c)    Patient Effort, Rehab Treatment fair  -PB (r) MS (t) PB (c)     Symptoms Noted Comment Increased pain L leg during movement  -PB (r) MS (t) PB (c)     General Information    Patient Profile Review yes  -PB (r) MS (t) PB (c) yes  -PB (r) MS (t) PB (c)    Onset of Illness/Injury or Date of Surgery Date 08/13/17  -PB (r) MS (t) PB (c) 08/13/17  -PB (r) MS (t) PB (c)    Referring Physician FANNIE Minor  -PB (r) MS (t) PB (c) FANNIE Minor  -PB (r) MS (t) PB (c)    General Observations Fowlers, alert, telemetry  -PB (r) MS (t) PB (c) L sidelying, lethargic  -PB (r) MS (t) PB (c)    Pertinent History Of Current Problem Admitted chest pain radiating down back, n/v, SOB, Increased cough, ESRD with dialysis MWF, Dx: volume overload secondary to non compliance with dialysis, chest pain, abdominal pain, hypokalemia, ESRD, hypotension, CHF, AFIB, wegeners granulomatosis, anemia, lactic acidosis, hypothyroid, anorexia. H&H 8.1/25 XR chest- trena interstitial infiltrates/  -PB (r) MS (t) PB (c) Admitted chest pain radiating down back, n/v, SOB, Increased cough, ESRD with dialysis MWF, Dx: volume overload secondary to non compliance with dialysis, chest pain, abdominal pain, hypokalemia, ESRD, hypotension, CHF, AFIB, wegeners granulomatosis, anemia, lactic acidosis, hypothyroid, anorexia. H&H 8.1/25 XR chest- trena interstitial infiltrates/  -PB (r) MS (t) PB (c)    Precautions/Limitations fall precautions  -PB (r) MS (t) PB (c)     Prior Level of Function min assist:;mod assist:;all household mobility;ADL's;home management  -PB (r) MS (t) PB (c) min assist:;mod assist:;all household mobility;ADL's;home  "management  -PB (r) MS (t) PB (c)    Equipment Currently Used at Home walker, rolling;commode;grab bar  -PB (r) MS (t) PB (c) walker, rolling;commode;grab bar  -PB (r) MS (t) PB (c)    Plans/Goals Discussed With patient;agreed upon  -PB (r) MS (t) PB (c)     Risks Reviewed patient:;LOB;nausea/vomiting;dizziness;increased discomfort  -PB (r) MS (t) PB (c)     Benefits Reviewed patient:;improve function;increase independence;increase strength;increase balance;increase knowledge  -PB (r) MS (t) PB (c)     Barriers to Rehab medically complex;previous functional deficit  -PB (r) MS (t) PB (c)     Living Environment    Lives With alone  -PB (r) MS (t) PB (c) alone  -PB (r) MS (t) PB (c)    Living Arrangements apartment  -PB (r) MS (t) PB (c) apartment  -PB (r) MS (t) PB (c)    Home Accessibility tub/shower is not walk in  -PB (r) MS (t) PB (c) tub/shower is not walk in  -PB (r) MS (t) PB (c)    Stair Railings at Home none  -PB (r) MS (t) PB (c)     Living Environment Comment Daughter comes and helps \"every now and then\"  -PB (r) MS (t) PB (c) Daughter comes and helps \"every now and then\"  -PB (r) MS (t) PB (c)    Clinical Impression    Date of Referral to PT 08/14/17  -PB (r) MS (t) PB (c)     Patient/Family Goals Statement None stated at this time  -PB (r) MS (t) PB (c)     Criteria for Skilled Therapeutic Interventions Met yes;treatment indicated  -PB (r) MS (t) PB (c)     Rehab Potential fair, will monitor progress closely  -PB (r) MS (t) PB (c)     Predicted Duration of Therapy Intervention (days/wks) Until D/C  -PB (r) MS (t) PB (c)     Pain Assessment    Pain Assessment Morrison-Cameron FACES  -PB (r) MS (t) PB (c)     Morrison-Cameron FACES Pain Rating 6  -PB (r) MS (t) PB (c)     Pain Type Acute pain;Chronic pain  -PB (r) MS (t) PB (c)     Pain Location Leg  -PB (r) MS (t) PB (c)     Pain Orientation Left  -PB (r) MS (t) PB (c)     Pain Frequency Intermittent   With movement  -PB (r) MS (t) PB (c)     Pain Intervention(s) " Repositioned;Ambulation/increased activity  -PB (r) MS (t) PB (c)     Response to Interventions tolerated  -PB (r) MS (t) PB (c)     Cognitive Assessment/Intervention    Current Cognitive/Communication Assessment functional  -PB (r) MS (t) PB (c)     Orientation Status oriented x 4;required verbal cueing (specifiy in comments);time  -PB (r) MS (t) PB (c)     Follows Commands/Answers Questions 100% of the time;able to follow multi-step instructions  -PB (r) MS (t) PB (c)     Personal Safety decreased awareness, need for assist;decreased awareness, need for safety;decreased insight to deficits  -PB (r) MS (t) PB (c)     Personal Safety Interventions nonskid shoes/slippers when out of bed;supervised activity  -PB (r) MS (t) PB (c)     ROM (Range of Motion)    General ROM Detail See OT eval for UEs; Decreased B PF  -PB (r) MS (t) PB (c)     MMT (Manual Muscle Testing)    General MMT Assessment Detail Functionally 3/5  -PB (r) MS (t) PB (c)     Bed Mobility, Assessment/Treatment    Bed Mobility, Assistive Device bed rails;head of bed elevated  -PB (r) MS (t) PB (c)     Bed Mobility, Roll Left, [place] verbal cues required;minimum assist (75% patient effort)  -PB (r) MS (t) PB (c)     Bed Mobility, Roll Right, [place] verbal cues required;minimum assist (75% patient effort)  -PB (r) MS (t) PB (c)     Bed Mobility, Scoot/Bridge, [place] verbal cues required;moderate assist (50% patient effort)  -PB (r) MS (t) PB (c)     Bed Mob, Supine to Sit, [place] verbal cues required;nonverbal cues required (demo/gesture);maximum assist (25% patient effort)  -PB (r) MS (t) PB (c)     Bed Mob, Sit to Supine, [place] verbal cues required;minimum assist (75% patient effort)   David at LEs  -PB (r) MS (t) PB (c)     Bed Mobility, Safety Issues decreased use of arms for pushing/pulling;decreased use of legs for bridging/pushing  -PB (r) MS (t) PB (c)     Bed Mobility, Impairments strength decreased;pain  -PB (r) MS  (t) PB (c)     Bed Mobility, Comment Pt demonstrates decreased motivation for mobility  -PB (r) MS (t) PB (c)     Transfer Assessment/Treatment    Transfer, Comment Futher mobility deferred at this time. Pt sat up for 30-60 seconds before laying back down  -PB (r) MS (t) PB (c)     Motor Skills/Interventions    Additional Documentation Balance Skills Training (Group)  -PB (r) MS (t) PB (c)     Balance Skills Training    Sitting-Level of Assistance Close supervision;Contact guard  -PB (r) MS (t) PB (c)     Sitting-Balance Support Right upper extremity supported;Left upper extremity supported  -PB (r) MS (t) PB (c)     Sensory Assessment/Intervention    Sensory Impairment --   WFL per pt  -PB (r) MS (t) PB (c)     Edema Management    Edema Amount minimal   LLE  -PB (r) MS (t) PB (c)     Positioning and Restraints    Pre-Treatment Position in bed  -PB (r) MS (t) PB (c)     Post Treatment Position bed  -PB (r) MS (t) PB (c)     In Bed notified nsg;fowlers;call light within reach;encouraged to call for assist;with nsg;side rails up x2  -PB (r) MS (t) PB (c)       08/15/17 0740 08/14/17 1400    Rehab Evaluation    Document Type evaluation   See MAR  -ND     Subjective Information agree to therapy;complains of;weakness;fatigue  -ND     Evaluation Not Performed --  -ND patient unavailable for evaluation   pt is in dialysis  -PB (r) MS (t) PB (c)    Evaluation Not Performed, Comment --  -ND     Symptoms Noted Comment 34 min spent checking on pt. in am trying to participate in therapy and chart review time  -ND     General Information    Patient Profile Review yes  -ND     Onset of Illness/Injury or Date of Surgery Date 08/13/17  -ND     Referring Physician FANNIE Weiss  -ND     General Observations Fowlers, alert, IV   -ND     Pertinent History Of Current Problem Admitted chest pain radiating down back, n/v, SOB, Increased cough, ESRD with dialysis MWF, Dx: volume overload secondary to non compliance with  "dialysis, chest pain, abdominal pain, hypokalemia, ESRD, hypotension, CHF, AFIB, wegeners granulomatosis, anemia, lactic acidosis, hypothyroid, anorexia. H&H 8.1/25 XR chest- trena interstitial infiltrates/  -ND     Precautions/Limitations fall precautions  -ND     Prior Level of Function min assist:;mod assist:;all household mobility;ADL's  -ND     Equipment Currently Used at Home walker, rolling;wheelchair  -ND     Plans/Goals Discussed With patient;agreed upon  -ND     Risks Reviewed patient:;LOB;nausea/vomiting;dizziness;increased discomfort;change in vital signs;increased drainage;lines disloged  -ND     Benefits Reviewed patient:;improve function;increase independence;increase strength;decrease pain;increase balance;decrease risk of DVT;improve skin integrity;increase knowledge  -ND     Barriers to Rehab medically complex;previous functional deficit;physical barrier  -ND     Living Environment    Lives With child(fe), adult   daughter and grandaugther stay with pt.   -ND     Living Arrangements house  -ND     Home Accessibility tub/shower is not walk in  -ND     Living Environment Comment Per pt daughter comes and assist \"every now and then\"  -ND     Pain Assessment    Pain Assessment No/denies pain  -ND     Vision Assessment/Intervention    Visual Impairment WFL  -ND     Cognitive Assessment/Intervention    Current Cognitive/Communication Assessment functional  -ND     Orientation Status oriented x 4  -ND     Follows Commands/Answers Questions 100% of the time;able to follow single-step instructions;needs cueing;needs increased time  -ND     Personal Safety decreased awareness, need for safety;decreased insight to deficits;decreased awareness, need for assist  -ND     Personal Safety Interventions fall prevention program maintained;muscle strengthening facilitated;nonskid shoes/slippers when out of bed;supervised activity  -ND     ROM (Range of Motion)    General ROM upper extremity range of motion deficits " identified  -ND     General ROM Detail Vadim shoulders impaired aprox 25%, elbows impaired approx 25%, 75% impaired digits  -ND     MMT (Manual Muscle Testing)    General MMT Assessment Detail Functionally 3/5  -ND     Bed Mobility, Assessment/Treatment    Bed Mobility, Assistive Device bed rails;head of bed elevated  -ND     Bed Mobility, Roll Left, [place] verbal cues required;nonverbal cues required (demo/gesture);minimum assist (75% patient effort)  -ND     Bed Mobility, Roll Right, [place] verbal cues required;nonverbal cues required (demo/gesture);minimum assist (75% patient effort)  -ND     Bed Mobility, Scoot/Bridge, [place] verbal cues required;nonverbal cues required (demo/gesture);moderate assist (50% patient effort)  -ND     Bed Mob, Supine to Sit, [place] verbal cues required;nonverbal cues required (demo/gesture);maximum assist (25% patient effort)  -ND     Bed Mob, Sit to Supine, [place] verbal cues required;nonverbal cues required (demo/gesture);minimum assist (75% patient effort)  -ND     Bed Mobility, Safety Issues decreased use of arms for pushing/pulling;decreased use of legs for bridging/pushing;impaired trunk control for bed mobility  -ND     Bed Mobility, Impairments strength decreased;impaired balance;postural control impaired  -ND     Transfer Assessment/Treatment    Transfer, Comment Pt. deferred at this time  -ND     Motor Skills/Interventions    Additional Documentation Balance Skills Training (Group)  -ND     Balance Skills Training    Sitting-Level of Assistance Close supervision;Contact guard  -ND     Sitting-Balance Support Right upper extremity supported;Left upper extremity supported;Feet supported  -ND     Sensory Assessment/Intervention    Sensory Impairment --   WFL per pt.   -ND     Positioning and Restraints    Pre-Treatment Position in bed  -ND     Post Treatment Position bed  -ND     In Bed fowlers;call light within reach;encouraged to call for  assist;with family/caregiver;side rails up x2  -ND       08/14/17 1324 08/14/17 1312    Rehab Evaluation    Evaluation Not Performed  patient unavailable for evaluation   Pt is in dialysis.  -MM    General Information    Equipment Currently Used at Home walker, rolling;wheelchair;walker, standard  -KP     Living Environment    Lives With child(fe), adult;other (see comments)   Daughter and grandchildren stay with patient in her home.   -KP     Living Arrangements house  -KP     Home Accessibility no concerns  -KP     Transportation Available family or friend will provide;car;public transportation  -KP       08/13/17 2349 08/13/17 2345    General Information    Equipment Currently Used at Home  walker, rolling  -RR    Living Environment    Lives With alone  -RR     Living Arrangements house  -RR     Home Accessibility no concerns  -RR     Stair Railings at Home none  -RR     Type of Financial/Environmental Concern none  -RR     Transportation Available car;family or friend will provide  -RR       User Key  (r) = Recorded By, (t) = Taken By, (c) = Cosigned By    Initials Name Provider Type    RR Lakisha Garsia, YAZMIN Licensed Nurse    ESEQUIEL Morrison, PT DPT Physical Therapist     Nataly Jack, BSW     MM Regan Pardo, OTR/L Occupational Therapist    ND Ailyn Gaston, OTR/L Occupational Therapist    MS Gena Garcia, PT Student PT Student          Physical Therapy Education     Title: PT OT SLP Therapies (Active)     Topic: Physical Therapy (Active)     Point: Mobility training (Done)    Learning Progress Summary    Learner Readiness Method Response Comment Documented by Status   Patient Acceptance E VU,NR Benefits of activity, goals for therapy, use of bed rails supine to sit t/fs MS 08/15/17 1352 Done               Point: Home exercise program (Done)    Learning Progress Summary    Learner Readiness Method Response Comment Documented by Status   Patient Acceptance E VU,NR Supine LE  exercises MS 08/15/17 1353 Done               Point: Body mechanics (Done)    Learning Progress Summary    Learner Readiness Method Response Comment Documented by Status   Patient Acceptance E VU,NR Benefits of activity, goals for therapy, use of bed rails supine to sit t/fs MS 08/15/17 1352 Done                      User Key     Initials Effective Dates Name Provider Type Discipline    MS 07/19/17 -  Gena Garcia, PT Student PT Student PT                PT Recommendation and Plan  Anticipated Discharge Disposition: skilled nursing facility  Planned Therapy Interventions: balance training, bed mobility training, gait training, home exercise program, patient/family education, strengthening, transfer training  PT Frequency: daily, 2 times/day, per priority policy  Plan of Care Review  Plan Of Care Reviewed With: patient  Outcome Summary/Follow up Plan: PT eval complete. Pt maxA sit to supine and modA supine to sit. Pt David rolling in bed and modA x2 scooting HOB. Pt deferred further mobility at this time due to fatigue. Pt sat EOB with CGA for 30-60 sec then requested to lay back down. Pt demonstrates decreased motivation for mobility. Pt would benefit from PT at this time to address functional mobility, strength, balance, activity tolerance and education to increase safety. Anticipate D/C to SNF.          IP PT Goals       08/15/17 1355          Bed Mobility PT LTG    Bed Mobility PT LTG, Time to Achieve by discharge  -PB (r) MS (t) PB (c)      Bed Mobility PT LTG, Activity Type all bed mobility  -PB (r) MS (t) PB (c)      Bed Mobility PT LTG, [place] Level conditional independence  -PB (r) MS (t) PB (c)      Bed Mobility PT Goal  LTG, Assist Device bed rails   HOB elevated  -PB (r) MS (t) PB (c)      Transfer Training PT LTG    Transfer Training PT LTG, Date Established 08/15/17  -PB (r) MS (t) PB (c)      Transfer Training PT LTG, Time to Achieve by discharge  -PB (r) MS (t) PB (c)      Transfer Training  PT LTG, Activity Type bed to chair /chair to bed;sit to stand/stand to sit  -PB (r) MS (t) PB (c)      Transfer Training PT LTG, [place] Level minimum assist (75% patient effort)  -PB (r) MS (t) PB (c)      Transfer Training PT LTG, Assist Device walker, rolling  -PB (r) MS (t) PB (c)      Gait Training PT LTG    Gait Training Goal PT LTG, Date Established 08/15/17  -PB (r) MS (t) PB (c)      Gait Training Goal PT LTG, Time to Achieve by discharge  -PB (r) MS (t) PB (c)      Gait Training Goal PT LTG, [place] Level contact guard assist  -PB (r) MS (t) PB (c)      Gait Training Goal PT LTG, Assist Device walker, rolling  -PB (r) MS (t) PB (c)      Gait Training Goal PT LTG, Distance to Achieve 15' x2  -PB (r) MS (t) PB (c)      Strength Goal PT LTG    Strength Goal PT LTG, Date Established 08/15/17  -PB (r) MS (t) PB (c)      Strength Goal PT LTG, Time to Achieve by discharge  -PB (r) MS (t) PB (c)      Strength Goal PT LTG, Functional Goal Supine and sitting LE exercises x20 reps  -PB (r) MS (t) PB (c)        User Key  (r) = Recorded By, (t) = Taken By, (c) = Cosigned By    Initials Name Provider Type    PB Yonas Morrison, PT DPT Physical Therapist    MS Gena Garcia, PT Student PT Student                Outcome Measures       08/15/17 1352 08/15/17 1306       How much help from another person do you currently need...    Turning from your back to your side while in flat bed without using bedrails?  3  -PB (r) MS (t) PB (c)     Moving from lying on back to sitting on the side of a flat bed without bedrails?  2  -PB (r) MS (t) PB (c)     Moving to and from a bed to a chair (including a wheelchair)?  2  -PB (r) MS (t) PB (c)     Standing up from a chair using your arms (e.g., wheelchair, bedside chair)?  2  -PB (r) MS (t) PB (c)     Climbing 3-5 steps with a railing?  1  -PB (r) MS (t) PB (c)     To walk in hospital room?  1  -PB (r) MS (t) PB (c)     AM-PAC 6 Clicks Score  11  -PB (r) MS (t)     How  much help from another is currently needed...    Putting on and taking off regular lower body clothing? 2  -ND      Bathing (including washing, rinsing, and drying) 2  -ND      Toileting (which includes using toilet bed pan or urinal) 2  -ND      Putting on and taking off regular upper body clothing 2  -ND      Taking care of personal grooming (such as brushing teeth) 2  -ND      Eating meals 2  -ND      Score 12  -ND      Functional Assessment    Outcome Measure Options AM-PAC 6 Clicks Daily Activity (OT)  -ND AM-PAC 6 Clicks Basic Mobility (PT)  -PB (r) MS (t) PB (c)       User Key  (r) = Recorded By, (t) = Taken By, (c) = Cosigned By    Initials Name Provider Type    ESEQUIEL Morrison, PT DPT Physical Therapist    RITA Gaston, OTR/L Occupational Therapist    MS Gena Garcia, PT Student PT Student           Time Calculation:         PT Charges       08/15/17 1354          Time Calculation    Start Time 1306   Additional time from [phone]  -PB (r) MS (t) PB (c)      Stop Time 1326  -PB (r) MS (t) PB (c)      Time Calculation (min) 20 min  -PB (r) MS (t)      PT Received On 08/15/17  -PB (r) MS (t) PB (c)      PT Goal Re-Cert Due Date 08/25/17  -PB (r) MS (t) PB (c)        User Key  (r) = Recorded By, (t) = Taken By, (c) = Cosigned By    Initials Name Provider Type    ESEQUIEL Morrison, PT DPT Physical Therapist    MS Gena Garcia, PT Student PT Student          Therapy Charges for Today     Code Description Service Date Service Provider Modifiers Qty    [id number]  PT EVAL MOD COMPLEXITY 4 8/15/2017 Gena Garcia, PT Student GP, KX 1          PT G-Codes  Outcome Measure Options: AM-PAC 6 Clicks Daily Activity (OT)  Score: 11  Functional Limitation: Mobility: Walking and moving around  Mobility: Walking and Moving Around Current Status (): At least 60 percent but less than 80 percent impaired, limited or restricted  Mobility: Walking and Moving Around Goal Status (): At least 40  percent but less than 60 percent impaired, limited or restricted      Gena Garcia, PT Student  8/15/2017

## 2017-08-15 NOTE — PLAN OF CARE
Problem: Patient Care Overview (Adult)  Goal: Plan of Care Review  Outcome: Ongoing (interventions implemented as appropriate)    08/15/17 1343   Coping/Psychosocial Response Interventions   Plan Of Care Reviewed With patient   Patient Care Overview   Progress progress toward functional goals as expected   Outcome Evaluation   Outcome Summary/Follow up Plan OT eval completed. Pt. not very motivated to participate in therapy. Eval was attempted in am this date and pt. deferred however, agreed in afternoon. Pt. Mod/Max for sit <>sup. Pt. Min A to roll L and R in bed. Pt. max A for LB dressing. Pt. cont to benefit from skilled OT d/t decreased balance, decreased strength, decreased act tolerance, decreased ind in ADLs. Anticipated dc is SNF. 8/15/17 1327         Problem: Inpatient Occupational Therapy  Goal: Bed Mobility Goal LTG- OT  Outcome: Ongoing (interventions implemented as appropriate)    08/15/17 1343   Bed Mobility OT LTG   Bed Mobility OT LTG, Date Established 08/15/17   Bed Mobility OT LTG, Time to Achieve by discharge   Bed Mobility OT LTG, Activity Type all bed mobility   Bed Mobility OT LTG, [place] Level supervision required       Goal: Transfer Training Goal 1 LTG- OT  Outcome: Ongoing (interventions implemented as appropriate)    08/15/17 1343   Transfer Training OT LTG   Transfer Training OT LTG, Date Established 08/15/17   Transfer Training OT LTG, Time to Achieve by discharge   Transfer Training OT LTG, Activity Type bed to chair /chair to bed;sit to stand/stand to sit;toilet   Transfer Training OT LTG, [place] Level moderate assist (50% patient effort)   Transfer Training OT LTG, Assist Device walker, rolling;commode, bedside       Goal: Strength Goal LTG- OT  Outcome: Ongoing (interventions implemented as appropriate)    08/15/17 1343   Strength OT LTG   Strength Goal OT LTG, Date Established 08/15/17   Strength Goal OT LTG, Time to Achieve by discharge   Strength Goal OT LTG, Measure  to Achieve Pt. will complete BUE strengthening exercises to increase BUE strength to 5/5 for ADls.        Goal: ADL Goal LTG- OT  Outcome: Ongoing (interventions implemented as appropriate)    08/15/17 1343   ADL OT LTG   ADL OT LTG, Date Established 08/15/17   ADL OT LTG, Time to Achieve by discharge   ADL OT LTG, Activity Type ADL skills   ADL OT LTG, [place] Level mod assist   ADL OT LTG, Additional Goal AE PRN

## 2017-08-15 NOTE — PROGRESS NOTES
"    [facility] Medicine Services  INPATIENT PROGRESS NOTE    Length of Stay: 2  Date of Admission: 8/13/2017  Primary Care Physician: No Known Provider    Subjective   Chief Complaint: follow up chest pain   HPI   Pt states she is \"sick in her chest.\" States she had problems with chest pain with hypotension during dialysis yesterday and has been \"sick\" every since. Denies abdominal pain, but states in her lower chest area is tender (more epigastric.). No diarrhea. Pt refused labs this am. I explained her troponin was mildly elevated yesterday and she needs to have her labs repeated. Not sure if she is to have dialysis today or not. Pt states she just wants to take a bath and needs help.     Review of Systems   All pertinent negatives and positives are as above. All other systems have been reviewed and are negative unless otherwise stated.     Objective    Temp:  [96.9 °F (36.1 °C)-98.1 °F (36.7 °C)] 96.9 °F (36.1 °C)  Heart Rate:  [87-98] 98  Resp:  [16-18] 18  BP: ()/(45-62) 80/50  Physical Exam   Constitutional: She is oriented to person, place, and time. She appears well-developed and well-nourished.   HENT:   Head: Normocephalic and atraumatic.   Eyes: Conjunctivae and EOM are normal. Pupils are equal, round, and reactive to light.   Neck: Neck supple. No JVD present. No thyromegaly present.   Cardiovascular: Normal rate, regular rhythm, normal heart sounds and intact distal pulses.  Exam reveals no gallop and no friction rub.    No murmur heard.  Chest wall tenderness.    Pulmonary/Chest: Effort normal and breath sounds normal. No respiratory distress. She has no wheezes. She has no rales. She exhibits no tenderness.   Abdominal: Soft. Bowel sounds are normal. She exhibits no distension. There is tenderness (epigastric tenderness. ). There is no rebound and no guarding.   Musculoskeletal: Normal range of motion. She exhibits no edema, tenderness or deformity. "   Lymphadenopathy:     She has no cervical adenopathy.   Neurological: She is alert and oriented to person, place, and time. She displays normal reflexes. No cranial nerve deficit. She exhibits normal muscle tone.   Skin: Skin is warm and dry. No rash noted.   Psychiatric: She has a normal mood and affect. Her behavior is normal. Judgment and thought content normal.     Results Review:  I have reviewed the labs, radiology results, and diagnostic studies.    Laboratory Data:     Results from last 7 days  Lab Units 08/13/17  1840   WBC 10*3/mm3 6.87   HEMOGLOBIN g/dL 8.1*   HEMATOCRIT % 25.0*   PLATELETS 10*3/mm3 283        Results from last 7 days  Lab Units 08/13/17  2202 08/13/17  1940   SODIUM mmol/L  --  141   POTASSIUM mmol/L 2.4* 2.9*   CHLORIDE mmol/L  --  105   CO2 mmol/L  --  18.0*   BUN mg/dL  --  76*   CREATININE mg/dL  --  14.24*   CALCIUM mg/dL  --  8.5   BILIRUBIN mg/dL  --  1.0   ALK PHOS U/L  --  132*   ALT (SGPT) U/L  --  30   AST (SGOT) U/L  --  27   GLUCOSE mg/dL  --  79     Radiology Data:   Imaging Results (last 24 hours)     ** No results found for the last 24 hours. **        I have reviewed the patient current medications.     Assessment/Plan   Assessment:  1. Volume overload secondary to non-compliance with hemodialysis  2. Chest pain with mildly elevated troponin-. No acute ST segment changes  3. Abdominal pain with nausea/vomiting/chronic diarrhea  4. Hypokalemia  5. End stage renal disease on maintenance hemodialysis [facility]  6. Hypotension-midodrine  7. 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 Paroxysmal atrial fibrillation-no anticoagulation  9. Wegener's Granulomatosis-chronic prednisone therapy  10. Chronic anemia  11. Mild lactic acidosis  12. Hypothyroidism-on home synthroid  13. Anorexia-Megace at home.      Plan:  1. Recheck cbc, bmp, troponin this am. Pt is agreeable to have labs drawn  2. Physical  and occupational therapy consults.   3. Pt is taking protonix. ?add H2 blocker  4. Will await this am labs.     Discharge Planning: I expect patient to be discharged to skilled nursing facility in ? days.    FANNIE Weiss   08/15/17   8:12 AM     I personally evaluated and examined the patient in conjunction with FANNIE Mishra and agree with the assessment, treatment plan, and disposition of the patient as recorded by her. My history, exam, and further recommendations are: Patient's primary complaint today is pain.  Cardiology did evaluate her for similar symptoms during her previous hospitalization in June.  When asked to localize her pain, she points more toward the midepigastric region.  In fact, when I ask her what makes her pain worse, she reports that eating seems to exacerbate her symptoms.  She denies having any reflux or dyspepsia symptoms.  She does report having some nausea and vomiting.  In fact, she currently has an emesis bag at her bedside and I have noticed 1 whole pill present inside the bag, but no emesis is seen.  She says her stomach is been upset, and spit this pill out given concern that would exacerbate her symptoms.  She has also continued to refuse labs which has made things difficult, particularly for our nephrologists were managing her dialysis.  Patient more agreeable to getting labs this morning.  She has been on a PPI.  I am going to order an upper GI study today for further evaluation; given her recent history of noncompliance I doubt she would be agreeable to GI consultation for endoscopic evaluation.    Bora Luciano MD  08/15/17  10:21 AM

## 2017-08-15 NOTE — PROGRESS NOTES
"Continued Stay Note  [facility]     Patient Name: Jennifer Marks  MRN: [id number]  Today's Date: 8/15/2017    Admit Date: 8/13/2017          Discharge Plan       08/15/17 1102    Case Management/Social Work Plan    Plan Possibly [facility]    Additional Comments Amira with [facility] called to inform  that patient only has Medicare part B and will not be able to admit to [facility] as they do not have a Medicaid bed to take patient \"medicaid pending\".  spoke to Adenike at [facility] and she states that [facility] could potentially take patient \"medicaid pending\" but will need to speak to patient's daughter and work details out re this. Adenike confirmed she is currently working on this and will call  with updates.               Discharge Codes     None            ANUPAMA Jiang    "

## 2017-08-15 NOTE — PROGRESS NOTES
Continued Stay Note   [facility]     Patient Name: Jennifer Marks  MRN: [id number]  Today's Date: 8/15/2017    Admit Date: 8/13/2017          Discharge Plan       08/15/17 7199    Case Management/Social Work Plan    Plan Probably [facility]    Additional Comments SW spoke to Adenike at [facility] again re status of referral. Adenike states that she is still working on patient's case as patient will be admitting there (if offically accepted) as a medicaid pending patient. Adenike was able to discuss this with patient's daughter, Rupa, re this and Rupa did understand medicaid pending requirements. Adenike states that she was waiting on PT and OT evaluations to make sure that patient would meet level of care for at least 30 days. PT and OT evals were completed at 1400. NATHAN advised Aednike of this. Adenike states patient will likely be accepted and plans to come speak to patient in room tomorrow. SS to continue to follow.               Discharge Codes     None            ANUPAMA Jiang

## 2017-08-15 NOTE — PROGRESS NOTES
Nephrology ([facility]) Progress Note      Patient:  Jennifer Marks  YOB: 1945  Date of Service: 8/15/2017  MRN: [id number]   Acct: [id number]   Primary Care Physician: No Known Provider  Advance Directive: Full Code  Admit Date: 8/13/2017       Hospital Day: 2  Referring Provider: Pino Tang MD      Patient personally seen and examined.  Complete chart including Consults, Notes, Operative Reports, Labs, Cardiology, and Radiology studies reviewed as able.        Subjective:  Jennifer Marks is a 72 y.o. female  whom we were consulted for end stage renal disease.  On hemodialysis Monday Wednesday Friday.  Has missed about one week of treatments due to not feeling well.  Long history of noncompliance with dialysis.  Admitted with abdominal pain, nausea/vomiting, and hypokalemia.  Given potassium replacement IV after admission.  Tolerated dialysis yesterday (8/14).  Today is feeling about the same; complaint of epigastric tenderness/discomfort.      Allergies:  Heparin; Iron; Latex; Levaquin [levofloxacin]; Shrimp (diagnostic); and Vancomycin    Home Meds:  Prescriptions Prior to Admission   Medication Sig Dispense Refill Last Dose   • levothyroxine (SYNTHROID, LEVOTHROID) 50 MCG tablet Take 1 tablet by mouth Daily. 30 tablet 2 Past Week at Unknown time   • midodrine (PROAMATINE) 5 MG tablet Take 1 tablet by mouth 3 (Three) Times a Day Before Meals. 90 tablet 2 Past Month at Unknown time   • pantoprazole (PROTONIX) 40 MG EC tablet Take 40 mg by mouth Daily.   Past Month at Unknown time   • traZODone (DESYREL) 50 MG tablet Take 50 mg by mouth Every Night.   Past Week at Unknown time   • megestrol (MEGACE) 40 MG/ML suspension Take 10 mL by mouth Daily. 480 mL 1 Unknown at Unknown time       Medicines:  Current Facility-Administered Medications   Medication Dose Route Frequency Provider Last Rate Last Dose   • acetaminophen (TYLENOL) tablet 1,000 mg  1,000 mg Oral Q4H PRN  Dg Ball MD       • acetaminophen (TYLENOL) tablet 650 mg  650 mg Oral Q6H PRN Pino Tang MD       • albumin human 25 % IV SOLN 12.5 g  12.5 g Intravenous PRN Dg Ball MD       • aspirin EC tablet 81 mg  81 mg Oral Daily Pino Tang MD   81 mg at 08/15/17 0921   • dextrose (D50W) solution 25 g  25 g Intravenous Q15 Min PRN Pino Tang MD       • dextrose (GLUTOSE) oral gel 15 g  15 g Oral Q15 Min PRN Pino Tang MD       • diphenhydrAMINE (BENADRYL) capsule 25 mg  25 mg Oral Q4H PRN Dg Ball MD        Or   • diphenhydrAMINE (BENADRYL) injection 25 mg  25 mg Intravenous Q4H PRN Dg Ball MD       • epoetin nikko (EPOGEN,PROCRIT) injection 10,000 Units  10,000 Units Subcutaneous Once per day on Mon Wed Fri Dg Ball MD   10,000 Units at 08/14/17 1850   • glucagon (human recombinant) (GLUCAGEN DIAGNOSTIC) injection 1 mg  1 mg Subcutaneous Q15 Min PRN Pino Tang MD       • insulin lispro (humaLOG) injection 2-7 Units  2-7 Units Subcutaneous 4x Daily With Meals & Nightly Pino Tang MD       • ipratropium-albuterol (DUO-NEB) nebulizer solution 3 mL  3 mL Nebulization Q4H PRN Pino Tang MD       • levothyroxine (SYNTHROID, LEVOTHROID) tablet 50 mcg  50 mcg Oral Daily Pino Tang MD   50 mcg at 08/15/17 0921   • ondansetron (ZOFRAN) injection 4 mg  4 mg Intravenous Q6H PRN Pino Tang MD       • ondansetron (ZOFRAN) injection 4 mg  4 mg Intravenous Q2H PRN Dg Ball MD   4 mg at 08/14/17 1535   • pantoprazole (PROTONIX) EC tablet 40 mg  40 mg Oral Daily Pino Tang MD   40 mg at 08/15/17 0921   • predniSONE (DELTASONE) tablet 10 mg  10 mg Oral Daily With Breakfast Pino Tang MD   10 mg at 08/15/17 0921   • sodium chloride 0.9 % bolus 100 mL  100 mL Intravenous PRN Dg Ball MD       • sodium chloride 0.9 % flush 1-10 mL  1-10 mL Intravenous PRN Pino Tang MD       • sodium chloride injection 40  mEq  10 mL Intravenous Q5 Min PRN Dg Ball MD           Past Medical History:  Past Medical History:   Diagnosis Date   • A-fib    • Anemia, chronic renal failure    • Arthritis    • CAD (coronary artery disease)    • Chronic combined systolic and diastolic CHF (congestive heart failure)    • Chronic diarrhea    • DVT (deep venous thrombosis)    • ESRD (end stage renal disease) on dialysis    • History of noncompliance with medical treatment    • HLD (hyperlipidemia)    • Hypertension    • Hypothyroidism    • PAD (peripheral artery disease)    • Pulmonary HTN    • Renal failure    • Syncope    • Wegener's granulomatosis        Past Surgical History:  Past Surgical History:   Procedure Laterality Date   • APPENDECTOMY     • ARTERIOVENOUS FISTULA LEG Left    • CATARACT EXTRACTION, BILATERAL     • CHOLECYSTECTOMY     • HERNIA REPAIR     • TUBAL ABDOMINAL LIGATION         Family History  History reviewed. No pertinent family history.    Social History  Social History     Social History   • Marital status: Single     Spouse name: N/A   • Number of children: N/A   • Years of education: N/A     Occupational History   • Not on file.     Social History Main Topics   • Smoking status: Former Smoker   • Smokeless tobacco: Not on file   • Alcohol use No   • Drug use: No   • Sexual activity: Defer     Other Topics Concern   • Not on file     Social History Narrative   • No narrative on file         Review of Systems:  History obtained from chart review and the patient  General ROS: No fever or chills  Respiratory ROS: positive for - shortness of breath  Cardiovascular ROS: no chest pain or dyspnea on exertion  Gastrointestinal ROS: epigastric pain  Genito-Urinary ROS: No dysuria or hematuria  14 point ROS reviewed with the patient and negative except as noted above and in the HPI unless unable to obtain.    Objective:  BP 96/52 (BP Location: Left arm, Patient Position: Lying)  Pulse 95  Temp 97 °F (36.1 °C)  "(Temporal Artery )   Resp 18  Ht 62\" (157.5 cm)  Wt 140 lb 1.6 oz (63.5 kg)  SpO2 92%  BMI 25.62 kg/m2    Intake/Output Summary (Last 24 hours) at 08/15/17 1213  Last data filed at 08/14/17 1300   Gross per 24 hour   Intake              240 ml   Output                0 ml   Net              240 ml     General: awake/alert   Chest:  clear to auscultation bilaterally without respiratory distress  CVS: regular rate and rhythm  Abdominal: soft, nontender, normal bowel sounds  Extremities: bilateral 1+ edema  Skin: warm and dry without rash      Labs:    Results from last 7 days  Lab Units 08/13/17  1840   WBC 10*3/mm3 6.87   HEMOGLOBIN g/dL 8.1*   HEMATOCRIT % 25.0*   PLATELETS 10*3/mm3 283           Results from last 7 days  Lab Units 08/13/17  2202 08/13/17  1940   SODIUM mmol/L  --  141   POTASSIUM mmol/L 2.4* 2.9*   CHLORIDE mmol/L  --  105   CO2 mmol/L  --  18.0*   BUN mg/dL  --  76*   CREATININE mg/dL  --  14.24*   CALCIUM mg/dL  --  8.5   BILIRUBIN mg/dL  --  1.0   ALK PHOS U/L  --  132*   ALT (SGPT) U/L  --  30   AST (SGOT) U/L  --  27   GLUCOSE mg/dL  --  79       Radiology:   Imaging Results (last 72 hours)     Procedure Component Value Units Date/Time    XR Chest 2 View [[id number]] Collected:  08/13/17 1913     Updated:  08/13/17 1917    Narrative:       EXAMINATION:   XR CHEST 2 VW-  8/13/2017 7:13 PM CDT     HISTORY: Chest pain     PA and lateral views the chest obtained. Bilateral interstitial  infiltrates are present. This is superimposed on chronic parenchymal  change. Most likely this is early pulmonary vascular congestion  superimposed on chronic change. Cardiac silhouettes moderately enlarged.  Atelectasis left lower lobe is noted.     IMPRESSION bilateral interstitial infiltrates most likely representing  mild pulmonary vascular congestion superimposed on chronic change.  2. Atelectasis left lower lobe  This report was finalized on 08/13/2017 19:14 by Dr. Herson Magaña MD.          Culture:   "       Assessment   1.  End stage renal disease on hemodialysis  2.  Hypokalemia  3.  Nausea and vomiting  4.  Anemia of chronic kidney disease    Plan:  1.  AM lab results still pending  2. Maintenance dialysis Monday Wednesday Friday        Jaden Tovar, APRN  8/15/2017  12:13 PM

## 2017-08-15 NOTE — PLAN OF CARE
Problem: Patient Care Overview (Adult)  Goal: Plan of Care Review  Outcome: Ongoing (interventions implemented as appropriate)    08/15/17 1355   Coping/Psychosocial Response Interventions   Plan Of Care Reviewed With patient   Outcome Evaluation   Outcome Summary/Follow up Plan PT eval complete. Pt maxA sit to supine and modA supine to sit. Pt David rolling in bed and modA x2 scooting HOB. Pt deferred further mobility at this time due to fatigue. Pt sat EOB with CGA for 30-60 sec then requested to lay back down. Pt demonstrates decreased motivation for mobility. Pt would benefit from PT at this time to address functional mobility, strength, balance, activity tolerance and education to increase safety. Anticipate D/C to SNF.         Problem: Inpatient Physical Therapy  Goal: Bed Mobility Goal LTG- PT  Outcome: Ongoing (interventions implemented as appropriate)    08/15/17 1355   Bed Mobility PT LTG   Bed Mobility PT LTG, Time to Achieve by discharge   Bed Mobility PT LTG, Activity Type all bed mobility   Bed Mobility PT LTG, Barney Level conditional independence   Bed Mobility PT Goal LTG, Assist Device bed rails  (HOB elevated)       Goal: Transfer Training Goal 1 LTG- PT  Outcome: Ongoing (interventions implemented as appropriate)    08/15/17 1355   Transfer Training PT LTG   Transfer Training PT LTG, Date Established 08/15/17   Transfer Training PT LTG, Time to Achieve by discharge   Transfer Training PT LTG, Activity Type bed to chair /chair to bed;sit to stand/stand to sit   Transfer Training PT LTG, Barney Level minimum assist (75% patient effort)   Transfer Training PT LTG, Assist Device walker, rolling       Goal: Gait Training Goal LTG- PT  Outcome: Ongoing (interventions implemented as appropriate)    08/15/17 1355   Gait Training PT LTG   Gait Training Goal PT LTG, Date Established 08/15/17   Gait Training Goal PT LTG, Time to Achieve by discharge   Gait Training Goal PT LTG, Barney Level  contact guard assist   Gait Training Goal PT LTG, Assist Device walker, rolling   Gait Training Goal PT LTG, Distance to Achieve 15' x2       Goal: Strength Goal LTG- PT  Outcome: Ongoing (interventions implemented as appropriate)    08/15/17 1355   Strength Goal PT LTG   Strength Goal PT LTG, Date Established 08/15/17   Strength Goal PT LTG, Time to Achieve by discharge   Strength Goal PT LTG, Functional Goal Supine and sitting LE exercises x20 reps

## 2017-08-16 ENCOUNTER — APPOINTMENT (OUTPATIENT)
Dept: GENERAL RADIOLOGY | Facility: HOSPITAL | Age: 72
End: 2017-08-16

## 2017-08-16 LAB
ABO GROUP BLD: NORMAL
ANION GAP SERPL CALCULATED.3IONS-SCNC: 17 MMOL/L (ref 4–13)
BASOPHILS # BLD AUTO: 0.02 10*3/MM3 (ref 0–0.2)
BASOPHILS NFR BLD AUTO: 0.4 % (ref 0–2)
BLD GP AB SCN SERPL QL: NEGATIVE
BUN BLD-MCNC: 38 MG/DL (ref 5–21)
BUN/CREAT SERPL: 4.4 (ref 7–25)
CALCIUM SPEC-SCNC: 8.6 MG/DL (ref 8.4–10.4)
CHLORIDE SERPL-SCNC: 103 MMOL/L (ref 98–110)
CO2 SERPL-SCNC: 19 MMOL/L (ref 24–31)
CREAT BLD-MCNC: 8.69 MG/DL (ref 0.5–1.4)
DEPRECATED RDW RBC AUTO: 51.5 FL (ref 40–54)
EOSINOPHIL # BLD AUTO: 0.08 10*3/MM3 (ref 0–0.7)
EOSINOPHIL NFR BLD AUTO: 1.5 % (ref 0–4)
ERYTHROCYTE [DISTWIDTH] IN BLOOD BY AUTOMATED COUNT: 16.4 % (ref 12–15)
GFR SERPL CREATININE-BSD FRML MDRD: 5 ML/MIN/1.73
GLUCOSE BLD-MCNC: 52 MG/DL (ref 70–100)
GLUCOSE BLDC GLUCOMTR-MCNC: 54 MG/DL (ref 70–130)
GLUCOSE BLDC GLUCOMTR-MCNC: 61 MG/DL (ref 70–130)
GLUCOSE BLDC GLUCOMTR-MCNC: 62 MG/DL (ref 70–130)
GLUCOSE BLDC GLUCOMTR-MCNC: 66 MG/DL (ref 70–130)
GLUCOSE BLDC GLUCOMTR-MCNC: 85 MG/DL (ref 70–130)
HCT VFR BLD AUTO: 18.9 % (ref 37–47)
HGB BLD-MCNC: 6.2 G/DL (ref 12–16)
IMM GRANULOCYTES # BLD: 0.01 10*3/MM3 (ref 0–0.03)
IMM GRANULOCYTES NFR BLD: 0.2 % (ref 0–5)
LYMPHOCYTES # BLD AUTO: 1.28 10*3/MM3 (ref 0.72–4.86)
LYMPHOCYTES NFR BLD AUTO: 24.1 % (ref 15–45)
MCH RBC QN AUTO: 28.8 PG (ref 28–32)
MCHC RBC AUTO-ENTMCNC: 32.8 G/DL (ref 33–36)
MCV RBC AUTO: 87.9 FL (ref 82–98)
MONOCYTES # BLD AUTO: 0.24 10*3/MM3 (ref 0.19–1.3)
MONOCYTES NFR BLD AUTO: 4.5 % (ref 4–12)
NEUTROPHILS # BLD AUTO: 3.68 10*3/MM3 (ref 1.87–8.4)
NEUTROPHILS NFR BLD AUTO: 69.3 % (ref 39–78)
NRBC BLD MANUAL-RTO: 0 /100 WBC (ref 0–0)
PLATELET # BLD AUTO: 220 10*3/MM3 (ref 130–400)
PMV BLD AUTO: 10 FL (ref 6–12)
POTASSIUM BLD-SCNC: 2.7 MMOL/L (ref 3.5–5.3)
RBC # BLD AUTO: 2.15 10*6/MM3 (ref 4.2–5.4)
RH BLD: NEGATIVE
SODIUM BLD-SCNC: 139 MMOL/L (ref 135–145)
WBC NRBC COR # BLD: 5.31 10*3/MM3 (ref 4.8–10.8)

## 2017-08-16 PROCEDURE — 36430 TRANSFUSION BLD/BLD COMPNT: CPT

## 2017-08-16 PROCEDURE — P9016 RBC LEUKOCYTES REDUCED: HCPCS

## 2017-08-16 PROCEDURE — 80048 BASIC METABOLIC PNL TOTAL CA: CPT | Performed by: INTERNAL MEDICINE

## 2017-08-16 PROCEDURE — 86901 BLOOD TYPING SEROLOGIC RH(D): CPT | Performed by: INTERNAL MEDICINE

## 2017-08-16 PROCEDURE — 30233N1 TRANSFUSION OF NONAUTOLOGOUS RED BLOOD CELLS INTO PERIPHERAL VEIN, PERCUTANEOUS APPROACH: ICD-10-PCS | Performed by: INTERNAL MEDICINE

## 2017-08-16 PROCEDURE — 97110 THERAPEUTIC EXERCISES: CPT

## 2017-08-16 PROCEDURE — 86923 COMPATIBILITY TEST ELECTRIC: CPT

## 2017-08-16 PROCEDURE — 97530 THERAPEUTIC ACTIVITIES: CPT

## 2017-08-16 PROCEDURE — 85025 COMPLETE CBC W/AUTO DIFF WBC: CPT | Performed by: INTERNAL MEDICINE

## 2017-08-16 PROCEDURE — 86900 BLOOD TYPING SEROLOGIC ABO: CPT | Performed by: INTERNAL MEDICINE

## 2017-08-16 PROCEDURE — 86900 BLOOD TYPING SEROLOGIC ABO: CPT

## 2017-08-16 PROCEDURE — 63710000001 PREDNISONE PER 5 MG: Performed by: INTERNAL MEDICINE

## 2017-08-16 PROCEDURE — 82962 GLUCOSE BLOOD TEST: CPT

## 2017-08-16 PROCEDURE — 63510000001 EPOETIN ALFA PER 1000 UNITS: Performed by: INTERNAL MEDICINE

## 2017-08-16 PROCEDURE — 86850 RBC ANTIBODY SCREEN: CPT | Performed by: INTERNAL MEDICINE

## 2017-08-16 PROCEDURE — 74241: CPT

## 2017-08-16 PROCEDURE — 25010000002 ONDANSETRON PER 1 MG: Performed by: INTERNAL MEDICINE

## 2017-08-16 RX ORDER — MIDODRINE HYDROCHLORIDE 10 MG/1
10 TABLET ORAL ONCE
Status: COMPLETED | OUTPATIENT
Start: 2017-08-16 | End: 2017-08-16

## 2017-08-16 RX ORDER — POTASSIUM CHLORIDE 29.8 MG/ML
20 INJECTION INTRAVENOUS ONCE
Status: DISCONTINUED | OUTPATIENT
Start: 2017-08-16 | End: 2017-08-16

## 2017-08-16 RX ORDER — MIDODRINE HYDROCHLORIDE 10 MG/1
10 TABLET ORAL
Status: DISCONTINUED | OUTPATIENT
Start: 2017-08-16 | End: 2017-08-17 | Stop reason: HOSPADM

## 2017-08-16 RX ORDER — POTASSIUM CHLORIDE 750 MG/1
20 CAPSULE, EXTENDED RELEASE ORAL ONCE
Status: DISCONTINUED | OUTPATIENT
Start: 2017-08-16 | End: 2017-08-17 | Stop reason: HOSPADM

## 2017-08-16 RX ORDER — ACETAMINOPHEN 325 MG/1
650 TABLET ORAL ONCE
Status: DISCONTINUED | OUTPATIENT
Start: 2017-08-16 | End: 2017-08-17 | Stop reason: HOSPADM

## 2017-08-16 RX ADMIN — ERYTHROPOIETIN 10000 UNITS: 10000 INJECTION, SOLUTION INTRAVENOUS; SUBCUTANEOUS at 12:01

## 2017-08-16 RX ADMIN — MIDODRINE HYDROCHLORIDE 10 MG: 10 TABLET ORAL at 12:00

## 2017-08-16 RX ADMIN — PANTOPRAZOLE SODIUM 40 MG: 40 TABLET, DELAYED RELEASE ORAL at 12:04

## 2017-08-16 RX ADMIN — ONDANSETRON 4 MG: 2 INJECTION INTRAMUSCULAR; INTRAVENOUS at 18:09

## 2017-08-16 RX ADMIN — LEVOTHYROXINE SODIUM 50 MCG: 50 TABLET ORAL at 11:55

## 2017-08-16 RX ADMIN — PREDNISONE 10 MG: 10 TABLET ORAL at 11:55

## 2017-08-16 RX ADMIN — ASPIRIN 81 MG: 81 TABLET ORAL at 11:55

## 2017-08-16 RX ADMIN — MIDODRINE HYDROCHLORIDE 10 MG: 10 TABLET ORAL at 09:43

## 2017-08-16 NOTE — PROGRESS NOTES
Continued Stay Note   [facility]     Patient Name: Jennifer Marks  MRN: [id number]  Today's Date: 8/16/2017    Admit Date: 8/13/2017          Discharge Plan       08/16/17 1051    Case Management/Social Work Plan    Plan Possible [facility]    Patient/Family In Agreement With Plan yes    Additional Comments Left a message for Marilee from [facility] to check on status of referral.               Discharge Codes     None            WILLIAM Jorge

## 2017-08-16 NOTE — PROGRESS NOTES
"Patient:  Jennifer Marks  YOB: 1945  Date of Service: 8/16/2017  MRN: [id number]   Acct: [id number]   Primary Care Physician: No Known Provider  Advance Directive: Full Code  Admit Date: 8/13/2017       Hospital Day: 3  Referring Provider: Pino Tang MD      Pt was seen on RRT  Modality: Hemodialysis  Access: Arterial Venous Fistula  Location: left upper  QB: 400  QD: 600  UF: none      Review of Systems:  History obtained from chart review and the patient  General ROS: No fever or chills  Respiratory ROS: No cough, +shortness of breath, -wheezing  Cardiovascular ROS: no chest pain but dyspnea on exertion  Gastrointestinal ROS: No abdominal pain or melena  Genito-Urinary ROS: No dysuria or hematuria  Musculoskeletal: negative  Skin: negative    Objective:  BP (!) (P) 87/42  Pulse (P) 94  Temp (P) 97.4 °F (36.3 °C) (Temporal Artery )   Resp (P) 16  Ht 62\" (157.5 cm)  Wt 128 lb 1.6 oz (58.1 kg)  SpO2 93%  BMI 23.43 kg/m2    Intake/Output Summary (Last 24 hours) at 08/16/17 1642  Last data filed at 08/15/17 2023   Gross per 24 hour   Intake              120 ml   Output                0 ml   Net              120 ml       Physical examination:  General: awake/alert   Chest:  clear to auscultation bilaterally without respiratory distress  CVS: regular rate and rhythm, 2/6 CHELSIE  Abdominal: soft, nontender, normal bowel sounds  Extremities: no cyanosis or edema  Skin: warm and dry without rash  Neuro: No focal motor deficits    Labs:  Lab Results (last 24 hours)     Procedure Component Value Units Date/Time    POC Glucose Fingerstick [[id number]]  (Abnormal) Collected:  08/15/17 1726    Specimen:  Blood Updated:  08/15/17 1737     Glucose 64 (L) mg/dL       : [id number] Sun Valdez ID: [id number]       POC Glucose Fingerstick [[id number]]  (Normal) Collected:  08/15/17 2226    Specimen:  Blood Updated:  08/15/17 2316     Glucose 82 mg/dL       : [id number] BanuelosMeter ID: " [id number]       POC Glucose Fingerstick [[id number]]  (Abnormal) Collected:  08/16/17 0819    Specimen:  Blood Updated:  08/16/17 0830     Glucose 62 (L) mg/dL       : [id number] Rundles SabrinaMeter ID: [id number]       POC Glucose Fingerstick [[id number]]  (Abnormal) Collected:  08/16/17 1145    Specimen:  Blood Updated:  08/16/17 1156     Glucose 54 (L) mg/dL       : [id number] Rundles SabrinaMeter ID: [id number]       CBC & Differential [[id number]] Collected:  08/16/17 1112    Specimen:  Blood Updated:  08/16/17 1207    Narrative:       The following orders were created for panel order CBC & Differential.  Procedure                               Abnormality         Status                     ---------                               -----------         ------                     CBC Auto Differential[[id number]]        Abnormal            Final result                 Please view results for these tests on the individual orders.    CBC Auto Differential [[id number]]  (Abnormal) Collected:  08/16/17 1112    Specimen:  Blood Updated:  08/16/17 1207     WBC 5.31 10*3/mm3      RBC 2.15 (L) 10*6/mm3      Hemoglobin 6.2 (C) g/dL      Hematocrit 18.9 (C) %      MCV 87.9 fL      MCH 28.8 pg      MCHC 32.8 (L) g/dL      RDW 16.4 (H) %      RDW-SD 51.5 fl      MPV 10.0 fL      Platelets 220 10*3/mm3      Neutrophil % 69.3 %      Lymphocyte % 24.1 %      Monocyte % 4.5 %      Eosinophil % 1.5 %      Basophil % 0.4 %      Immature Grans % 0.2 %      Neutrophils, Absolute 3.68 10*3/mm3      Lymphocytes, Absolute 1.28 10*3/mm3      Monocytes, Absolute 0.24 10*3/mm3      Eosinophils, Absolute 0.08 10*3/mm3      Basophils, Absolute 0.02 10*3/mm3      Immature Grans, Absolute 0.01 10*3/mm3      nRBC 0.0 /100 WBC     Basic Metabolic Panel [[id number]]  (Abnormal) Collected:  08/16/17 1112    Specimen:  Blood Updated:  08/16/17 1219     Glucose 52 (L) mg/dL      BUN 38 (H) mg/dL      Creatinine 8.69 (H) mg/dL      Sodium 139  mmol/L      Potassium 2.7 (C) mmol/L      Chloride 103 mmol/L      CO2 19.0 (L) mmol/L      Calcium 8.6 mg/dL      eGFR  African Amer 5 (L) mL/min/1.73      BUN/Creatinine Ratio 4.4 (L)     Anion Gap 17.0 (H) mmol/L     Narrative:       The MDRD GFR formula is only valid for adults with stable renal function between ages 18 and 70.          Radiology:   Imaging Results (last 24 hours)     Procedure Component Value Units Date/Time    FL Upper GI Single Contrast With KUB [[id number]] Collected:  08/16/17 1106     Updated:  08/16/17 1146    Narrative:       Procedure: Fluoro Double contrast upper GI     Clinical indication: abdominal pain        Technique: Single contrast barium cine esophagram and upper GI study is  performed.         Fluoroscopy time:    2.0 minutes.     Findings:      This exam was somewhat limited in that the patient was unable to stand  during the exam and also patient was unable to roll prone. She mentioned  that she was nauseous this morning and corona multiple times. It was  thought that the patient would not be able to tolerate the double  contrast study and so a single contrast study was performed supine and  with the table tilted up to approximately 20 to 30 degrees.     After swallowing thin barium, there was poor primary peristalsis. There  is stasis of the barium column in the midesophagus. There was initially  thought to be a fixed stenosis in the lower third of the esophagus,  however, contrast was eventually seen to pass through the distal  esophagus into the stomach. There does appear to be a large sized hiatal  hernia. Barium did pull within the fundus of the stomach which was  included within the hernia sac. There was gastroesophageal reflux from  the hernia into the distal esophagus. There is limited evaluation of the  stomach due to the poor gastric distention. The 13 mm barium tablet was  not attempted.       Impression:       Impression:   1. Limited evaluation given the  patient's ability to stand and roll  prone. Study was performed as a single contrast study given the  patient's nausea and ongoing vomiting.  2. There was poor primary peristalsis with stasis of the barium column  in the mid esophagus. No fixed esophageal stricture or mass was  identified.  3. There is a large hiatal hernia with reflux of contrast from the  hernia sac into the distal esophagus.  4. Limited assessment for mucosal lesions of the stomach due to the poor  gastric distention.  This report was finalized on 08/16/2017 11:43 by Dr Ronald Jenkins, .              Assessment   -ESRD  -Type 2 diabetes mellitus with renal disease  -Nausea and vomiting.   -Hypoakalemia    -Anermia of ckd     Plan:  Patient agreed for dialysis. Will avoid fluids removal because of hypotension. Will give 2 units of PRBCS and 4 K bath in her dialysate.       Dg Ball MD  8/16/2017  4:42 PM

## 2017-08-16 NOTE — PROGRESS NOTES
"    [facility] Medicine Services  INPATIENT PROGRESS NOTE    Length of Stay: 3  Date of Admission: 8/13/2017  Primary Care Physician: No Known Provider    Subjective   Chief Complaint: follow up chest pain/abdominal pain  HPI   Pt to have UGI today. Did not occur yesterday. For dialysis today. Pt is awake and alert. States she is feeling some better today. Still with dry cough which then causes shortness of breath. States her abdomen feels better today. She is less \"sick in my chest.\" States still has loose stools.     Review of Systems   All pertinent negatives and positives are as above. All other systems have been reviewed and are negative unless otherwise stated.     Objective    Temp:  [97 °F (36.1 °C)-98.4 °F (36.9 °C)] 97.4 °F (36.3 °C)  Heart Rate:  [] 90  Resp:  [18-20] 18  BP: (81-99)/(34-69) 98/69  Physical Exam   Constitutional: She is oriented to person, place, and time. She appears well-developed and well-nourished.   HENT:   Head: Normocephalic and atraumatic.   Eyes: Conjunctivae and EOM are normal. Pupils are equal, round, and reactive to light.   Neck: Neck supple. No JVD present. No thyromegaly present.   Cardiovascular: Normal rate, regular rhythm, normal heart sounds and intact distal pulses.  Exam reveals no gallop and no friction rub.    No murmur heard.  Sinus 92-96 on telemetry.    Pulmonary/Chest: Effort normal and breath sounds normal. No respiratory distress. She has no wheezes. She has no rales. She exhibits no tenderness.   Diminished bases.    Abdominal: Soft. Bowel sounds are normal. She exhibits no distension. There is no tenderness. There is no rebound and no guarding.   Musculoskeletal: Normal range of motion. She exhibits no edema, tenderness or deformity.   Lymphadenopathy:     She has no cervical adenopathy.   Neurological: She is alert and oriented to person, place, and time. She displays normal reflexes. No cranial nerve deficit. She " exhibits normal muscle tone.   Skin: Skin is warm and dry. No rash noted.   Psychiatric: She has a normal mood and affect. Her behavior is normal. Judgment and thought content normal.   Flat affect.      Results Review:  I have reviewed the labs, radiology results, and diagnostic studies.    Laboratory Data:     Results from last 7 days  Lab Units 08/13/17  1840   WBC 10*3/mm3 6.87   HEMOGLOBIN g/dL 8.1*   HEMATOCRIT % 25.0*   PLATELETS 10*3/mm3 283       Results from last 7 days  Lab Units 08/13/17  2202 08/13/17  1940   SODIUM mmol/L  --  141   POTASSIUM mmol/L 2.4* 2.9*   CHLORIDE mmol/L  --  105   CO2 mmol/L  --  18.0*   BUN mg/dL  --  76*   CREATININE mg/dL  --  14.24*   CALCIUM mg/dL  --  8.5   BILIRUBIN mg/dL  --  1.0   ALK PHOS U/L  --  132*   ALT (SGPT) U/L  --  30   AST (SGOT) U/L  --  27   GLUCOSE mg/dL  --  79     Radiology Data:   Imaging Results (last 24 hours)     ** No results found for the last 24 hours. **          I have reviewed the patient current medications.     Assessment/Plan   Assessment:  1. Volume overload secondary to non-compliance with hemodialysis  2. Chest pain with mildly elevated troponin-. No acute ST segment changes  3. Abdominal pain with nausea/vomiting/chronic diarrhea  4. Hypokalemia  5. End stage renal disease on maintenance hemodialysis Rcmizd-Ncmtdqixz-Jyzxns  6. Hypotension-midodrine  7. 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 Paroxysmal atrial fibrillation-no anticoagulation  9. Wegener's Granulomatosis-chronic prednisone therapy  10. Chronic anemia  11. Mild lactic acidosis  12. Hypothyroidism-on home synthroid  13. Anorexia-Megace at home.     Plan:  1. Await UGI results. Pt is afraid she will not be able to drink the contrast for the study.   2. ? Need to  increase midodrine.  3. Appreciate physical and occupational therapy assessments.   4. Pt will need skilled nursing facility placement. [facility] is pending.   5. Dialysis today. Will await labs drawn in dialysis    Discharge Planning: I expect patient to be discharged to [facility] in ? days.    FANNIE Weiss   08/16/17   6:07 AM     I personally evaluated and examined the patient in conjunction with FANNIE Mishra and agree with the assessment, treatment plan, and disposition of the patient as recorded by her. My history, exam, and further recommendations are: We will follow-up with upper GI results which are pending.  Patient has continued to refuse blood work, and refused dialysis again this morning.  She has had some low blood pressures, and I will titrate her dose of scheduled midodrine.  She reports that she is ready to leave the hospital, primarily because she has an appointment with her heart specialist in [place] scheduled for tomorrow around 3 PM.  I have informed her today that without lab data or other diagnostic information I have no way to determine whether or not she is medically stable for discharge.  I have no lab work to work with in a patient with end-stage renal disease on hemodialysis as she has repeatedly refused labs.  With this information, she reports she is agreeable to getting labs taken this morning.  Appreciate Nephrology input.        Bora Luciano MD  08/16/17  10:57 AM

## 2017-08-16 NOTE — PROGRESS NOTES
Continued Stay Note  MORENA Meier     Patient Name: Jennifer Marks  MRN: [id number]  Today's Date: 8/16/2017    Admit Date: 8/13/2017          Discharge Plan       08/16/17 1523    Case Management/Social Work Plan    Plan Possible [facility]    Patient/Family In Agreement With Plan yes    Additional Comments Pt was evaluated by PT and OT yesterday. Marilee has that information and [facility] is reviewing it.               Discharge Codes     None            WILLIAM Jorge

## 2017-08-16 NOTE — PROGRESS NOTES
" PROGRESS NOTE.      Patient:  Jennifer Marks  YOB: 1945  Date of Service: 8/16/2017  MRN: [id number]   Acct: [id number]   Primary Care Physician: No Known Provider  Advance Directive: Full Code  Admit Date: 8/13/2017       Hospital Day: 3  Referring Provider: Pino Tang MD      Patient Seen, Chart, Consults, Notes, Labs, Radiology studies reviewed.        Subjective:  Jennifer Marks is a 72 y.o. female  whom we were consulted for end stage renal disease.  On hemodialysis Monday Wednesday Friday. She missed about one week of treatments due to not feeling well.  Long history of noncompliance with dialysis.  Admitted with abdominal pain, nausea/vomiting, and hypokalemia.  Given potassium replacement IV after admission.  Tolerated dialysis on 8/14. Today, she was hypotensive, and wanted to skip dialysis. She was given Midodrine and her BP has improved some. She remained uncomfortable about doing dialysis.       Review of Systems:  History obtained from chart review and the patient  General ROS: No fever or chills  Respiratory ROS: No cough, +shortness of breath,- wheezing  Cardiovascular ROS: no chest pain but dyspnea on exertion  Gastrointestinal ROS: No abdominal pain or melena  Genito-Urinary ROS: No dysuria or hematuria  Musculoskeletal: negative  Skin: negative    Objective:  BP 90/41 (BP Location: Left arm, Patient Position: Lying)  Pulse 87  Temp 97.1 °F (36.2 °C) (Temporal Artery )   Resp 16  Ht 62\" (157.5 cm)  Wt 128 lb 1.6 oz (58.1 kg)  SpO2 94%  BMI 23.43 kg/m2    Intake/Output Summary (Last 24 hours) at 08/16/17 1307  Last data filed at 08/15/17 2023   Gross per 24 hour   Intake              120 ml   Output                0 ml   Net              120 ml       Physical examination:  General: awake/alert   Chest:  clear to auscultation bilaterally without respiratory distress  CVS: regular rate and rhythm, 2/6 CHELSIE  Abdominal: soft, nontender, normal bowel sounds  Extremities: no " cyanosis or edema  Skin: warm and dry without rash  Neuro: No focal motor deficits    Labs:  Lab Results (last 24 hours)     Procedure Component Value Units Date/Time    POC Glucose Fingerstick [[id number]]  (Abnormal) Collected:  08/15/17 1726    Specimen:  Blood Updated:  08/15/17 1737     Glucose 64 (L) mg/dL       : [id number] Rundles SabrinaMeter ID: [id number]       POC Glucose Fingerstick [[id number]]  (Normal) Collected:  08/15/17 2226    Specimen:  Blood Updated:  08/15/17 2316     Glucose 82 mg/dL       : [id number] Ady RachelMeter ID: [id number]       POC Glucose Fingerstick [[id number]]  (Abnormal) Collected:  08/16/17 0819    Specimen:  Blood Updated:  08/16/17 0830     Glucose 62 (L) mg/dL       : [id number] Rundles SabrinaMeter ID: [id number]       POC Glucose Fingerstick [[id number]]  (Abnormal) Collected:  08/16/17 1145    Specimen:  Blood Updated:  08/16/17 1156     Glucose 54 (L) mg/dL       : [id number] Rundles SabrinaMeter ID: [id number]       CBC & Differential [[id number]] Collected:  08/16/17 1112    Specimen:  Blood Updated:  08/16/17 1207    Narrative:       The following orders were created for panel order CBC & Differential.  Procedure                               Abnormality         Status                     ---------                               -----------         ------                     CBC Auto Differential[[id number]]        Abnormal            Final result                 Please view results for these tests on the individual orders.    CBC Auto Differential [[id number]]  (Abnormal) Collected:  08/16/17 1112    Specimen:  Blood Updated:  08/16/17 1207     WBC 5.31 10*3/mm3      RBC 2.15 (L) 10*6/mm3      Hemoglobin 6.2 (C) g/dL      Hematocrit 18.9 (C) %      MCV 87.9 fL      MCH 28.8 pg      MCHC 32.8 (L) g/dL      RDW 16.4 (H) %      RDW-SD 51.5 fl      MPV 10.0 fL      Platelets 220 10*3/mm3      Neutrophil % 69.3 %      Lymphocyte % 24.1 %      Monocyte % 4.5 %       Eosinophil % 1.5 %      Basophil % 0.4 %      Immature Grans % 0.2 %      Neutrophils, Absolute 3.68 10*3/mm3      Lymphocytes, Absolute 1.28 10*3/mm3      Monocytes, Absolute 0.24 10*3/mm3      Eosinophils, Absolute 0.08 10*3/mm3      Basophils, Absolute 0.02 10*3/mm3      Immature Grans, Absolute 0.01 10*3/mm3      nRBC 0.0 /100 WBC     Basic Metabolic Panel [[id number]]  (Abnormal) Collected:  08/16/17 1112    Specimen:  Blood Updated:  08/16/17 1219     Glucose 52 (L) mg/dL      BUN 38 (H) mg/dL      Creatinine 8.69 (H) mg/dL      Sodium 139 mmol/L      Potassium 2.7 (C) mmol/L      Chloride 103 mmol/L      CO2 19.0 (L) mmol/L      Calcium 8.6 mg/dL      eGFR  African Amer 5 (L) mL/min/1.73      BUN/Creatinine Ratio 4.4 (L)     Anion Gap 17.0 (H) mmol/L     Narrative:       The MDRD GFR formula is only valid for adults with stable renal function between ages 18 and 70.          Radiology:   Imaging Results (last 24 hours)     Procedure Component Value Units Date/Time    FL Upper GI Single Contrast With KUB [[id number]] Collected:  08/16/17 1106     Updated:  08/16/17 1146    Narrative:       Procedure: Fluoro Double contrast upper GI     Clinical indication: abdominal pain        Technique: Single contrast barium cine esophagram and upper GI study is  performed.         Fluoroscopy time:    2.0 minutes.     Findings:      This exam was somewhat limited in that the patient was unable to stand  during the exam and also patient was unable to roll prone. She mentioned  that she was nauseous this morning and corona multiple times. It was  thought that the patient would not be able to tolerate the double  contrast study and so a single contrast study was performed supine and  with the table tilted up to approximately 20 to 30 degrees.     After swallowing thin barium, there was poor primary peristalsis. There  is stasis of the barium column in the midesophagus. There was initially  thought to be a fixed stenosis in  the lower third of the esophagus,  however, contrast was eventually seen to pass through the distal  esophagus into the stomach. There does appear to be a large sized hiatal  hernia. Barium did pull within the fundus of the stomach which was  included within the hernia sac. There was gastroesophageal reflux from  the hernia into the distal esophagus. There is limited evaluation of the  stomach due to the poor gastric distention. The 13 mm barium tablet was  not attempted.       Impression:       Impression:   1. Limited evaluation given the patient's ability to stand and roll  prone. Study was performed as a single contrast study given the  patient's nausea and ongoing vomiting.  2. There was poor primary peristalsis with stasis of the barium column  in the mid esophagus. No fixed esophageal stricture or mass was  identified.  3. There is a large hiatal hernia with reflux of contrast from the  hernia sac into the distal esophagus.  4. Limited assessment for mucosal lesions of the stomach due to the poor  gastric distention.  This report was finalized on 08/16/2017 11:43 by Dr Ronald Jenkins, .              Assessment     -ESRD  -Type 2 diabetes mellitus with renal disease  -nausea and vomiting.   -Hypoakalemia    -Anermia of ckd    Plan:  Patient wanted to hold dialysis till Friday 8/18. Will monitor labs in the meantime.       Dg Ball MD  8/16/2017  1:07 PM

## 2017-08-16 NOTE — PLAN OF CARE
Problem: Pressure Ulcer Risk (New Scale) (Adult,Obstetrics,Pediatric)  Goal: Skin Integrity  Outcome: Ongoing (interventions implemented as appropriate)    Problem: Hemodialysis (Adult)  Goal: Signs and Symptoms of Listed Potential Problems Will be Absent or Manageable (Hemodialysis)  Outcome: Ongoing (interventions implemented as appropriate)    Problem: Patient Care Overview (Adult)  Goal: Plan of Care Review  Outcome: Ongoing (interventions implemented as appropriate)    08/16/17 0659   Coping/Psychosocial Response Interventions   Plan Of Care Reviewed With patient   Patient Care Overview   Progress no change   Outcome Evaluation   Outcome Summary/Follow up Plan Pt to have upper GI steady this am. Dialysis scheduled.

## 2017-08-16 NOTE — NURSING NOTE
Received patient from EGD - post procedure, blood pressure was 67/36 at 0920, unable to start dialysis. Notified FANNIE Campoverde, received order for Midodrine 10 mg table p.o. Now for blood pressure support.  Patient given Midodrine, at 0952, blood pressure 110/49.  Patient refusing to have hemodialysis today.  Dr. Ball notified. Tentatively, Hemodialysis Treatment for tomorrow.

## 2017-08-17 ENCOUNTER — APPOINTMENT (OUTPATIENT)
Dept: GENERAL RADIOLOGY | Facility: HOSPITAL | Age: 72
End: 2017-08-17

## 2017-08-17 VITALS
OXYGEN SATURATION: 96 % | SYSTOLIC BLOOD PRESSURE: 114 MMHG | HEIGHT: 62 IN | WEIGHT: 128.1 LBS | BODY MASS INDEX: 23.57 KG/M2 | RESPIRATION RATE: 20 BRPM | DIASTOLIC BLOOD PRESSURE: 53 MMHG | TEMPERATURE: 97.7 F | HEART RATE: 94 BPM

## 2017-08-17 LAB
ABO + RH BLD: NORMAL
ABO + RH BLD: NORMAL
BH BB BLOOD EXPIRATION DATE: NORMAL
BH BB BLOOD EXPIRATION DATE: NORMAL
BH BB BLOOD TYPE BARCODE: 9500
BH BB BLOOD TYPE BARCODE: 9500
BH BB DISPENSE STATUS: NORMAL
BH BB DISPENSE STATUS: NORMAL
BH BB PRODUCT CODE: NORMAL
BH BB PRODUCT CODE: NORMAL
BH BB UNIT NUMBER: NORMAL
BH BB UNIT NUMBER: NORMAL
GLUCOSE BLDC GLUCOMTR-MCNC: 96 MG/DL (ref 70–130)
UNIT  ABO: NORMAL
UNIT  ABO: NORMAL
UNIT  RH: NORMAL
UNIT  RH: NORMAL

## 2017-08-17 PROCEDURE — 82962 GLUCOSE BLOOD TEST: CPT

## 2017-08-17 PROCEDURE — 63710000001 PREDNISONE PER 5 MG: Performed by: INTERNAL MEDICINE

## 2017-08-17 PROCEDURE — 71020 HC CHEST PA AND LATERAL: CPT

## 2017-08-17 PROCEDURE — 97110 THERAPEUTIC EXERCISES: CPT

## 2017-08-17 PROCEDURE — 97535 SELF CARE MNGMENT TRAINING: CPT

## 2017-08-17 RX ADMIN — ASPIRIN 81 MG: 81 TABLET ORAL at 08:06

## 2017-08-17 RX ADMIN — LEVOTHYROXINE SODIUM 50 MCG: 50 TABLET ORAL at 08:06

## 2017-08-17 RX ADMIN — PREDNISONE 10 MG: 10 TABLET ORAL at 08:06

## 2017-08-17 RX ADMIN — MIDODRINE HYDROCHLORIDE 10 MG: 10 TABLET ORAL at 08:06

## 2017-08-17 RX ADMIN — PANTOPRAZOLE SODIUM 40 MG: 40 TABLET, DELAYED RELEASE ORAL at 08:06

## 2017-08-17 NOTE — THERAPY TREATMENT NOTE
"Acute Care - Occupational Therapy Treatment Note  [facility]     Patient Name: Jennifer Marks  : 1945  MRN: [id number]  Today's Date: 2017  Onset of Illness/Injury or Date of Surgery Date: 17  Date of Referral to OT: 17  Referring Physician: FANNIE Minor      Admit Date: 2017    Visit Dx:     ICD-10-CM ICD-9-CM   1. Chronic kidney failure, unspecified stage N18.9 585.9   2. Decreased activities of daily living (ADL) Z78.9 V49.89   3. Impaired functional mobility, balance, gait, and endurance Z74.09 V49.89     Patient Active Problem List   Diagnosis   • Chest pain in adult   • Renal failure   • Hypotension   • Anemia   • Hypokalemia   • ESRD (end stage renal disease) on dialysis             Adult Rehabilitation Note       17 0818 17 0740 17 1347    Rehab Assessment/Intervention    Discipline physical therapy assistant  -AE occupational therapist  -ND physical therapy assistant  -AE    Document Type therapy note (daily note)  -AE therapy note (daily note)  -ND therapy note (daily note)  -AE    Subjective Information agree to therapy   only exercises. Pt agrees to walk later  -AE agree to therapy;complains of;weakness;fatigue   \"cold\"  -ND agree to therapy;complains of;pain;nausea/vomiting  -AE    Precautions/Limitations fall precautions  -AE fall precautions  -ND fall precautions  -AE    Specific Treatment Considerations  Pt. is always cold. Getting her warm blankets will motivate her to get up.   -ND     Recorded by [AE] Latosha Cruz PTA [ND] Ailyn Gaston OTR/L [AE] Latosha Cruz PTA    Pain Assessment    Pain Assessment  0-10  -ND Morrison-Cameron FACES  -AE    Morrison-Cameron FACES Pain Rating  2  -ND 2  -AE    Pain Type   Acute pain  -AE    Pain Location  Generalized  -ND --   both heels  -AE    Pain Frequency   Intermittent  -AE    Pain Intervention(s)  Repositioned  -ND Repositioned  -AE    Response to Interventions  tolerated  -ND improved  -AE    " Recorded by  [ND] Ailyn Gaston OTR/L [AE] Latosha Cruz PTA    Bed Mobility, Assessment/Treatment    Bed Mobility, Assistive Device  bed rails;head of bed elevated  -ND bed rails  -AE    Bed Mobility, Roll Left, McIntosh   minimum assist (75% patient effort);verbal cues required   x 2  -AE    Bed Mobility, Roll Right, McIntosh   minimum assist (75% patient effort);verbal cues required   x2  -AE    Bed Mobility, Scoot/Bridge, McIntosh   maximum assist (25% patient effort)   with bed in trendelenberg  -AE    Bed Mob, Supine to Sit, McIntosh  supervision required  -ND     Bed Mob, Sit to Supine, McIntosh  --   in chair  -ND     Bed Mobility, Safety Issues  decreased use of arms for pushing/pulling;decreased use of legs for bridging/pushing  -ND decreased use of legs for bridging/pushing  -AE    Bed Mobility, Impairments  strength decreased  -ND     Bed Mobility, Comment  Pt. is capable of more than she lets on she just has to be encouraged.   -ND     Recorded by  [ND] ZUHAIR Aquino/L [AE] Latosha Cruz PTA    Transfer Assessment/Treatment    Transfers, Sit-Stand McIntosh  nonverbal cues required (demo/gesture);verbal cues required;moderate assist (50% patient effort)  -ND     Transfers, Stand-Sit McIntosh  verbal cues required;nonverbal cues required (demo/gesture);moderate assist (50% patient effort)  -ND     Transfers, Sit-Stand-Sit, Assist Device  --   BUE support  -ND     Transfer, Safety Issues  step length decreased;weight-shifting ability decreased  -ND     Transfer, Impairments  strength decreased;impaired balance;postural control impaired  -ND     Transfer, Comment up in chair   Pt was given RW and was encouraged to walk later  -AE      Recorded by [AE] Latosha Cruz PTA [ND] Ailyn Gaston OTR/L     Therapy Exercises    Bilateral Lower Extremities AAROM:;AROM:;10 reps;sitting;hip abduction/adduction;ankle pumps/circles;SLR  -AE  AROM:;10 reps;supine  -AE     Recorded by [AE] Latosha Cruz PTA  [AE] Latosha Cruz PTA    Positioning and Restraints    Pre-Treatment Position sitting in chair/recliner  -AE in bed  -ND in bed  -AE    Post Treatment Position chair  -AE chair  -ND bed  -AE    In Bed   fowlers;call light within reach  -AE    In Chair reclined;call light within reach  -AE reclined;call light within reach;encouraged to call for assist;with other staff;legs elevated  -ND     Recorded by [AE] Latosha Cruz PTA [ND] Ailyn Gaston, OTR/L [AE] Latosha Cruz PTA      User Key  (r) = Recorded By, (t) = Taken By, (c) = Cosigned By    Initials Name Effective Dates    AE Latosha Cruz PTA 06/22/15 -     ND Ailyn Gaston, OTR/L 10/21/16 -                 OT Goals       08/15/17 1343          Bed Mobility OT LTG    Bed Mobility OT LTG, Date Established 08/15/17  -ND      Bed Mobility OT LTG, Time to Achieve by discharge  -ND      Bed Mobility OT LTG, Activity Type all bed mobility  -ND      Bed Mobility OT LTG, [place] Level supervision required  -ND      Transfer Training OT LTG    Transfer Training OT LTG, Date Established 08/15/17  -ND      Transfer Training OT LTG, Time to Achieve by discharge  -ND      Transfer Training OT LTG, Activity Type bed to chair /chair to bed;sit to stand/stand to sit;toilet  -ND      Transfer Training OT LTG, [place] Level moderate assist (50% patient effort)  -ND      Transfer Training OT LTG, Assist Device walker, rolling;commode, bedside  -ND      Strength OT LTG    Strength Goal OT LTG, Date Established 08/15/17  -ND      Strength Goal OT LTG, Time to Achieve by discharge  -ND      Strength Goal OT LTG, Measure to Achieve Pt. will complete BUE strengthening exercises to increase BUE strength to 5/5 for ADls.   -ND      ADL OT LTG    ADL OT LTG, Date Established 08/15/17  -ND      ADL OT LTG, Time to Achieve by discharge  -ND      ADL OT LTG, Activity Type ADL skills  -ND      ADL OT LTG, [place] Level mod  assist  -ND      ADL OT LTG, Additional Goal AE PRN  -ND        User Key  (r) = Recorded By, (t) = Taken By, (c) = Cosigned By    Initials Name Provider Type    RITA Gaston, OTR/L Occupational Therapist          Occupational Therapy Education     Title: PT OT SLP Therapies (Active)     Topic: Occupational Therapy (Done)     Point: ADL training (Done)    Description: Instruct learner(s) on proper safety adaptation and remediation techniques during self care or transfers.   Instruct in proper use of assistive devices.    Learning Progress Summary    Learner Readiness Method Response Comment Documented by Status   Patient Acceptance E VU,NR Pt. educated on role of OT , safe t/f, benefit of activity, progression with poc ND 08/17/17 0905 Done    Acceptance E VU,NR Pt. educated on role of OT, benefit of activity, progression with poc ND 08/15/17 1339 Done               Point: Home exercise program (Done)    Description: Instruct learner(s) on appropriate technique for monitoring, assisting and/or progressing therapeutic exercises/activities.    Learning Progress Summary    Learner Readiness Method Response Comment Documented by Status   Patient Acceptance E VU,NR Pt. educated on role of OT , safe t/f, benefit of activity, progression with poc ND 08/17/17 0905 Done    Acceptance E VU,NR Pt. educated on role of OT, benefit of activity, progression with poc ND 08/15/17 1339 Done               Point: Body mechanics (Done)    Description: Instruct learner(s) on proper positioning and spine alignment during self-care, functional mobility activities and/or exercises.    Learning Progress Summary    Learner Readiness Method Response Comment Documented by Status   Patient Acceptance E VU,NR Pt. educated on role of OT , safe t/f, benefit of activity, progression with poc ND 08/17/17 0905 Done    Acceptance E VU,NR Pt. educated on role of OT, benefit of activity, progression with poc ND 08/15/17 1339 Done                       User Key     Initials Effective Dates Name Provider Type Discipline    ND 10/21/16 -  Ailyn Gaston, OTR/L Occupational Therapist OT                  OT Recommendation and Plan  Anticipated Discharge Disposition: skilled nursing facility  Planned Therapy Interventions: activity intolerance, ADL retraining, balance training, bed mobility training, energy conservation, home exercise program, ROM (Range of Motion), strengthening, transfer training  Therapy Frequency: 3-5 times/wk  Plan of Care Review  Plan Of Care Reviewed With: patient  Progress: progress toward functional goals as expected  Outcome Summary/Follow up Plan: OT tx note completed. Pt. required supervision to complete bed mobility. Pt. Mod A for BUE support to complete sit to stand t/f from bed and chair.  Pt. is capable of more than she leads therapist to believe at times she just requires more encouragement to get OOB. Would benefit from further education on benefit of therapy. 8/17/17 0800        Outcome Measures       08/17/17 0900 08/15/17 1352 08/15/17 1306    How much help from another person do you currently need...    Turning from your back to your side while in flat bed without using bedrails?   3  -PB (r) MS (t) PB (c)    Moving from lying on back to sitting on the side of a flat bed without bedrails?   2  -PB (r) MS (t) PB (c)    Moving to and from a bed to a chair (including a wheelchair)?   2  -PB (r) MS (t) PB (c)    Standing up from a chair using your arms (e.g., wheelchair, bedside chair)?   2  -PB (r) MS (t) PB (c)    Climbing 3-5 steps with a railing?   1  -PB (r) MS (t) PB (c)    To walk in hospital room?   1  -PB (r) MS (t) PB (c)    AM-PAC 6 Clicks Score   11  -PB (r) MS (t)    How much help from another is currently needed...    Putting on and taking off regular lower body clothing? 2  -ND 2  -ND     Bathing (including washing, rinsing, and drying) 2  -ND 2  -ND     Toileting (which includes using toilet bed pan or urinal) 2   -ND 2  -ND     Putting on and taking off regular upper body clothing 3  -ND 2  -ND     Taking care of personal grooming (such as brushing teeth) 3  -ND 2  -ND     Eating meals 3  -ND 2  -ND     Score 15  -ND 12  -ND     Functional Assessment    Outcome Measure Options  AM-PAC 6 Clicks Daily Activity (OT)  -ND AM-PAC 6 Clicks Basic Mobility (PT)  -PB (r) MS (t) PB (c)      User Key  (r) = Recorded By, (t) = Taken By, (c) = Cosigned By    Initials Name Provider Type    PB Yonas Morrison, PT DPT Physical Therapist    ND Ailyn Gaston, OTR/L Occupational Therapist    MS Gena A Jose, PT Student PT Student           Time Calculation:         Time Calculation- OT       08/17/17 0910          Time Calculation- OT    OT Start Time 0740  -ND      OT Stop Time 0800  -ND      OT Time Calculation (min) 20 min  -ND      OT Received On 08/17/17  -ND        User Key  (r) = Recorded By, (t) = Taken By, (c) = Cosigned By    Initials Name Provider Type    RITA Gaston OTR/L Occupational Therapist           Therapy Charges for Today     Code Description Service Date Service Provider Modifiers Qty    [id number] HC OT SELF CARE/MGMT/TRAIN EA 15 MIN 8/17/2017 Ailyn Gaston OTR/L DARLING DAVIDSON 1          OT G-codes  OT Functional Scales Options: AM-PAC 6 Clicks Daily Activity (OT)  Functional Limitation: Self care  Self Care Current Status (): At least 60 percent but less than 80 percent impaired, limited or restricted  Self Care Goal Status (): At least 40 percent but less than 60 percent impaired, limited or restricted    Ailyn Gaston OTR/CATHIE  8/17/2017

## 2017-08-17 NOTE — PROGRESS NOTES
Continued Stay Note  [facility]     Patient Name: Jennifer Marks  MRN: [id number]  Today's Date: 8/17/2017    Admit Date: 8/13/2017          Discharge Plan       08/17/17 1049    Case Management/Social Work Plan    Plan AMA    Final Note    Final Note Went to pt's room to further discuss [facility] and she says she is leaving AM because she has an appointment with a cardiologist at [facility]. GAYE Sands, went to pt's room after speaking with Dr. Luciano and informed pt that this is not safe due to her low BP and low hgb. Pt says that she does not care and that she is leaving. She does not want to miss her appointment. It was explained to her that this is not a red decision because she is not medically stable and this appointment can be rescheduled. She acknowledged that the cardiologist would rather her stay in the hospital instead of going to the appointment but she still said she is not going to miss the appt. Pt said her aunt is going to pick her up and take her to [facility] and they will transport her. It was explained to pt several times what a risk it is to leave the hospital and she refuses to stay. She acknowledged that she cannot take care of herself and she said she would still be willing to go to [facility] but informed her they will not take her if she leaves the hospital AMA. She still says she will sign the paperwork to leave. Pt lives with her children, who help her at home.      08/17/17 1022    Case Management/Social Work Plan    Plan Possible [facility]    Patient/Family In Agreement With Plan yes    Additional Comments Marilee from [facility] is coming to speak with pt. For pt to go to a facility, she has to stay there for 30 days so that Medicaid will be approved.              Discharge Codes     None            WILLIAM Jorge

## 2017-08-17 NOTE — PLAN OF CARE
Problem: Patient Care Overview (Adult)  Goal: Plan of Care Review  Outcome: Ongoing (interventions implemented as appropriate)    08/17/17 0906   Coping/Psychosocial Response Interventions   Plan Of Care Reviewed With patient   Patient Care Overview   Progress progress toward functional goals as expected   Outcome Evaluation   Outcome Summary/Follow up Plan OT tx note completed. Pt. required supervision to complete bed mobility. Pt. Mod A for BUE support to complete sit to stand t/f from bed and chair. Pt. is capable of more than she leads therapist to believe at times she just requires more encouragement to get OOB. Would benefit from further education on benefit of therapy. 8/17/17 0800

## 2017-08-17 NOTE — PROGRESS NOTES
Nephrology ([facility]) Progress Note      Patient:  Jennifer Marks  YOB: 1945  Date of Service: 8/17/2017  MRN: [id number]   Acct: [id number]   Primary Care Physician: No Known Provider  Advance Directive: Full Code  Admit Date: 8/13/2017       Hospital Day: 4  Referring Provider: Pino Tang MD      Patient personally seen and examined.  Complete chart including Consults, Notes, Operative Reports, Labs, Cardiology, and Radiology studies reviewed as able.        Subjective:  Jennifer Marks is a 72 y.o. female  whom we were consulted for end stage renal disease.  On hemodialysis Monday Wednesday Friday.  Has missed about one week of treatments due to not feeling well.  Long history of noncompliance with dialysis.  Admitted with abdominal pain, nausea/vomiting, and hypokalemia.  Given potassium replacement IV after admission.  Tolerated dialysis well yesterday (8/16).  Care has been significantly complicated by patient's refusal to allow follow up labs to be drawn.  Today pt is adamant that she is leaving hospital; stating she has a doctor appointment in [place] which she has to attend.      Allergies:  Heparin; Iron; Latex; Levaquin [levofloxacin]; Shrimp (diagnostic); and Vancomycin    Home Meds:  Prescriptions Prior to Admission   Medication Sig Dispense Refill Last Dose   • levothyroxine (SYNTHROID, LEVOTHROID) 50 MCG tablet Take 1 tablet by mouth Daily. 30 tablet 2 Past Week at Unknown time   • midodrine (PROAMATINE) 5 MG tablet Take 1 tablet by mouth 3 (Three) Times a Day Before Meals. 90 tablet 2 Past Month at Unknown time   • pantoprazole (PROTONIX) 40 MG EC tablet Take 40 mg by mouth Daily.   Past Month at Unknown time   • traZODone (DESYREL) 50 MG tablet Take 50 mg by mouth Every Night.   Past Week at Unknown time   • megestrol (MEGACE) 40 MG/ML suspension Take 10 mL by mouth Daily. 480 mL 1 Unknown at Unknown time       Medicines:  Current  Facility-Administered Medications   Medication Dose Route Frequency Provider Last Rate Last Dose   • acetaminophen (TYLENOL) tablet 1,000 mg  1,000 mg Oral Q4H PRN Dg Ball MD       • acetaminophen (TYLENOL) tablet 650 mg  650 mg Oral Q6H PRN Pino Tang MD       • acetaminophen (TYLENOL) tablet 650 mg  650 mg Oral Once Bora Luciano MD       • aspirin EC tablet 81 mg  81 mg Oral Daily Pino Tang MD   81 mg at 08/17/17 0806   • dextrose (D50W) solution 25 g  25 g Intravenous Q15 Min PRN Pino Tang MD       • dextrose (GLUTOSE) oral gel 15 g  15 g Oral Q15 Min PRN Pino Tang MD       • diphenhydrAMINE (BENADRYL) capsule 25 mg  25 mg Oral Q4H PRN Dg Ball MD        Or   • diphenhydrAMINE (BENADRYL) injection 25 mg  25 mg Intravenous Q4H PRN Dg Ball MD       • epoetin nikko (EPOGEN,PROCRIT) injection 10,000 Units  10,000 Units Subcutaneous Once per day on Mon Wed Fri Dg Ball MD   10,000 Units at 08/16/17 1201   • glucagon (human recombinant) (GLUCAGEN DIAGNOSTIC) injection 1 mg  1 mg Subcutaneous Q15 Min PRN Pino Tang MD       • insulin lispro (humaLOG) injection 2-7 Units  2-7 Units Subcutaneous 4x Daily With Meals & Nightly Pino Tang MD       • ipratropium-albuterol (DUO-NEB) nebulizer solution 3 mL  3 mL Nebulization Q4H PRN Pino Tang MD       • levothyroxine (SYNTHROID, LEVOTHROID) tablet 50 mcg  50 mcg Oral Daily Pino Tang MD   50 mcg at 08/17/17 0806   • midodrine (PROAMATINE) tablet 10 mg  10 mg Oral TID AC Bora Luciano MD   10 mg at 08/17/17 0806   • ondansetron (ZOFRAN) injection 4 mg  4 mg Intravenous Q6H PRN Pino Tang MD       • ondansetron (ZOFRAN) injection 4 mg  4 mg Intravenous Q2H PRN Dg Ball MD   4 mg at 08/16/17 1809   • pantoprazole (PROTONIX) EC tablet 40 mg  40 mg Oral Daily Pino Tang MD   40 mg at 08/17/17 0806   • potassium chloride (MICRO-K) CR capsule 20 mEq  20 mEq  Oral Once Dg Ball MD       • predniSONE (DELTASONE) tablet 10 mg  10 mg Oral Daily With Breakfast Pino Tang MD   10 mg at 08/17/17 0806   • sodium chloride 0.9 % bolus 100 mL  100 mL Intravenous PRN Dg Ball MD       • sodium chloride 0.9 % flush 1-10 mL  1-10 mL Intravenous PRN Pino Tang MD       • sodium chloride injection 40 mEq  10 mL Intravenous Q5 Min PRN Dg Ball MD           Past Medical History:  Past Medical History:   Diagnosis Date   • A-fib    • Anemia, chronic renal failure    • Arthritis    • CAD (coronary artery disease)    • Chronic combined systolic and diastolic CHF (congestive heart failure)    • Chronic diarrhea    • DVT (deep venous thrombosis)    • ESRD (end stage renal disease) on dialysis    • History of noncompliance with medical treatment    • HLD (hyperlipidemia)    • Hypertension    • Hypothyroidism    • PAD (peripheral artery disease)    • Pulmonary HTN    • Renal failure    • Syncope    • Wegener's granulomatosis        Past Surgical History:  Past Surgical History:   Procedure Laterality Date   • APPENDECTOMY     • ARTERIOVENOUS FISTULA LEG Left    • CATARACT EXTRACTION, BILATERAL     • CHOLECYSTECTOMY     • HERNIA REPAIR     • TUBAL ABDOMINAL LIGATION         Family History  History reviewed. No pertinent family history.    Social History  Social History     Social History   • Marital status: Single     Spouse name: N/A   • Number of children: N/A   • Years of education: N/A     Occupational History   • Not on file.     Social History Main Topics   • Smoking status: Former Smoker   • Smokeless tobacco: Not on file   • Alcohol use No   • Drug use: No   • Sexual activity: Defer     Other Topics Concern   • Not on file     Social History Narrative         Review of Systems:  History obtained from chart review and the patient  General ROS: No fever or chills  Respiratory ROS: positive for - shortness of breath  Cardiovascular ROS: no  "chest pain or dyspnea on exertion  Gastrointestinal ROS: epigastric pain  Genito-Urinary ROS: No dysuria or hematuria  Neurological ROS: negative  14 point ROS reviewed with the patient and negative except as noted above and in the HPI unless unable to obtain.    Objective:  /53 (BP Location: Right arm, Patient Position: Sitting)  Pulse 94  Temp 97.7 °F (36.5 °C) (Tympanic)   Resp 20  Ht 62\" (157.5 cm)  Wt 128 lb 1.6 oz (58.1 kg)  SpO2 96%  BMI 23.43 kg/m2    Intake/Output Summary (Last 24 hours) at 08/17/17 1145  Last data filed at 08/17/17 0926   Gross per 24 hour   Intake              500 ml   Output                0 ml   Net              500 ml     General: awake/alert    Neck: supple, no JVD  Chest:  clear to auscultation bilaterally without respiratory distress  CVS: regular rate and rhythm  Abdominal: soft, nontender, normal bowel sounds  Extremities: no cyanosis or edema  Skin: warm and dry without rash   Neuro: no focal motor deficits      Labs:    Results from last 7 days  Lab Units 08/16/17  1112 08/13/17  1840   WBC 10*3/mm3 5.31 6.87   HEMOGLOBIN g/dL 6.2* 8.1*   HEMATOCRIT % 18.9* 25.0*   PLATELETS 10*3/mm3 220 283           Results from last 7 days  Lab Units 08/16/17  1112 08/13/17  2202 08/13/17  1940   SODIUM mmol/L 139  --  141   POTASSIUM mmol/L 2.7* 2.4* 2.9*   CHLORIDE mmol/L 103  --  105   CO2 mmol/L 19.0*  --  18.0*   BUN mg/dL 38*  --  76*   CREATININE mg/dL 8.69*  --  14.24*   CALCIUM mg/dL 8.6  --  8.5   BILIRUBIN mg/dL  --   --  1.0   ALK PHOS U/L  --   --  132*   ALT (SGPT) U/L  --   --  30   AST (SGOT) U/L  --   --  27   GLUCOSE mg/dL 52*  --  79       Radiology:   Imaging Results (last 72 hours)     Procedure Component Value Units Date/Time    XR Chest 2 View [[id number]] Collected:  08/13/17 1913     Updated:  08/13/17 1917    Narrative:       EXAMINATION:   XR CHEST 2 VW-  8/13/2017 7:13 PM CDT     HISTORY: Chest pain     PA and lateral views the chest obtained. Bilateral " interstitial  infiltrates are present. This is superimposed on chronic parenchymal  change. Most likely this is early pulmonary vascular congestion  superimposed on chronic change. Cardiac silhouettes moderately enlarged.  Atelectasis left lower lobe is noted.     IMPRESSION bilateral interstitial infiltrates most likely representing  mild pulmonary vascular congestion superimposed on chronic change.  2. Atelectasis left lower lobe  This report was finalized on 08/13/2017 19:14 by Dr. Herson Magaña MD.          Culture:         Assessment   1.  End stage renal disease on hemodialysis  2.  Hypokalemia  3.  Nausea and vomiting--improving  4.  Anemia of chronic kidney disease    Plan:  1.  Lengthy conversation with patient.  Explained in direct language that her decision to leave hospital today against medical advise may result in significant complication up to and including death.  Advised her she is not safe to leave hospital; she still may have severe hypokalemia that we have not adequately treated.  Patient is alert and oriented X 3; verbalizes full understanding of her decision and remains steadfast that she will be leaving hospital today.  Advised patient that she can return to hospital at any time if she changes her mind.  2.  If patient decides to stay; will plan to dialyze in AM tomorrow      Jaden Tovar, APRN  8/17/2017  11:45 AM

## 2017-08-17 NOTE — PLAN OF CARE
Problem: Pressure Ulcer Risk (New Scale) (Adult,Obstetrics,Pediatric)  Goal: Skin Integrity  Outcome: Ongoing (interventions implemented as appropriate)    Problem: Hemodialysis (Adult)  Goal: Signs and Symptoms of Listed Potential Problems Will be Absent or Manageable (Hemodialysis)  Outcome: Ongoing (interventions implemented as appropriate)    Problem: Patient Care Overview (Adult)  Goal: Plan of Care Review  Outcome: Ongoing (interventions implemented as appropriate)    08/17/17 0225   Coping/Psychosocial Response Interventions   Plan Of Care Reviewed With patient   Patient Care Overview   Progress no change   Outcome Evaluation   Outcome Summary/Follow up Plan pt Bd sugar was in 60's. soda and PB and crackers provided. brought back up to 85. sat was in mid 80's, pt placed on 2L 02. 100% now. cont ot monitor

## 2017-08-17 NOTE — PLAN OF CARE
Problem: Inpatient Physical Therapy  Goal: Bed Mobility Goal LTG- PT  Outcome: Unable to achieve outcome(s) by discharge Date Met:  08/17/17    08/15/17 1355 08/17/17 1531   Bed Mobility PT LTG   Bed Mobility PT LTG, Time to Achieve by discharge --    Bed Mobility PT LTG, Activity Type all bed mobility --    Bed Mobility PT LTG, [place]umpy [place] Level conditional independence --    Bed Mobility PT Goal LTG, Assist Device bed rails  (HOB elevated) --    Bed Mobility PT LTG, Date Goal Reviewed --  08/17/17   Bed Mobility PT LTG, Outcome --  goal not met   Bed Mobility PT LTG, Reason Goal Not Met --  other (see comments)  (left AMA)       Goal: Transfer Training Goal 1 LTG- PT  Outcome: Unable to achieve outcome(s) by discharge Date Met:  08/17/17    08/15/17 1355 08/17/17 1531   Transfer Training PT LTG   Transfer Training PT LTG, Date Established 08/15/17 --    Transfer Training PT LTG, Time to Achieve by discharge --    Transfer Training PT LTG, Activity Type bed to chair /chair to bed;sit to stand/stand to sit --    Transfer Training PT LTG, [place] Level minimum assist (75% patient effort) --    Transfer Training PT LTG, Assist Device walker, rolling --    Transfer Training PT LTG, Date Goal Reviewed --  08/17/17   Transfer Training PT LTG, Outcome --  goal not met   Transfer Training PT LTG, Reason Goal Not Met --  other (see comments)  (left AMA)       Goal: Gait Training Goal LTG- PT  Outcome: Unable to achieve outcome(s) by discharge Date Met:  08/17/17    08/15/17 1355 08/17/17 1531   Gait Training PT LTG   Gait Training Goal PT LTG, Date Established 08/15/17 --    Gait Training Goal PT LTG, Time to Achieve by discharge --    Gait Training Goal PT LTG, [place] Level contact guard assist --    Gait Training Goal PT LTG, Assist Device walker, rolling --    Gait Training Goal PT LTG, Distance to Achieve 15' x2 --    Gait Training Goal PT LTG, Date Goal Reviewed --  08/17/17   Gait Training Goal PT LTG,  Outcome --  goal not met   Gait Training Goal PT LTG, Reason Goal Not Met --  other (see comments)  (left AMA)       Goal: Strength Goal LTG- PT  Outcome: Unable to achieve outcome(s) by discharge Date Met:  08/17/17    08/15/17 1355 08/17/17 1531   Strength Goal PT LTG   Strength Goal PT LTG, Date Established 08/15/17 --    Strength Goal PT LTG, Time to Achieve by discharge --    Strength Goal PT LTG, Functional Goal Supine and sitting LE exercises x20 reps --    Strength Goal PT LTG, Date Goal Reviewed --  08/17/17   Strength Goal PT LTG, Outcome --  goal not met   Strength Goal PT LTG, Reason Goal Not Met --  other (see comments)  (left AMA)

## 2017-08-17 NOTE — DISCHARGE SUMMARY
[facility] Medicine Services  DISCHARGE SUMMARY       Date of Admission: 8/13/2017  Date of Discharge:  8/17/2017  Primary Care Physician: No Known Provider    Presenting Problem/History of Present Illness:  ESRD (end stage renal disease) on dialysis [N18.6, Z99.2]     Final Discharge Diagnoses:  1. Volume overload secondary to non-compliance with hemodialysis  2. Chest pain with mildly elevated troponin-. No acute ST segment changes  3. Abdominal pain with nausea/vomiting/chronic diarrhea  4. Hypokalemia  5. End stage renal disease on maintenance hemodialysis Nstjkn-Iusqxaozs-Fhwdec  6. Hypotension-midodrine  7. 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 Paroxysmal atrial fibrillation-no anticoagulation  9. Wegener's Granulomatosis-chronic prednisone therapy  10. Chronic anemia-status post 2 units Packed red blood cells 8/16  11. Mild lactic acidosis  12. Hypothyroidism-on home synthroid  13. Anorexia-Megace at home.      Consults:   1. Dr. Ball-nephrology    Procedures Performed: none    Pertinent Test Results:   Imaging Results (last 7 days)     Procedure Component Value Units Date/Time    XR Chest 2 View [[id number]] Collected:  08/13/17 1913     Updated:  08/13/17 1917    Narrative:       EXAMINATION:   XR CHEST 2 VW-  8/13/2017 7:13 PM CDT     HISTORY: Chest pain     PA and lateral views the chest obtained. Bilateral interstitial  infiltrates are present. This is superimposed on chronic parenchymal  change. Most likely this is early pulmonary vascular congestion  superimposed on chronic change. Cardiac silhouettes moderately enlarged.  Atelectasis left lower lobe is noted.     IMPRESSION bilateral interstitial infiltrates most likely representing  mild pulmonary vascular congestion superimposed on chronic change.  2. Atelectasis left lower lobe  This report was finalized on 08/13/2017 19:14 by Dr. Bains  MD Archana.    FL Upper GI Single Contrast With KUB [[id number]] Collected:  08/16/17 1106     Updated:  08/16/17 1146    Narrative:       Procedure: Fluoro Double contrast upper GI     Clinical indication: abdominal pain        Technique: Single contrast barium cine esophagram and upper GI study is  performed.         Fluoroscopy time:    2.0 minutes.     Findings:      This exam was somewhat limited in that the patient was unable to stand  during the exam and also patient was unable to roll prone. She mentioned  that she was nauseous this morning and corona multiple times. It was  thought that the patient would not be able to tolerate the double  contrast study and so a single contrast study was performed supine and  with the table tilted up to approximately 20 to 30 degrees.     After swallowing thin barium, there was poor primary peristalsis. There  is stasis of the barium column in the midesophagus. There was initially  thought to be a fixed stenosis in the lower third of the esophagus,  however, contrast was eventually seen to pass through the distal  esophagus into the stomach. There does appear to be a large sized hiatal  hernia. Barium did pull within the fundus of the stomach which was  included within the hernia sac. There was gastroesophageal reflux from  the hernia into the distal esophagus. There is limited evaluation of the  stomach due to the poor gastric distention. The 13 mm barium tablet was  not attempted.       Impression:       Impression:   1. Limited evaluation given the patient's ability to stand and roll  prone. Study was performed as a single contrast study given the  patient's nausea and ongoing vomiting.  2. There was poor primary peristalsis with stasis of the barium column  in the mid esophagus. No fixed esophageal stricture or mass was  identified.  3. There is a large hiatal hernia with reflux of contrast from the  hernia sac into the distal esophagus.  4. Limited assessment for mucosal  lesions of the stomach due to the poor  gastric distention.  This report was finalized on 08/16/2017 11:43 by Dr Ronald Jenkins, .    XR Chest PA & Lateral [[id number]] Collected:  08/17/17 0920     Updated:  08/17/17 0924    Narrative:       EXAMINATION: XR CHEST PA AND LATERAL-     8/17/2017 10:08 AM EDT     HISTORY: hypoxia; N18.9-Chronic kidney disease, unspecified; Z78.9-Other  specified health status; Z74.09-Other reduced mobility.     2 view chest x-ray compared with 08/13/2017.     Cardiomegaly and aortic arch calcification.  Diffuse interstitial prominence shows slight improvement compatible with  decreased interstitial edema.     Small pleural effusions persist.     No pneumothorax.     Summary:  1. Slightly decreased diffuse interstitial infiltrate/edema.  This report was finalized on 08/17/2017 09:21 by Dr. Garrick Hardwick MD.        Lab Results (last 7 days)     Procedure Component Value Units Date/Time    Comprehensive Metabolic Panel [[id number]]  (Abnormal) Collected:  08/13/17 1940    Specimen:  Blood Updated:  08/13/17 2000     Glucose 79 mg/dL      BUN 76 (H) mg/dL       Specimen hemolyzed. Results may be affected.        Creatinine 14.24 (H) mg/dL      Sodium 141 mmol/L      Potassium 2.9 (C) mmol/L       Specimen hemolyzed.  Results may be affected.        Chloride 105 mmol/L      CO2 18.0 (L) mmol/L      Calcium 8.5 mg/dL      Total Protein 6.7 g/dL      Albumin 2.90 (L) g/dL      ALT (SGPT) 30 U/L       Specimen hemolyzed.  Results may be affected.        AST (SGOT) 27 U/L       Specimen hemolyzed.  Results may be affected.        Alkaline Phosphatase 132 (H) U/L       Specimen hemolyzed. Results may be affected.        Total Bilirubin 1.0 mg/dL      eGFR  African Amer 3 (L) mL/min/1.73      Globulin 3.8 gm/dL      A/G Ratio 0.8 (L) g/dL      BUN/Creatinine Ratio 5.3 (L)     Anion Gap 18.0 (H) mmol/L     Narrative:       The MDRD GFR formula is only valid for adults with stable renal function  between ages 18 and 70.    [place] Draw [[id number]] Collected:  08/13/17 1840    Specimen:  Blood Updated:  08/13/17 2001    Narrative:       The following orders were created for panel order [place] Draw.  Procedure                               Abnormality         Status                     ---------                               -----------         ------                     Light Blue Top[[id number]]                                   Final result               Green Top (Gel)[[id number]]                                  Final result               Lavender Top[[id number]]                                     Final result               Red Top[[id number]]                                          Final result                 Please view results for these tests on the individual orders.    Light Blue Top [[id number]] Collected:  08/13/17 1840    Specimen:  Blood Updated:  08/13/17 2001     Extra Tube hold for add-on      Auto resulted       Green Top (Gel) [[id number]] Collected:  08/13/17 1840    Specimen:  Blood Updated:  08/13/17 2001     Extra Tube Hold for add-ons.      Auto resulted.       Lavender Top [[id number]] Collected:  08/13/17 1840    Specimen:  Blood Updated:  08/13/17 2001     Extra Tube hold for add-on      Auto resulted       Red Top [[id number]] Collected:  08/13/17 1840    Specimen:  Blood Updated:  08/13/17 2001     Extra Tube Hold for add-ons.      Auto resulted.       Troponin [[id number]]  (Abnormal) Collected:  08/13/17 1940    Specimen:  Blood Updated:  08/13/17 2003     Troponin I 0.073 (H) ng/mL     Myoglobin, Serum [[id number]]  (Abnormal) Collected:  08/13/17 1940    Specimen:  Blood Updated:  08/13/17 2003     Myoglobin 167.1 (H) ng/mL     Magnesium [[id number]]  (Abnormal) Collected:  08/13/17 1940    Specimen:  Blood Updated:  08/13/17 2006     Magnesium 2.3 (H) mg/dL       Specimen hemolyzed.  Results may be affected.       CK-MB [[id number]]  (Normal) Collected:  08/13/17 1940    Specimen:   Blood Updated:  08/13/17 2006     CKMB 2.86 ng/mL     Narrative:       CKMB Index not indicated    CK [[id number]]  (Normal) Collected:  08/13/17 1940    Specimen:  Blood Updated:  08/13/17 2007     Creatine Kinase 57 U/L     CBC & Differential [[id number]] Collected:  08/13/17 1840    Specimen:  Blood Updated:  08/13/17 2015    Narrative:       The following orders were created for panel order CBC & Differential.  Procedure                               Abnormality         Status                     ---------                               -----------         ------                     CBC Auto Differential[[id number]]        Abnormal            Final result                 Please view results for these tests on the individual orders.    CBC Auto Differential [[id number]]  (Abnormal) Collected:  08/13/17 1840    Specimen:  Blood Updated:  08/13/17 2015     WBC 6.87 10*3/mm3      RBC 2.80 (L) 10*6/mm3      Hemoglobin 8.1 (L) g/dL      Hematocrit 25.0 (L) %      MCV 89.3 fL      MCH 28.9 pg      MCHC 32.4 (L) g/dL      RDW 16.3 (H) %      RDW-SD 52.8 fl      MPV 9.8 fL      Platelets 283 10*3/mm3      Neutrophil % 76.7 %      Lymphocyte % 17.9 %      Monocyte % 3.9 (L) %      Eosinophil % 0.9 %      Basophil % 0.3 %      Immature Grans % 0.3 %      Neutrophils, Absolute 5.27 10*3/mm3      Lymphocytes, Absolute 1.23 10*3/mm3      Monocytes, Absolute 0.27 10*3/mm3      Eosinophils, Absolute 0.06 10*3/mm3      Basophils, Absolute 0.02 10*3/mm3      Immature Grans, Absolute 0.02 10*3/mm3     D-dimer, Quantitative [[id number]]  (Abnormal) Collected:  08/13/17 1840    Specimen:  Blood Updated:  08/13/17 2019     D-Dimer, Quantitative 1.20 (H) mg/L (FEU)     Narrative:       Reference Range is 0-0.50 mg/L FEU. However, results <0.50 mg/L FEU tends to rule out DVT or PE. Results >0.50 mg/L FEU are not useful in predicting absence or presence of DVT or PE.    BNP [[id number]]  (Abnormal) Collected:  08/13/17 1940    Specimen:  Blood  Updated:  08/13/17 2027     proBNP 704228.0 (H) pg/mL     Potassium [[id number]]  (Abnormal) Collected:  08/13/17 2202    Specimen:  Blood Updated:  08/13/17 2224     Potassium 2.4 (C) mmol/L     Lactic Acid, Plasma [[id number]]  (Abnormal) Collected:  08/13/17 2250    Specimen:  Blood Updated:  08/13/17 2325     Lactate 2.1 (C) mmol/L     Troponin [[id number]]  (Normal) Collected:  08/13/17 2250    Specimen:  Blood Updated:  08/13/17 2337     Troponin I 0.024 ng/mL     Amylase [[id number]]  (Normal) Collected:  08/13/17 2250    Specimen:  Blood Updated:  08/13/17 2349     Amylase 100 U/L     Lipase [[id number]]  (Normal) Collected:  08/13/17 2250    Specimen:  Blood Updated:  08/13/17 2349     Lipase 32 U/L     POC Glucose Fingerstick [[id number]]  (Normal) Collected:  08/14/17 0818    Specimen:  Blood Updated:  08/14/17 0829     Glucose 82 mg/dL       : [id number] [employer] SabrinaMeter ID: [id number]       POC Glucose Fingerstick [[id number]]  (Normal) Collected:  08/14/17 1213    Specimen:  Blood Updated:  08/14/17 1225     Glucose 96 mg/dL       : [id number] [employer] SabrinaMeter ID: [id number]       Troponin [[id number]]  (Abnormal) Collected:  08/14/17 1405    Specimen:  Blood Updated:  08/14/17 1434     Troponin I 0.083 (H) ng/mL     Lactic Acid, Plasma [[id number]]  (Normal) Collected:  08/14/17 1712    Specimen:  Blood Updated:  08/14/17 1733     Lactate 1.4 mmol/L     POC Glucose Fingerstick [[id number]]  (Normal) Collected:  08/14/17 1732    Specimen:  Blood Updated:  08/14/17 1742     Glucose 88 mg/dL       : [id number] [employer] SabrinaMeter ID: [id number]       POC Glucose Fingerstick [[id number]]  (Normal) Collected:  08/14/17 2117    Specimen:  Blood Updated:  08/14/17 2137     Glucose 99 mg/dL       : Damion Garsia RachelMeter ID: [id number]       Hepatitis B Surface Antigen [[id number]]  (Normal) Collected:  08/14/17 1400    Specimen:  Blood Updated:  08/15/17 0802     Hepatitis B Surface Ag  Negative    POC Glucose Fingerstick [[id number]]  (Normal) Collected:  08/15/17 0827    Specimen:  Blood Updated:  08/15/17 0839     Glucose 73 mg/dL       : [id number] Rundles SabrinaMeter ID: [id number]       Troponin [[id number]]  (Abnormal) Collected:  08/15/17 0811    Specimen:  Blood Updated:  08/15/17 0921     Troponin I 0.120 (H) ng/mL     POC Glucose Fingerstick [[id number]]  (Normal) Collected:  08/15/17 1217    Specimen:  Blood Updated:  08/15/17 1228     Glucose 70 mg/dL       : [id number] Rundles SabrinaMeter ID: [id number]       POC Glucose Fingerstick [[id number]]  (Abnormal) Collected:  08/15/17 1726    Specimen:  Blood Updated:  08/15/17 1737     Glucose 64 (L) mg/dL       : [id number] Rundles SabrinaMeter ID: [id number]       POC Glucose Fingerstick [[id number]]  (Normal) Collected:  08/15/17 2226    Specimen:  Blood Updated:  08/15/17 2316     Glucose 82 mg/dL       : [id number] Ady RachelMeter ID: [id number]       POC Glucose Fingerstick [[id number]]  (Abnormal) Collected:  08/16/17 0819    Specimen:  Blood Updated:  08/16/17 0830     Glucose 62 (L) mg/dL       : [id number] Rundles SabrinaMeter ID: [id number]       POC Glucose Fingerstick [[id number]]  (Abnormal) Collected:  08/16/17 1145    Specimen:  Blood Updated:  08/16/17 1156     Glucose 54 (L) mg/dL       : [id number] Rundles SabrinaMeter ID: [id number]       CBC & Differential [[id number]] Collected:  08/16/17 1112    Specimen:  Blood Updated:  08/16/17 1207    Narrative:       The following orders were created for panel order CBC & Differential.  Procedure                               Abnormality         Status                     ---------                               -----------         ------                     CBC Auto Differential[[id number]]        Abnormal            Final result                 Please view results for these tests on the individual orders.    CBC Auto Differential [[id number]]  (Abnormal)  Collected:  08/16/17 1112    Specimen:  Blood Updated:  08/16/17 1207     WBC 5.31 10*3/mm3      RBC 2.15 (L) 10*6/mm3      Hemoglobin 6.2 (C) g/dL      Hematocrit 18.9 (C) %      MCV 87.9 fL      MCH 28.8 pg      MCHC 32.8 (L) g/dL      RDW 16.4 (H) %      RDW-SD 51.5 fl      MPV 10.0 fL      Platelets 220 10*3/mm3      Neutrophil % 69.3 %      Lymphocyte % 24.1 %      Monocyte % 4.5 %      Eosinophil % 1.5 %      Basophil % 0.4 %      Immature Grans % 0.2 %      Neutrophils, Absolute 3.68 10*3/mm3      Lymphocytes, Absolute 1.28 10*3/mm3      Monocytes, Absolute 0.24 10*3/mm3      Eosinophils, Absolute 0.08 10*3/mm3      Basophils, Absolute 0.02 10*3/mm3      Immature Grans, Absolute 0.01 10*3/mm3      nRBC 0.0 /100 WBC     Basic Metabolic Panel [[id number]]  (Abnormal) Collected:  08/16/17 1112    Specimen:  Blood Updated:  08/16/17 1219     Glucose 52 (L) mg/dL      BUN 38 (H) mg/dL      Creatinine 8.69 (H) mg/dL      Sodium 139 mmol/L      Potassium 2.7 (C) mmol/L      Chloride 103 mmol/L      CO2 19.0 (L) mmol/L      Calcium 8.6 mg/dL      eGFR  African Amer 5 (L) mL/min/1.73      BUN/Creatinine Ratio 4.4 (L)     Anion Gap 17.0 (H) mmol/L     Narrative:       The MDRD GFR formula is only valid for adults with stable renal function between ages 18 and 70.    POC Glucose Fingerstick [[id number]]  (Abnormal) Collected:  08/16/17 2122    Specimen:  Blood Updated:  08/16/17 2153     Glucose 61 (L) mg/dL       : [id number] [employer]Meter ID: [id number]       POC Glucose Fingerstick [[id number]]  (Abnormal) Collected:  08/16/17 2222    Specimen:  Blood Updated:  08/16/17 2253     Glucose 66 (L) mg/dL       : [id number] [employer] TeresaMeter ID: [id number]       POC Glucose Fingerstick [[id number]]  (Normal) Collected:  08/16/17 2324    Specimen:  Blood Updated:  08/16/17 2359     Glucose 85 mg/dL       : [id number] Jeniffer Wallace ID: [id number]       POC Glucose Fingerstick [[id number]]  (Normal)  "Collected:  08/17/17 0759    Specimen:  Blood Updated:  08/17/17 0817     Glucose 96 mg/dL       : [id number] Bharati Peace ID: [id number]         Chief Complaint on Day of Discharge: \"I'm going to my Dr's appointment in [place] today.\"    Hospital Course:  The patient is a 72 y.o. female with a past medical history significant for End-stage renal disease on maintenance hemodialysis, medical noncompliance, hypertension, pulmonary hypertension, Wegener's granulomatosis, hypothyroidism and, DVT.  She presented to the emergency room on 8/13/17 with multiple complaints including chest pain, nausea vomiting, diarrhea, shortness of breath, and a cough.  She did not have dialysis for almost a week.  He states she felt too poorly to go to dialysis.  Patient has a history of noncompliance.  In the emergency department was found to have volume overload on a chest x-ray.  He was also found to have significant hypokalemia.  She was admitted to the hospitalist service for further evaluation management.  Consultation is sought from Dr. Ball with nephrology.  Patient actually went down for dialysis hospital day on the 14th.  She was complaining of more midepigastric pain she is consistently had hypo-tension in her mid stream was increased.  Patient has refused majority of her laboratory data.  Should have a mildly elevated troponin.  Upon further recommend review, patient had exactly the same symptomatology with previous hospitalization with cardiology evaluated her.  They did not think she had ischemic symptoms or changes.  We did not consult cardiology.  Patient did consent to have an upper GI performed this revealed a large hiatal hernia with stasis of the barium in the esophagus.  She consented to go to dialysis last night with potassium bath as well as to receive 2 units packed blood cells for hemoglobin of 6.2.  Patient had some mild hypoxia last night and agreed for oxygen for short period time and then removed " "it.  She was seen in consultation by physical and occupational therapy departments and she did not complete her past with them.  Patient continued to refuse to have her labs drawn other than in dialysis.  Patient refused her telemetry this morning.  Should have a running sinus rhythm 96-98 with couplets and frequent PVCs.    When I saw the patient this morning, patient states she has an appointment with her cardiologist at [place] today.  She states that she does not care what has happened that she will go to that appointment today.  It was explained to her by Dr. Luciano as well as myself as well as Dr. Ball's nurse practitioner that leaving the hospital was not in her best interest as we do not have a repeat evaluation of her potassium level or her hemoglobin.  It was explained to her that she could die because of leaving the hospital without full understanding of her care.  Patient is alert and oriented and chose to leave AMA.     Please note that pt was going to go to skilled nursing facility at discharge.     Condition on Discharge:  Guarded as we do not know her potassium or hemoglobin level.     Physical Exam on Discharge:  /53 (BP Location: Right arm, Patient Position: Sitting)  Pulse 94  Temp 97.7 °F (36.5 °C) (Tympanic)   Resp 20  Ht 62\" (157.5 cm)  Wt 128 lb 1.6 oz (58.1 kg)  SpO2 96%  BMI 23.43 kg/m2  Physical Exam   Constitutional: She is oriented to person, place, and time. She appears well-developed and well-nourished.   HENT:   Head: Normocephalic and atraumatic.   Eyes: Conjunctivae and EOM are normal. Pupils are equal, round, and reactive to light.   Neck: Neck supple. No JVD present. No thyromegaly present.   Cardiovascular: Normal rate, regular rhythm, normal heart sounds and intact distal pulses.  Exam reveals no gallop and no friction rub.    No murmur heard.  Pulmonary/Chest: Effort normal and breath sounds normal. No respiratory distress. She has no wheezes. She has no " rales. She exhibits no tenderness.   Abdominal: Soft. Bowel sounds are normal. She exhibits no distension. There is no tenderness. There is no rebound and no guarding.   Musculoskeletal: Normal range of motion. She exhibits no edema, tenderness or deformity.   Lymphadenopathy:     She has no cervical adenopathy.   Neurological: She is alert and oriented to person, place, and time. She displays normal reflexes. No cranial nerve deficit. She exhibits normal muscle tone.   Skin: Skin is warm and dry. No rash noted.   Psychiatric: She has a normal mood and affect. Her behavior is normal. Judgment and thought content normal.     Discharge Disposition:  Left Against Medical Advice    Discharge Medications:   Jennifer Marks   Home Medication Instructions JAYLEN:[id number]    Printed on:08/17/17 3079   Medication Information                      levothyroxine (SYNTHROID, LEVOTHROID) 50 MCG tablet  Take 1 tablet by mouth Daily.             megestrol (MEGACE) 40 MG/ML suspension  Take 10 mL by mouth Daily.             midodrine (PROAMATINE) 5 MG tablet  Take 1 tablet by mouth 3 (Three) Times a Day Before Meals.             pantoprazole (PROTONIX) 40 MG EC tablet  Take 40 mg by mouth Daily.             traZODone (DESYREL) 50 MG tablet  Take 50 mg by mouth Every Night.                 Discharge Diet: dialysis    Activity at Discharge: as tolerated.     Discharge Care Plan/Instructions: Pt left AMA    Follow-up Appointments:   No future appointments.    Test Results Pending at Discharge: none    FANNIE Weiss  08/17/17  2:25 PM    Time: 35 min.

## 2017-08-17 NOTE — THERAPY TREATMENT NOTE
"Acute Care - Physical Therapy Treatment Note  [facility]     Patient Name: Jennifer Marks  : 1945  MRN: [id number]  Today's Date: 2017  Onset of Illness/Injury or Date of Surgery Date: 17  Date of Referral to PT: 17  Referring Physician: FANNIE Minor    Admit Date: 2017    Visit Dx:    ICD-10-CM ICD-9-CM   1. Chronic kidney failure, unspecified stage N18.9 585.9   2. Decreased activities of daily living (ADL) Z78.9 V49.89   3. Impaired functional mobility, balance, gait, and endurance Z74.09 V49.89     Patient Active Problem List   Diagnosis   • Chest pain in adult   • Renal failure   • Hypotension   • Anemia   • Hypokalemia   • ESRD (end stage renal disease) on dialysis               Adult Rehabilitation Note       17 0818 17 0740 17 1347    Rehab Assessment/Intervention    Discipline physical therapy assistant  -AE occupational therapist  -ND physical therapy assistant  -AE    Document Type therapy note (daily note)  -AE therapy note (daily note)  -ND therapy note (daily note)  -AE    Subjective Information agree to therapy   only exercises. Pt agrees to walk later  -AE agree to therapy;complains of;weakness;fatigue   \"cold\"  -ND agree to therapy;complains of;pain;nausea/vomiting  -AE    Precautions/Limitations fall precautions  -AE fall precautions  -ND fall precautions  -AE    Specific Treatment Considerations  Pt. is always cold. Getting her warm blankets will motivate her to get up.   -ND     Recorded by [AE] Latosha Cruz PTA [ND] Ailyn Gaston OTR/L [AE] Latosha Cruz PTA    Pain Assessment    Pain Assessment  0-10  -ND Morrison-Cameron FACES  -AE    Morrison-Cameron FACES Pain Rating  2  -ND 2  -AE    Pain Type   Acute pain  -AE    Pain Location  Generalized  -ND --   both heels  -AE    Pain Frequency   Intermittent  -AE    Pain Intervention(s)  Repositioned  -ND Repositioned  -AE    Response to Interventions  tolerated  -ND improved  -AE    Recorded by "  [ND] Ailyn Gaston, OTR/L [AE] Latosha Cruz PTA    Bed Mobility, Assessment/Treatment    Bed Mobility, Assistive Device  bed rails;head of bed elevated  -ND bed rails  -AE    Bed Mobility, Roll Left, [place]   minimum assist (75% patient effort);verbal cues required   x 2  -AE    Bed Mobility, Roll Right, [place]   minimum assist (75% patient effort);verbal cues required   x2  -AE    Bed Mobility, Scoot/Bridge, [place]   maximum assist (25% patient effort)   with bed in trendelenberg  -AE    Bed Mob, Supine to Sit, [place]  supervision required  -ND     Bed Mobility, Safety Issues   decreased use of legs for bridging/pushing  -AE    Recorded by  [ND] Ailyn Gaston, OTR/L [AE] Latosha Cruz PTA    Transfer Assessment/Treatment    Transfer, Comment up in chair   Pt was given RW and was encouraged to walk later  -AE      Recorded by [AE] Latosha Cruz PTA      Therapy Exercises    Bilateral Lower Extremities AAROM:;AROM:;10 reps;sitting;hip abduction/adduction;ankle pumps/circles;SLR  -AE  AROM:;10 reps;supine  -AE    Recorded by [AE] Latosha Cruz PTA  [AE] Latosha Cruz PTA    Positioning and Restraints    Pre-Treatment Position sitting in chair/recliner  -AE  in bed  -AE    Post Treatment Position chair  -AE  bed  -AE    In Bed   fowlers;call light within reach  -AE    In Chair reclined;call light within reach  -AE      Recorded by [AE] Latosha Cruz PTA  [AE] Latosha Cruz PTA      User Key  (r) = Recorded By, (t) = Taken By, (c) = Cosigned By    Initials Name Effective Dates    AE Latosha Cruz PTA 06/22/15 -     ND Ailyn Gaston, OTR/L 10/21/16 -                 IP PT Goals       08/15/17 1355          Bed Mobility PT LTG    Bed Mobility PT LTG, Time to Achieve by discharge  -PB (r) MS (t) PB (c)      Bed Mobility PT LTG, Activity Type all bed mobility  -PB (r) MS (t) PB (c)      Bed Mobility PT LTG, [place] Level conditional independence  -PB (r) MS (t) PB (c)       Bed Mobility PT Goal  LTG, Assist Device bed rails   HOB elevated  -PB (r) MS (t) PB (c)      Transfer Training PT LTG    Transfer Training PT LTG, Date Established 08/15/17  -PB (r) MS (t) PB (c)      Transfer Training PT LTG, Time to Achieve by discharge  -PB (r) MS (t) PB (c)      Transfer Training PT LTG, Activity Type bed to chair /chair to bed;sit to stand/stand to sit  -PB (r) MS (t) PB (c)      Transfer Training PT LTG, [place] minimum assist (75% patient effort)  -PB (r) MS (t) PB (c)      Transfer Training PT LTG, Assist Device walker, rolling  -PB (r) MS (t) PB (c)      Gait Training PT LTG    Gait Training Goal PT LTG, Date Established 08/15/17  -PB (r) MS (t) PB (c)      Gait Training Goal PT LTG, Time to Achieve by discharge  -PB (r) MS (t) PB (c)      Gait Training Goal PT LTG, [place] contact guard assist  -PB (r) MS (t) PB (c)      Gait Training Goal PT LTG, Assist Device walker, rolling  -PB (r) MS (t) PB (c)      Gait Training Goal PT LTG, Distance to Achieve 15' x2  -PB (r) MS (t) PB (c)      Strength Goal PT LTG    Strength Goal PT LTG, Date Established 08/15/17  -PB (r) MS (t) PB (c)      Strength Goal PT LTG, Time to Achieve by discharge  -PB (r) MS (t) PB (c)      Strength Goal PT LTG, Functional Goal Supine and sitting LE exercises x20 reps  -PB (r) MS (t) PB (c)        User Key  (r) = Recorded By, (t) = Taken By, (c) = Cosigned By    Initials Name Provider Type    PB Yonas Morrison, PT DPT Physical Therapist    MS Gena Garcia, PT Student PT Student          Physical Therapy Education     Title: PT OT SLP Therapies (Active)     Topic: Physical Therapy (Active)     Point: Mobility training (Done)    Learning Progress Summary    Learner Readiness Method Response Comment Documented by Status   Patient Acceptance E VU,NR Benefits of activity, goals for therapy, use of bed rails supine to sit t/fs MS 08/15/17 2476 Done               Point: Home exercise program  (Done)    Learning Progress Summary    Learner Readiness Method Response Comment Documented by Status   Patient Acceptance E VU,NR Supine LE exercises MS 08/15/17 1353 Done               Point: Body mechanics (Done)    Learning Progress Summary    Learner Readiness Method Response Comment Documented by Status   Patient Acceptance E VU,NR Benefits of activity, goals for therapy, use of bed rails supine to sit t/fs MS 08/15/17 1352 Done                      User Key     Initials Effective Dates Name Provider Type Discipline    MS 07/19/17 -  Gena Garcia, PT Student PT Student PT                    PT Recommendation and Plan  Anticipated Discharge Disposition: skilled nursing facility  Planned Therapy Interventions: balance training, bed mobility training, gait training, home exercise program, patient/family education, strengthening, transfer training  PT Frequency: daily, 2 times/day, per priority policy             Outcome Measures       08/15/17 1352 08/15/17 1306       How much help from another person do you currently need...    Turning from your back to your side while in flat bed without using bedrails?  3  -PB (r) MS (t) PB (c)     Moving from lying on back to sitting on the side of a flat bed without bedrails?  2  -PB (r) MS (t) PB (c)     Moving to and from a bed to a chair (including a wheelchair)?  2  -PB (r) MS (t) PB (c)     Standing up from a chair using your arms (e.g., wheelchair, bedside chair)?  2  -PB (r) MS (t) PB (c)     Climbing 3-5 steps with a railing?  1  -PB (r) MS (t) PB (c)     To walk in hospital room?  1  -PB (r) MS (t) PB (c)     AM-PAC 6 Clicks Score  11  -PB (r) MS (t)     How much help from another is currently needed...    Putting on and taking off regular lower body clothing? 2  -ND      Bathing (including washing, rinsing, and drying) 2  -ND      Toileting (which includes using toilet bed pan or urinal) 2  -ND      Putting on and taking off regular upper body clothing 2  -ND       Taking care of personal grooming (such as brushing teeth) 2  -ND      Eating meals 2  -ND      Score 12  -ND      Functional Assessment    Outcome Measure Options AM-PAC 6 Clicks Daily Activity (OT)  -ND AM-PAC 6 Clicks Basic Mobility (PT)  -PB (r) MS (t) PB (c)       User Key  (r) = Recorded By, (t) = Taken By, (c) = Cosigned By    Initials Name Provider Type    PB Yonas Morrison, PT DPT Physical Therapist    RITA Gaston, OTR/L Occupational Therapist    MS Gena CARRERA Jose, PT Student PT Student           Time Calculation:         PT Charges       08/17/17 0900          Time Calculation    Start Time 0818  -AE      Stop Time 0826  -AE      Time Calculation (min) 8 min  -AE      PT Received On 08/17/17  -AE      PT Goal Re-Cert Due Date 08/25/17  -AE      Time Calculation- PT    Total Timed Code Minutes- PT 8 minute(s)  -AE        User Key  (r) = Recorded By, (t) = Taken By, (c) = Cosigned By    Initials Name Provider Type    AE Latosha Cruz PTA Physical Therapy Assistant          Therapy Charges for Today     Code Description Service Date Service Provider Modifiers Qty    51752450843 HC PT THERAPEUTIC ACT EA 15 MIN 8/16/2017 Latosha Cruz PTA GP, KX 1    30403125979 HC PT THER PROC EA 15 MIN 8/16/2017 Latosha Cruz PTA GP, KX 1    02854794190 HC PT THER PROC EA 15 MIN 8/17/2017 Latosha Cruz PTA GP, KX 1          PT G-Codes  Outcome Measure Options: AM-PAC 6 Clicks Daily Activity (OT)  Score: 11  Functional Limitation: Mobility: Walking and moving around  Mobility: Walking and Moving Around Current Status (): At least 60 percent but less than 80 percent impaired, limited or restricted  Mobility: Walking and Moving Around Goal Status (): At least 40 percent but less than 60 percent impaired, limited or restricted    Latosha Cruz PTA  8/17/2017

## 2017-08-17 NOTE — PROGRESS NOTES
Faxed pt's d/c information to Esther at [facility] because that who was already following pt.

## 2017-08-17 NOTE — PROGRESS NOTES
Continued Stay Note  [facility]     Patient Name: Jennifer Marks  MRN: [id number]  Today's Date: 8/17/2017    Admit Date: 8/13/2017          Discharge Plan       08/17/17 1022    Case Management/Social Work Plan    Plan Possible [facility]    Patient/Family In Agreement With Plan yes    Additional Comments Marilee from [facility] is coming to speak with pt. For pt to go to a facility, she has to stay there for 30 days so that Medicaid will be approved.              Discharge Codes     None            WILLIAM Jorge

## 2017-08-17 NOTE — DISCHARGE PLACEMENT REQUEST
Fleipa  [id number]         [facility] Medicine Services  DISCHARGE SUMMARY         Date of Admission: 8/13/2017  Date of Discharge:  8/17/2017  Primary Care Physician: No Known Provider     Presenting Problem/History of Present Illness:  ESRD (end stage renal disease) on dialysis [N18.6, Z99.2]                 Final Discharge Diagnoses:  1. Volume overload secondary to non-compliance with hemodialysis  2. Chest pain with mildly elevated troponin-. No acute ST segment changes  3. Abdominal pain with nausea/vomiting/chronic diarrhea  4. Hypokalemia  5. End stage renal disease on maintenance hemodialysis Gjjxed-Thguwgcgs-Zklawl  6. Hypotension-midodrine  7. 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 Paroxysmal atrial fibrillation-no anticoagulation  9. Wegener's Granulomatosis-chronic prednisone therapy  10. Chronic anemia-status post 2 units Packed red blood cells 8/16  11. Mild lactic acidosis  12. Hypothyroidism-on home synthroid  13. Anorexia-Megace at home.      Consults:   1. Dr. Ball-nephrology     Procedures Performed: none     Pertinent Test Results:   Imaging Results (last 7 days)     Procedure Component Value Units Date/Time     XR Chest 2 View [[id number]] Collected:  08/13/17 1913       Updated:  08/13/17 1917     Narrative:        EXAMINATION:   XR CHEST 2 VW-  8/13/2017 7:13 PM CDT      HISTORY: Chest pain      PA and lateral views the chest obtained. Bilateral interstitial  infiltrates are present. This is superimposed on chronic parenchymal  change. Most likely this is early pulmonary vascular congestion  superimposed on chronic change. Cardiac silhouettes moderately enlarged.  Atelectasis left lower lobe is noted.      IMPRESSION bilateral interstitial infiltrates most likely representing  mild pulmonary vascular congestion superimposed on chronic change.  2. Atelectasis left lower lobe  This report was  finalized on 08/13/2017 19:14 by Dr. Herson Magaña MD.     FL Upper GI Single Contrast With KUB [[id number]] Collected:  08/16/17 1106       Updated:  08/16/17 1146     Narrative:        Procedure: Fluoro Double contrast upper GI      Clinical indication: abdominal pain         Technique: Single contrast barium cine esophagram and upper GI study is  performed.          Fluoroscopy time:    2.0 minutes.      Findings:       This exam was somewhat limited in that the patient was unable to stand  during the exam and also patient was unable to roll prone. She mentioned  that she was nauseous this morning and corona multiple times. It was  thought that the patient would not be able to tolerate the double  contrast study and so a single contrast study was performed supine and  with the table tilted up to approximately 20 to 30 degrees.      After swallowing thin barium, there was poor primary peristalsis. There  is stasis of the barium column in the midesophagus. There was initially  thought to be a fixed stenosis in the lower third of the esophagus,  however, contrast was eventually seen to pass through the distal  esophagus into the stomach. There does appear to be a large sized hiatal  hernia. Barium did pull within the fundus of the stomach which was  included within the hernia sac. There was gastroesophageal reflux from  the hernia into the distal esophagus. There is limited evaluation of the  stomach due to the poor gastric distention. The 13 mm barium tablet was  not attempted.         Impression:        Impression:   1. Limited evaluation given the patient's ability to stand and roll  prone. Study was performed as a single contrast study given the  patient's nausea and ongoing vomiting.  2. There was poor primary peristalsis with stasis of the barium column  in the mid esophagus. No fixed esophageal stricture or mass was  identified.  3. There is a large hiatal hernia with reflux of contrast from the  hernia sac  into the distal esophagus.  4. Limited assessment for mucosal lesions of the stomach due to the poor  gastric distention.  This report was finalized on 08/16/2017 11:43 by Dr Ronald Jenkins, .     XR Chest PA & Lateral [[id number]] Collected:  08/17/17 0920       Updated:  08/17/17 0924     Narrative:        EXAMINATION: XR CHEST PA AND LATERAL-      8/17/2017 10:08 AM EDT      HISTORY: hypoxia; N18.9-Chronic kidney disease, unspecified; Z78.9-Other  specified health status; Z74.09-Other reduced mobility.      2 view chest x-ray compared with 08/13/2017.      Cardiomegaly and aortic arch calcification.  Diffuse interstitial prominence shows slight improvement compatible with  decreased interstitial edema.      Small pleural effusions persist.      No pneumothorax.      Summary:  1. Slightly decreased diffuse interstitial infiltrate/edema.  This report was finalized on 08/17/2017 09:21 by Dr. Garrick Hardwick MD.         Lab Results (last 7 days)     Procedure Component Value Units Date/Time             Comprehensive Metabolic Panel [[id number]]  (Abnormal) Collected:  08/13/17 1940     Specimen:  Blood Updated:  08/13/17 2000       Glucose 79 mg/dL         BUN 76 (H) mg/dL           Specimen hemolyzed. Results may be affected.           Creatinine 14.24 (H) mg/dL         Sodium 141 mmol/L         Potassium 2.9 (C) mmol/L           Specimen hemolyzed.  Results may be affected.           Chloride 105 mmol/L         CO2 18.0 (L) mmol/L         Calcium 8.5 mg/dL         Total Protein 6.7 g/dL         Albumin 2.90 (L) g/dL         ALT (SGPT) 30 U/L           Specimen hemolyzed.  Results may be affected.           AST (SGOT) 27 U/L           Specimen hemolyzed.  Results may be affected.           Alkaline Phosphatase 132 (H) U/L           Specimen hemolyzed. Results may be affected.           Total Bilirubin 1.0 mg/dL         eGFR  African Amer 3 (L) mL/min/1.73         Globulin 3.8 gm/dL         A/G Ratio 0.8 (L) g/dL          BUN/Creatinine Ratio 5.3 (L)       Anion Gap 18.0 (H) mmol/L       Narrative:        The MDRD GFR formula is only valid for adults with stable renal function between ages 18 and 70.     Leonidas Draw [[id number]] Collected:  08/13/17 1840     Specimen:  Blood Updated:  08/13/17 2001     Narrative:        The following orders were created for panel order Leonidas Draw.  Procedure                               Abnormality         Status                     ---------                               -----------         ------                     Light Blue Top[[id number]]                                   Final result               Green Top (Gel)[[id number]]                                  Final result               Lavender Top[[id number]]                                     Final result               Red Top[[id number]]                                          Final result                  Please view results for these tests on the individual orders.     Light Blue Top [[id number]] Collected:  08/13/17 1840     Specimen:  Blood Updated:  08/13/17 2001       Extra Tube hold for add-on         Auto resulted         Green Top (Gel) [[id number]] Collected:  08/13/17 1840     Specimen:  Blood Updated:  08/13/17 2001       Extra Tube Hold for add-ons.         Auto resulted.         Lavender Top [[id number]] Collected:  08/13/17 1840     Specimen:  Blood Updated:  08/13/17 2001       Extra Tube hold for add-on         Auto resulted         Red Top [[id number]] Collected:  08/13/17 1840     Specimen:  Blood Updated:  08/13/17 2001       Extra Tube Hold for add-ons.         Auto resulted.         Troponin [[id number]]  (Abnormal) Collected:  08/13/17 1940     Specimen:  Blood Updated:  08/13/17 2003       Troponin I 0.073 (H) ng/mL       Myoglobin, Serum [[id number]]  (Abnormal) Collected:  08/13/17 1940     Specimen:  Blood Updated:  08/13/17 2003       Myoglobin 167.1 (H) ng/mL       Magnesium [[id number]]  (Abnormal) Collected:  08/13/17  1940     Specimen:  Blood Updated:  08/13/17 2006       Magnesium 2.3 (H) mg/dL           Specimen hemolyzed.  Results may be affected.         CK-MB [[id number]]  (Normal) Collected:  08/13/17 1940     Specimen:  Blood Updated:  08/13/17 2006       CKMB 2.86 ng/mL       Narrative:        CKMB Index not indicated     CK [[id number]]  (Normal) Collected:  08/13/17 1940     Specimen:  Blood Updated:  08/13/17 2007       Creatine Kinase 57 U/L       CBC & Differential [[id number]] Collected:  08/13/17 1840     Specimen:  Blood Updated:  08/13/17 2015     Narrative:        The following orders were created for panel order CBC & Differential.  Procedure                               Abnormality         Status                     ---------                               -----------         ------                     CBC Auto Differential[[id number]]        Abnormal            Final result                  Please view results for these tests on the individual orders.     CBC Auto Differential [[id number]]  (Abnormal) Collected:  08/13/17 1840     Specimen:  Blood Updated:  08/13/17 2015       WBC 6.87 10*3/mm3         RBC 2.80 (L) 10*6/mm3         Hemoglobin 8.1 (L) g/dL         Hematocrit 25.0 (L) %         MCV 89.3 fL         MCH 28.9 pg         MCHC 32.4 (L) g/dL         RDW 16.3 (H) %         RDW-SD 52.8 fl         MPV 9.8 fL         Platelets 283 10*3/mm3         Neutrophil % 76.7 %         Lymphocyte % 17.9 %         Monocyte % 3.9 (L) %         Eosinophil % 0.9 %         Basophil % 0.3 %         Immature Grans % 0.3 %         Neutrophils, Absolute 5.27 10*3/mm3         Lymphocytes, Absolute 1.23 10*3/mm3         Monocytes, Absolute 0.27 10*3/mm3         Eosinophils, Absolute 0.06 10*3/mm3         Basophils, Absolute 0.02 10*3/mm3         Immature Grans, Absolute 0.02 10*3/mm3       D-dimer, Quantitative [[id number]]  (Abnormal) Collected:  08/13/17 1840     Specimen:  Blood Updated:  08/13/17 2019       D-Dimer,  Quantitative 1.20 (H) mg/L (FEU)       Narrative:        Reference Range is 0-0.50 mg/L FEU. However, results <0.50 mg/L FEU tends to rule out DVT or PE. Results >0.50 mg/L FEU are not useful in predicting absence or presence of DVT or PE.     BNP [[id number]]  (Abnormal) Collected:  08/13/17 1940     Specimen:  Blood Updated:  08/13/17 2027       proBNP 173454.0 (H) pg/mL       Potassium [[id number]]  (Abnormal) Collected:  08/13/17 2202     Specimen:  Blood Updated:  08/13/17 2224       Potassium 2.4 (C) mmol/L       Lactic Acid, Plasma [[id number]]  (Abnormal) Collected:  08/13/17 2250     Specimen:  Blood Updated:  08/13/17 2325       Lactate 2.1 (C) mmol/L       Troponin [[id number]]  (Normal) Collected:  08/13/17 2250     Specimen:  Blood Updated:  08/13/17 2337       Troponin I 0.024 ng/mL       Amylase [[id number]]  (Normal) Collected:  08/13/17 2250     Specimen:  Blood Updated:  08/13/17 2349       Amylase 100 U/L       Lipase [[id number]]  (Normal) Collected:  08/13/17 2250     Specimen:  Blood Updated:  08/13/17 2349       Lipase 32 U/L       POC Glucose Fingerstick [[id number]]  (Normal) Collected:  08/14/17 0818     Specimen:  Blood Updated:  08/14/17 0829       Glucose 82 mg/dL           : [id number] [employer] SabrinaMeter ID: [id number]         POC Glucose Fingerstick [[id number]]  (Normal) Collected:  08/14/17 1213     Specimen:  Blood Updated:  08/14/17 1225       Glucose 96 mg/dL           : [id number] [employer] SabrinaMeter ID: [id number]         Troponin [[id number]]  (Abnormal) Collected:  08/14/17 1405     Specimen:  Blood Updated:  08/14/17 1434       Troponin I 0.083 (H) ng/mL       Lactic Acid, Plasma [[id number]]  (Normal) Collected:  08/14/17 1712     Specimen:  Blood Updated:  08/14/17 1733       Lactate 1.4 mmol/L       POC Glucose Fingerstick [[id number]]  (Normal) Collected:  08/14/17 1732     Specimen:  Blood Updated:  08/14/17 1742       Glucose 88 mg/dL           : 173703 [employer]  SabrinaMeter ID: [id number]         POC Glucose Fingerstick [[id number]]  (Normal) Collected:  08/14/17 2117     Specimen:  Blood Updated:  08/14/17 2137       Glucose 99 mg/dL           : [id number] Ady RachelMeter ID: [id number]         Hepatitis B Surface Antigen [[id number]]  (Normal) Collected:  08/14/17 1400     Specimen:  Blood Updated:  08/15/17 0802       Hepatitis B Surface Ag Negative     POC Glucose Fingerstick [[id number]]  (Normal) Collected:  08/15/17 0827     Specimen:  Blood Updated:  08/15/17 0839       Glucose 73 mg/dL           : [id number] [employer] SabrinaMeter ID: [id number]         Troponin [[id number]]  (Abnormal) Collected:  08/15/17 0811     Specimen:  Blood Updated:  08/15/17 0921       Troponin I 0.120 (H) ng/mL       POC Glucose Fingerstick [[id number]]  (Normal) Collected:  08/15/17 1217     Specimen:  Blood Updated:  08/15/17 1228       Glucose 70 mg/dL           : [id number] [employer] SabrinaMeter ID: [id number]         POC Glucose Fingerstick [[id number]]  (Abnormal) Collected:  08/15/17 1726     Specimen:  Blood Updated:  08/15/17 1737       Glucose 64 (L) mg/dL           : 880975 [employer] Sabrina[employer]eter ID: [id number]         POC Glucose Fingerstick [[id number]]  (Normal) Collected:  08/15/17 2226     Specimen:  Blood Updated:  08/15/17 2316       Glucose 82 mg/dL           : [id number] Ady RachelMeter ID: [id number]         POC Glucose Fingerstick [[id number]]  (Abnormal) Collected:  08/16/17 0819     Specimen:  Blood Updated:  08/16/17 0830       Glucose 62 (L) mg/dL           : [id number] [employer]rina[employer] ID: [id number]         POC Glucose Fingerstick [[id number]]  (Abnormal) Collected:  08/16/17 1145     Specimen:  Blood Updated:  08/16/17 1156       Glucose 54 (L) mg/dL           : [id number] [employer] ID: [id number]         CBC & Differential [[id number]] Collected:  08/16/17 1112     Specimen:  Blood Updated:  08/16/17 1207     Narrative:         The following orders were created for panel order CBC & Differential.  Procedure                               Abnormality         Status                     ---------                               -----------         ------                     CBC Auto Differential[[id number]]        Abnormal            Final result                  Please view results for these tests on the individual orders.     CBC Auto Differential [[id number]]  (Abnormal) Collected:  08/16/17 1112     Specimen:  Blood Updated:  08/16/17 1207       WBC 5.31 10*3/mm3         RBC 2.15 (L) 10*6/mm3         Hemoglobin 6.2 (C) g/dL         Hematocrit 18.9 (C) %         MCV 87.9 fL         MCH 28.8 pg         MCHC 32.8 (L) g/dL         RDW 16.4 (H) %         RDW-SD 51.5 fl         MPV 10.0 fL         Platelets 220 10*3/mm3         Neutrophil % 69.3 %         Lymphocyte % 24.1 %         Monocyte % 4.5 %         Eosinophil % 1.5 %         Basophil % 0.4 %         Immature Grans % 0.2 %         Neutrophils, Absolute 3.68 10*3/mm3         Lymphocytes, Absolute 1.28 10*3/mm3         Monocytes, Absolute 0.24 10*3/mm3         Eosinophils, Absolute 0.08 10*3/mm3         Basophils, Absolute 0.02 10*3/mm3         Immature Grans, Absolute 0.01 10*3/mm3         nRBC 0.0 /100 WBC       Basic Metabolic Panel [[id number]]  (Abnormal) Collected:  08/16/17 1112     Specimen:  Blood Updated:  08/16/17 1219       Glucose 52 (L) mg/dL         BUN 38 (H) mg/dL         Creatinine 8.69 (H) mg/dL         Sodium 139 mmol/L         Potassium 2.7 (C) mmol/L         Chloride 103 mmol/L         CO2 19.0 (L) mmol/L         Calcium 8.6 mg/dL         eGFR  African Amer 5 (L) mL/min/1.73         BUN/Creatinine Ratio 4.4 (L)       Anion Gap 17.0 (H) mmol/L       Narrative:        The MDRD GFR formula is only valid for adults with stable renal function between ages 18 and 70.     POC Glucose Fingerstick [[id number]]  (Abnormal) Collected:  08/16/17 2122     Specimen:  Blood Updated:   "08/16/17 2153       Glucose 61 (L) mg/dL           : [id number] Jeniffer TeresaMeter ID: [id number]         POC Glucose Fingerstick [[id number]]  (Abnormal) Collected:  08/16/17 2222     Specimen:  Blood Updated:  08/16/17 2253       Glucose 66 (L) mg/dL           : [id number] Jeniffer TeresaMeter ID: [id number]         POC Glucose Fingerstick [[id number]]  (Normal) Collected:  08/16/17 2324     Specimen:  Blood Updated:  08/16/17 2359       Glucose 85 mg/dL           : [id number] Jeniffer TeresaMeter ID: [id number]         POC Glucose Fingerstick [[id number]]  (Normal) Collected:  08/17/17 0759     Specimen:  Blood Updated:  08/17/17 0817       Glucose 96 mg/dL           : 309022 Bharati PelayoidraMeter ID: [id number]          Chief Complaint on Day of Discharge: \"I'm going to my Dr's appointment in [place] today.\"     Hospital Course:  The patient is a 72 y.o. female with a past medical history significant for End-stage renal disease on maintenance hemodialysis, medical noncompliance, hypertension, pulmonary hypertension, Wegener's granulomatosis, hypothyroidism and, DVT.  She presented to the emergency room on 8/13/17 with multiple complaints including chest pain, nausea vomiting, diarrhea, shortness of breath, and a cough.  She did not have dialysis for almost a week.  He states she felt too poorly to go to dialysis.  Patient has a history of noncompliance.  In the emergency department was found to have volume overload on a chest x-ray.  He was also found to have significant hypokalemia.  She was admitted to the hospitalist service for further evaluation management.  Consultation is sought from Dr. Ball with nephrology.  Patient actually went down for dialysis hospital day on the 14th.  She was complaining of more midepigastric pain she is consistently had hypo-tension in her mid stream was increased.  Patient has refused majority of her laboratory data.  Should have a mildly elevated troponin.  " "Upon further recommend review, patient had exactly the same symptomatology with previous hospitalization with cardiology evaluated her.  They did not think she had ischemic symptoms or changes.  We did not consult cardiology.  Patient did consent to have an upper GI performed this revealed a large hiatal hernia with stasis of the barium in the esophagus.  She consented to go to dialysis last night with potassium bath as well as to receive 2 units packed blood cells for hemoglobin of 6.2.  Patient had some mild hypoxia last night and agreed for oxygen for short period time and then removed it.  She was seen in consultation by physical and occupational therapy departments and she did not complete her past with them.  Patient continued to refuse to have her labs drawn other than in dialysis.  Patient refused her telemetry this morning.  Should have a running sinus rhythm 96-98 with couplets and frequent PVCs.     When I saw the patient this morning, patient states she has an appointment with her cardiologist at [place] today.  She states that she does not care what has happened that she will go to that appointment today.  It was explained to her by Dr. Luciano as well as myself as well as Dr. Ball's nurse practitioner that leaving the hospital was not in her best interest as we do not have a repeat evaluation of her potassium level or her hemoglobin.  It was explained to her that she could die because of leaving the hospital without full understanding of her care.  Patient is alert and oriented and chose to leave A.      Please note that pt was going to go to skilled nursing facility at discharge.      Condition on Discharge:  Guarded as we do not know her potassium or hemoglobin level.      Physical Exam on Discharge:  /53 (BP Location: Right arm, Patient Position: Sitting)  Pulse 94  Temp 97.7 °F (36.5 °C) (Tympanic)   Resp 20  Ht 62\" (157.5 cm)  Wt 128 lb 1.6 oz (58.1 kg)  SpO2 96%  BMI 23.43 " kg/m2  Physical Exam   Constitutional: She is oriented to person, place, and time. She appears well-developed and well-nourished.   HENT:   Head: Normocephalic and atraumatic.   Eyes: Conjunctivae and EOM are normal. Pupils are equal, round, and reactive to light.   Neck: Neck supple. No JVD present. No thyromegaly present.   Cardiovascular: Normal rate, regular rhythm, normal heart sounds and intact distal pulses.  Exam reveals no gallop and no friction rub.    No murmur heard.  Pulmonary/Chest: Effort normal and breath sounds normal. No respiratory distress. She has no wheezes. She has no rales. She exhibits no tenderness.   Abdominal: Soft. Bowel sounds are normal. She exhibits no distension. There is no tenderness. There is no rebound and no guarding.   Musculoskeletal: Normal range of motion. She exhibits no edema, tenderness or deformity.   Lymphadenopathy:     She has no cervical adenopathy.   Neurological: She is alert and oriented to person, place, and time. She displays normal reflexes. No cranial nerve deficit. She exhibits normal muscle tone.   Skin: Skin is warm and dry. No rash noted.   Psychiatric: She has a normal mood and affect. Her behavior is normal. Judgment and thought content normal.      Discharge Disposition:  Left Against Medical Advice     Discharge Medications:                Jennifer Marks   Home Medication Instructions JAYLEN:[id number]     Printed on:08/17/17 6435   Medication Information                                       levothyroxine (SYNTHROID, LEVOTHROID) 50 MCG tablet  Take 1 tablet by mouth Daily.                   megestrol (MEGACE) 40 MG/ML suspension  Take 10 mL by mouth Daily.                   midodrine (PROAMATINE) 5 MG tablet  Take 1 tablet by mouth 3 (Three) Times a Day Before Meals.                   pantoprazole (PROTONIX) 40 MG EC tablet  Take 40 mg by mouth Daily.                   traZODone (DESYREL) 50 MG tablet  Take 50 mg by mouth Every Night.                          Discharge Diet: dialysis     Activity at Discharge: as tolerated.      Discharge Care Plan/Instructions: Pt left AMA     Follow-up Appointments:   No future appointments.     Test Results Pending at Discharge: none     Laura Hernández FANNIE Woods  08/17/17  2:25 PM     Time: 35 min.                             AM      Attestation signed by Dg Ball MD at 8/17/2017 2:24 PM   I agree with NP's exam and assessment. Patient decided to leave against medical advice. Next dialysis is due in AM.      Expand All Collapse All    Hide copied text  Nephrology ([facility]) Progress Note        Patient:  Jennifer Marks  YOB: 1945  Date of Service: 8/17/2017  MRN: [id number]                  Acct: [id number]                Primary Care Physician: No Known Provider  Advance Directive: Full Code  Admit Date: 8/13/2017                                            Hospital Day: 4  Referring Provider: Pino Tang MD        Patient personally seen and examined.  Complete chart including Consults, Notes, Operative Reports, Labs, Cardiology, and Radiology studies reviewed as able.           Subjective:  Jennifer Marks is a 72 y.o. female  whom we were consulted for end stage renal disease.  On hemodialysis Monday Wednesday Friday.  Has missed about one week of treatments due to not feeling well.  Long history of noncompliance with dialysis.  Admitted with abdominal pain, nausea/vomiting, and hypokalemia.  Given potassium replacement IV after admission.  Tolerated dialysis well yesterday (8/16).  Care has been significantly complicated by patient's refusal to allow follow up labs to be drawn.  Today pt is adamant that she is leaving hospital; stating she has a doctor appointment in [place] which she has to attend.       Allergies:  Heparin; Iron; Latex; Levaquin [levofloxacin]; Shrimp (diagnostic); and Vancomycin     Home Meds:   Prescriptions Prior to Admission             Prescriptions Prior to Admission   Medication Sig Dispense Refill Last Dose   • levothyroxine (SYNTHROID, LEVOTHROID) 50 MCG tablet Take 1 tablet by mouth Daily. 30 tablet 2 Past Week at Unknown time   • midodrine (PROAMATINE) 5 MG tablet Take 1 tablet by mouth 3 (Three) Times a Day Before Meals. 90 tablet 2 Past Month at Unknown time   • pantoprazole (PROTONIX) 40 MG EC tablet Take 40 mg by mouth Daily.     Past Month at Unknown time   • traZODone (DESYREL) 50 MG tablet Take 50 mg by mouth Every Night.     Past Week at Unknown time   • megestrol (MEGACE) 40 MG/ML suspension Take 10 mL by mouth Daily. 480 mL 1 Unknown at Unknown time            Medicines:   Current Medications              Current Facility-Administered Medications   Medication Dose Route Frequency Provider Last Rate Last Dose   • acetaminophen (TYLENOL) tablet 1,000 mg  1,000 mg Oral Q4H PRN Dg Ball MD         • acetaminophen (TYLENOL) tablet 650 mg  650 mg Oral Q6H PRN Pino Tang MD         • acetaminophen (TYLENOL) tablet 650 mg  650 mg Oral Once Bora Luciano MD         • aspirin EC tablet 81 mg  81 mg Oral Daily Pino Tang MD    81 mg at 08/17/17 0806   • dextrose (D50W) solution 25 g  25 g Intravenous Q15 Min PRN Pino Tang MD         • dextrose (GLUTOSE) oral gel 15 g  15 g Oral Q15 Min PRN Pino Tang MD         • diphenhydrAMINE (BENADRYL) capsule 25 mg  25 mg Oral Q4H PRN Dg Ball MD           Or   • diphenhydrAMINE (BENADRYL) injection 25 mg  25 mg Intravenous Q4H PRN Dg Ball MD         • epoetin nikko (EPOGEN,PROCRIT) injection 10,000 Units  10,000 Units Subcutaneous Once per day on Mon Wed Fri Dg Ball MD    10,000 Units at 08/16/17 1201   • glucagon (human recombinant) (GLUCAGEN DIAGNOSTIC) injection 1 mg  1 mg Subcutaneous Q15 Min PRN Pino Tang MD         • insulin lispro (humaLOG) injection 2-7 Units  2-7 Units Subcutaneous 4x Daily With Meals &  Nightly Pino Tang MD         • ipratropium-albuterol (DUO-NEB) nebulizer solution 3 mL  3 mL Nebulization Q4H PRN Pino Tang MD         • levothyroxine (SYNTHROID, LEVOTHROID) tablet 50 mcg  50 mcg Oral Daily Pino Tang MD    50 mcg at 08/17/17 0806   • midodrine (PROAMATINE) tablet 10 mg  10 mg Oral TID AC Bora Luciano MD    10 mg at 08/17/17 0806   • ondansetron (ZOFRAN) injection 4 mg  4 mg Intravenous Q6H PRN Pino Tang MD         • ondansetron (ZOFRAN) injection 4 mg  4 mg Intravenous Q2H PRN Dg Ball MD    4 mg at 08/16/17 1809   • pantoprazole (PROTONIX) EC tablet 40 mg  40 mg Oral Daily Pnio Tang MD    40 mg at 08/17/17 0806   • potassium chloride (MICRO-K) CR capsule 20 mEq  20 mEq Oral Once Dg Ball MD         • predniSONE (DELTASONE) tablet 10 mg  10 mg Oral Daily With Breakfast Pino Tang MD    10 mg at 08/17/17 0806   • sodium chloride 0.9 % bolus 100 mL  100 mL Intravenous PRN Dg Ball MD         • sodium chloride 0.9 % flush 1-10 mL  1-10 mL Intravenous PRN Pino Tang MD         • sodium chloride injection 40 mEq  10 mL Intravenous Q5 Min PRN Dg Ball MD                  Past Medical History:   Medical History         Past Medical History:   Diagnosis Date   • A-fib     • Anemia, chronic renal failure     • Arthritis     • CAD (coronary artery disease)     • Chronic combined systolic and diastolic CHF (congestive heart failure)     • Chronic diarrhea     • DVT (deep venous thrombosis)     • ESRD (end stage renal disease) on dialysis     • History of noncompliance with medical treatment     • HLD (hyperlipidemia)     • Hypertension     • Hypothyroidism     • PAD (peripheral artery disease)     • Pulmonary HTN     • Renal failure     • Syncope     • Wegener's granulomatosis              Past Surgical History:   Surgical History          Past Surgical History:   Procedure Laterality Date   • APPENDECTOMY       •  "ARTERIOVENOUS FISTULA LEG Left     • CATARACT EXTRACTION, BILATERAL       • CHOLECYSTECTOMY       • HERNIA REPAIR       • TUBAL ABDOMINAL LIGATION                Family History  History reviewed. No pertinent family history.     Social History   Social History    Social History            Social History   • Marital status: Single       Spouse name: N/A   • Number of children: N/A   • Years of education: N/A          Occupational History   • Not on file.           Social History Main Topics   • Smoking status: Former Smoker   • Smokeless tobacco: Not on file   • Alcohol use No   • Drug use: No   • Sexual activity: Defer           Other Topics Concern   • Not on file      Social History Narrative               Review of Systems:  History obtained from chart review and the patient  General ROS: No fever or chills  Respiratory ROS: positive for - shortness of breath  Cardiovascular ROS: no chest pain or dyspnea on exertion  Gastrointestinal ROS: epigastric pain  Genito-Urinary ROS: No dysuria or hematuria  Neurological ROS: negative  14 point ROS reviewed with the patient and negative except as noted above and in the HPI unless unable to obtain.     Objective:  /53 (BP Location: Right arm, Patient Position: Sitting)  Pulse 94  Temp 97.7 °F (36.5 °C) (Tympanic)   Resp 20  Ht 62\" (157.5 cm)  Wt 128 lb 1.6 oz (58.1 kg)  SpO2 96%  BMI 23.43 kg/m2     Intake/Output Summary (Last 24 hours) at 08/17/17 1145  Last data filed at 08/17/17 0926    Gross per 24 hour   Intake              500 ml   Output                0 ml   Net              500 ml      General: awake/alert    Neck: supple, no JVD  Chest:            clear to auscultation bilaterally without respiratory distress  CVS: regular rate and rhythm  Abdominal: soft, nontender, normal bowel sounds  Extremities: no cyanosis or edema  Skin: warm and dry without rash   Neuro: no focal motor deficits        Labs:     Results from last 7 days  Lab Units " 08/16/17  1112 08/13/17  1840   WBC 10*3/mm3 5.31 6.87   HEMOGLOBIN g/dL 6.2* 8.1*   HEMATOCRIT % 18.9* 25.0*   PLATELETS 10*3/mm3 220 283               Results from last 7 days  Lab Units 08/16/17  1112 08/13/17  2202 08/13/17  1940   SODIUM mmol/L 139  --  141   POTASSIUM mmol/L 2.7* 2.4* 2.9*   CHLORIDE mmol/L 103  --  105   CO2 mmol/L 19.0*  --  18.0*   BUN mg/dL 38*  --  76*   CREATININE mg/dL 8.69*  --  14.24*   CALCIUM mg/dL 8.6  --  8.5   BILIRUBIN mg/dL  --   --  1.0   ALK PHOS U/L  --   --  132*   ALT (SGPT) U/L  --   --  30   AST (SGOT) U/L  --   --  27   GLUCOSE mg/dL 52*  --  79         Radiology:   Imaging Results (last 72 hours)     Procedure Component Value Units Date/Time     XR Chest 2 View [[id number]] Collected:  08/13/17 1913       Updated:  08/13/17 1917     Narrative:        EXAMINATION:   XR CHEST 2 VW-  8/13/2017 7:13 PM CDT      HISTORY: Chest pain      PA and lateral views the chest obtained. Bilateral interstitial  infiltrates are present. This is superimposed on chronic parenchymal  change. Most likely this is early pulmonary vascular congestion  superimposed on chronic change. Cardiac silhouettes moderately enlarged.  Atelectasis left lower lobe is noted.      IMPRESSION bilateral interstitial infiltrates most likely representing  mild pulmonary vascular congestion superimposed on chronic change.  2. Atelectasis left lower lobe  This report was finalized on 08/13/2017 19:14 by Dr. Herson Magaña MD.            Culture:            Assessment   1.  End stage renal disease on hemodialysis  2.  Hypokalemia  3.  Nausea and vomiting--improving  4.  Anemia of chronic kidney disease     Plan:  1.  Lengthy conversation with patient.  Explained in direct language that her decision to leave hospital today against medical advise may result in significant complication up to and including death.  Advised her she is not safe to leave hospital; she still may have severe hypokalemia that we have not  adequately treated.  Patient is alert and oriented X 3; verbalizes full understanding of her decision and remains steadfast that she will be leaving hospital today.  Advised patient that she can return to hospital at any time if she changes her mind.  2.  If patient decides to stay; will plan to dialyze in AM tomorrow        FANNIE Johnson  2017  11:45 AM             Cosigned by: Dg Ball MD at 2017  2:24 PM   Revision History       Date/Time User Provider Type Action     2017  2:24 PM Dg Ball MD Physician Cosign     2017 11:54 AM FANNIE Blanco Nurse Practitioner Sign                          Physical Therapy Notes (last 24 hours) (Notes from 2017  2:46 PM through 2017  2:46 PM)      Latosha Cruz PTA at 2017  9:01 AM  Version 1 of 1         Acute Care - Physical Therapy Treatment Note  [facility]     Patient Name: Jennifer Marks  : 1945  MRN: [id number]  Today's Date: 2017  Onset of Illness/Injury or Date of Surgery Date: 17  Date of Referral to PT: 17  Referring Physician: FANNIE Minor    Admit Date: 2017    Visit Dx:    ICD-10-CM ICD-9-CM   1. Chronic kidney failure, unspecified stage N18.9 585.9   2. Decreased activities of daily living (ADL) Z78.9 V49.89   3. Impaired functional mobility, balance, gait, and endurance Z74.09 V49.89     Patient Active Problem List   Diagnosis   • Chest pain in adult   • Renal failure   • Hypotension   • Anemia   • Hypokalemia   • ESRD (end stage renal disease) on dialysis               Adult Rehabilitation Note       17 0818 17 0740 17 1347    Rehab Assessment/Intervention    Discipline physical therapy assistant  -AE occupational therapist  -ND physical therapy assistant  -AE    Document Type therapy note (daily note)  -AE therapy note (daily note)  -ND therapy note (daily note)  -AE    Subjective Information agree to therapy   only  "exercises. Pt agrees to walk later  -AE agree to therapy;complains of;weakness;fatigue   \"cold\"  -ND agree to therapy;complains of;pain;nausea/vomiting  -AE    Precautions/Limitations fall precautions  -AE fall precautions  -ND fall precautions  -AE    Specific Treatment Considerations  Pt. is always cold. Getting her warm blankets will motivate her to get up.   -ND     Recorded by [AE] Latosha Cruz PTA [ND] Ailyn Gaston, OTR/L [AE] Latosha Cruz PTA    Pain Assessment    Pain Assessment  0-10  -ND Morrison-Cameron FACES  -AE    Morrison-Cameron FACES Pain Rating  2  -ND 2  -AE    Pain Type   Acute pain  -AE    Pain Location  Generalized  -ND --   both heels  -AE    Pain Frequency   Intermittent  -AE    Pain Intervention(s)  Repositioned  -ND Repositioned  -AE    Response to Interventions  tolerated  -ND improved  -AE    Recorded by  [ND] Ailyn Gaston, OTR/L [AE] Latosha Cruz PTA    Bed Mobility, Assessment/Treatment    Bed Mobility, Assistive Device  bed rails;head of bed elevated  -ND bed rails  -AE    Bed Mobility, Roll Left, [place]   minimum assist (75% patient effort);verbal cues required   x 2  -AE    Bed Mobility, Roll Right, [place]   minimum assist (75% patient effort);verbal cues required   x2  -AE    Bed Mobility, Scoot/Bridge, [place]   maximum assist (25% patient effort)   with bed in trendelenberg  -AE    Bed Mob, Supine to Sit, Cole  supervision required  -ND     Bed Mobility, Safety Issues   decreased use of legs for bridging/pushing  -AE    Recorded by  [ND] Ailyn Gaston, OTR/L [AE] Latosha Cruz PTA    Transfer Assessment/Treatment    Transfer, Comment up in chair   Pt was given RW and was encouraged to walk later  -AE      Recorded by [AE] Latosha Cruz PTA      Therapy Exercises    Bilateral Lower Extremities AAROM:;AROM:;10 reps;sitting;hip abduction/adduction;ankle pumps/circles;SLR  -AE  AROM:;10 reps;supine  -AE    Recorded by [AE] Latosha Cruz PTA  [AE] " Latosha Cruz PTA    Positioning and Restraints    Pre-Treatment Position sitting in chair/recliner  -AE  in bed  -AE    Post Treatment Position chair  -AE  bed  -AE    In Bed   fowlers;call light within reach  -AE    In Chair reclined;call light within reach  -AE      Recorded by [AE] Latosha Cruz PTA  [AE] Latosha Cruz PTA      User Key  (r) = Recorded By, (t) = Taken By, (c) = Cosigned By    Initials Name Effective Dates    AE Latosha Cruz PTA 06/22/15 -     ND Ailyn Gaston, OTR/L 10/21/16 -                 IP PT Goals       08/15/17 1355          Bed Mobility PT LTG    Bed Mobility PT LTG, Time to Achieve by discharge  -PB (r) MS (t) PB (c)      Bed Mobility PT LTG, Activity Type all bed mobility  -PB (r) MS (t) PB (c)      Bed Mobility PT LTG, [place] Level conditional independence  -PB (r) MS (t) PB (c)      Bed Mobility PT Goal  LTG, Assist Device bed rails   HOB elevated  -PB (r) MS (t) PB (c)      Transfer Training PT LTG    Transfer Training PT LTG, Date Established 08/15/17  -PB (r) MS (t) PB (c)      Transfer Training PT LTG, Time to Achieve by discharge  -PB (r) MS (t) PB (c)      Transfer Training PT LTG, Activity Type bed to chair /chair to bed;sit to stand/stand to sit  -PB (r) MS (t) PB (c)      Transfer Training PT LTG, [place] Level minimum assist (75% patient effort)  -PB (r) MS (t) PB (c)      Transfer Training PT LTG, Assist Device walker, rolling  -PB (r) MS (t) PB (c)      Gait Training PT LTG    Gait Training Goal PT LTG, Date Established 08/15/17  -PB (r) MS (t) PB (c)      Gait Training Goal PT LTG, Time to Achieve by discharge  -PB (r) MS (t) PB (c)      Gait Training Goal PT LTG, [place] Level contact guard assist  -PB (r) MS (t) PB (c)      Gait Training Goal PT LTG, Assist Device walker, rolling  -PB (r) MS (t) PB (c)      Gait Training Goal PT LTG, Distance to Achieve 15' x2  -PB (r) MS (t) PB (c)      Strength Goal PT LTG    Strength Goal PT LTG, Date  Established 08/15/17  -PB (r) MS (t) PB (c)      Strength Goal PT LTG, Time to Achieve by discharge  -PB (r) MS (t) PB (c)      Strength Goal PT LTG, Functional Goal Supine and sitting LE exercises x20 reps  -PB (r) MS (t) PB (c)        User Key  (r) = Recorded By, (t) = Taken By, (c) = Cosigned By    Initials Name Provider Type    PB Yonas Morrison, PT DPT Physical Therapist    MS Gena Garcia, PT Student PT Student          Physical Therapy Education     Title: PT OT SLP Therapies (Active)     Topic: Physical Therapy (Active)     Point: Mobility training (Done)    Learning Progress Summary    Learner Readiness Method Response Comment Documented by Status   Patient Acceptance E VU,NR Benefits of activity, goals for therapy, use of bed rails supine to sit t/fs MS 08/15/17 1352 Done               Point: Home exercise program (Done)    Learning Progress Summary    Learner Readiness Method Response Comment Documented by Status   Patient Acceptance E VU,NR Supine LE exercises MS 08/15/17 1353 Done               Point: Body mechanics (Done)    Learning Progress Summary    Learner Readiness Method Response Comment Documented by Status   Patient Acceptance E VU,NR Benefits of activity, goals for therapy, use of bed rails supine to sit t/fs MS 08/15/17 1352 Done                      User Key     Initials Effective Dates Name Provider Type Discipline    MS 07/19/17 -  Gena Garcia, PT Student PT Student PT                    PT Recommendation and Plan  Anticipated Discharge Disposition: skilled nursing facility  Planned Therapy Interventions: balance training, bed mobility training, gait training, home exercise program, patient/family education, strengthening, transfer training  PT Frequency: daily, 2 times/day, per priority policy             Outcome Measures       08/15/17 1352 08/15/17 1306       How much help from another person do you currently need...    Turning from your back to your side while in flat bed  without using bedrails?  3  -PB (r) MS (t) PB (c)     Moving from lying on back to sitting on the side of a flat bed without bedrails?  2  -PB (r) MS (t) PB (c)     Moving to and from a bed to a chair (including a wheelchair)?  2  -PB (r) MS (t) PB (c)     Standing up from a chair using your arms (e.g., wheelchair, bedside chair)?  2  -PB (r) MS (t) PB (c)     Climbing 3-5 steps with a railing?  1  -PB (r) MS (t) PB (c)     To walk in hospital room?  1  -PB (r) MS (t) PB (c)     AM-PAC 6 Clicks Score  11  -PB (r) MS (t)     How much help from another is currently needed...    Putting on and taking off regular lower body clothing? 2  -ND      Bathing (including washing, rinsing, and drying) 2  -ND      Toileting (which includes using toilet bed pan or urinal) 2  -ND      Putting on and taking off regular upper body clothing 2  -ND      Taking care of personal grooming (such as brushing teeth) 2  -ND      Eating meals 2  -ND      Score 12  -ND      Functional Assessment    Outcome Measure Options AM-PAC 6 Clicks Daily Activity (OT)  -ND AM-PAC 6 Clicks Basic Mobility (PT)  -PB (r) MS (t) PB (c)       User Key  (r) = Recorded By, (t) = Taken By, (c) = Cosigned By    Initials Name Provider Type    PB Yonas Morrison, PT DPT Physical Therapist    RITA Gaston, OTR/L Occupational Therapist    MS Gena Garcia, PT Student PT Student           Time Calculation:         PT Charges       08/17/17 0900          Time Calculation    Start Time 0818  -AE      Stop Time 0826  -AE      Time Calculation (min) 8 min  -AE      PT Received On 08/17/17  -AE      PT Goal Re-Cert Due Date 08/25/17  -AE      Time Calculation- PT    Total Timed Code Minutes- PT 8 minute(s)  -AE        User Key  (r) = Recorded By, (t) = Taken By, (c) = Cosigned By    Initials Name Provider Type    DELMAR Cruz, PTA Physical Therapy Assistant          Therapy Charges for Today     Code Description Service Date Service Provider Modifiers Qty     31515022116 HC PT THERAPEUTIC ACT EA 15 MIN 2017 Latosha Cruz PTA GP, KX 1    77155993005 HC PT THER PROC EA 15 MIN 2017 Latosha Cruz PTA GP, KX 1    95724717873 HC PT THER PROC EA 15 MIN 2017 Latosha Cruz PTA GP, KX 1          PT G-Codes  Outcome Measure Options: AM-PAC 6 Clicks Daily Activity (OT)  Score: 11  Functional Limitation: Mobility: Walking and moving around  Mobility: Walking and Moving Around Current Status (): At least 60 percent but less than 80 percent impaired, limited or restricted  Mobility: Walking and Moving Around Goal Status (): At least 40 percent but less than 60 percent impaired, limited or restricted    Latosha Cruz PTA  2017            Electronically signed by Latosha Cruz PTA at 2017  9:01 AM           Occupational Therapy Notes (last 24 hours) (Notes from 2017  2:46 PM through 2017  2:46 PM)      ZUHAIR Aquino/L at 2017  9:09 AM  Version 1 of 1         Problem: Patient Care Overview (Adult)  Goal: Plan of Care Review  Outcome: Ongoing (interventions implemented as appropriate)    17 0906   Coping/Psychosocial Response Interventions   Plan Of Care Reviewed With patient   Patient Care Overview   Progress progress toward functional goals as expected   Outcome Evaluation   Outcome Summary/Follow up Plan OT tx note completed. Pt. required supervision to complete bed mobility. Pt. Mod A for BUE support to complete sit to stand t/f from bed and chair. Pt. is capable of more than she leads therapist to believe at times she just requires more encouragement to get OOB. Would benefit from further education on benefit of therapy. 17 0800              Electronically signed by ZUHAIR Aquino/L at 2017  9:09 AM      ZUHAIR Aquino/L at 2017  9:11 AM  Version 1 of 1         Acute Care - Occupational Therapy Treatment Note  [facility]     Patient Name: Jennifer Marks  : 1945  MRN:  [id number]  Today's Date: 8/17/2017  Onset of Illness/Injury or Date of Surgery Date: 08/13/17  Date of Referral to OT: 08/14/17  Referring Physician: FANNIE Minor      Admit Date: 8/13/2017    Visit Dx:     ICD-10-CM ICD-9-CM   1. Chronic kidney failure, unspecified stage N18.9 585.9   2. Decreased activities of daily living (ADL) Z78.9 V49.89   3. Impaired functional mobility, balance, gait, and endurance Z74.09 V49.89     Patient Active Problem List   Diagnosis   • Chest pain in adult   • Renal failure   • Hypotension   • Anemia   • Hypokalemia   • ESRD (end stage renal disease) on dialysis             Adult Rehabilitation Note       08/17/17 0818 08/17/17 0740 08/16/17 1347    Rehab Assessment/Intervention    Discipline physical therapy assistant  -AE occupational therapist  -ND physical therapy assistant  -AE    Document Type therapy note (daily note)  -AE therapy note (daily note)  -ND therapy note (daily note)  -AE    Subjective Information agree to therapy   only exercises. Pt agrees to walk later  -AE agree to therapy;complains of;weakness;fatigue   \"cold\"  -ND agree to therapy;complains of;pain;nausea/vomiting  -AE    Precautions/Limitations fall precautions  -AE fall precautions  -ND fall precautions  -AE    Specific Treatment Considerations  Pt. is always cold. Getting her warm blankets will motivate her to get up.   -ND     Recorded by [AE] Latosha Cruz PTA [ND] Ailyn Gaston, OTR/L [AE] Latosha Cruz PTA    Pain Assessment    Pain Assessment  0-10  -ND Morrison-Cameron FACES  -AE    Morrison-Cameron FACES Pain Rating  2  -ND 2  -AE    Pain Type   Acute pain  -AE    Pain Location  Generalized  -ND --   both heels  -AE    Pain Frequency   Intermittent  -AE    Pain Intervention(s)  Repositioned  -ND Repositioned  -AE    Response to Interventions  tolerated  -ND improved  -AE    Recorded by  [ND] Ailyn Gaston, OTR/L [AE] Latosha Cruz PTA    Bed Mobility, Assessment/Treatment    Bed Mobility, " Assistive Device  bed rails;head of bed elevated  -ND bed rails  -AE    Bed Mobility, Roll Left, [place]   minimum assist (75% patient effort);verbal cues required   x 2  -AE    Bed Mobility, Roll Right, [place]   minimum assist (75% patient effort);verbal cues required   x2  -AE    Bed Mobility, Scoot/Bridge, [place]   maximum assist (25% patient effort)   with bed in trendelenberg  -AE    Bed Mob, Supine to Sit, [place]  supervision required  -ND     Bed Mob, Sit to Supine, [place]  --   in chair  -ND     Bed Mobility, Safety Issues  decreased use of arms for pushing/pulling;decreased use of legs for bridging/pushing  -ND decreased use of legs for bridging/pushing  -AE    Bed Mobility, Impairments  strength decreased  -ND     Bed Mobility, Comment  Pt. is capable of more than she lets on she just has to be encouraged.   -ND     Recorded by  [ND] Ailyn Gaston, OTR/L [AE] Latosha Cruz PTA    Transfer Assessment/Treatment    Transfers, Sit-Stand [place]  nonverbal cues required (demo/gesture);verbal cues required;moderate assist (50% patient effort)  -ND     Transfers, Stand-Sit [place]  verbal cues required;nonverbal cues required (demo/gesture);moderate assist (50% patient effort)  -ND     Transfers, Sit-Stand-Sit, Assist Device  --   BUE support  -ND     Transfer, Safety Issues  step length decreased;weight-shifting ability decreased  -ND     Transfer, Impairments  strength decreased;impaired balance;postural control impaired  -ND     Transfer, Comment up in chair   Pt was given RW and was encouraged to walk later  -AE      Recorded by [AE] Latosha Cruz PTA [ND] Ailyn Gaston, OTR/L     Therapy Exercises    Bilateral Lower Extremities AAROM:;AROM:;10 reps;sitting;hip abduction/adduction;ankle pumps/circles;SLR  -AE  AROM:;10 reps;supine  -AE    Recorded by [AE] Latosha Cruz PTA  [AE] Latosha Cruz PTA    Positioning and Restraints    Pre-Treatment Position  sitting in chair/recliner  -AE in bed  -ND in bed  -AE    Post Treatment Position chair  -AE chair  -ND bed  -AE    In Bed   fowlers;call light within reach  -AE    In Chair reclined;call light within reach  -AE reclined;call light within reach;encouraged to call for assist;with other staff;legs elevated  -ND     Recorded by [AE] Latosha Cruz, HILLARY [ND] Ailyn Gaston, OTR/L [AE] Latosha Cruz, HILLARY      User Key  (r) = Recorded By, (t) = Taken By, (c) = Cosigned By    Initials Name Effective Dates    AE Latosha Cruz, PTA 06/22/15 -     ND Ailyn Gaston, OTR/L 10/21/16 -                 OT Goals       08/15/17 1343          Bed Mobility OT LTG    Bed Mobility OT LTG, Date Established 08/15/17  -ND      Bed Mobility OT LTG, Time to Achieve by discharge  -ND      Bed Mobility OT LTG, Activity Type all bed mobility  -ND      Bed Mobility OT LTG, [place] Level supervision required  -ND      Transfer Training OT LTG    Transfer Training OT LTG, Date Established 08/15/17  -ND      Transfer Training OT LTG, Time to Achieve by discharge  -ND      Transfer Training OT LTG, Activity Type bed to chair /chair to bed;sit to stand/stand to sit;toilet  -ND      Transfer Training OT LTG, [place] Level moderate assist (50% patient effort)  -ND      Transfer Training OT LTG, Assist Device walker, rolling;commode, bedside  -ND      Strength OT LTG    Strength Goal OT LTG, Date Established 08/15/17  -ND      Strength Goal OT LTG, Time to Achieve by discharge  -ND      Strength Goal OT LTG, Measure to Achieve Pt. will complete BUE strengthening exercises to increase BUE strength to 5/5 for ADls.   -ND      ADL OT LTG    ADL OT LTG, Date Established 08/15/17  -ND      ADL OT LTG, Time to Achieve by discharge  -ND      ADL OT LTG, Activity Type ADL skills  -ND      ADL OT LTG, [place] Level mod assist  -ND      ADL OT LTG, Additional Goal AE PRN  -ND        User Key  (r) = Recorded By, (t) = Taken By, (c) = Cosigned  By    Initials Name Provider Type    ND Ailyn Gaston OTR/L Occupational Therapist          Occupational Therapy Education     Title: PT OT SLP Therapies (Active)     Topic: Occupational Therapy (Done)     Point: ADL training (Done)    Description: Instruct learner(s) on proper safety adaptation and remediation techniques during self care or transfers.   Instruct in proper use of assistive devices.    Learning Progress Summary    Learner Readiness Method Response Comment Documented by Status   Patient Acceptance E VU,NR Pt. educated on role of OT , safe t/f, benefit of activity, progression with poc ND 08/17/17 0905 Done    Acceptance E VU,NR Pt. educated on role of OT, benefit of activity, progression with poc ND 08/15/17 1339 Done               Point: Home exercise program (Done)    Description: Instruct learner(s) on appropriate technique for monitoring, assisting and/or progressing therapeutic exercises/activities.    Learning Progress Summary    Learner Readiness Method Response Comment Documented by Status   Patient Acceptance E VU,NR Pt. educated on role of OT , safe t/f, benefit of activity, progression with poc ND 08/17/17 0905 Done    Acceptance E VU,NR Pt. educated on role of OT, benefit of activity, progression with poc ND 08/15/17 1339 Done               Point: Body mechanics (Done)    Description: Instruct learner(s) on proper positioning and spine alignment during self-care, functional mobility activities and/or exercises.    Learning Progress Summary    Learner Readiness Method Response Comment Documented by Status   Patient Acceptance E VU,NR Pt. educated on role of OT , safe t/f, benefit of activity, progression with poc ND 08/17/17 0905 Done    Acceptance E VU,NR Pt. educated on role of OT, benefit of activity, progression with poc ND 08/15/17 1339 Done                      User Key     Initials Effective Dates Name Provider Type Discipline    ND 10/21/16 -  Ailyn Gaston OTR/L  Occupational Therapist OT                  OT Recommendation and Plan  Anticipated Discharge Disposition: skilled nursing facility  Planned Therapy Interventions: activity intolerance, ADL retraining, balance training, bed mobility training, energy conservation, home exercise program, ROM (Range of Motion), strengthening, transfer training  Therapy Frequency: 3-5 times/wk  Plan of Care Review  Plan Of Care Reviewed With: patient  Progress: progress toward functional goals as expected  Outcome Summary/Follow up Plan: OT tx note completed. Pt. required supervision to complete bed mobility. Pt. Mod A for BUE support to complete sit to stand t/f from bed and chair.  Pt. is capable of more than she leads therapist to believe at times she just requires more encouragement to get OOB. Would benefit from further education on benefit of therapy. 8/17/17 0800        Outcome Measures       08/17/17 0900 08/15/17 1352 08/15/17 1306    How much help from another person do you currently need...    Turning from your back to your side while in flat bed without using bedrails?   3  -PB (r) MS (t) PB (c)    Moving from lying on back to sitting on the side of a flat bed without bedrails?   2  -PB (r) MS (t) PB (c)    Moving to and from a bed to a chair (including a wheelchair)?   2  -PB (r) MS (t) PB (c)    Standing up from a chair using your arms (e.g., wheelchair, bedside chair)?   2  -PB (r) MS (t) PB (c)    Climbing 3-5 steps with a railing?   1  -PB (r) MS (t) PB (c)    To walk in hospital room?   1  -PB (r) MS (t) PB (c)    AM-PAC 6 Clicks Score   11  -PB (r) MS (t)    How much help from another is currently needed...    Putting on and taking off regular lower body clothing? 2  -ND 2  -ND     Bathing (including washing, rinsing, and drying) 2  -ND 2  -ND     Toileting (which includes using toilet bed pan or urinal) 2  -ND 2  -ND     Putting on and taking off regular upper body clothing 3  -ND 2  -ND     Taking care of personal  grooming (such as brushing teeth) 3  -ND 2  -ND     Eating meals 3  -ND 2  -ND     Score 15  -ND 12  -ND     Functional Assessment    Outcome Measure Options  AM-PAC 6 Clicks Daily Activity (OT)  -ND AM-PAC 6 Clicks Basic Mobility (PT)  -PB (r) MS (t) PB (c)      User Key  (r) = Recorded By, (t) = Taken By, (c) = Cosigned By    Initials Name Provider Type    PB Yonas Morrison, PT DPT Physical Therapist    ND Ailyn Gaston OTR/L Occupational Therapist    MS Gena CARRERA Jose, PT Student PT Student           Time Calculation:         Time Calculation- OT       08/17/17 0910          Time Calculation- OT    OT Start Time 0740  -ND      OT Stop Time 0800  -ND      OT Time Calculation (min) 20 min  -ND      OT Received On 08/17/17  -ND        User Key  (r) = Recorded By, (t) = Taken By, (c) = Cosigned By    Initials Name Provider Type    ND Ailyn Gaston OTR/L Occupational Therapist           Therapy Charges for Today     Code Description Service Date Service Provider Modifiers Qty    09932968052 HC OT SELF CARE/MGMT/TRAIN EA 15 MIN 8/17/2017 Ailyn Gaston OTR/L GO, KX 1          OT G-codes  OT Functional Scales Options: AM-PAC 6 Clicks Daily Activity (OT)  Functional Limitation: Self care  Self Care Current Status (): At least 60 percent but less than 80 percent impaired, limited or restricted  Self Care Goal Status (): At least 40 percent but less than 60 percent impaired, limited or restricted    ZUHAIR Foley/L  8/17/2017     Electronically signed by ZUHAIR Aquino/L at 8/17/2017  9:11 AM

## 2017-08-17 NOTE — NURSING NOTE
Pt expressing intention of leaving [facility]. ANGEL LUIS Mishra notified. Shalonda states she is aware of the patients intentions. Discussed with patient that she is not medically stable to be discharged and if she leaves it will be [facility]. Strongly encouraged patient to reconsider. She is concerned about an appt she has today with a cardiologist in [place]. Advised her the appt could be rescheduled. I tried to convince her to reconsider due to the fact that her bp is dangerously low and her hgb was 6.2 yesterday, she got 2 units prbc and refused to have it rechecked today so it is unknown whether her hgb has rebounded. She again states she is leaving. Verbalizes understanding to her medical condition. She does state that she intends to go to dialysis on Saturday.  Strongly encouraged her to do so. Dr Luciano here and discussed situation with patient as well.

## 2017-08-17 NOTE — THERAPY DISCHARGE NOTE
Acute Care - Physical Therapy Discharge Summary  [facility]       Patient Name: Jennifer Marks  : 1945  MRN: [id number]    Today's Date: 2017  Onset of Illness/Injury or Date of Surgery Date: 17    Date of Referral to PT: 17  Referring Physician: FANNIE Minor      Admit Date: 2017      PT Recommendation and Plan    Visit Dx:    ICD-10-CM ICD-9-CM   1. Chronic kidney failure, unspecified stage N18.9 585.9   2. Decreased activities of daily living (ADL) Z78.9 V49.89   3. Impaired functional mobility, balance, gait, and endurance Z74.09 V49.89             Outcome Measures       17 0900 08/15/17 1352 08/15/17 1306    How much help from another person do you currently need...    Turning from your back to your side while in flat bed without using bedrails?   3  -PB (r) MS (t) PB (c)    Moving from lying on back to sitting on the side of a flat bed without bedrails?   2  -PB (r) MS (t) PB (c)    Moving to and from a bed to a chair (including a wheelchair)?   2  -PB (r) MS (t) PB (c)    Standing up from a chair using your arms (e.g., wheelchair, bedside chair)?   2  -PB (r) MS (t) PB (c)    Climbing 3-5 steps with a railing?   1  -PB (r) MS (t) PB (c)    To walk in hospital room?   1  -PB (r) MS (t) PB (c)    AM-PAC 6 Clicks Score   11  -PB (r) MS (t)    How much help from another is currently needed...    Putting on and taking off regular lower body clothing? 2  -ND 2  -ND     Bathing (including washing, rinsing, and drying) 2  -ND 2  -ND     Toileting (which includes using toilet bed pan or urinal) 2  -ND 2  -ND     Putting on and taking off regular upper body clothing 3  -ND 2  -ND     Taking care of personal grooming (such as brushing teeth) 3  -ND 2  -ND     Eating meals 3  -ND 2  -ND     Score 15  -ND 12  -ND     Functional Assessment    Outcome Measure Options  AM-PAC 6 Clicks Daily Activity (OT)  -ND AM-PAC 6 Clicks Basic Mobility (PT)  -PB (r) MS (t) PB (c)      User  Key  (r) = Recorded By, (t) = Taken By, (c) = Cosigned By    Initials Name Provider Type    PB Yonas Morrison, PT DPT Physical Therapist    RITA Gaston, OTR/L Occupational Therapist    MS Gena A Jose, PT Student PT Student                PT Charges       08/17/17 0900          Time Calculation    Start Time 0818  -AE      Stop Time 0826  -AE      Time Calculation (min) 8 min  -AE      PT Received On 08/17/17  -AE      PT Goal Re-Cert Due Date 08/25/17  -AE      Time Calculation- PT    Total Timed Code Minutes- PT 8 minute(s)  -AE        User Key  (r) = Recorded By, (t) = Taken By, (c) = Cosigned By    Initials Name Provider Type    AE Latosha Cruz, PTA Physical Therapy Assistant                  IP PT Goals       08/17/17 1531 08/15/17 1355       Bed Mobility PT LTG    Bed Mobility PT LTG, Time to Achieve  by discharge  -PB (r) MS (t) PB (c)     Bed Mobility PT LTG, Activity Type  all bed mobility  -PB (r) MS (t) PB (c)     Bed Mobility PT LTG, [place] Level  conditional independence  -PB (r) MS (t) PB (c)     Bed Mobility PT Goal  LTG, Assist Device  bed rails   HOB elevated  -PB (r) MS (t) PB (c)     Bed Mobility PT LTG, Date Goal Reviewed 08/17/17  -NW      Bed Mobility PT LTG, Outcome goal not met  -NW      Bed Mobility PT LTG, Reason Goal Not Met other (see comments)   left AMA  -NW      Transfer Training PT LTG    Transfer Training PT LTG, Date Established  08/15/17  -PB (r) MS (t) PB (c)     Transfer Training PT LTG, Time to Achieve  by discharge  -PB (r) MS (t) PB (c)     Transfer Training PT LTG, Activity Type  bed to chair /chair to bed;sit to stand/stand to sit  -PB (r) MS (t) PB (c)     Transfer Training PT LTG, [place] Level  minimum assist (75% patient effort)  -PB (r) MS (t) PB (c)     Transfer Training PT LTG, Assist Device  walker, rolling  -PB (r) MS (t) PB (c)     Transfer Training PT  LTG, Date Goal Reviewed 08/17/17  -NW      Transfer Training PT LTG, Outcome goal not  met  -NW      Transfer Training PT LTG, Reason Goal Not Met other (see comments)   left AMA  -NW      Gait Training PT LTG    Gait Training Goal PT LTG, Date Established  08/15/17  -PB (r) MS (t) PB (c)     Gait Training Goal PT LTG, Time to Achieve  by discharge  -PB (r) MS (t) PB (c)     Gait Training Goal PT LTG, [place] Level  contact guard assist  -PB (r) MS (t) PB (c)     Gait Training Goal PT LTG, Assist Device  walker, rolling  -PB (r) MS (t) PB (c)     Gait Training Goal PT LTG, Distance to Achieve  15' x2  -PB (r) MS (t) PB (c)     Gait Training Goal PT LTG, Date Goal Reviewed 08/17/17  -NW      Gait Training Goal PT LTG, Outcome goal not met  -NW      Gait Training Goal PT LTG, Reason Goal Not Met other (see comments)   left AMA  -NW      Strength Goal PT LTG    Strength Goal PT LTG, Date Established  08/15/17  -PB (r) MS (t) PB (c)     Strength Goal PT LTG, Time to Achieve  by discharge  -PB (r) MS (t) PB (c)     Strength Goal PT LTG, Functional Goal  Supine and sitting LE exercises x20 reps  -PB (r) MS (t) PB (c)     Strength Goal PT LTG, Date Goal Reviewed 08/17/17  -NW      Strength Goal PT LTG, Outcome goal not met  -NW      Strength Goal PT LTG, Reason Goal Not Met other (see comments)   left AMA  -NW        User Key  (r) = Recorded By, (t) = Taken By, (c) = Cosigned By    Initials Name Provider Type    NW Ghislaine Rivas, HILLARY Physical Therapy Assistant    PB Yonas Morrison, PT DPT Physical Therapist    MS Gena Garcia, PT Student PT Student              PT Discharge Summary  Anticipated Discharge Disposition: home  Reason for Discharge: other (comment) (left AMA)  Outcomes Achieved: Refer to plan of care for updates on goals achieved, Other (left AMA)  Discharge Destination: other (comment) (left AMA)      Ghislaine Rivas PTA   8/17/2017

## 2017-08-18 ENCOUNTER — HOSPITAL ENCOUNTER (EMERGENCY)
Facility: HOSPITAL | Age: 72
Discharge: HOME | End: 2017-08-18
Attending: EMERGENCY MEDICINE | Admitting: EMERGENCY MEDICINE

## 2017-08-18 VITALS
HEIGHT: 62 IN | HEART RATE: 91 BPM | RESPIRATION RATE: 18 BRPM | DIASTOLIC BLOOD PRESSURE: 51 MMHG | WEIGHT: 130 LBS | BODY MASS INDEX: 23.92 KG/M2 | TEMPERATURE: 97 F | SYSTOLIC BLOOD PRESSURE: 98 MMHG | OXYGEN SATURATION: 93 %

## 2017-08-18 DIAGNOSIS — N18.6 ESRD (END STAGE RENAL DISEASE) ON DIALYSIS (HCC): ICD-10-CM

## 2017-08-18 DIAGNOSIS — Z99.2 ESRD (END STAGE RENAL DISEASE) ON DIALYSIS (HCC): ICD-10-CM

## 2017-08-18 DIAGNOSIS — E87.6 HYPOKALEMIA: Primary | ICD-10-CM

## 2017-08-18 LAB
ALBUMIN SERPL-MCNC: 2.3 G/DL (ref 3.5–5)
ALBUMIN/GLOB SERPL: 0.7 G/DL (ref 1.1–2.5)
ALP SERPL-CCNC: 127 U/L (ref 24–120)
ALT SERPL W P-5'-P-CCNC: 23 U/L (ref 0–54)
AMYLASE SERPL-CCNC: 60 U/L (ref 30–110)
ANION GAP SERPL CALCULATED.3IONS-SCNC: 12 MMOL/L (ref 4–13)
AST SERPL-CCNC: 20 U/L (ref 7–45)
BASOPHILS # BLD AUTO: 0.01 10*3/MM3 (ref 0–0.2)
BASOPHILS NFR BLD AUTO: 0.2 % (ref 0–2)
BILIRUB SERPL-MCNC: 1.1 MG/DL (ref 0.1–1)
BUN BLD-MCNC: 22 MG/DL (ref 5–21)
BUN/CREAT SERPL: 3.7 (ref 7–25)
CALCIUM SPEC-SCNC: 8.3 MG/DL (ref 8.4–10.4)
CHLORIDE SERPL-SCNC: 102 MMOL/L (ref 98–110)
CO2 SERPL-SCNC: 24 MMOL/L (ref 24–31)
CREAT BLD-MCNC: 5.95 MG/DL (ref 0.5–1.4)
DEPRECATED RDW RBC AUTO: 50.7 FL (ref 40–54)
EOSINOPHIL # BLD AUTO: 0.07 10*3/MM3 (ref 0–0.7)
EOSINOPHIL NFR BLD AUTO: 1.5 % (ref 0–4)
ERYTHROCYTE [DISTWIDTH] IN BLOOD BY AUTOMATED COUNT: 15.9 % (ref 12–15)
GFR SERPL CREATININE-BSD FRML MDRD: 8 ML/MIN/1.73
GLOBULIN UR ELPH-MCNC: 3.2 GM/DL
GLUCOSE BLD-MCNC: 95 MG/DL (ref 70–100)
HCT VFR BLD AUTO: 25.9 % (ref 37–47)
HGB BLD-MCNC: 8.8 G/DL (ref 12–16)
IMM GRANULOCYTES # BLD: 0 10*3/MM3 (ref 0–0.03)
IMM GRANULOCYTES NFR BLD: 0 % (ref 0–5)
LIPASE SERPL-CCNC: 52 U/L (ref 23–203)
LYMPHOCYTES # BLD AUTO: 1.24 10*3/MM3 (ref 0.72–4.86)
LYMPHOCYTES NFR BLD AUTO: 26.6 % (ref 15–45)
MCH RBC QN AUTO: 30 PG (ref 28–32)
MCHC RBC AUTO-ENTMCNC: 34 G/DL (ref 33–36)
MCV RBC AUTO: 88.4 FL (ref 82–98)
MONOCYTES # BLD AUTO: 0.37 10*3/MM3 (ref 0.19–1.3)
MONOCYTES NFR BLD AUTO: 7.9 % (ref 4–12)
NEUTROPHILS # BLD AUTO: 2.97 10*3/MM3 (ref 1.87–8.4)
NEUTROPHILS NFR BLD AUTO: 63.8 % (ref 39–78)
PLATELET # BLD AUTO: 163 10*3/MM3 (ref 130–400)
PMV BLD AUTO: 9.9 FL (ref 6–12)
POTASSIUM BLD-SCNC: 2.9 MMOL/L (ref 3.5–5.3)
PROT SERPL-MCNC: 5.5 G/DL (ref 6.3–8.7)
RBC # BLD AUTO: 2.93 10*6/MM3 (ref 4.2–5.4)
SODIUM BLD-SCNC: 138 MMOL/L (ref 135–145)
WBC NRBC COR # BLD: 4.66 10*3/MM3 (ref 4.8–10.8)

## 2017-08-18 PROCEDURE — 93005 ELECTROCARDIOGRAM TRACING: CPT | Performed by: EMERGENCY MEDICINE

## 2017-08-18 PROCEDURE — 85025 COMPLETE CBC W/AUTO DIFF WBC: CPT | Performed by: EMERGENCY MEDICINE

## 2017-08-18 PROCEDURE — 99284 EMERGENCY DEPT VISIT MOD MDM: CPT

## 2017-08-18 PROCEDURE — 80053 COMPREHEN METABOLIC PANEL: CPT | Performed by: EMERGENCY MEDICINE

## 2017-08-18 PROCEDURE — 93010 ELECTROCARDIOGRAM REPORT: CPT | Performed by: INTERNAL MEDICINE

## 2017-08-18 PROCEDURE — 82150 ASSAY OF AMYLASE: CPT | Performed by: EMERGENCY MEDICINE

## 2017-08-18 PROCEDURE — 83690 ASSAY OF LIPASE: CPT | Performed by: EMERGENCY MEDICINE

## 2017-08-18 RX ORDER — SODIUM CHLORIDE 0.9 % (FLUSH) 0.9 %
10 SYRINGE (ML) INJECTION AS NEEDED
Status: DISCONTINUED | OUTPATIENT
Start: 2017-08-18 | End: 2017-08-18 | Stop reason: HOSPADM

## 2017-08-18 RX ORDER — POTASSIUM CHLORIDE 750 MG/1
10 CAPSULE, EXTENDED RELEASE ORAL ONCE
Status: COMPLETED | OUTPATIENT
Start: 2017-08-18 | End: 2017-08-18

## 2017-08-18 RX ORDER — POTASSIUM CHLORIDE 750 MG/1
10 TABLET, EXTENDED RELEASE ORAL ONCE
Status: DISCONTINUED | OUTPATIENT
Start: 2017-08-18 | End: 2017-08-18 | Stop reason: CLARIF

## 2017-08-18 RX ADMIN — POTASSIUM CHLORIDE 10 MEQ: 750 CAPSULE, EXTENDED RELEASE ORAL at 13:22

## 2017-08-18 NOTE — PLAN OF CARE
Problem: Inpatient Occupational Therapy  Goal: Bed Mobility Goal LTG- OT  Outcome: Unable to achieve outcome(s) by discharge Date Met:  08/18/17    08/15/17 1343 08/18/17 0809   Bed Mobility OT LTG   Bed Mobility OT LTG, Date Established 08/15/17 --    Bed Mobility OT LTG, Time to Achieve by discharge --    Bed Mobility OT LTG, Activity Type all bed mobility --    Bed Mobility OT LTG, [place] Level supervision required --    Bed Mobility OT LTG, Date Goal Reviewed --  08/18/17   Bed Mobility OT LTG, Outcome --  goal not met   Bed Mobility OT LTG, Reason Goal Not Met --  other (see comments)  (left AMA)       Goal: Transfer Training Goal 1 LTG- OT  Outcome: Outcome(s) achieved Date Met:  08/18/17    08/15/17 1343 08/18/17 0809   Transfer Training OT LTG   Transfer Training OT LTG, Date Established 08/15/17 --    Transfer Training OT LTG, Time to Achieve by discharge --    Transfer Training OT LTG, Activity Type bed to chair /chair to bed;sit to stand/stand to sit;toilet --    Transfer Training OT LTG, [place] Level moderate assist (50% patient effort) --    Transfer Training OT LTG, Assist Device walker, rolling;commode, bedside --    Transfer Training OT LTG, Date Goal Reviewed --  08/18/17   Transfer Training OT LTG, Outcome --  goal not met   Transfer Training OT LTG, Reason Goal Not Met --  other (see comments)  (left AMA)       Goal: Strength Goal LTG- OT  Outcome: Unable to achieve outcome(s) by discharge Date Met:  08/18/17    08/15/17 1343 08/18/17 0809   Strength OT LTG   Strength Goal OT LTG, Date Established 08/15/17 --    Strength Goal OT LTG, Time to Achieve by discharge --    Strength Goal OT LTG, Measure to Achieve Pt. will complete BUE strengthening exercises to increase BUE strength to 5/5 for ADls.  --    Strength Goal OT LTG, Date Goal Reviewed --  08/18/17   Strength Goal OT LTG, Outcome --  goal not met   Strength Goal OT LTG, Reason Goal Not Met --  other (see comments)  (left AMA)        Goal: ADL Goal LTG- OT  Outcome: Unable to achieve outcome(s) by discharge Date Met:  08/18/17    08/15/17 1343 08/18/17 0809   ADL OT LTG   ADL OT LTG, Date Established 08/15/17 --    ADL OT LTG, Time to Achieve by discharge --    ADL OT LTG, Activity Type ADL skills --    ADL OT LTG, [place] Level mod assist --    ADL OT LTG, Additional Goal AE PRN --    ADL OT LTG, Date Goal Reviewed --  08/18/17   ADL OT LTG, Outcome --  goal not met   ADL OT LTG, Reason Goal Not Met --  other (see comments)  (left AMA)

## 2017-08-18 NOTE — THERAPY DISCHARGE NOTE
Acute Care - Occupational Therapy Discharge Summary  [facility]     Patient Name: Jennifer Marks  : 1945  MRN: [id number]    Today's Date: 2017  Onset of Illness/Injury or Date of Surgery Date: 17    Date of Referral to OT: 17  Referring Physician: FANNIE Minor      Admit Date: 2017        OT Recommendation and Plan    Visit Dx:    ICD-10-CM ICD-9-CM   1. Chronic kidney failure, unspecified stage N18.9 585.9   2. Decreased activities of daily living (ADL) Z78.9 V49.89   3. Impaired functional mobility, balance, gait, and endurance Z74.09 V49.89                     OT Goals       17 0809 08/15/17 1343       Bed Mobility OT LTG    Bed Mobility OT LTG, Date Established  08/15/17  -ND     Bed Mobility OT LTG, Time to Achieve  by discharge  -ND     Bed Mobility OT LTG, Activity Type  all bed mobility  -ND     Bed Mobility OT LTG, [place] Level  supervision required  -ND     Bed Mobility OT LTG, Date Goal Reviewed 17  -TS      Bed Mobility OT LTG, Outcome goal not met  -TS      Bed Mobility OT LTG, Reason Goal Not Met other (see comments)   left AMA  -TS      Transfer Training OT LTG    Transfer Training OT LTG, Date Established  08/15/17  -ND     Transfer Training OT LTG, Time to Achieve  by discharge  -ND     Transfer Training OT LTG, Activity Type  bed to chair /chair to bed;sit to stand/stand to sit;toilet  -ND     Transfer Training OT LTG, [place] Level  moderate assist (50% patient effort)  -ND     Transfer Training OT LTG, Assist Device  walker, rolling;commode, bedside  -ND     Transfer Training OT LTG, Date Goal Reviewed 17  -TS      Transfer Training OT LTG, Outcome goal not met  -TS      Transfer Training OT LTG, Reason Goal Not Met other (see comments)   left AMA  -TS      Strength OT LTG    Strength Goal OT LTG, Date Established  08/15/17  -ND     Strength Goal OT LTG, Time to Achieve  by discharge  -ND     Strength Goal OT LTG, Measure to  Achieve  Pt. will complete BUE strengthening exercises to increase BUE strength to 5/5 for ADls.   -ND     Strength Goal OT LTG, Date Goal Reviewed 08/18/17  -TS      Strength Goal OT LTG, Outcome goal not met  -TS      Strength Goal OT LTG, Reason Goal Not Met other (see comments)   left AMA  -TS      ADL OT LTG    ADL OT LTG, Date Established  08/15/17  -ND     ADL OT LTG, Time to Achieve  by discharge  -ND     ADL OT LTG, Activity Type  ADL skills  -ND     ADL OT LTG, [place] Level  mod assist  -ND     ADL OT LTG, Additional Goal  AE PRN  -ND     ADL OT LTG, Date Goal Reviewed 08/18/17  -TS      ADL OT LTG, Outcome goal not met  -TS      ADL OT LTG, Reason Goal Not Met other (see comments)   left AMA  -TS        User Key  (r) = Recorded By, (t) = Taken By, (c) = Cosigned By    Initials Name Provider Type     Elissa Geronimo, BOLAND/L Occupational Therapy Assistant    RITA Gaston, OTR/L Occupational Therapist                Outcome Measures       08/17/17 0900 08/15/17 1352 08/15/17 1306    How much help from another person do you currently need...    Turning from your back to your side while in flat bed without using bedrails?   3  -PB (r) MS (t) PB (c)    Moving from lying on back to sitting on the side of a flat bed without bedrails?   2  -PB (r) MS (t) PB (c)    Moving to and from a bed to a chair (including a wheelchair)?   2  -PB (r) MS (t) PB (c)    Standing up from a chair using your arms (e.g., wheelchair, bedside chair)?   2  -PB (r) MS (t) PB (c)    Climbing 3-5 steps with a railing?   1  -PB (r) MS (t) PB (c)    To walk in hospital room?   1  -PB (r) MS (t) PB (c)    AM-PAC 6 Clicks Score   11  -PB (r) MS (t)    How much help from another is currently needed...    Putting on and taking off regular lower body clothing? 2  -ND 2  -ND     Bathing (including washing, rinsing, and drying) 2  -ND 2  -ND     Toileting (which includes using toilet bed pan or urinal) 2  -ND 2  -ND     Putting  on and taking off regular upper body clothing 3  -ND 2  -ND     Taking care of personal grooming (such as brushing teeth) 3  -ND 2  -ND     Eating meals 3  -ND 2  -ND     Score 15  -ND 12  -ND     Functional Assessment    Outcome Measure Options  AM-PAC 6 Clicks Daily Activity (OT)  -ND AM-PAC 6 Clicks Basic Mobility (PT)  -PB (r) MS (t) PB (c)      User Key  (r) = Recorded By, (t) = Taken By, (c) = Cosigned By    Initials Name Provider Type    PB Yonas Morrison, PT DPT Physical Therapist    ND Ailyn Gaston, OTR/L Occupational Therapist    MS Gena Garcia, PT Student PT Student              OT Discharge Summary  Reason for Discharge: other (comment) (left AMA)  Outcomes Achieved: Refer to plan of care for updates on goals achieved  Discharge Destination: Home with assist      BEATRIZ Case  8/18/2017

## 2017-08-18 NOTE — PROGRESS NOTES
Continued Stay Note   [facility]     Patient Name: Jennifer Marks  MRN: [id number]  Today's Date: 8/18/2017    Admit Date: 8/18/2017          Discharge Plan       08/18/17 1138    Case Management/Social Work Plan    Plan [facility]    Additional Comments Spoke with Adenike from [facility] [phone], they will NOT be offering pt a bed since she left AMA yesterday.       08/18/17 1119    Case Management/Social Work Plan    Plan Possible [facility]    Additional Comments Spoke with pt in ER, she will more than likely be admitted. She left AMA yesterday wanting to get to [facility], never made it.  She says she realizes she made a mistake.  Pt lives alone, says dtr stays with her off and on.  Pt says she has needed DME, would like shower chair but realizes this is not covered via ins.  [facility]  Pt says she is agreeble going to [facility] at d/c and realized going medicaid pending she will need to stay at least 30 days.  Will inform Adenike that pt is back and possiblity of going there at d/c.               Discharge Codes     None            WILLIAM Pollock

## 2017-08-18 NOTE — ED PROVIDER NOTES
Subjective   HPI Comments: Patient presents to the emergency from complaining of generalized weakness and feeling bad with nausea and aches all over.  She says she was in the hospital until yesterday because of low potassium and had to have some of that replaced.  She left AMA yesterday to go see her cardiologist in [place].  She did go see her cardiologist in [place] and was told her heart was okay and that her problems related to her other chronic illnesses.  She says she got a call from a nurse here at the hospital this morning and describes feeling bad to her and was told to come back to the ER because she may be still sick and they wanted her rechecked.  They were afraid that her potassium was still low.    Patient is a 72 y.o. female presenting with general illness.   History provided by:  Patient   used: No    Illness   Location:  Genreraliized  Quality:  Achy  Severity:  Moderate  Onset quality:  Gradual  Duration:  1 week  Timing:  Constant  Progression:  Unchanged  Chronicity:  New  Associated symptoms: fatigue, myalgias and nausea    Associated symptoms: no abdominal pain, no chest pain, no congestion, no cough, no diarrhea, no ear pain, no fever, no headaches, no loss of consciousness, no rash, no rhinorrhea, no shortness of breath, no sore throat, no vomiting and no wheezing        Review of Systems   Constitutional: Positive for fatigue. Negative for fever.   HENT: Negative for congestion, ear pain, rhinorrhea and sore throat.    Eyes: Negative.    Respiratory: Negative.  Negative for cough, shortness of breath and wheezing.    Cardiovascular: Negative.  Negative for chest pain.   Gastrointestinal: Positive for nausea. Negative for abdominal pain, diarrhea and vomiting.   Genitourinary: Negative.    Musculoskeletal: Positive for myalgias.   Skin: Negative.  Negative for rash.   Neurological: Negative.  Negative for loss of consciousness and headaches.   Hematological: Negative.     Psychiatric/Behavioral: Negative.    All other systems reviewed and are negative.      Past Medical History:   Diagnosis Date   • A-fib    • Anemia, chronic renal failure    • Arthritis    • CAD (coronary artery disease)    • Chronic combined systolic and diastolic CHF (congestive heart failure)    • Chronic diarrhea    • DVT (deep venous thrombosis)    • ESRD (end stage renal disease) on dialysis    • History of noncompliance with medical treatment    • HLD (hyperlipidemia)    • Hypertension    • Hypothyroidism    • PAD (peripheral artery disease)    • Pulmonary HTN    • Renal failure    • Syncope    • Wegener's granulomatosis        Allergies   Allergen Reactions   • Heparin    • Iron    • Latex      PATIENT STATES SHE IS NOT ALLERGIC   • Levaquin [Levofloxacin]    • Shrimp (Diagnostic)    • Vancomycin        Past Surgical History:   Procedure Laterality Date   • APPENDECTOMY     • ARTERIOVENOUS FISTULA LEG Left    • CATARACT EXTRACTION, BILATERAL     • CHOLECYSTECTOMY     • HERNIA REPAIR     • TUBAL ABDOMINAL LIGATION         History reviewed. No pertinent family history.    Social History     Social History   • Marital status: Single     Spouse name: N/A   • Number of children: N/A   • Years of education: N/A     Social History Main Topics   • Smoking status: Former Smoker   • Smokeless tobacco: None   • Alcohol use No   • Drug use: No   • Sexual activity: Defer     Other Topics Concern   • None     Social History Narrative       Prior to Admission medications    Medication Sig Start Date End Date Taking? Authorizing Provider   levothyroxine (SYNTHROID, LEVOTHROID) 50 MCG tablet Take 1 tablet by mouth Daily. 6/26/17   Carter Llamas MD   megestrol (MEGACE) 40 MG/ML suspension Take 10 mL by mouth Daily. 6/26/17   Carter Llamas MD   midodrine (PROAMATINE) 5 MG tablet Take 1 tablet by mouth 3 (Three) Times a Day Before Meals. 6/26/17   Carter Llamas MD   pantoprazole (PROTONIX) 40 MG EC tablet Take 40 mg by  mouth Daily.    Historical Provider, MD   traZODone (DESYREL) 50 MG tablet Take 50 mg by mouth Every Night.    Historical Provider, MD       Medications   sodium chloride 0.9 % flush 10 mL (not administered)   potassium chloride (MICRO-K) CR capsule 10 mEq (10 mEq Oral Given 8/18/17 1322)       Vitals:    08/18/17 1246   BP: 95/55   Pulse: 92   Resp: 23   Temp:    SpO2:          Objective   Physical Exam   Constitutional: She is oriented to person, place, and time. She appears well-developed and well-nourished.   HENT:   Head: Normocephalic and atraumatic.   Eyes: EOM are normal. Pupils are equal, round, and reactive to light.   Neck: Normal range of motion. Neck supple.   Cardiovascular: Normal rate and regular rhythm.    Pulmonary/Chest: Effort normal and breath sounds normal.   Abdominal: Soft. Bowel sounds are normal.   Musculoskeletal: Normal range of motion.   Neurological: She is alert and oriented to person, place, and time.   Skin: Skin is warm and dry.   Psychiatric: She has a normal mood and affect. Her behavior is normal.   Nursing note and vitals reviewed.      Procedures         Lab Results (last 24 hours)     Procedure Component Value Units Date/Time    CBC & Differential [[id number]] Collected:  08/18/17 1058    Specimen:  Blood Updated:  08/18/17 1147    Narrative:       The following orders were created for panel order CBC & Differential.  Procedure                               Abnormality         Status                     ---------                               -----------         ------                     CBC Auto Differential[[id number]]        Abnormal            Final result                 Please view results for these tests on the individual orders.    Comprehensive Metabolic Panel [[id number]]  (Abnormal) Collected:  08/18/17 1058    Specimen:  Blood from Arm, Right Updated:  08/18/17 1136     Glucose 95 mg/dL      BUN 22 (H) mg/dL      Creatinine 5.95 (H) mg/dL      Sodium 138 mmol/L       Potassium 2.9 (C) mmol/L      Chloride 102 mmol/L      CO2 24.0 mmol/L      Calcium 8.3 (L) mg/dL      Total Protein 5.5 (L) g/dL      Albumin 2.30 (L) g/dL      ALT (SGPT) 23 U/L      AST (SGOT) 20 U/L      Alkaline Phosphatase 127 (H) U/L      Total Bilirubin 1.1 (H) mg/dL      eGFR  African Amer 8 (L) mL/min/1.73      Globulin 3.2 gm/dL      A/G Ratio 0.7 (L) g/dL      BUN/Creatinine Ratio 3.7 (L)     Anion Gap 12.0 mmol/L     Narrative:       The MDRD GFR formula is only valid for adults with stable renal function between ages 18 and 70.    Amylase [[id number]]  (Normal) Collected:  08/18/17 1058    Specimen:  Blood from Arm, Right Updated:  08/18/17 1128     Amylase 60 U/L     Lipase [[id number]]  (Normal) Collected:  08/18/17 1058    Specimen:  Blood from Arm, Right Updated:  08/18/17 1128     Lipase 52 U/L     CBC Auto Differential [[id number]]  (Abnormal) Collected:  08/18/17 1058    Specimen:  Blood from Arm, Right Updated:  08/18/17 1147     WBC 4.66 (L) 10*3/mm3      RBC 2.93 (L) 10*6/mm3      Hemoglobin 8.8 (L) g/dL      Hematocrit 25.9 (L) %      MCV 88.4 fL      MCH 30.0 pg      MCHC 34.0 g/dL      RDW 15.9 (H) %      RDW-SD 50.7 fl      MPV 9.9 fL      Platelets 163 10*3/mm3      Neutrophil % 63.8 %      Lymphocyte % 26.6 %      Monocyte % 7.9 %      Eosinophil % 1.5 %      Basophil % 0.2 %      Immature Grans % 0.0 %      Neutrophils, Absolute 2.97 10*3/mm3      Lymphocytes, Absolute 1.24 10*3/mm3      Monocytes, Absolute 0.37 10*3/mm3      Eosinophils, Absolute 0.07 10*3/mm3      Basophils, Absolute 0.01 10*3/mm3      Immature Grans, Absolute 0.00 10*3/mm3           No orders to display       ED Course  ED Course   Comment By Time   Told the patient that her testing here was basically okay.  Certainly her chemistries about normal but they are not bad for a renal dialysis patient.  The only significant problem was the potassium 2.9 but this is improved from when it was in the hospital.  It is out of any  dangers all the present time.  Within a year was dose of potassium here and she is stable for discharge. Juanpablo Bass Jr., MD 08/18 1500          MDM  Number of Diagnoses or Management Options  ESRD (end stage renal disease) on dialysis: established and worsening  Hypokalemia: new and requires workup     Amount and/or Complexity of Data Reviewed  Clinical lab tests: ordered and reviewed  Decide to obtain previous medical records or to obtain history from someone other than the patient: yes    Risk of Complications, Morbidity, and/or Mortality  Presenting problems: moderate  Diagnostic procedures: moderate  Management options: moderate    Patient Progress  Patient progress: stable      Final diagnoses:   Hypokalemia   ESRD (end stage renal disease) on dialysis          Juanpablo Bass Jr., MD  08/18/17 1501

## 2017-08-18 NOTE — PROGRESS NOTES
Discharge Planning Assessment  [facility]     Patient Name: Jennifer Marks  MRN: [id number]  Today's Date: 8/18/2017    Admit Date: 8/18/2017          Discharge Needs Assessment       08/18/17 1116    Living Environment    Lives With alone    Living Arrangements apartment    Type of Financial/Environmental Concern none    Transportation Available family or friend will provide;car;public transportation    Living Environment    Provides Primary Care For no one    Quality Of Family Relationships supportive    Able to Return to Prior Living Arrangements other (see comments)   Pt now saying she will go to [facility] if they will accept her    Discharge Needs Assessment    Concerns To Be Addressed basic needs concerns;home safety concerns    Readmission Within The Last 30 Days other (see comments)   Pt left AMA yesterday    Outpatient/Agency/Support Group Needs homecare agency (specify level of care)    Community Agency Name(S) [facility]    Anticipated Changes Related to Illness inability to care for self    Equipment Currently Used at Home walker, rolling;wheelchair;commode;oxygen;nebulizer;bipap/ cpap    Equipment Needed After Discharge none    Discharge Facility/Level Of Care Needs nursing facility, intermediate            Discharge Plan       08/18/17 1119    Case Management/Social Work Plan    Plan Possible [facility]    Additional Comments Spoke with pt in ER, she will more than likely be admitted. She left AMA yesterday wanting to get to [facility], never made it.  She says she realizes she made a mistake.  Pt lives alone, says dtr stays with her off and on.  Pt says she has needed DME, would like shower chair but realizes this is not covered via ins.  Pt is followed by [facility] care at home.  Pt says she is agreeable going to [facility] at d/c and realizes going medicaid pending she will need to stay at least 30 days.  Will inform [facility] that pt is back and possibility of going there at  d/c.         Discharge Placement     No information found                Demographic Summary     None            Functional Status     None            Psychosocial     None            Abuse/Neglect     None            Legal     None            Substance Abuse     None            Patient Forms     None          WILLIAM Pollock

## 2017-08-18 NOTE — ED NOTES
Pt's daughter called and states that she does not know why the boyfriend said he would come get her since he is unable to drive. She states that she will be taking off of work or will find another ride for the pt      Richard Syed RN  08/18/17 0962

## 2017-08-21 NOTE — ED NOTES
"ED Call Back Questions    1. How are you doing since leaving the Emergency Department?    Feel the same,still having pain  2. Do you have any questions about your discharge instructions? No     3. Have you filled your new prescriptions yet? N/A  a. Do you have any questions about those medications? N/A    4. Were you able to make a follow-up appointment with the physician? Yes     5. Do you have a primary care physician? Yes   a. If No, would you like for me to set you up with one? No   i. If Yes, “I will have our ED  give you a call right back at this number to work with you on the best time for an appointment.”    6. We are always looking to get better at what we do. Do you have any suggestions for what we can do to be even better? No   a. If Yes, \"Thank you for sharing your concerns. I apologize. I will follow up with our manager and patient . Would you like someone to call you back?\" N/A    7. Is there anything else I can do for you? N/A  Visit was ok     Alton Davis  08/21/17 1245    "

## 2020-11-03 PROBLEM — I73.9 PERIPHERAL VASCULAR DISEASE (HCC): Status: RESOLVED | Noted: 2020-11-03 | Resolved: 2020-11-03

## 2023-02-05 NOTE — PLAN OF CARE
Problem: Pressure Ulcer Risk (New Scale) (Adult,Obstetrics,Pediatric)  Goal: Identify Related Risk Factors and Signs and Symptoms  Outcome: Outcome(s) achieved Date Met:  08/14/17    Problem: Fall Risk (Adult)  Goal: Identify Related Risk Factors and Signs and Symptoms  Outcome: Outcome(s) achieved Date Met:  08/14/17  Goal: Absence of Falls  Outcome: Outcome(s) achieved Date Met:  08/14/17    Problem: Hemodialysis (Adult)  Goal: Signs and Symptoms of Listed Potential Problems Will be Absent or Manageable (Hemodialysis)  Outcome: Ongoing (interventions implemented as appropriate)    Problem: Chronic Kidney Disease/End Stage Renal Disease (Adult)  Goal: Signs and Symptoms of Listed Potential Problems Will be Absent or Manageable (Chronic Kidney Disease/End Stage Renal Disease)  Outcome: Outcome(s) achieved Date Met:  08/14/17       · Continue Sinemet  · Follow up with Neurology